# Patient Record
Sex: MALE | Race: WHITE | NOT HISPANIC OR LATINO | Employment: OTHER | URBAN - METROPOLITAN AREA
[De-identification: names, ages, dates, MRNs, and addresses within clinical notes are randomized per-mention and may not be internally consistent; named-entity substitution may affect disease eponyms.]

---

## 2017-02-09 ENCOUNTER — TELEPHONE (OUTPATIENT)
Dept: DERMATOLOGY | Facility: CLINIC | Age: 47
End: 2017-02-09

## 2017-02-09 NOTE — TELEPHONE ENCOUNTER
RN called pt and pt states that he has never had a skin check before. RN notified pt that skin check is okay on 3/10/17. Appt notes updated...Cathryn Ross RN

## 2017-02-09 NOTE — TELEPHONE ENCOUNTER
[facility] Call Center    Phone Message    Name of Caller: SAUMYA VAIL    Phone Number: Home number on file [phone] (home)    Best time to return call: ANY. Has appt on 3.10.17 with Dr Woods.  Wants to know if she can do a skin check at that appt.    May a detailed message be left on voicemail: yes    Relation to patient: Self    Reason for Call: Other: Call and advise patient. Thanks.     Action Taken: Message routed to:  Adult Clinics: Dermatology p [phone]

## 2017-03-10 ENCOUNTER — OFFICE VISIT (OUTPATIENT)
Dept: DERMATOLOGY | Facility: CLINIC | Age: 47
End: 2017-03-10
Payer: COMMERCIAL

## 2017-03-10 DIAGNOSIS — B07.9 VIRAL WARTS, UNSPECIFIED TYPE: Primary | ICD-10-CM

## 2017-03-10 DIAGNOSIS — D22.9 MULTIPLE BENIGN NEVI: ICD-10-CM

## 2017-03-10 DIAGNOSIS — Z92.25 HISTORY OF IMMUNOSUPPRESSION: ICD-10-CM

## 2017-03-10 PROCEDURE — 17110 DESTRUCTION B9 LES UP TO 14: CPT | Performed by: DERMATOLOGY

## 2017-03-10 NOTE — LETTER
3/10/2017       RE: Jorge Amaral  [address] [facility] [address]     Dear Colleague,    Thank you for referring your patient, Jorge Amaral, to the [facility]. Please see a copy of my visit note below.    [facility] Dermatology Note    Dermatology Problem List:  1. Bowenoid papulosis, left abdomen  -s/p biopsy 4/18/15  -s/p Efudex x 6 weeks initiated 5/6/15, marked resolved 8/2015  2. Verruca plantaris  -s/p cantherone, trichloracetic acid, cryotherapy and PDL with Dr. Chris Stoddard, s/p candida 3X  -s/p cryotherapy  3. History of cold sore  4. Hyperpigmented patches, scalp, post inflammatory hyperpigmentation 2/2 to shaving,   -s/p fluocinolone   5. Tinea versicolor, back and chest  -s/p ketoconazole 2% cream initiated 10/23/2015  6. History of immunosuppression, Humaria, Crohn's     Encounter Date: Mar 10, 2017    CC:  Chief Complaint   Patient presents with     RECHECK     History of Present Illness:  This 46 year old male presents as a follow up for verruca plantaris and total skin exam for history of immunosuppression. Reports no lesions on the genitals and declines exam there today. Reports warts on hand and foot. No other new skin concerns.     Past Medical History:   Past Medical History   Diagnosis Date     Crohn's disease (H)      Hyperlipidemia LDL goal < 160      not on meds   Fracture, metacarpal  Delayed union of fracture  ACL tear, recurrent    Past Surgical History   Procedure Laterality Date     Appendectomy       Laparoscopy procedure unlisted       C stomach surgery procedure unlisted       Open reduction internal fixation hand       right IV MCP hand 7/11     Remove hardware lower extremity  4/6/2012     Procedure:REMOVE HARDWARE LOWER EXTREMITY; REMOVAL OF RIGHT TIBIAL SCREW; Surgeon:JOSE LUIS CARMICHAEL; Location:[facility]     Ent surgery  6-28-13     Left Tonsil biopsy     Social History:  The  patient works as a  but is retiring. The patient uses 1-2  alcoholic beverages per week. The patient admits to use of tanning beds.    Family History:  There is no family history of skin cancer.  There is no family history of psoriasis, eczema or allergies.  There is a family history of asthma.    Medications:  Current Outpatient Prescriptions   Medication Sig Dispense Refill     valACYclovir (VALTREX) 1000 mg tablet two tablets by mouth twice daily for 1 day at onset of symptoms of a cold sore. 30 tablet 0     mercaptopurine (PURINETHOL) 50 MG tablet Take 50 mg by mouth daily. 1 and half tabs daily       adalimumab (HUMIRA) 40 MG/0.8ML injection Inject 40 mg Subcutaneous once.       PREDNISONE PO Take 10 mg by mouth daily Reported on 3/10/2017       No Known Allergies    Review of Systems:  -Skin: As above in HPI. No additional skin concerns.    Physical exam:  There were no vitals taken for this visit.  -This is a well developed, well-nourished male in no acute distress, in a pleasant mood.    SKIN: Focused examination of the bilateral toes was performed.  -There are verrucous papules with thrombosed capillaries interrupting dermatoglyphics on the right distal 2nd toe and left periungual toe and index finger tip.  -No other lesions of concern on areas examined.     Impression/Plan:  1. Verrucous papule, right distal 2nd toe and left periungual toe and index finger s/p cryotherapy with improvement  Cryotherapy procedure note: After verbal consent and discussion of risks and benefits including but no limited to dyspigmentation/scar, blister, and pain, 3 was treated with 1-2mm freeze border for 2 cycles with liquid nitrogen. Post cryotherapy instructions were provided.   2. History of immunosuppression- reviewed increased risks with the skin and need for regular skin exams.   Follow up in 3 weeks for wart, earlier for new or changing lesions.       Staff Involved:  Staff/January hillman  also present.     Sasha Woods MD    Department of Dermatology  [facility]: Phone: [phone], Fax:[phone]  [facility]: Phone: [phone], Fax: [phone]

## 2017-03-10 NOTE — PATIENT INSTRUCTIONS
Cryotherapy    What is it?    Use of a very cold liquid, such as liquid nitrogen, to freeze and destroy abnormal skin cells that need to be removed    What should I expect?    Tenderness and redness    A small blister that might grow and fill with dark purple blood. There may be crusting.    More than one treatment may be needed if the lesions do not go away.    How do I care for the treated area?    Gently wash the area with your hands when bathing.    Use a thin layer of Vaseline to help with healing. You may use a Band-Aid.     The area should heal within 7-10 days and may leave behind a pink or lighter color.     Do not use an antibiotic or Neosporin ointment.     You may take acetaminophen (Tylenol) for pain.     Call your Doctor if you have:    Severe pain    Signs of infection (warmth, redness, cloudy yellow drainage, and or a bad smell)    Questions or concerns    Who should I call with questions?       [facility]: [phone]       [facility]: [phone]       For urgent needs outside of business hours call the [facility] at [phone]        and ask for the dermatology resident on call

## 2017-03-10 NOTE — NURSING NOTE
Dermatology Rooming Note    Jorge Amaral's goals for this visit include:   Chief Complaint   Patient presents with     RECHECK       Is a scribe okay for this visit:YES,     Are records needed for this visit(If yes, obtain release of information): Not applicable, .     Vitals: There were no vitals taken for this visit.    Referring Provider:  No referring provider defined for this encounter.

## 2017-03-10 NOTE — MR AVS SNAPSHOT
After Visit Summary   3/10/2017    Jorge Amaral    MRN: [id number]           Patient Information     Date Of Birth          1970        Visit Information        Provider Department      3/10/2017 11:15 AM Sasha Woods MD [facility]        Today's Diagnoses     Viral warts, unspecified type    -  1    Multiple benign nevi        History of immunosuppression          Care Instructions    Cryotherapy    What is it?    Use of a very cold liquid, such as liquid nitrogen, to freeze and destroy abnormal skin cells that need to be removed    What should I expect?    Tenderness and redness    A small blister that might grow and fill with dark purple blood. There may be crusting.    More than one treatment may be needed if the lesions do not go away.    How do I care for the treated area?    Gently wash the area with your hands when bathing.    Use a thin layer of Vaseline to help with healing. You may use a Band-Aid.     The area should heal within 7-10 days and may leave behind a pink or lighter color.     Do not use an antibiotic or Neosporin ointment.     You may take acetaminophen (Tylenol) for pain.     Call your Doctor if you have:    Severe pain    Signs of infection (warmth, redness, cloudy yellow drainage, and or a bad smell)    Questions or concerns    Who should I call with questions?       [facility]: [phone]       [facility]: [phone]       For urgent needs outside of business hours call the [facility] at [phone]        and ask for the dermatology resident on call          Follow-ups after your visit        Your next 10 appointments already scheduled     Apr 04, 2017  9:15 AM CDT   [facility] Visit with Thad Lott MD   [facility]    [address] [place] [id number] [phone]            Mar 06, 2018  9:30 AM CST    [facility] Visit with Sasha Woods MD   [facility]    [address] [address] [place] [address] [phone]              Who to contact     If you have questions or need follow up information about today's clinic visit or your schedule please contact [facility] directly at [phone].  Normal or non-critical lab and imaging results will be communicated to you by [employer], letter or phone within 4 business days after the clinic has received the results. If you do not hear from us within 7 days, please contact the clinic through [employer] or phone. If you have a critical or abnormal lab result, we will notify you by phone as soon as possible.  Submit refill requests through [employer] or call your pharmacy and they will forward the refill request to us. Please allow 3 business days for your refill to be completed.          Additional Information About Your Visit        [employer] Information     [employer] gives you secure access to your electronic health record. If you see a primary care provider, you can also send messages to your care team and make appointments. If you have questions, please call your primary care clinic.  If you do not have a primary care provider, please call [phone] and they will assist you.      [employer] is an electronic gateway that provides easy, online access to your medical records. With [employer], you can request a clinic appointment, read your test results, renew a prescription or communicate with your care team.     To access your existing account, please contact your [facility] or call [phone] for assistance.        Care EveryWhere ID     This is your Care EveryWhere ID. This could be used by other organizations to access your [facility] medical records  [id number]         Blood Pressure from Last 3 Encounters:   12/09/16 107/73   08/10/16 123/65   03/08/16 117/74    Weight from Last 3  Encounters:   12/09/16 100.2 kg (221 lb)   08/10/16 97.5 kg (215 lb)   03/08/16 98.9 kg (218 lb)              Today, you had the following     No orders found for display       Primary Care Provider Office Phone # Fax #    Vince Swain PA-C [phone]       [facility] [address] [facility] [address]        Thank you!     Thank you for choosing [facility]  for your care. Our goal is always to provide you with excellent care. Hearing back from our patients is one way we can continue to improve our services. Please take a few minutes to complete the written survey that you may receive in the mail after your visit with us. Thank you!             Your Updated Medication List - Protect others around you: Learn how to safely use, store and throw away your medicines at www.disposemymeds.org.          This list is accurate as of: 3/10/17 12:07 PM.  Always use your most recent med list.                   Brand Name Dispense Instructions for use    HUMIRA 40 MG/0.8ML injection   Generic drug:  adalimumab      Inject 40 mg Subcutaneous once.       PREDNISONE PO      Take 10 mg by mouth daily Reported on 3/10/2017       PURINETHOL 50 MG tablet CHEMO   Generic drug:  mercaptopurine      Take 50 mg by mouth daily. 1 and half tabs daily       valACYclovir 1000 mg tablet    VALTREX    30 tablet    two tablets by mouth twice daily for 1 day at onset of symptoms of a cold sore.

## 2017-03-10 NOTE — NURSING NOTE
[facility] Dermatology Note      Dermatology Problem List:  1. History of immunosuppression, Cronh's  2. Bowenoid papulosis, left abdomen  -s/p biopsy 4/18/15  -s/p Efudex x 6 weeks initiated 5/6/15, marked resolved 8/2015  3. Verruca plantaris  -Previous Tx: cantherone, trichloracetic acid, cryotherapy and PDL with Dr. Chris Stoddard, s/p candida 3X, cryotherapy  4. Tinea versicolor, back and chest  -Previous Tx:  ketoconazole 2% cream initiated 10/23/2015    Encounter Date: Mar 10, 2017    CC:  Chief Complaint   Patient presents with     RECHECK         History of Present Illness:  Mr. Jorge Amaral is a 46 year old male who presents as a follow-up for warts. The patient was last seen 7/14/2016 when 1 wart was treated with cryotherapy and 1 treated with cantherone. Today, the patient reports new warts on the left 5th finger and right 2nd toe. The patient reports no other lesions of concern.      Past Medical History:   Patient Active Problem List   Diagnosis     Crohns disease (H)     CARDIOVASCULAR SCREENING; LDL GOAL LESS THAN 160     Fracture, metacarpal     Delayed union of fracture     Family history of diabetes mellitus     ACL (anterior cruciate ligament) tear--recurrent     Cold sore     Common wart     Past Medical History   Diagnosis Date     Crohn's disease (H)      Hyperlipidemia LDL goal < 160      not on meds     Past Surgical History   Procedure Laterality Date     Appendectomy       Laparoscopy procedure unlisted       C stomach surgery procedure unlisted       Open reduction internal fixation hand       right IV MCP hand 7/11     Remove hardware lower extremity  4/6/2012     Procedure:REMOVE HARDWARE LOWER EXTREMITY; REMOVAL OF RIGHT TIBIAL SCREW; Surgeon:JOSE LUIS CARMICHAEL; Location:[facility]     Ent surgery  6-28-13     Left Tonsil biopsy     Social History:  The patient works as a  but is retiring. The patient uses 1-2 alcoholic beverages per week. The  patient admits to use of tanning beds.     Family History:  There is no family history of skin cancer.  There is no family history of psoriasis, eczema or allergies.  There is a family history of asthma.    Medications:  Current Outpatient Prescriptions   Medication Sig Dispense Refill     valACYclovir (VALTREX) 1000 mg tablet two tablets by mouth twice daily for 1 day at onset of symptoms of a cold sore. 30 tablet 0     mercaptopurine (PURINETHOL) 50 MG tablet Take 50 mg by mouth daily. 1 and half tabs daily       adalimumab (HUMIRA) 40 MG/0.8ML injection Inject 40 mg Subcutaneous once.       PREDNISONE PO Take 10 mg by mouth daily Reported on 3/10/2017       No Known Allergies    Review of Systems:  -Skin: As above in HPI. No additional skin concerns.    Physical exam:  Vitals: There were no vitals taken for this visit.  GEN: This is a well developed, well-nourished male in no acute distress, in a pleasant mood.    SKIN: Total skin excluding the undergarment areas was performed. The exam included the head/face, neck, both arms, chest, back, abdomen, both legs, digits and/or nails.   -There are verrucous papules with thrombosed capillaries interrupting dermatoglyphics on the right 2nd toe, left 3rd toe and left 2nd finger.  -Multiple regular brown pigmented macules and papules are identified on the trunk.   -No other lesions of concern on areas examined.     Impression/Plan:  1. History of immunosuppression  2. Verrucous plantaris, right 2nd toe, left 3rd toe    Cryotherapy procedure note: After verbal consent and discussion of risks and benefits including but no limited to dyspigmentation/scar, blister, and pain, 2 were treated with 1-2mm freeze border for 2 cycles with liquid nitrogen. Post cryotherapy instructions were provided.  3. Verrucous vulgaris, left 2nd finger    Cryotherapy procedure note: After verbal consent and discussion of risks and benefits including but no limited to dyspigmentation/scar,  blister, and pain, 1 was  treated with 1-2mm freeze border for 2 cycles with liquid nitrogen. Post cryotherapy instructions were provided.   4. Multiple clinically benign nevi on the trunk    Discussed benign nature, no further intervention required at this time.     Follow up in 3 weeks for warts, earlier for new or changing lesions.     Staff Involved:  Scribe/Staff    Scribe Disclosure:   I, Kaykay Naranjo, am serving as a scribe to document services personally performed by Dr. Sasha Woods, based on data collection and the provider's statements to me.

## 2017-03-11 NOTE — PROGRESS NOTES
[facility] Dermatology Note    Dermatology Problem List:  1. Bowenoid papulosis, left abdomen  -s/p biopsy 4/18/15  -s/p Efudex x 6 weeks initiated 5/6/15, marked resolved 8/2015  2. Verruca plantaris  -s/p cantherone, trichloracetic acid, cryotherapy and PDL with Dr. Chris Stoddard, s/p candida 3X  -s/p cryotherapy  3. History of cold sore  4. Hyperpigmented patches, scalp, post inflammatory hyperpigmentation 2/2 to shaving,   -s/p fluocinolone   5. Tinea versicolor, back and chest  -s/p ketoconazole 2% cream initiated 10/23/2015  6. History of immunosuppression, Humaria, Crohn's     Encounter Date: Mar 10, 2017    CC:  Chief Complaint   Patient presents with     RECHECK     History of Present Illness:  This 46 year old male presents as a follow up for verruca plantaris and total skin exam for history of immunosuppression. Reports no lesions on the genitals and declines exam there today. Reports warts on hand and foot. No other new skin concerns.     Past Medical History:   Past Medical History   Diagnosis Date     Crohn's disease (H)      Hyperlipidemia LDL goal < 160      not on meds   Fracture, metacarpal  Delayed union of fracture  ACL tear, recurrent    Past Surgical History   Procedure Laterality Date     Appendectomy       Laparoscopy procedure unlisted       C stomach surgery procedure unlisted       Open reduction internal fixation hand       right IV MCP hand 7/11     Remove hardware lower extremity  4/6/2012     Procedure:REMOVE HARDWARE LOWER EXTREMITY; REMOVAL OF RIGHT TIBIAL SCREW; Surgeon:JOSE LUIS CARMICHAEL; Location:[facility]     Ent surgery  6-28-13     Left Tonsil biopsy     Social History:  The patient works as a  but is retiring. The patient uses 1-2  alcoholic beverages per week. The patient admits to use of tanning beds.    Family History:  There is no family history of skin cancer.  There is no family history of psoriasis, eczema or allergies.  There is  a family history of asthma.    Medications:  Current Outpatient Prescriptions   Medication Sig Dispense Refill     valACYclovir (VALTREX) 1000 mg tablet two tablets by mouth twice daily for 1 day at onset of symptoms of a cold sore. 30 tablet 0     mercaptopurine (PURINETHOL) 50 MG tablet Take 50 mg by mouth daily. 1 and half tabs daily       adalimumab (HUMIRA) 40 MG/0.8ML injection Inject 40 mg Subcutaneous once.       PREDNISONE PO Take 10 mg by mouth daily Reported on 3/10/2017       No Known Allergies    Review of Systems:  -Skin: As above in HPI. No additional skin concerns.    Physical exam:  There were no vitals taken for this visit.  -This is a well developed, well-nourished male in no acute distress, in a pleasant mood.    SKIN: Focused examination of the bilateral toes was performed.  -There are verrucous papules with thrombosed capillaries interrupting dermatoglyphics on the right distal 2nd toe and left periungual toe and index finger tip.  -No other lesions of concern on areas examined.     Impression/Plan:  1. Verrucous papule, right distal 2nd toe and left periungual toe and index finger s/p cryotherapy with improvement  Cryotherapy procedure note: After verbal consent and discussion of risks and benefits including but no limited to dyspigmentation/scar, blister, and pain, 3 was treated with 1-2mm freeze border for 2 cycles with liquid nitrogen. Post cryotherapy instructions were provided.   2. History of immunosuppression- reviewed increased risks with the skin and need for regular skin exams.   Follow up in 3 weeks for wart, earlier for new or changing lesions.       Staff Involved:  Staff/January hillman also present.     Sasha Woods MD    Department of Dermatology  [facility]: Phone: [phone], Fax:[phone]  [facility]: Phone: [phone], Fax:  [phone]

## 2017-04-28 ENCOUNTER — RADIANT APPOINTMENT (OUTPATIENT)
Dept: GENERAL RADIOLOGY | Facility: CLINIC | Age: 47
End: 2017-04-28
Attending: FAMILY MEDICINE
Payer: COMMERCIAL

## 2017-04-28 ENCOUNTER — OFFICE VISIT (OUTPATIENT)
Dept: FAMILY MEDICINE | Facility: CLINIC | Age: 47
End: 2017-04-28
Payer: COMMERCIAL

## 2017-04-28 VITALS
BODY MASS INDEX: 27.34 KG/M2 | TEMPERATURE: 97 F | WEIGHT: 213 LBS | DIASTOLIC BLOOD PRESSURE: 70 MMHG | SYSTOLIC BLOOD PRESSURE: 105 MMHG | OXYGEN SATURATION: 96 % | HEART RATE: 65 BPM | HEIGHT: 74 IN

## 2017-04-28 DIAGNOSIS — M79.672 PAIN OF LEFT HEEL: Primary | ICD-10-CM

## 2017-04-28 DIAGNOSIS — M79.672 PAIN OF LEFT HEEL: ICD-10-CM

## 2017-04-28 PROCEDURE — 73650 X-RAY EXAM OF HEEL: CPT | Mod: LT

## 2017-04-28 PROCEDURE — 99213 OFFICE O/P EST LOW 20 MIN: CPT | Performed by: FAMILY MEDICINE

## 2017-04-28 NOTE — MR AVS SNAPSHOT
After Visit Summary   4/28/2017    Jorge Amaral    MRN: [id number]           Patient Information     Date Of Birth          1970        Visit Information        Provider Department      4/28/2017 10:50 AM Candido Dozier MD [facility]        Today's Diagnoses     Pain of left heel    -  1      Care Instructions    1. Your xray is fine.    2. Do achilles stretch on a step, achilles stretch against the wall, roll tennis ball under the foot.    3. Wear well padded shoes. Otherwise you can try a heel cup (gel type) which you can get from any pharmacy.    4. If the pain is not getting better see our foot specialist [phone].          Follow-ups after your visit        Your next 10 appointments already scheduled     May 09, 2017 10:15 AM CDT   Return Visit with Thad Lott MD   [facility]    [address] [place] [id number] [phone]            Mar 06, 2018  9:30 AM CST   Return Visit with Sasha Woods MD   [facility] ([facility])    [address] [place] [id number] [phone]              Who to contact     If you have questions or need follow up information about today's clinic visit or your schedule please contact [facility] directly at [phone].  Normal or non-critical lab and imaging results will be communicated to you by MyChart, letter or phone within 4 business days after the clinic has received the results. If you do not hear from us within 7 days, please contact the clinic through MyChart or phone. If you have a critical or abnormal lab result, we will notify you by phone as soon as possible.  Submit refill requests through Mind Palette or call your pharmacy and they will forward the refill request to us. Please allow 3 business days for your refill to be completed.          Additional Information About Your Visit        [facility]  "Information     Roque gives you secure access to your electronic health record. If you see a primary care provider, you can also send messages to your care team and make appointments. If you have questions, please call your primary care clinic.  If you do not have a primary care provider, please call [phone] and they will assist you.        Care EveryWhere ID     This is your Care EveryWhere ID. This could be used by other organizations to access your [place] medical records  [id number]        Your Vitals Were     Pulse Temperature Height Pulse Oximetry BMI (Body Mass Index)       65 97  F (36.1  C) (Oral) 6' 2\" (1.88 m) 96% 27.35 kg/m2        Blood Pressure from Last 3 Encounters:   04/28/17 105/70   12/09/16 107/73   08/10/16 123/65    Weight from Last 3 Encounters:   04/28/17 213 lb (96.6 kg)   12/09/16 221 lb (100.2 kg)   08/10/16 215 lb (97.5 kg)               Primary Care Provider Office Phone # Fax #    Vince Swain PA-C [phone]       [facility] [address] [facility] [address]        Thank you!     Thank you for choosing [facility]  for your care. Our goal is always to provide you with excellent care. Hearing back from our patients is one way we can continue to improve our services. Please take a few minutes to complete the written survey that you may receive in the mail after your visit with us. Thank you!             Your Updated Medication List - Protect others around you: Learn how to safely use, store and throw away your medicines at www.disposemymeds.org.          This list is accurate as of: 4/28/17 12:19 PM.  Always use your most recent med list.                   Brand Name Dispense Instructions for use    HUMIRA 40 MG/0.8ML injection   Generic drug:  adalimumab      Inject 40 mg Subcutaneous once.       PREDNISONE PO      Take 10 mg by mouth daily Reported on 3/10/2017       PURINETHOL 50 MG tablet CHEMO   Generic drug:  " mercaptopurine      Take 50 mg by mouth daily. 1 and half tabs daily       valACYclovir 1000 mg tablet    VALTREX    30 tablet    two tablets by mouth twice daily for 1 day at onset of symptoms of a cold sore.

## 2017-04-28 NOTE — PATIENT INSTRUCTIONS
1. Your xray is fine.    2. Do achilles stretch on a step, achilles stretch against the wall, roll tennis ball under the foot.    3. Wear well padded shoes. Otherwise you can try a heel cup (gel type) which you can get from any pharmacy.    4. If the pain is not getting better see our foot specialist [phone].

## 2017-04-28 NOTE — PROGRESS NOTES
"  HPI:    Jorge Amaral is an 46 year old male who presents for evaluation of left heel pain.    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 Also he was in [employer] tournament and may have kicked using his left heel.  Location: bottom of feet over the calcaneous on the plantar aspect  Severity: mild - he says he just wants to make sure there is nothing serious  Aggravated by: walking after prolonged inactivity  Alleviated by: activity  Other symptoms: none      XR CALCANEUS LT G/E 2 VW 4/28/2017 11:55 AM     HISTORY: Pain.     COMPARISON: None.         IMPRESSION: No evidence of acute fracture. Tiny calcaneal bone spur.     YARED GABRIEL MD    ROS:    No joint swelling. No numbness.     Exam:    /70 (Cuff Size: Adult Large)  Pulse 65  Temp 97  F (36.1  C) (Oral)  Ht 6' 2\" (1.88 m)  Wt 213 lb (96.6 kg)  SpO2 96%  BMI 27.35 kg/m2    Gen: Healthy appearing male in no acute distress  CV: Left DP pulses normal. The tips of the toes are warm with good capillary refills.  MS: there is typical tenderness over the left heel on the plantar aspects. There is also tenderness when squeezing the calcaneous. Otherwise both feet normal by inspection and palpation.      Assessment and Plan - Decision Making    1. Pain of left heel  See below.  - XR Calcaneus Left G/E 2 Views; Future      Written instructions given as follows:    Patient Instructions   1. Your xray is fine.    2. Do achilles stretch on a step, achilles stretch against the wall, roll tennis ball under the foot.    3. Wear well padded shoes. Otherwise you can try a heel cup (gel type) which you can get from any pharmacy.    4. If the pain is not getting better see our foot specialist [phone].        "

## 2017-05-09 ENCOUNTER — OFFICE VISIT (OUTPATIENT)
Dept: DERMATOLOGY | Facility: CLINIC | Age: 47
End: 2017-05-09
Payer: COMMERCIAL

## 2017-05-09 DIAGNOSIS — D84.9 IMMUNOSUPPRESSION (H): ICD-10-CM

## 2017-05-09 DIAGNOSIS — B07.8 PERIUNGUAL WART: ICD-10-CM

## 2017-05-09 DIAGNOSIS — L82.1 SEBORRHEIC KERATOSIS: ICD-10-CM

## 2017-05-09 DIAGNOSIS — B07.8 COMMON WART: Primary | ICD-10-CM

## 2017-05-09 PROCEDURE — 17110 DESTRUCTION B9 LES UP TO 14: CPT | Performed by: DERMATOLOGY

## 2017-05-09 PROCEDURE — 99212 OFFICE O/P EST SF 10 MIN: CPT | Mod: 25 | Performed by: DERMATOLOGY

## 2017-05-09 RX ORDER — FLUOROURACIL 50 MG/G
CREAM TOPICAL
Qty: 40 G | Refills: 0 | Status: SHIPPED | OUTPATIENT
Start: 2017-05-09 | End: 2018-05-16

## 2017-05-09 NOTE — PROGRESS NOTES
[facility] Dermatology Note      Dermatology Problem List:  1. History of immunosuppression, Cronh's  2. Bowenoid papulosis, left abdomen  -s/p biopsy 4/18/15  -s/p Efudex x 6 weeks initiated 5/6/15, marked resolved 8/2015  3. Verruca plantaris  -Previous Tx: cantherone, trichloracetic acid, cryotherapy and PDL with Dr. Chris Stoddard, s/p candida 3X, cryotherapy  4. Tinea versicolor, back and chest  -Previous Tx:  ketoconazole 2% cream initiated 10/23/2015    Encounter Date: May 9, 2017    CC:  Chief Complaint   Patient presents with     RECHECK         History of Present Illness:  Mr. Jorge Amaral is a 46 year old male with history of immunosuppression presents as a follow-up for warts. The patient was last seen 3/10/2017 by Dr. Woods when 3 warts were treated with cryotherapy. Today, the patient reports a new lesion on the right forearm that used to be raised but has since scratched off. The patient reports no other lesions of concern.     Past Medical History:   Patient Active Problem List   Diagnosis     Crohns disease (H)     CARDIOVASCULAR SCREENING; LDL GOAL LESS THAN 160     Fracture, metacarpal     Delayed union of fracture     Family history of diabetes mellitus     ACL (anterior cruciate ligament) tear--recurrent     Cold sore     Common wart     Past Medical History:   Diagnosis Date     Crohn's disease (H)      Hyperlipidemia LDL goal < 160     not on meds     Past Surgical History:   Procedure Laterality Date     APPENDECTOMY       C STOMACH SURGERY PROCEDURE UNLISTED       ENT SURGERY  6-28-13    Left Tonsil biopsy     LAPAROSCOPY PROCEDURE UNLISTED       OPEN REDUCTION INTERNAL FIXATION HAND      right IV MCP hand 7/11     REMOVE HARDWARE LOWER EXTREMITY  4/6/2012    Procedure:REMOVE HARDWARE LOWER EXTREMITY; REMOVAL OF RIGHT TIBIAL SCREW; Surgeon:JOSE LUIS CARMICHAEL; Location:[facility]     Social History:  The patient works as a  but is retiring. The  patient uses 1-2  alcoholic beverages per week. The patient admits to use of tanning beds.    Family History:  There is no family history of skin cancer.  There is no family history of psoriasis, eczema or allergies.  There is a family history of asthma.    Medications:  Current Outpatient Prescriptions   Medication Sig Dispense Refill     PREDNISONE PO Take 10 mg by mouth daily Reported on 3/10/2017       valACYclovir (VALTREX) 1000 mg tablet two tablets by mouth twice daily for 1 day at onset of symptoms of a cold sore. 30 tablet 0     mercaptopurine (PURINETHOL) 50 MG tablet Take 50 mg by mouth daily. 1 and half tabs daily       adalimumab (HUMIRA) 40 MG/0.8ML injection Inject 40 mg Subcutaneous once.       No Known Allergies    Review of Systems:  -Skin: As above in HPI. No additional skin concerns.    Physical exam:  There were no vitals taken for this visit.  -This is a well developed, well-nourished male in no acute distress, in a pleasant mood.    SKIN: Focused examination of the hands and feet was performed.  -2mm papilloma on the left pinky.  -There is verrucous papules with thrombosed capillaries interrupting dermatoglyphics on the right 2nd toe hyponychium.   -There is a waxy stuck on tan to brown papule on the right forearm.  -No other lesions of concern on areas examined.     Impression/Plan:  1. Verrucous vulgaris, right 2nd toe hyponychium, left 5th finger with Immunosuppression which makes treatment of warts difficult AND most immunologically based treatments such as imiquimod to not work.  Cryotherapy procedure note: After verbal consent and discussion of risks and benefits including but no limited to dyspigmentation/scar, blister, and pain, 2 were treated with 1-2mm freeze border for 2 cycles with liquid nitrogen. Post cryotherapy instructions were provided.   Start Efudex twice daily for 2-3 weeks or until the onset of irritation.   2. Seborrheic keratosis, right forearm    No further  intervention required at this time.     Follow up in 4-8 weeks, earlier for new or changing lesions.     Staff Involved:  Staff/Scribe    Scribe Disclosure:   I, Kaykay Naranjo, am serving as a scribe to document services personally performed by Dr. Thad Lott, based on data collection and the provider's statements to me.      Provider Disclosure:   I have reviewed the documentation recorded by the scribe and have edited it as needed. I have personally performed the services documented here and the documentation accurately represents those services and the decisions made by me.     Thad Lott MD, MS    Department of Dermatology  [facility]: Phone: [phone], Fax:[phone]  [facility]: Phone: [phone], Fax: [phone]

## 2017-05-09 NOTE — PATIENT INSTRUCTIONS
Efudex Treatment    Today, you are being prescribed Fluorouracil (Efudex) a topical cream used for the treatment of Actinic Keratosis (AK's).  The medication is working to eliminate the unhealthy cells. Even though this treatment may be unattractive and somewhat uncomfortable.    Your treatment will last twice daily for 2-3 weeks or until the onset of irritation on the right 2nd toe and left pinky finger.  You may experience some mild discomfort while being treated.    You will want to stop any other creams such as glycolic acid products, retin A, Tazorac, etc. to the area. You may use bland makeup/cover-up as long as it doesn't sting or cause you discomfort.    Apply the cream at night as your physician recommends. Use a cotton-tipped applicator, or use gloves if applying it with your fingertips. If applied with unprotected fingertips, it is important to wash your hands well after you apply this medicine.     Keep this medication away from pets.    We recommend avoiding excessive sun exposure to the treated area    You may use moisturizing creams over bothersome areas such as Vanicream or Cetaphil cream if the reaction becomes too bothersome. Please, call the clinic if this occurs.   Potential Side Effects    Your treated areas may be unsightly during therapy.  This will improve slowly following the discontinuation of therapy.     During the first week of application, mild inflammation may occur.     During the following weeks, redness, and swelling may occur with some crusting and burning.     Lesions resolve as the skin exfoliates.     Over 1 to 2 weeks, new skin grows into the treatment area.    Keep this medication away from pets  Specific side effects that usually do not require medical attention (report to your doctor or health care professional if they continue or are bothersome) include: Red or dark-colored skin     Mild erosion (loss of upper layer of skin)     Mild eye irritation including burning,  itching, sensitivity, stinging, or watering     Increased sensitivity of the skin to sun and ultraviolet light     Pain and burning of the affected area     Dryness, scaling or swelling of the affected area     Skin rash, itching of the affected area     Tenderness   Who should I call with questions?     [facility]: [phone]     [facility]: [phone]     For urgent needs outside of business hours call the [facility] at [phone]  and ask for the dermatology resident on call        Cryotherapy    What is it?    Use of a very cold liquid, such as liquid nitrogen, to freeze and destroy abnormal skin cells that need to be removed    What should I expect?    Tenderness and redness    A small blister that might grow and fill with dark purple blood. There may be crusting.    More than one treatment may be needed if the lesions do not go away.    How do I care for the treated area?    Gently wash the area with your hands when bathing.    Use a thin layer of Vaseline to help with healing. You may use a Band-Aid.     The area should heal within 7-10 days and may leave behind a pink or lighter color.     Do not use an antibiotic or Neosporin ointment.     You may take acetaminophen (Tylenol) for pain.     Call your Doctor if you have:    Severe pain    Signs of infection (warmth, redness, cloudy yellow drainage, and or a bad smell)    Questions or concerns    Who should I call with questions?       [facility]: [phone]       [facility]: [phone]       For urgent needs outside of business hours call the [facility] at [phone]        and ask for the dermatology resident on call

## 2017-05-09 NOTE — MR AVS SNAPSHOT
After Visit Summary   5/9/2017    Jorge Amaral    MRN: [id number]           Patient Information     Date Of Birth          1970        Visit Information        Provider Department      5/9/2017 10:15 AM Thad Lott MD [facility]        Today's Diagnoses     Common wart    -  1    Periungual wart        Seborrheic keratosis        Immunosuppression (H)          Care Instructions    Efudex Treatment    Today, you are being prescribed Fluorouracil (Efudex) a topical cream used for the treatment of Actinic Keratosis (AK's).  The medication is working to eliminate the unhealthy cells. Even though this treatment may be unattractive and somewhat uncomfortable.    Your treatment will last twice daily for 2-3 weeks or until the onset of irritation on the right 2nd toe and left pinky finger.  You may experience some mild discomfort while being treated.    You will want to stop any other creams such as glycolic acid products, retin A, Tazorac, etc. to the area. You may use bland makeup/cover-up as long as it doesn't sting or cause you discomfort.    Apply the cream at night as your physician recommends. Use a cotton-tipped applicator, or use gloves if applying it with your fingertips. If applied with unprotected fingertips, it is important to wash your hands well after you apply this medicine.     Keep this medication away from pets.    We recommend avoiding excessive sun exposure to the treated area    You may use moisturizing creams over bothersome areas such as Vanicream or Cetaphil cream if the reaction becomes too bothersome. Please, call the clinic if this occurs.   Potential Side Effects    Your treated areas may be unsightly during therapy.  This will improve slowly following the discontinuation of therapy.     During the first week of application, mild inflammation may occur.     During the following weeks, redness, and swelling may occur with some crusting and burning.      Lesions resolve as the skin exfoliates.     Over 1 to 2 weeks, new skin grows into the treatment area.    Keep this medication away from pets  Specific side effects that usually do not require medical attention (report to your doctor or health care professional if they continue or are bothersome) include: Red or dark-colored skin     Mild erosion (loss of upper layer of skin)     Mild eye irritation including burning, itching, sensitivity, stinging, or watering     Increased sensitivity of the skin to sun and ultraviolet light     Pain and burning of the affected area     Dryness, scaling or swelling of the affected area     Skin rash, itching of the affected area     Tenderness   Who should I call with questions?     [facility]: [phone]     [facility]: [phone]     For urgent needs outside of business hours call the [facility] at [phone]  and ask for the dermatology resident on call        Cryotherapy    What is it?    Use of a very cold liquid, such as liquid nitrogen, to freeze and destroy abnormal skin cells that need to be removed    What should I expect?    Tenderness and redness    A small blister that might grow and fill with dark purple blood. There may be crusting.    More than one treatment may be needed if the lesions do not go away.    How do I care for the treated area?    Gently wash the area with your hands when bathing.    Use a thin layer of Vaseline to help with healing. You may use a Band-Aid.     The area should heal within 7-10 days and may leave behind a pink or lighter color.     Do not use an antibiotic or Neosporin ointment.     You may take acetaminophen (Tylenol) for pain.     Call your Doctor if you have:    Severe pain    Signs of infection (warmth, redness, cloudy yellow drainage, and or a bad smell)    Questions or concerns    Who should I call with questions?       [facility]  [place]: [phone]       [facility]: [phone]       For urgent needs outside of business hours call the [facility] at [phone]        and ask for the dermatology resident on call          Follow-ups after your visit        Your next 10 appointments already scheduled     Mar 06, 2018  9:30 AM CST   Return Visit with Sasha Woods MD   [facility]    [address] [place] [id number] [phone]              Who to contact     If you have questions or need follow up information about today's clinic visit or your schedule please contact [facility] directly at [phone].  Normal or non-critical lab and imaging results will be communicated to you by Eyegroovehart, letter or phone within 4 business days after the clinic has received the results. If you do not hear from us within 7 days, please contact the clinic through Eyegroovehart or phone. If you have a critical or abnormal lab result, we will notify you by phone as soon as possible.  Submit refill requests through Lanica or call your pharmacy and they will forward the refill request to us. Please allow 3 business days for your refill to be completed.          Additional Information About Your Visit        Lanica Information     Lanica gives you secure access to your electronic health record. If you see a primary care provider, you can also send messages to your care team and make appointments. If you have questions, please call your primary care clinic.  If you do not have a primary care provider, please call [phone] and they will assist you.      Lanica is an electronic gateway that provides easy, online access to your medical records. With Lanica, you can request a clinic appointment, read your test results, renew a prescription or communicate with your care team.     To access your existing account, please contact your [facility]  [facility] or call [phone] for assistance.        Care EveryWhere ID     This is your Care EveryWhere ID. This could be used by other organizations to access your [place] medical records  [id number]         Blood Pressure from Last 3 Encounters:   04/28/17 105/70   12/09/16 107/73   08/10/16 123/65    Weight from Last 3 Encounters:   04/28/17 213 lb (96.6 kg)   12/09/16 221 lb (100.2 kg)   08/10/16 215 lb (97.5 kg)              We Performed the Following     DESTRUCT BENIGN LESION, UP TO 14          Today's Medication Changes          These changes are accurate as of: 5/9/17 10:54 AM.  If you have any questions, ask your nurse or doctor.               Start taking these medicines.        Dose/Directions    fluorouracil 5 % cream   Commonly known as:  EFUDEX   Used for:  Common wart, Periungual wart, Immunosuppression (H)        Apply to the wart at night and cover. Do for 2-3 weeks then stop to see if wart is still there after healing.   Quantity:  40 g   Refills:  0         Stop taking these medicines if you haven't already. Please contact your care team if you have questions.     PREDNISONE PO                Where to get your medicines      These medications were sent to [facility]/pharmacy #[id number] - [facility] [place] - [address] [facility] [address]     Phone:  [phone]     fluorouracil 5 % cream                Primary Care Provider Office Phone # Fax #    Vince Swain PA-C [phone]       [facility] [address] [facility] [address]        Thank you!     Thank you for choosing [facility]  for your care. Our goal is always to provide you with excellent care. Hearing back from our patients is one way we can continue to improve our services. Please take a few minutes to complete the written survey that you may receive in the mail after your visit with us. Thank you!              Your Updated Medication List - Protect others around you: Learn how to safely use, store and throw away your medicines at www.disposemymeds.org.          This list is accurate as of: 5/9/17 10:54 AM.  Always use your most recent med list.                   Brand Name Dispense Instructions for use    fluorouracil 5 % cream    EFUDEX    40 g    Apply to the wart at night and cover. Do for 2-3 weeks then stop to see if wart is still there after healing.       HUMIRA 40 MG/0.8ML injection   Generic drug:  adalimumab      Inject 40 mg Subcutaneous once.       PURINETHOL 50 MG tablet CHEMO   Generic drug:  mercaptopurine      Take 50 mg by mouth daily. 1 and half tabs daily       valACYclovir 1000 mg tablet    VALTREX    30 tablet    two tablets by mouth twice daily for 1 day at onset of symptoms of a cold sore.

## 2017-05-09 NOTE — NURSING NOTE
Dermatology Rooming Note    Jorge Amaral's goals for this visit include:   Chief Complaint   Patient presents with     RECHECK       Is a scribe okay for this visit:YES,     Are records needed for this visit(If yes, obtain release of information): No,      Vitals: There were no vitals taken for this visit.    Referring Provider:  ESTABLISHED PATIENT  No address on file

## 2017-08-10 NOTE — PROGRESS NOTES
SUBJECTIVE:   CC: Jorge Amaral is an 47 year old male who presents for preventative health visit.     Healthy Habits:    Do you get at least three servings of calcium containing foods daily (dairy, green leafy vegetables, etc.)? yes    Amount of exercise or daily activities, outside of work: 4-5 days a week    Problems taking medications regularly No    Medication side effects: No    Have you had an eye exam in the past two years? yes    Do you see a dentist twice per year? yes    Do you have sleep apnea, excessive snoring or daytime drowsiness?no      No concerns  History of crohns and see's GI DrKatalina   History of cold sore. About 1 a years. Stable.     Today's PHQ-2 Score:   PHQ-2 ( 1999 Pfizer) 8/11/2017 8/10/2016   Q1: Little interest or pleasure in doing things 0 0   Q2: Feeling down, depressed or hopeless 0 0   PHQ-2 Score 0 0       Abuse: Current or Past(Physical, Sexual or Emotional)- No  Do you feel safe in your environment - Yes    Social History   Substance Use Topics     Smoking status: Current Some Day Smoker     Types: Cigars     Smokeless tobacco: Never Used      Comment: very rarely      Alcohol use Yes      Comment: RARE     The patient does not drink >3 drinks per day nor >7 drinks per week.    Last PSA: No results found for: PSA    Reviewed orders with patient. Reviewed health maintenance and updated orders accordingly - Yes  Labs reviewed in EPIC  BP Readings from Last 3 Encounters:   08/11/17 115/73   04/28/17 105/70   12/09/16 107/73    Wt Readings from Last 3 Encounters:   08/11/17 216 lb (98 kg)   04/28/17 213 lb (96.6 kg)   12/09/16 221 lb (100.2 kg)                  Patient Active Problem List   Diagnosis     Crohns disease (H)     CARDIOVASCULAR SCREENING; LDL GOAL LESS THAN 160     Fracture, metacarpal     Delayed union of fracture     Family history of diabetes mellitus     ACL (anterior cruciate ligament) tear--recurrent     Cold sore     Common wart     Crohn's disease with  complication, unspecified gastrointestinal tract location (H)     Herpes labialis     FH: prostate cancer     Past Surgical History:   Procedure Laterality Date     APPENDECTOMY       C STOMACH SURGERY PROCEDURE UNLISTED       ENT SURGERY  6-28-13    Left Tonsil biopsy     LAPAROSCOPY PROCEDURE UNLISTED       OPEN REDUCTION INTERNAL FIXATION HAND      right IV MCP hand 7/11     REMOVE HARDWARE LOWER EXTREMITY  4/6/2012    Procedure:REMOVE HARDWARE LOWER EXTREMITY; REMOVAL OF RIGHT TIBIAL SCREW; Surgeon:JOSE LUIS CARMICHAEL; Location[facility]       Social History   Substance Use Topics     Smoking status: Current Some Day Smoker     Types: Cigars     Smokeless tobacco: Never Used      Comment: very rarely      Alcohol use Yes      Comment: RARE     Family History   Problem Relation Age of Onset     Hypertension Mother      Respiratory Mother      SMOKER -EMPYSEMA     Hypertension Father      DIABETES Father      DIABETES Brother          Current Outpatient Prescriptions   Medication Sig Dispense Refill     valACYclovir (VALTREX) 1000 mg tablet two tablets by mouth twice daily for 1 day at onset of symptoms of a cold sore. 30 tablet 0     fluorouracil (EFUDEX) 5 % cream Apply to the wart at night and cover. Do for 2-3 weeks then stop to see if wart is still there after healing. 40 g 0     mercaptopurine (PURINETHOL) 50 MG tablet Take 50 mg by mouth daily. 1 and half tabs daily       adalimumab (HUMIRA) 40 MG/0.8ML injection Inject 40 mg Subcutaneous once.       [DISCONTINUED] valACYclovir (VALTREX) 1000 mg tablet two tablets by mouth twice daily for 1 day at onset of symptoms of a cold sore. 30 tablet 0     No Known Allergies        Reviewed and updated as needed this visit by clinical staffTobacco  Allergies  Meds  Problems  Med Hx  Surg Hx  Fam Hx  Soc Hx          Reviewed and updated as needed this visit by Provider  Allergies  Meds  Problems          Past Medical History:   Diagnosis Date     Crohn's disease  "(H)      Hyperlipidemia LDL goal < 160     not on meds      Past Surgical History:   Procedure Laterality Date     APPENDECTOMY       C STOMACH SURGERY PROCEDURE UNLISTED       ENT SURGERY  6-28-13    Left Tonsil biopsy     LAPAROSCOPY PROCEDURE UNLISTED       OPEN REDUCTION INTERNAL FIXATION HAND      right IV MCP hand 7/11     REMOVE HARDWARE LOWER EXTREMITY  4/6/2012    Procedure:REMOVE HARDWARE LOWER EXTREMITY; REMOVAL OF RIGHT TIBIAL SCREW; Surgeon:JOSE LUIS CARMICHAEL; Location:[facility]         ROS:  C: NEGATIVE for fever, chills, change in weight  I: NEGATIVE for worrisome rashes, moles or lesions  E: NEGATIVE for vision changes or irritation  ENT: NEGATIVE for ear, mouth and throat problems  R: NEGATIVE for significant cough or SOB  CV: NEGATIVE for chest pain, palpitations or peripheral edema  GI: NEGATIVE for nausea, abdominal pain, heartburn, or change in bowel habits   male: negative for dysuria, hematuria, decreased urinary stream, erectile dysfunction, urethral discharge  M: NEGATIVE for significant arthralgias or myalgia  N: NEGATIVE for weakness, dizziness or paresthesias  P: NEGATIVE for changes in mood or affect    OBJECTIVE:   /73  Pulse 65  Ht 6' 2\" (1.88 m)  Wt 216 lb (98 kg)  SpO2 96%  BMI 27.73 kg/m2  EXAM:  GENERAL: healthy, alert and no distress  EYES: Eyes grossly normal to inspection, PERRL and conjunctivae and sclerae normal  HENT: ear canals and TM's normal, nose and mouth without ulcers or lesions  NECK: no adenopathy, no asymmetry, masses, or scars and thyroid normal to palpation  RESP: lungs clear to auscultation - no rales, rhonchi or wheezes  CV: regular rate and rhythm, normal S1 S2, no S3 or S4, no murmur, click or rub, no peripheral edema and peripheral pulses strong  ABDOMEN: soft, nontender, no hepatosplenomegaly, no masses and bowel sounds normal   (male): normal male genitalia without lesions or urethral discharge, no hernia  MS: no gross musculoskeletal defects " "noted, no edema  SKIN: no suspicious lesions or rashes  NEURO: Normal strength and tone, mentation intact and speech normal  PSYCH: mentation appears normal, affect normal/bright    ASSESSMENT/PLAN:       ICD-10-CM    1. Routine general medical examination at a health care facility Z00.00 Lipid panel reflex to direct LDL     Basic metabolic panel   2. Crohn's disease with complication, unspecified gastrointestinal tract location (H) K50.919    3. Herpes labialis B00.1 valACYclovir (VALTREX) 1000 mg tablet   4. FH: prostate cancer Z80.42 PSA, screen   Work on Healthy diet and exercise. Getting heart rate elevated for 30 mins most days of week.  Labs pending.   Med refill  Recheck yearly.       COUNSELING:  Reviewed preventive health counseling, as reflected in patient instructions       Regular exercise       Healthy diet/nutrition    BP Screening:   Last 3 BP Readings:    BP Readings from Last 3 Encounters:   08/11/17 115/73   04/28/17 105/70   12/09/16 107/73       The following was recommended to the patient:       reports that he has been smoking Cigars.  He has never used smokeless tobacco.      Estimated body mass index is 27.73 kg/(m^2) as calculated from the following:    Height as of this encounter: 6' 2\" (1.88 m).    Weight as of this encounter: 216 lb (98 kg).         Counseling Resources:  ATP IV Guidelines  Pooled Cohorts Equation Calculator  FRAX Risk Assessment  ICSI Preventive Guidelines  Dietary Guidelines for Americans, 2010  USDA's MyPlate  ASA Prophylaxis  Lung CA Screening    Vince Swain PA-C  [facility]  "

## 2017-08-11 ENCOUNTER — OFFICE VISIT (OUTPATIENT)
Dept: FAMILY MEDICINE | Facility: CLINIC | Age: 47
End: 2017-08-11
Payer: COMMERCIAL

## 2017-08-11 VITALS
OXYGEN SATURATION: 96 % | DIASTOLIC BLOOD PRESSURE: 73 MMHG | HEIGHT: 74 IN | BODY MASS INDEX: 27.72 KG/M2 | WEIGHT: 216 LBS | SYSTOLIC BLOOD PRESSURE: 115 MMHG | HEART RATE: 65 BPM

## 2017-08-11 DIAGNOSIS — B00.1 HERPES LABIALIS: ICD-10-CM

## 2017-08-11 DIAGNOSIS — K50.919 CROHN'S DISEASE WITH COMPLICATION, UNSPECIFIED GASTROINTESTINAL TRACT LOCATION (H): ICD-10-CM

## 2017-08-11 DIAGNOSIS — Z00.00 ROUTINE GENERAL MEDICAL EXAMINATION AT A HEALTH CARE FACILITY: Primary | ICD-10-CM

## 2017-08-11 DIAGNOSIS — Z80.42 FH: PROSTATE CANCER: ICD-10-CM

## 2017-08-11 LAB
ANION GAP SERPL CALCULATED.3IONS-SCNC: 3 MMOL/L (ref 3–14)
BUN SERPL-MCNC: 11 MG/DL (ref 7–30)
CALCIUM SERPL-MCNC: 8.8 MG/DL (ref 8.5–10.1)
CHLORIDE SERPL-SCNC: 104 MMOL/L (ref 94–109)
CHOLEST SERPL-MCNC: 213 MG/DL
CO2 SERPL-SCNC: 34 MMOL/L (ref 20–32)
CREAT SERPL-MCNC: 1.07 MG/DL (ref 0.66–1.25)
GFR SERPL CREATININE-BSD FRML MDRD: 73 ML/MIN/1.7M2
GLUCOSE SERPL-MCNC: 94 MG/DL (ref 70–99)
HDLC SERPL-MCNC: 47 MG/DL
LDLC SERPL CALC-MCNC: 136 MG/DL
NONHDLC SERPL-MCNC: 166 MG/DL
POTASSIUM SERPL-SCNC: 4.4 MMOL/L (ref 3.4–5.3)
PSA SERPL-ACNC: 0.51 UG/L (ref 0–4)
SODIUM SERPL-SCNC: 141 MMOL/L (ref 133–144)
TRIGL SERPL-MCNC: 152 MG/DL

## 2017-08-11 PROCEDURE — G0103 PSA SCREENING: HCPCS | Performed by: PHYSICIAN ASSISTANT

## 2017-08-11 PROCEDURE — 36415 COLL VENOUS BLD VENIPUNCTURE: CPT | Performed by: PHYSICIAN ASSISTANT

## 2017-08-11 PROCEDURE — 80048 BASIC METABOLIC PNL TOTAL CA: CPT | Performed by: PHYSICIAN ASSISTANT

## 2017-08-11 PROCEDURE — 80061 LIPID PANEL: CPT | Performed by: PHYSICIAN ASSISTANT

## 2017-08-11 PROCEDURE — 99396 PREV VISIT EST AGE 40-64: CPT | Performed by: PHYSICIAN ASSISTANT

## 2017-08-11 RX ORDER — VALACYCLOVIR HYDROCHLORIDE 1 G/1
TABLET, FILM COATED ORAL
Qty: 30 TABLET | Refills: 0 | Status: SHIPPED | OUTPATIENT
Start: 2017-08-11 | End: 2019-12-04

## 2017-08-11 NOTE — MR AVS SNAPSHOT
After Visit Summary   8/11/2017    Jorge Amaral    MRN: [id number]           Patient Information     Date Of Birth          1970        Visit Information        Provider Department      8/11/2017 7:00 AM Vince Swain PA-C [facility]        Today's Diagnoses     Routine general medical examination at a health care facility    -  1    Crohn's disease with complication, unspecified gastrointestinal tract location (H)        Herpes labialis        FH: prostate cancer          Care Instructions      Preventive Health Recommendations  Male Ages 40 to 49    Yearly exam:             See your health care provider every year in order to  o   Review health changes.   o   Discuss preventive care.    o   Review your medicines if your doctor has prescribed any.    You should be tested each year for STDs (sexually transmitted diseases) if you re at risk.     Have a cholesterol test every 5 years.     Have a colonoscopy (test for colon cancer) if someone in your family has had colon cancer or polyps before age 50.     After age 45, have a diabetes test (fasting glucose). If you are at risk for diabetes, you should have this test every 3 years.      Talk with your health care provider about whether or not a prostate cancer screening test (PSA) is right for you.    Shots: Get a flu shot each year. Get a tetanus shot every 10 years.     Nutrition:    Eat at least 5 servings of fruits and vegetables daily.     Eat whole-grain bread, whole-wheat pasta and brown rice instead of white grains and rice.     Talk to your provider about Calcium and Vitamin D.     Lifestyle    Exercise for at least 150 minutes a week (30 minutes a day, 5 days a week). This will help you control your weight and prevent disease.     Limit alcohol to one drink per day.     No smoking.     Wear sunscreen to prevent skin cancer.     See your dentist every six months for an exam and cleaning.              Follow-ups  "after your visit        Your next 10 appointments already scheduled     Mar 06, 2018  9:30 AM CST   Return Visit with Sasha Woods MD   [facility]    [address] [address] [place] [address] [phone]              Who to contact     If you have questions or need follow up information about today's clinic visit or your schedule please contact [facility] directly at [phone].  Normal or non-critical lab and imaging results will be communicated to you by MyChart, letter or phone within 4 business days after the clinic has received the results. If you do not hear from us within 7 days, please contact the clinic through Twinedhart or phone. If you have a critical or abnormal lab result, we will notify you by phone as soon as possible.  Submit refill requests through [employer] or call your pharmacy and they will forward the refill request to us. Please allow 3 business days for your refill to be completed.          Additional Information About Your Visit        Twinedhart Information     [employer] gives you secure access to your electronic health record. If you see a primary care provider, you can also send messages to your care team and make appointments. If you have questions, please call your primary care clinic.  If you do not have a primary care provider, please call [phone] and they will assist you.        Care EveryWhere ID     This is your Care EveryWhere ID. This could be used by other organizations to access your [place] medical records  [id number]        Your Vitals Were     Pulse Height Pulse Oximetry BMI (Body Mass Index)          65 6' 2\" (1.88 m) 96% 27.73 kg/m2         Blood Pressure from Last 3 Encounters:   08/11/17 115/73   04/28/17 105/70   12/09/16 107/73    Weight from Last 3 Encounters:   08/11/17 216 lb (98 kg)   04/28/17 213 lb (96.6 kg)   12/09/16 221 lb (100.2 kg)              We Performed the Following     Basic " metabolic panel     Lipid panel reflex to direct LDL     PSA, screen          Where to get your medicines      These medications were sent to [facility]/pharmacy #[id number] - [place], [place] - [address] [place] [address] [place] [address] [place] [address]     Phone:  [phone]     valACYclovir 1000 mg tablet          Primary Care Provider Office Phone # Fax #    Vince Swain PA-C [phone] [address] [facility] [address]        Equal Access to Services     [facility]: Hadii aad ku hadasho Soomaali, waaxda luqadaha, qaybta kaalmada adeegyada, waxay brentin hayaan adeselvin cruz . So [facility] [phone].    ATENCIÓN: Si habla español, tiene a shannon disposición servicios gratuitos de asistencia lingüística. [facility] [phone].    We comply with applicable federal civil rights laws and [place] laws. We do not discriminate on the basis of race, color, national origin, age, disability sex, sexual orientation or gender identity.            Thank you!     Thank you for choosing [facility]  for your care. Our goal is always to provide you with excellent care. Hearing back from our patients is one way we can continue to improve our services. Please take a few minutes to complete the written survey that you may receive in the mail after your visit with us. Thank you!             Your Updated Medication List - Protect others around you: Learn how to safely use, store and throw away your medicines at www.disposemymeds.org.          This list is accurate as of: 8/11/17  7:36 AM.  Always use your most recent med list.                   Brand Name Dispense Instructions for use Diagnosis    fluorouracil 5 % cream    EFUDEX    40 g    Apply to the wart at night and cover. Do for 2-3 weeks then stop to see if wart is still there after healing.    Common wart, Periungual wart, Immunosuppression (H)       HUMIRA 40 MG/0.8ML injection   Generic drug:  adalimumab       Inject 40 mg Subcutaneous once.        PURINETHOL 50 MG tablet CHEMO   Generic drug:  mercaptopurine      Take 50 mg by mouth daily. 1 and half tabs daily        valACYclovir 1000 mg tablet    VALTREX    30 tablet    two tablets by mouth twice daily for 1 day at onset of symptoms of a cold sore.    Herpes labialis

## 2017-08-11 NOTE — PROGRESS NOTES
Mr. Amaral,    All of your labs were normal for you.  Work on Healthy diet and exercise. Getting heart rate elevated for 30 mins most days of week.     Please contact the clinic if you have additional questions.  Thank you.    Sincerely,    Vince Swain PA-C

## 2017-08-11 NOTE — NURSING NOTE
"Chief Complaint   Patient presents with     Physical       Initial /73  Pulse 65  Ht 6' 2\" (1.88 m)  Wt 216 lb (98 kg)  SpO2 96%  BMI 27.73 kg/m2 Estimated body mass index is 27.73 kg/(m^2) as calculated from the following:    Height as of this encounter: 6' 2\" (1.88 m).    Weight as of this encounter: 216 lb (98 kg).  Medication Reconciliation: complete  Anna Davidson CMA    "

## 2017-10-02 ENCOUNTER — OFFICE VISIT (OUTPATIENT)
Dept: ORTHOPEDICS | Facility: CLINIC | Age: 47
End: 2017-10-02
Payer: COMMERCIAL

## 2017-10-02 ENCOUNTER — RADIANT APPOINTMENT (OUTPATIENT)
Dept: GENERAL RADIOLOGY | Facility: CLINIC | Age: 47
End: 2017-10-02
Attending: ORTHOPAEDIC SURGERY
Payer: COMMERCIAL

## 2017-10-02 VITALS — WEIGHT: 221.6 LBS | RESPIRATION RATE: 16 BRPM | HEIGHT: 74 IN | BODY MASS INDEX: 28.44 KG/M2

## 2017-10-02 DIAGNOSIS — M72.2 PLANTAR FASCIITIS: Primary | ICD-10-CM

## 2017-10-02 DIAGNOSIS — M72.2 PLANTAR FASCIITIS: ICD-10-CM

## 2017-10-02 PROCEDURE — 73630 X-RAY EXAM OF FOOT: CPT | Mod: LT

## 2017-10-02 PROCEDURE — 99242 OFF/OP CONSLTJ NEW/EST SF 20: CPT | Performed by: ORTHOPAEDIC SURGERY

## 2017-10-02 NOTE — MR AVS SNAPSHOT
After Visit Summary   10/2/2017    Jogre Amaral    MRN: [id number]           Patient Information     Date Of Birth          1970        Visit Information        Provider Department      10/2/2017 8:45 AM Zohaib Edmond MD [facility]        Today's Diagnoses     Plantar fasciitis    -  1       Follow-ups after your visit        Additional Services     [facility] PT, HAND, AND CHIROPRACTIC REFERRAL       **This order will print in the [facility] Scheduling Office**    Physical Therapy, Hand Therapy and Chiropractic Care are available through:    *[facility]  *[facility]  *[facility]    Call one number to schedule at any of the above locations: [phone].    Your provider has referred you to: Physical Therapy at [facility] or [facility]    Indication/Reason for Referral: B plantar fasciitis  Onset of Illness: chronic 3/2017  Therapy Orders: Evaluate and Treat/ # of visits @ therapist discretion  Special Programs:   Special Request: None  Modalities: as needed, ultrasound.  Additional Comments for the Therapist or Chiropractor: # of visits per therapist's discretion     Please be aware that coverage of these services is subject to the terms and limitations of your health insurance plan.  Call member services at your health plan with any benefit or coverage questions.      Please bring the following to your appointment:    *Your personal calendar for scheduling future appointments  *Comfortable clothing            ORTHOTICS REFERRAL       **This referral order prints off in the [facility]  (Orthotics & Prosthetics) Central Scheduling Office**    The [facility] Scheduling Staff will contact the patient to schedule appointments.     Central Scheduling Contact Information: [phone] ([place])    Diagnosis: Plantar fasciitis  (primary encounter diagnosis)     Orthotics: Bilateral Shoe.  Semi-rigid, with good heel  sarah.    Please be aware that coverage of these services is subject to the terms and limitations of your health insurance plan.  Call member services at your health plan with any benefit or coverage questions.      Please bring the following to your appointment:    >>   Any x-rays, CTs or MRIs which have been performed.  Contact the facility where they were done to arrange for  prior to your scheduled appointment.    >>   List of current medications   >>   This referral request   >>   Any documents/labs given to you for this referral                  Your next 10 appointments already scheduled     Mar 06, 2018  9:30 AM CST   Return Visit with Sasha Woods MD   [facility]    [address] [place] [address] [phone]              Who to contact     If you have questions or need follow up information about today's clinic visit or your schedule please contact [facility] directly at [phone].  Normal or non-critical lab and imaging results will be communicated to you by MyChart, letter or phone within 4 business days after the clinic has received the results. If you do not hear from us within 7 days, please contact the clinic through Blink Bookinghart or phone. If you have a critical or abnormal lab result, we will notify you by phone as soon as possible.  Submit refill requests through [employer] or call your pharmacy and they will forward the refill request to us. Please allow 3 business days for your refill to be completed.          Additional Information About Your Visit        Blink Bookinghart Information     [employer] gives you secure access to your electronic health record. If you see a primary care provider, you can also send messages to your care team and make appointments. If you have questions, please call your primary care clinic.  If you do not have a primary care provider, please call [phone] and they will assist you.        Care  "EveryWhere ID     This is your Care EveryWhere ID. This could be used by other organizations to access your [place] medical records  [id number]        Your Vitals Were     Respirations Height BMI (Body Mass Index)             16 1.88 m (6' 2\") 28.45 kg/m2          Blood Pressure from Last 3 Encounters:   08/11/17 115/73   04/28/17 105/70   12/09/16 107/73    Weight from Last 3 Encounters:   10/02/17 100.5 kg (221 lb 9.6 oz)   08/11/17 98 kg (216 lb)   04/28/17 96.6 kg (213 lb)              We Performed the Following     KB PT, HAND, AND CHIROPRACTIC REFERRAL     ORTHOTICS REFERRAL        Primary Care Provider Office Phone # Fax #    Vince Swain PA-C [phone] [address]  [facility] [address]        Equal Access to Services     [facility]: Hadii aad ku hadasho Soomaali, waaxda luqadaha, qaybta kaalmada adeegyada, waxay idiin hayaan daya fuentesaratavares cruz . So [facility] [phone].    ATENCIÓN: Si habla español, tiene a shannon disposición servicios gratuitos de asistencia lingüística. Marito al [phone].    We comply with applicable federal civil rights laws and [place] laws. We do not discriminate on the basis of race, color, national origin, age, disability, sex, sexual orientation, or gender identity.            Thank you!     Thank you for choosing [facility]  for your care. Our goal is always to provide you with excellent care. Hearing back from our patients is one way we can continue to improve our services. Please take a few minutes to complete the written survey that you may receive in the mail after your visit with us. Thank you!             Your Updated Medication List - Protect others around you: Learn how to safely use, store and throw away your medicines at www.disposemymeds.org.          This list is accurate as of: 10/2/17 10:37 AM.  Always use your most recent med list.                   Brand Name Dispense Instructions for use Diagnosis    " fluorouracil 5 % cream    EFUDEX    40 g    Apply to the wart at night and cover. Do for 2-3 weeks then stop to see if wart is still there after healing.    Common wart, Periungual wart, Immunosuppression (H)       HUMIRA 40 MG/0.8ML injection   Generic drug:  adalimumab      Inject 40 mg Subcutaneous once.        PURINETHOL 50 MG tablet CHEMO   Generic drug:  mercaptopurine      Take 50 mg by mouth daily. 1 and half tabs daily        valACYclovir 1000 mg tablet    VALTREX    30 tablet    two tablets by mouth twice daily for 1 day at onset of symptoms of a cold sore.    Herpes labialis

## 2017-10-02 NOTE — PROGRESS NOTES
SUBJECTIVE:  Jorge Amaral is a 47 year old male who is seen in consultation at the request of Dr. Dozier for left heel pain that started 3/11/17. 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 He may have kicked using his heel in March 2017.      The pain is worse in morning and when getting up from being seated for a while.    Present symptoms: Pain while sitting, walking. No swelling. No numbness or back problems.   Previous treatment: Rolling with racquet balls, stretching, gel heel pads in shoes. Occasional antiinflammatory.    Patients past medical, surgical, social and family histories reviewed.   Orthopedic PMH: Right IV MCP hand 7/11, Hardware removal of right tibial screw (4/6/12)   Past Medical History:   Diagnosis Date     Crohn's disease (H)      Hyperlipidemia LDL goal < 160     not on meds     Past Surgical History:   Procedure Laterality Date     APPENDECTOMY       C STOMACH SURGERY PROCEDURE UNLISTED       ENT SURGERY  6-28-13    Left Tonsil biopsy     LAPAROSCOPY PROCEDURE UNLISTED       OPEN REDUCTION INTERNAL FIXATION HAND      right IV MCP hand 7/11     REMOVE HARDWARE LOWER EXTREMITY  4/6/2012    Procedure:REMOVE HARDWARE LOWER EXTREMITY; REMOVAL OF RIGHT TIBIAL SCREW; Surgeon:JOSE LUIS CARMICHAEL; Location:[facility]     Social History     Social History     Marital status: Single     Spouse name: N/A     Number of children: N/A     Years of education: N/A     Occupational History      Self     Social History Main Topics     Smoking status: Current Some Day Smoker     Types: Cigars     Smokeless tobacco: Never Used      Comment: very rarely      Alcohol use Yes      Comment: RARE     Drug use: No     Sexual activity: Yes     Partners: Female     Other Topics Concern     Not on file     Social History Narrative     REVIEW OF SYSTEMS:   CONSTITUTIONAL:  NEGATIVE for fever, chills, change in weight  INTEGUMENTARY/SKIN:  NEGATIVE for worrisome rashes, moles or lesions  EYES:  NEGATIVE for vision changes or  "irritation  ENT/MOUTH:  NEGATIVE for ear, mouth and throat problems  RESP:  NEGATIVE for significant cough or SOB  BREAST:  NEGATIVE for masses, tenderness or discharge  CV:  NEGATIVE for chest pain, palpitations or peripheral edema  GI:  NEGATIVE for nausea, abdominal pain, heartburn, or change in bowel habits  :  Negative   MUSCULOSKELETAL:  See HPI above  NEURO:  NEGATIVE for weakness, dizziness or paresthesias  ENDOCRINE:  NEGATIVE for temperature intolerance, skin/hair changes  HEME/ALLERGY/IMMUNE:  NEGATIVE for bleeding problems  PSYCHIATRIC:  NEGATIVE for changes in mood or affect    EXAM:  Resp 16  Ht 1.88 m (6' 2\")  Wt 100.5 kg (221 lb 9.6 oz)  BMI 28.45 kg/m2  GENERAL APPEARANCE: healthy, alert and no distress   GAIT: NORMAL.   SKIN: no suspicious lesions or rashes  NEURO: Normal strength and tone, mentation intact and speech normal  PSYCH:  mentation appears normal and affect normal/bright  RESP: No increased work of breathing  LYMPH:  No lymphedema  PULSES:  Intact.    MUSCULOSKELETAL:    ANKLE/FOOT:  Inspection: Slight varus, pes cavus,    Tender: Center pad of heel, very tender over plantar fascia insertion, mild posterior in heel pad, mild calf  Non-tender: Medial and lateral borders of calcaneus  Plantar fascia is tight.  Achilles is fairly flexible.  SLR: Negative  Normal sensation    X-RAY INTERPRETATION: Obtained 4/28/17 of left heel show small plantar calcaneraneous spur, calcification of insertion of achilles tendon, calcifications seen of the dorsal talarnovicular joint.    Obtained today of the left foot: 3-views, reviewed in the office with the patient by myself today and are essentially unchanged. No stress fractures seen.      ASSESSMENT  1. Plantar fasciitis, left  2. Pes cavus    PLAN:   I discussed the findings and diagnosis with the patient. We talked about the treatment options: night stretcher boot, custom orthotics, cortisone injections, antiinflammatories, and physical therapy. " All questions were answered.   Physical therapy, custom orthotics, night stretcher boot ordered.    Return to the clinic in 6 weeks if no improvements. Cortisone injection may be administered at this time.     EMILY Edmond MD  Dept. Orthopedic Surgery  [facility]    This document serves as a record of the services and decisions personally performed and made by Dr. EMILY Edmond MD. It was created on his behalf by Brock Craig, a trained medical scribe. The creation of this record is based on the provider's personal observations and the statements of the patient. This document has been checked and approved by the attending provider.   Brock Craig October 2, 2017 10:19 AM

## 2017-10-02 NOTE — LETTER
10/2/2017         RE: Jorge Amaral  [address] [facility] [address]        Dear Colleague,    Thank you for referring your patient, Jorge Amaral, to the [facility]. Please see a copy of my visit note below.    SUBJECTIVE:  Jorge Amaral is a 47 year old male who is seen in consultation at the request of Dr. Dozier for left heel pain that started 3/11/17. 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 He may have kicked using his heel in March 2017.      The pain is worse in morning and when getting up from being seated for a while.    Present symptoms: Pain while sitting, walking. No swelling. No numbness or back problems.   Previous treatment: Rolling with racquet balls, stretching, gel heel pads in shoes. Occasional antiinflammatory.    Patients past medical, surgical, social and family histories reviewed.   Orthopedic PMH: Right IV MCP hand 7/11, Hardware removal of right tibial screw (4/6/12)   Past Medical History:   Diagnosis Date     Crohn's disease (H)      Hyperlipidemia LDL goal < 160     not on meds     Past Surgical History:   Procedure Laterality Date     APPENDECTOMY       C STOMACH SURGERY PROCEDURE UNLISTED       ENT SURGERY  6-28-13    Left Tonsil biopsy     LAPAROSCOPY PROCEDURE UNLISTED       OPEN REDUCTION INTERNAL FIXATION HAND      right IV MCP hand 7/11     REMOVE HARDWARE LOWER EXTREMITY  4/6/2012    Procedure:REMOVE HARDWARE LOWER EXTREMITY; REMOVAL OF RIGHT TIBIAL SCREW; Surgeon:JOSE LUIS CARMICHAEL; Location:[facility]     Social History     Social History     Marital status: Single     Spouse name: N/A     Number of children: N/A     Years of education: N/A     Occupational History      Self     Social History Main Topics     Smoking status: Current Some Day Smoker     Types: Cigars     Smokeless tobacco: Never Used      Comment: very rarely      Alcohol use Yes      Comment: RARE     Drug use: No     Sexual activity: Yes     Partners: Female     Other Topics Concern  "    Not on file     Social History Narrative     REVIEW OF SYSTEMS:   CONSTITUTIONAL:  NEGATIVE for fever, chills, change in weight  INTEGUMENTARY/SKIN:  NEGATIVE for worrisome rashes, moles or lesions  EYES:  NEGATIVE for vision changes or irritation  ENT/MOUTH:  NEGATIVE for ear, mouth and throat problems  RESP:  NEGATIVE for significant cough or SOB  BREAST:  NEGATIVE for masses, tenderness or discharge  CV:  NEGATIVE for chest pain, palpitations or peripheral edema  GI:  NEGATIVE for nausea, abdominal pain, heartburn, or change in bowel habits  :  Negative   MUSCULOSKELETAL:  See HPI above  NEURO:  NEGATIVE for weakness, dizziness or paresthesias  ENDOCRINE:  NEGATIVE for temperature intolerance, skin/hair changes  HEME/ALLERGY/IMMUNE:  NEGATIVE for bleeding problems  PSYCHIATRIC:  NEGATIVE for changes in mood or affect    EXAM:  Resp 16  Ht 1.88 m (6' 2\")  Wt 100.5 kg (221 lb 9.6 oz)  BMI 28.45 kg/m2  GENERAL APPEARANCE: healthy, alert and no distress   GAIT: NORMAL.   SKIN: no suspicious lesions or rashes  NEURO: Normal strength and tone, mentation intact and speech normal  PSYCH:  mentation appears normal and affect normal/bright  RESP: No increased work of breathing  LYMPH:  No lymphedema  PULSES:  Intact.    MUSCULOSKELETAL:    ANKLE/FOOT:  Inspection: Slight varus, pes cavus,    Tender: Center pad of heel, very tender over plantar fascia insertion, mild posterior in heel pad, mild calf  Non-tender: Medial and lateral borders of calcaneus  Plantar fascia is tight.  Achilles is fairly flexible.  SLR: Negative  Normal sensation    X-RAY INTERPRETATION: Obtained 4/28/17 of left heel show small plantar calcaneraneous spur, calcification of insertion of achilles tendon, calcifications seen of the dorsal talarnovicular joint.    Obtained today of the left foot: 3-views, reviewed in the office with the patient by myself today and are essentially unchanged. No stress fractures seen.      ASSESSMENT  1. Plantar " fasciitis, left  2. Pes cavus    PLAN:   I discussed the findings and diagnosis with the patient. We talked about the treatment options: night stretcher boot, custom orthotics, cortisone injections, antiinflammatories, and physical therapy. All questions were answered.   Physical therapy, custom orthotics, night stretcher boot ordered.    Return to the clinic in 6 weeks if no improvements. Cortisone injection may be administered at this time.     EMILY Edmond MD  Dept. Orthopedic Surgery  [facility]    This document serves as a record of the services and decisions personally performed and made by Dr. EMILY Edmond MD. It was created on his behalf by Brock Craig, a trained medical scribe. The creation of this record is based on the provider's personal observations and the statements of the patient. This document has been checked and approved by the attending provider.   Brock Craig October 2, 2017 10:19 AM    Again, thank you for allowing me to participate in the care of your patient.        Sincerely,        Zohaib Edmond MD

## 2017-10-02 NOTE — NURSING NOTE
"Chief Complaint   Patient presents with     Consult     Left heel       Initial Resp 16  Ht 1.88 m (6' 2\")  Wt 100.5 kg (221 lb 9.6 oz)  BMI 28.45 kg/m2 Estimated body mass index is 28.45 kg/(m^2) as calculated from the following:    Height as of this encounter: 1.88 m (6' 2\").    Weight as of this encounter: 100.5 kg (221 lb 9.6 oz).  Medication Reconciliation: complete   Nelda Orozco CMA 10/2/2017 9:01 AM      "

## 2017-10-04 ENCOUNTER — THERAPY VISIT (OUTPATIENT)
Dept: PHYSICAL THERAPY | Facility: CLINIC | Age: 47
End: 2017-10-04
Payer: COMMERCIAL

## 2017-10-04 DIAGNOSIS — M72.2 PLANTAR FASCIITIS: Primary | ICD-10-CM

## 2017-10-04 PROCEDURE — 97161 PT EVAL LOW COMPLEX 20 MIN: CPT | Mod: GP | Performed by: PHYSICAL THERAPIST

## 2017-10-04 PROCEDURE — 97035 APP MDLTY 1+ULTRASOUND EA 15: CPT | Mod: GP | Performed by: PHYSICAL THERAPIST

## 2017-10-04 PROCEDURE — 97010 HOT OR COLD PACKS THERAPY: CPT | Mod: GP | Performed by: PHYSICAL THERAPIST

## 2017-10-04 PROCEDURE — 97110 THERAPEUTIC EXERCISES: CPT | Mod: GP | Performed by: PHYSICAL THERAPIST

## 2017-10-04 NOTE — PROGRESS NOTES
[facility] Initial Evaluation      Subjective:    Patient is a 47 year old male presenting with rehab left ankle/foot hpi. The history is provided by the patient. No  was used.   Jorge Amaral is a 47 year old male with a left foot condition.  Condition occurred with:  Contact with another person.  Condition occurred: during recreation/sport.  This is a chronic condition  10/2/17 per MD order for physical therapy to address plantar fasciitis that first began in March 2017 after an impact with sparing that resulted in L heel and achilles pain.    Patient reports pain:  Longitudinal arch and other (medial calcaneous).    Pain is described as sharp and aching and is intermittent and reported as 8/10.   Pain is worse in the A.M..  Symptoms are exacerbated by activity, weight bearing, standing and walking and relieved by rest and NSAID's.  Since onset symptoms are unchanged.  Special tests:  X-ray (negative for fracture).      General health as reported by patient is fair.  Pertinent medical history includes:  Other (Crohn's disease).  Medical allergies: no.  Other surgeries include:  None reported.  Current medications:  Other (mercaptopurine).  Current occupation is .  Patient is working in normal job without restrictions.  Primary job tasks include:  Prolonged standing and repetitive tasks.    Barriers include:  None as reported by patient.    Red flags:  None as reported by patient.                        Objective:    System    Physical Exam    General     ROS    ANKLE:     PROM L PROM R AROM L AROM R MMT L MMT R   Dorsiflexion 10 15   5/5 5/5   Plantarflexion 45 45   5/5 5/5   Inversion WNL WNL   5-/5 5/5   Eversion WNL WNL   5-/5 5/5   G Toe Ext         G Toe Flex             Palpation: pain at plantar fascia insertion on medial calcaneous L foot > R, tenderness at L achilles tendon    Gait: avoids placing full weight onto L  heel      Assessment/Plan:      Patient is a 47 year old male with L foot complaints.    Patient has the following significant findings with corresponding treatment plan.                Diagnosis 1:  L heel pain    Pain -  US, self management, education and home program  Decreased ROM/flexibility - manual therapy, therapeutic exercise and home program  Decreased strength - therapeutic exercise, therapeutic activities and home program  Decreased proprioception - neuro re-education, therapeutic activities and home program  Impaired gait - gait training and home program  Decreased function - therapeutic activities and home program    Therapy Evaluation Codes:   1) History comprised of:   Personal factors that impact the plan of care:      Time since onset of symptoms.    Comorbidity factors that impact the plan of care are:      None.     Medications impacting care: None.  2) Examination of Body Systems comprised of:   Body structures and functions that impact the plan of care:      Foot.   Activity limitations that impact the plan of care are:      Running, Standing and Walking.  3) Clinical presentation characteristics are:   Stable/Uncomplicated.  4) Decision-Making    Low complexity using standardized patient assessment instrument and/or measureable assessment of functional outcome.  Cumulative Therapy Evaluation is: Low complexity.    Previous and current functional limitations:  (See Goal Flow Sheet for this information)    Short term and Long term goals: (See Goal Flow Sheet for this information)     Communication ability:  Patient appears to be able to clearly communicate and understand verbal and written communication and follow directions correctly.  Treatment Explanation - The following has been discussed with the patient:   RX ordered/plan of care  Anticipated outcomes  Possible risks and side effects  This patient would benefit from PT intervention to resume normal activities.   Rehab potential is  good.    Frequency:  1 X week, once daily  Duration:  for 6 weeks  Discharge Plan:  Achieve all LTG.  Independent in home treatment program.  Reach maximal therapeutic benefit.    Please refer to the daily flowsheet for treatment today, total treatment time and time spent performing 1:1 timed codes.

## 2017-10-13 ENCOUNTER — THERAPY VISIT (OUTPATIENT)
Dept: PHYSICAL THERAPY | Facility: CLINIC | Age: 47
End: 2017-10-13
Payer: COMMERCIAL

## 2017-10-13 DIAGNOSIS — M72.2 PLANTAR FASCIITIS: ICD-10-CM

## 2017-10-13 PROCEDURE — 97110 THERAPEUTIC EXERCISES: CPT | Mod: GP | Performed by: PHYSICAL THERAPIST

## 2017-10-13 PROCEDURE — 97035 APP MDLTY 1+ULTRASOUND EA 15: CPT | Mod: GP | Performed by: PHYSICAL THERAPIST

## 2017-10-13 NOTE — MR AVS SNAPSHOT
After Visit Summary   10/13/2017    Jorge Amaral    MRN: [id number]           Patient Information     Date Of Birth          1970        Visit Information        Provider Department      10/13/2017 9:40 AM Curry Webb PT [facility]        Today's Diagnoses     Plantar fasciitis           Follow-ups after your visit        Your next 10 appointments already scheduled     Oct 30, 2017  9:40 AM CDT   [facility] ([facility]    [address]  [place] [id number] [phone]            Mar 06, 2018  9:30 AM CST   Return Visit with Sasha Woods MD   [facility] ([facility])    [address] [place] [id number] [phone]              Who to contact     If you have questions or need follow up information about today's clinic visit or your schedule please contact [facility] directly at [phone].  Normal or non-critical lab and imaging results will be communicated to you by [employer], letter or phone within 4 business days after the clinic has received the results. If you do not hear from us within 7 days, please contact the clinic through [employer] or phone. If you have a critical or abnormal lab result, we will notify you by phone as soon as possible.  Submit refill requests through [employer] or call your pharmacy and they will forward the refill request to us. Please allow 3 business days for your refill to be completed.          Additional Information About Your Visit        [employer] Information     [employer] gives you secure access to your electronic health record. If you see a primary care provider, you can also send messages to your care team and make appointments. If you have questions, please call your primary care clinic.  If you do not have a primary care provider, please call [phone] and they will assist you.        Care EveryWhere ID      This is your Care EveryWhere ID. This could be used by other organizations to access your [facility] medical records  [id number]         Blood Pressure from Last 3 Encounters:   08/11/17 115/73   04/28/17 105/70   12/09/16 107/73    Weight from Last 3 Encounters:   10/02/17 100.5 kg (221 lb 9.6 oz)   08/11/17 98 kg (216 lb)   04/28/17 96.6 kg (213 lb)              We Performed the Following     THERAPEUTIC EXERCISES     ULTRASOUND THERAPY        Primary Care Provider Office Phone # Fax #    Vince Swain PA-C [phone] [address] [facility] [address]        Equal Access to Services     DOLLY SAAVEDRA : Hadii mary Cassidy, waaxda ludenysadaha, qaybta kaalmada adeselvinyafang, juventino forde. So [facility] [phone].    ATENCIÓN: Si habla español, tiene a shannon disposición servicios gratuitos de asistencia lingüística. Llame al [phone].    We comply with applicable federal civil rights laws and [place] laws. We do not discriminate on the basis of race, color, national origin, age, disability, sex, sexual orientation, or gender identity.            Thank you!     Thank you for choosing [facility]  for your care. Our goal is always to provide you with excellent care. Hearing back from our patients is one way we can continue to improve our services. Please take a few minutes to complete the written survey that you may receive in the mail after your visit with us. Thank you!             Your Updated Medication List - Protect others around you: Learn how to safely use, store and throw away your medicines at www.disposemymeds.org.          This list is accurate as of: 10/13/17 10:49 AM.  Always use your most recent med list.                   Brand Name Dispense Instructions for use Diagnosis    fluorouracil 5 % cream    EFUDEX    40 g    Apply to the wart at night and cover. Do for 2-3 weeks then stop to see if wart is still there after healing.     Common wart, Periungual wart, Immunosuppression (H)       HUMIRA 40 MG/0.8ML injection   Generic drug:  adalimumab      Inject 40 mg Subcutaneous once.        PURINETHOL 50 MG tablet CHEMO   Generic drug:  mercaptopurine      Take 50 mg by mouth daily. 1 and half tabs daily        valACYclovir 1000 mg tablet    VALTREX    30 tablet    two tablets by mouth twice daily for 1 day at onset of symptoms of a cold sore.    Herpes labialis

## 2017-12-11 PROBLEM — M72.2 PLANTAR FASCIITIS: Status: RESOLVED | Noted: 2017-10-04 | Resolved: 2017-12-11

## 2017-12-11 NOTE — PROGRESS NOTES
Subjective:    HPI                    Objective:    System    Physical Exam    General     ROS    Assessment/Plan:      DISCHARGE REPORT    Progress reporting period is from 10/4/17 to 10/13/17.     SUBJECTIVE  Subjective: pt reports foot is feeling somewhat better, felt better after previous session but continues to have pain with walking and performing martial arts.       Initial Pain level: 8/10   Changes in function: Yes, see goal flow sheet for change in function   Adverse reactions: None;   ,     The objective findings are from DOS 10/13/17.    OBJECTIVE  Objective: antalgic gait with decreased stance time on LLE, TTP L medial calcaneal tubercle      ASSESSMENT/PLAN  Updated problem list and treatment plan: Diagnosis 1:  Plantar fasciitis  STG/LTGs have been met or progress has been made towards goals:  None  Assessment of Progress: The patient's condition is unchanged.  The patient has not returned to therapy. Current status is unknown.  Self Management Plans:  Patient has been instructed in a home treatment program.  Jorge continues to require the following intervention to meet STG and LTG's: HEP  We will discharge this patient from PT.    Recommendations:  This patient is ready to be discharged from therapy and continue their home treatment program.    Please refer to the daily flowsheet for treatment today, total treatment time and time spent performing 1:1 timed codes.

## 2018-04-30 ENCOUNTER — OFFICE VISIT (OUTPATIENT)
Dept: FAMILY MEDICINE | Facility: CLINIC | Age: 48
End: 2018-04-30
Payer: COMMERCIAL

## 2018-04-30 VITALS
WEIGHT: 216 LBS | BODY MASS INDEX: 27.73 KG/M2 | RESPIRATION RATE: 15 BRPM | HEART RATE: 52 BPM | TEMPERATURE: 97.3 F | OXYGEN SATURATION: 96 % | DIASTOLIC BLOOD PRESSURE: 71 MMHG | SYSTOLIC BLOOD PRESSURE: 110 MMHG

## 2018-04-30 DIAGNOSIS — E86.0 DEHYDRATION: ICD-10-CM

## 2018-04-30 DIAGNOSIS — R11.10 VOMITING, INTRACTABILITY OF VOMITING NOT SPECIFIED, PRESENCE OF NAUSEA NOT SPECIFIED, UNSPECIFIED VOMITING TYPE: Primary | ICD-10-CM

## 2018-04-30 PROCEDURE — 99215 OFFICE O/P EST HI 40 MIN: CPT | Performed by: NURSE PRACTITIONER

## 2018-04-30 RX ORDER — ONDANSETRON 8 MG/1
8 TABLET, FILM COATED ORAL EVERY 8 HOURS PRN
Qty: 20 TABLET | Refills: 0 | Status: SHIPPED | OUTPATIENT
Start: 2018-04-30 | End: 2018-05-16

## 2018-04-30 NOTE — PROGRESS NOTES
SUBJECTIVE:   Jorge Amaral is a 47 year old male who presents to clinic today for the following health issues:      Patient presents with:  Vomiting: pt c/o nauesea and vomiting x 1 day  Has vomited every 15 minutes since coming on suddenly at 11 am.   No fever, abdominal pain, diarrhea.   Cannot keep fluids down    Problem list and histories reviewed & adjusted, as indicated.  Additional history: as documented    Patient Active Problem List   Diagnosis     Crohns disease (H)     CARDIOVASCULAR SCREENING; LDL GOAL LESS THAN 160     Fracture, metacarpal     Delayed union of fracture     Family history of diabetes mellitus     ACL (anterior cruciate ligament) tear--recurrent     Cold sore     Common wart     Crohn's disease with complication, unspecified gastrointestinal tract location (H)     Herpes labialis     FH: prostate cancer     Past Surgical History:   Procedure Laterality Date     APPENDECTOMY       C STOMACH SURGERY PROCEDURE UNLISTED       ENT SURGERY  6-28-13    Left Tonsil biopsy     LAPAROSCOPY PROCEDURE UNLISTED       OPEN REDUCTION INTERNAL FIXATION HAND      right IV MCP hand 7/11     REMOVE HARDWARE LOWER EXTREMITY  4/6/2012    Procedure:REMOVE HARDWARE LOWER EXTREMITY; REMOVAL OF RIGHT TIBIAL SCREW; Surgeon:JOSE LUIS CARMICHAEL; [facility]       Social History   Substance Use Topics     Smoking status: Current Some Day Smoker     Types: Cigars     Smokeless tobacco: Never Used      Comment: very rarely      Alcohol use Yes      Comment: RARE     Family History   Problem Relation Age of Onset     Hypertension Mother      Respiratory Mother      SMOKER -EMPYSEMA     Hypertension Father      DIABETES Father      DIABETES Brother            Reviewed and updated as needed this visit by clinical staff  Tobacco  Allergies  Meds  Med Hx  Surg Hx  Fam Hx  Soc Hx      Reviewed and updated as needed this visit by Provider         ROS:  Constitutional, HEENT, cardiovascular, pulmonary, GI, ,  musculoskeletal, neuro, skin, endocrine and psych systems are negative, except as otherwise noted.    OBJECTIVE:     /71  Pulse 52  Temp 97.3  F (36.3  C) (Tympanic)  Resp 15  Wt 216 lb (98 kg)  SpO2 96%  BMI 27.73 kg/m2  Body mass index is 27.73 kg/(m^2).  GENERAL: healthy, alert and no distress  EYES: Eyes grossly normal to inspection, PERRL and conjunctivae and sclerae normal  HENT: ear canals and TM's normal, nose and mouth without ulcers or lesions  NECK: no adenopathy, no asymmetry, masses, or scars and thyroid normal to palpation  RESP: lungs clear to auscultation - no rales, rhonchi or wheezes  CV: regular rate and rhythm, normal S1 S2, no S3 or S4, no murmur, click or rub, no peripheral edema and peripheral pulses strong  ABDOMEN: soft, nontender, no hepatosplenomegaly, no masses and bowel sounds normal    Diagnostic Test Results:  none     ASSESSMENT/PLAN:     1. Vomiting, intractability of vomiting not specified, presence of nausea not specified, unspecified vomiting type    - ondansetron (ZOFRAN) 8 MG tablet; Take 1 tablet (8 mg) by mouth every 8 hours as needed for nausea  Dispense: 20 tablet; Refill: 0    2. Dehydration  He is unable to keep any fluids down, mouth is dry.  Will have friend drive him to [facility] for iv fluids now.    Follow up as needed    RAMON Howard [facility]

## 2018-04-30 NOTE — MR AVS SNAPSHOT
After Visit Summary   4/30/2018    Jorge Amaral    MRN: [id number]           Patient Information     Date Of Birth          1970        Visit Information        Provider Department      4/30/2018 3:00 PM Theresa Flowers APRN CNP [facility]        Today's Diagnoses     Vomiting, intractability of vomiting not specified, presence of nausea not specified, unspecified vomiting type    -  1    Dehydration           Follow-ups after your visit        Who to contact     If you have questions or need follow up information about today's clinic visit or your schedule please contact [facility] directly at [phone].  Normal or non-critical lab and imaging results will be communicated to you by Slingjothart, letter or phone within 4 business days after the clinic has received the results. If you do not hear from us within 7 days, please contact the clinic through Slingjothart or phone. If you have a critical or abnormal lab result, we will notify you by phone as soon as possible.  Submit refill requests through SocialRadar or call your pharmacy and they will forward the refill request to us. Please allow 3 business days for your refill to be completed.          Additional Information About Your Visit        MyChart Information     SocialRadar gives you secure access to your electronic health record. If you see a primary care provider, you can also send messages to your care team and make appointments. If you have questions, please call your primary care clinic.  If you do not have a primary care provider, please call [phone] and they will assist you.        Care EveryWhere ID     This is your Care EveryWhere ID. This could be used by other organizations to access your [place] medical records  [id number]        Your Vitals Were     Pulse Temperature Respirations Pulse Oximetry BMI (Body Mass Index)       52 97.3  F (36.3  C) (Tympanic) 15 96% 27.73 kg/m2        Blood Pressure from  Last 3 Encounters:   04/30/18 110/71   08/11/17 115/73   04/28/17 105/70    Weight from Last 3 Encounters:   04/30/18 216 lb (98 kg)   10/02/17 221 lb 9.6 oz (100.5 kg)   08/11/17 216 lb (98 kg)              Today, you had the following     No orders found for display         Today's Medication Changes          These changes are accurate as of 4/30/18  3:23 PM.  If you have any questions, ask your nurse or doctor.               Start taking these medicines.        Dose/Directions    ondansetron 8 MG tablet   Commonly known as:  ZOFRAN   Used for:  Vomiting, intractability of vomiting not specified, presence of nausea not specified, unspecified vomiting type   Started by:  Theresa Flowers APRN CNP        Dose:  8 mg   Take 1 tablet (8 mg) by mouth every 8 hours as needed for nausea   Quantity:  20 tablet   Refills:  0            Where to get your medicines      These medications were sent to [facility]pharmacy #[id number] - [facility] [place] - [address] [place] [address]. [address] [facility] [address]     Phone:  [phone]     ondansetron 8 MG tablet                Primary Care Provider Office Phone # Fax #    Vince Swain PA-C [phone] [address] [facility] [address]        Equal Access to Services     DOLLY SAAVEDRA : Hadii mary parker hadasho Somagdalene, waaxda luqadaha, qaybta kaalmada adeselvinyada, juventino cruz . So [facility] [phone].    ATENCIÓN: Si habla español, tiene a shannon disposición servicios gratuitos de asistencia lingüística. Marito al [phone].    We comply with applicable federal civil rights laws and [place] laws. We do not discriminate on the basis of race, color, national origin, age, disability, sex, sexual orientation, or gender identity.            Thank you!     Thank you for choosing [facility]  for your care. Our goal is always to provide you with excellent care. Hearing back from our patients is one  way we can continue to improve our services. Please take a few minutes to complete the written survey that you may receive in the mail after your visit with us. Thank you!             Your Updated Medication List - Protect others around you: Learn how to safely use, store and throw away your medicines at www.disposemymeds.org.          This list is accurate as of 4/30/18  3:23 PM.  Always use your most recent med list.                   Brand Name Dispense Instructions for use Diagnosis    fluorouracil 5 % cream    EFUDEX    40 g    Apply to the wart at night and cover. Do for 2-3 weeks then stop to see if wart is still there after healing.    Common wart, Periungual wart, Immunosuppression (H)       HUMIRA 40 MG/0.8ML injection   Generic drug:  adalimumab      Inject 40 mg Subcutaneous once.        ondansetron 8 MG tablet    ZOFRAN    20 tablet    Take 1 tablet (8 mg) by mouth every 8 hours as needed for nausea    Vomiting, intractability of vomiting not specified, presence of nausea not specified, unspecified vomiting type       PURINETHOL 50 MG tablet CHEMO   Generic drug:  mercaptopurine      Take 50 mg by mouth daily. 1 and half tabs daily        valACYclovir 1000 mg tablet    VALTREX    30 tablet    two tablets by mouth twice daily for 1 day at onset of symptoms of a cold sore.    Herpes labialis

## 2018-05-16 ENCOUNTER — OFFICE VISIT (OUTPATIENT)
Dept: FAMILY MEDICINE | Facility: CLINIC | Age: 48
End: 2018-05-16
Payer: COMMERCIAL

## 2018-05-16 VITALS
WEIGHT: 210 LBS | RESPIRATION RATE: 16 BRPM | SYSTOLIC BLOOD PRESSURE: 107 MMHG | OXYGEN SATURATION: 98 % | HEIGHT: 74 IN | BODY MASS INDEX: 26.95 KG/M2 | HEART RATE: 72 BPM | DIASTOLIC BLOOD PRESSURE: 67 MMHG | TEMPERATURE: 97.1 F

## 2018-05-16 DIAGNOSIS — Z01.818 PREOP GENERAL PHYSICAL EXAM: Primary | ICD-10-CM

## 2018-05-16 PROCEDURE — 99214 OFFICE O/P EST MOD 30 MIN: CPT | Performed by: INTERNAL MEDICINE

## 2018-05-16 ASSESSMENT — PAIN SCALES - GENERAL: PAINLEVEL: NO PAIN (0)

## 2018-05-16 NOTE — NURSING NOTE
"Chief Complaint   Patient presents with     Pre-Op Exam       Initial /67  Pulse 72  Temp 97.1  F (36.2  C) (Oral)  Resp 16  Ht 6' 2.02\" (1.88 m)  Wt 210 lb (95.3 kg)  SpO2 98%  BMI 26.95 kg/m2 Estimated body mass index is 26.95 kg/(m^2) as calculated from the following:    Height as of this encounter: 6' 2.02\" (1.88 m).    Weight as of this encounter: 210 lb (95.3 kg).  Medication Reconciliation: complete  Josh Bourgeios MA    "

## 2018-05-16 NOTE — MR AVS SNAPSHOT
After Visit Summary   5/16/2018    Jorge Amaral    MRN: [id number]           Patient Information     Date Of Birth          1970        Visit Information        Provider Department      5/16/2018 9:40 AM Aminata Wei MD [facility]        Today's Diagnoses     Preop general physical exam    -  1      Care Instructions      Before Your Surgery      Call your surgeon if there is any change in your health. This includes signs of a cold or flu (such as a sore throat, runny nose, cough, rash or fever).    Do not smoke, drink alcohol or take over the counter medicine (unless your surgeon or primary care doctor tells you to) for the 24 hours before and after surgery.    If you take prescribed drugs: Follow your doctor s orders about which medicines to take and which to stop until after surgery.    Eating and drinking prior to surgery: follow the instructions from your surgeon    Take a shower or bath the night before surgery. Use the soap your surgeon gave you to gently clean your skin. If you do not have soap from your surgeon, use your regular soap. Do not shave or scrub the surgery site.  Wear clean pajamas and have clean sheets on your bed.           Follow-ups after your visit        Your next 10 appointments already scheduled     Nov 15, 2018  9:30 AM CST   [facility]   [facility]    [address] [place] [id number] [phone]              Who to contact     If you have questions or need follow up information about today's clinic visit or your schedule please contact [facility] directly at [phone].  Normal or non-critical lab and imaging results will be communicated to you by MyChart, letter or phone within 4 business days after the clinic has received the results. If you do not hear from us within 7 days, please contact the clinic through MyChart or phone. If you have  "a critical or abnormal lab result, we will notify you by phone as soon as possible.  Submit refill requests through [employer] or call your pharmacy and they will forward the refill request to us. Please allow 3 business days for your refill to be completed.          Additional Information About Your Visit        MyChart Information     [employer] gives you secure access to your electronic health record. If you see a primary care provider, you can also send messages to your care team and make appointments. If you have questions, please call your primary care clinic.  If you do not have a primary care provider, please call [phone] and they will assist you.        Care EveryWhere ID     This is your Care EveryWhere ID. This could be used by other organizations to access your [place] medical records  [id number]        Your Vitals Were     Pulse Temperature Respirations Height Pulse Oximetry BMI (Body Mass Index)    72 97.1  F (36.2  C) (Oral) 16 6' 2.02\" (1.88 m) 98% 26.95 kg/m2       Blood Pressure from Last 3 Encounters:   05/16/18 107/67   04/30/18 110/71   08/11/17 115/73    Weight from Last 3 Encounters:   05/16/18 210 lb (95.3 kg)   04/30/18 216 lb (98 kg)   10/02/17 221 lb 9.6 oz (100.5 kg)              Today, you had the following     No orders found for display       Primary Care Provider Office Phone # Fax #    Vince Swain PA-C [phone] [address] [facility] [address]        Equal Access to Services     [facility] QUENTIN AH: Hadii aad ku hadasho Soomaali, waaxda luqadaha, qaybta kaalmada adeegyada, juventino forde. So [facility] [phone].    ATENCIÓN: Si habla español, tiene a shannon disposición servicios gratuitos de asistencia lingüística. Llame al [phone].    We comply with applicable federal civil rights laws and [place] laws. We do not discriminate on the basis of race, color, national origin, age, disability, sex, sexual orientation, or gender " identity.            Thank you!     Thank you for choosing [facility]  for your care. Our goal is always to provide you with excellent care. Hearing back from our patients is one way we can continue to improve our services. Please take a few minutes to complete the written survey that you may receive in the mail after your visit with us. Thank you!             Your Updated Medication List - Protect others around you: Learn how to safely use, store and throw away your medicines at www.disposemymeds.org.          This list is accurate as of 5/16/18 10:00 AM.  Always use your most recent med list.                   Brand Name Dispense Instructions for use Diagnosis    HUMIRA 40 MG/0.8ML injection   Generic drug:  adalimumab      Inject 40 mg Subcutaneous once.        PURINETHOL 50 MG tablet CHEMO   Generic drug:  mercaptopurine      Take 50 mg by mouth daily. 1 and half tabs daily        valACYclovir 1000 mg tablet    VALTREX    30 tablet    two tablets by mouth twice daily for 1 day at onset of symptoms of a cold sore.    Herpes labialis

## 2018-05-16 NOTE — PROGRESS NOTES
[facility]  [address] [facility] [address] [phone]  Dept: [phone]    PRE-OP EVALUATION:  Today's date: 2018    Jorge Amaral (: 1970) presents for pre-operative evaluation assessment as requested by Dr. Dr Estes.  He requires evaluation and anesthesia risk assessment prior to undergoing surgery/procedure for treatment of Crohns .    Fax number for surgical facility: [facility]  Primary Physician: Vince Swain  Type of Anesthesia Anticipated: to be determined    Patient has a Health Care Directive or Living Will:  NO    Preop Questions 2018   Who is doing your surgery? Dr Estes - ColoRectal Surgery   What are you having done? Dilation & Colonoscopy   Date of Surgery/Procedure: 18   Facility or Hospital where procedure/surgery will be performed: colorectal specialty clinic   1.  Do you have a history of Heart attack, stroke, stent, coronary bypass surgery, or other heart surgery? No   2.  Do you ever have any pain or discomfort in your chest? No   3.  Do you have a history of  Heart Failure? No   4.   Are you troubled by shortness of breath when:  walking on a level surface, or up a slight hill, or at night? No   5.  Do you currently have a cold, bronchitis or other respiratory infection? No   6.  Do you have a cough, shortness of breath, or wheezing? No   7.  Do you sometimes get pains in the calves of your legs when you walk? YES - only if vigorously exercising, but not feel it's a problem   8. Do you or anyone in your family have previous history of blood clots? No   9.  Do you or does anyone in your family have a serious bleeding problem such as prolonged bleeding following surgeries or cuts? No   10. Have you ever had problems with anemia or been told to take iron pills? YES - he's been on iron in past   11. Have you had any abnormal blood loss such as black, tarry or bloody stools? No   12. Have you ever had a blood transfusion?  YES - long time ago, for chrons   13. Have you or any of your relatives ever had problems with anesthesia? UNKNOWN - none known   14. Do you have sleep apnea, excessive snoring or daytime drowsiness? YES - some daytime tiredness   15. Do you have any prosthetic heart valves? No   16. Do you have prosthetic joints? No         HPI:     HPI related to upcoming procedure: has history of Chron's.  Has seen his specialist and has strictures present.  Tried to do a colonoscopy earlier this year and had complications due to the strictures.  Now planning to dilate the strictures and redo the colonoscopy.      See problem list for active medical problems.  Problems all longstanding and stable, except as noted/documented.  See ROS for pertinent symptoms related to these conditions.                                                                                                                                                          .    MEDICAL HISTORY:     Patient Active Problem List    Diagnosis Date Noted     Crohn's disease with complication, unspecified gastrointestinal tract location (H) 08/11/2017     Priority: Medium     Herpes labialis 08/11/2017     Priority: Medium     FH: prostate cancer 08/11/2017     Priority: Medium     Common wart 10/14/2014     Priority: Medium     Cold sore 08/14/2014     Priority: Medium     ACL (anterior cruciate ligament) tear--recurrent 01/03/2013     Priority: Medium     Family history of diabetes mellitus 12/16/2011     Priority: Medium     Delayed union of fracture 12/01/2011     Priority: Medium     Fracture, metacarpal 08/03/2011     Priority: Medium     CARDIOVASCULAR SCREENING; LDL GOAL LESS THAN 160 10/31/2010     Priority: Medium     Crohns disease (H) 10/29/2010     Priority: Medium      Past Medical History:   Diagnosis Date     Crohn's disease (H)      Hyperlipidemia LDL goal < 160     not on meds     Past Surgical History:   Procedure Laterality Date     APPENDECTOMY       C  STOMACH SURGERY PROCEDURE UNLISTED       ENT SURGERY  6-28-13    Left Tonsil biopsy     LAPAROSCOPY PROCEDURE UNLISTED       OPEN REDUCTION INTERNAL FIXATION HAND      right IV MCP hand 7/11     REMOVE HARDWARE LOWER EXTREMITY  4/6/2012    Procedure:REMOVE HARDWARE LOWER EXTREMITY; REMOVAL OF RIGHT TIBIAL SCREW; Surgeon:JOSE LUIS CARMICHAEL; Location:[facility]     Current Outpatient Prescriptions   Medication Sig Dispense Refill     adalimumab (HUMIRA) 40 MG/0.8ML injection Inject 40 mg Subcutaneous once.       mercaptopurine (PURINETHOL) 50 MG tablet Take 50 mg by mouth daily. 1 and half tabs daily       valACYclovir (VALTREX) 1000 mg tablet two tablets by mouth twice daily for 1 day at onset of symptoms of a cold sore. 30 tablet 0     OTC products: None, except as noted above    No Known Allergies   Latex Allergy: NO    Social History   Substance Use Topics     Smoking status: Current Some Day Smoker     Types: Cigars     Smokeless tobacco: Never Used      Comment: very rarely      Alcohol use Yes      Comment: RARE     History   Drug Use No       REVIEW OF SYSTEMS:   CONSTITUTIONAL: NEGATIVE for fever, chills, change in weight  INTEGUMENTARY/SKIN: NEGATIVE for worrisome rashes, moles or lesions  EYES: NEGATIVE for vision changes or irritation  ENT/MOUTH: NEGATIVE for ear, mouth and throat problems  RESP: NEGATIVE for significant cough or SOB  BREAST: NEGATIVE for masses, tenderness or discharge  CV: NEGATIVE for chest pain, palpitations or peripheral edema  GI: NEGATIVE for nausea, abdominal pain, heartburn, or change in bowel habits  : NEGATIVE for frequency, dysuria, or hematuria  MUSCULOSKELETAL: NEGATIVE for significant arthralgias or myalgia  NEURO: NEGATIVE for weakness, dizziness or paresthesias  ENDOCRINE: NEGATIVE for temperature intolerance, skin/hair changes  HEME: NEGATIVE for bleeding problems  PSYCHIATRIC: NEGATIVE for changes in mood or affect    EXAM:   /67  Pulse 72  Temp 97.1  F (36.2  C)  "(Oral)  Resp 16  Ht 6' 2.02\" (1.88 m)  Wt 210 lb (95.3 kg)  SpO2 98%  BMI 26.95 kg/m2    GENERAL APPEARANCE: healthy, alert and no distress     EYES: EOMI,  PERRL     HENT: ear canals and TM's normal and nose and mouth without ulcers or lesions     NECK: no adenopathy, no asymmetry, masses, or scars and thyroid normal to palpation     RESP: lungs clear to auscultation - no rales, rhonchi or wheezes     CV: regular rates and rhythm, normal S1 S2, no S3 or S4 and no murmur, click or rub     ABDOMEN:  soft, nontender, no HSM or masses and bowel sounds normal     MS: extremities normal- no gross deformities noted, no evidence of inflammation in joints, FROM in all extremities.     SKIN: no suspicious lesions or rashes     NEURO: Normal strength and tone, sensory exam grossly normal, mentation intact and speech normal     PSYCH: mentation appears normal. and affect normal/bright.CN 2-12 grossly intact.  Strength 5/5 and symmetric in bilateral upper and lower extremities.  DTRs 2+ and symmetric in bilateral upper and lower extremities.  Sensation to light touch grossly intact in bilateral upper and lower extremities.     LYMPHATICS: No cervical adenopathy    DIAGNOSTICS:   Labs from 4/30/18 done at [facility]: Na 139, K 4.3, gluc 99, bun 15, Cr 0.93, WBC 6.1, Hgb 14.7, plt 216    Recent Labs   Lab Test  08/11/17   0738  04/04/12   1123  12/16/11   1228   HGB   --   15.2  14.9   NA  141   --    --    POTASSIUM  4.4   --    --    CR  1.07   --    --         IMPRESSION:   Reason for surgery/procedure: colon stricture, Chron's  Diagnosis/reason for consult: minimize risk of procedure    The proposed surgical procedure is considered INTERMEDIATE risk.    REVISED CARDIAC RISK INDEX  The patient has the following serious cardiovascular risks for perioperative complications such as (MI, PE, VFib and 3  AV Block):  No serious cardiac risks  INTERPRETATION: 0 risks: Class I (very low risk - 0.4% complication rate)    The patient " has the following additional risks for perioperative complications:  H/O Chron's increases risk of complication for GI procedures      ICD-10-CM    1. Preop general physical exam Z01.818        RECOMMENDATIONS:         --Patient is to take all scheduled medications on the day of surgery   -- Pt to avoid asa and nsaids prior to surgery.  -- pt to contact his doctor is becomes ill prior to surgery.    APPROVAL GIVEN to proceed with proposed procedure, without further diagnostic evaluation       Signed Electronically by: Aminata Wei MD    Copy of this evaluation report is provided to requesting physician.    [facility] Preop Guidelines    Revised Cardiac Risk Index

## 2018-05-17 ENCOUNTER — TELEPHONE (OUTPATIENT)
Dept: FAMILY MEDICINE | Facility: CLINIC | Age: 48
End: 2018-05-17

## 2018-05-18 ENCOUNTER — TRANSFERRED RECORDS (OUTPATIENT)
Dept: HEALTH INFORMATION MANAGEMENT | Facility: CLINIC | Age: 48
End: 2018-05-18
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
## 2018-06-21 ENCOUNTER — OFFICE VISIT (OUTPATIENT)
Dept: FAMILY MEDICINE | Facility: CLINIC | Age: 48
End: 2018-06-21
Payer: COMMERCIAL

## 2018-06-21 ENCOUNTER — RADIANT APPOINTMENT (OUTPATIENT)
Dept: GENERAL RADIOLOGY | Facility: CLINIC | Age: 48
End: 2018-06-21
Attending: PHYSICIAN ASSISTANT
Payer: COMMERCIAL

## 2018-06-21 VITALS
RESPIRATION RATE: 14 BRPM | OXYGEN SATURATION: 98 % | WEIGHT: 214 LBS | SYSTOLIC BLOOD PRESSURE: 109 MMHG | BODY MASS INDEX: 27.46 KG/M2 | HEART RATE: 78 BPM | HEIGHT: 74 IN | TEMPERATURE: 97.7 F | DIASTOLIC BLOOD PRESSURE: 68 MMHG

## 2018-06-21 DIAGNOSIS — R68.84 JAW PAIN: Primary | ICD-10-CM

## 2018-06-21 DIAGNOSIS — J06.9 VIRAL UPPER RESPIRATORY TRACT INFECTION: ICD-10-CM

## 2018-06-21 DIAGNOSIS — R68.84 JAW PAIN: ICD-10-CM

## 2018-06-21 PROCEDURE — 70110 X-RAY EXAM OF JAW 4/> VIEWS: CPT | Mod: FY

## 2018-06-21 PROCEDURE — 99214 OFFICE O/P EST MOD 30 MIN: CPT | Performed by: PHYSICIAN ASSISTANT

## 2018-06-21 RX ORDER — DIPHENOXYLATE HCL/ATROPINE 2.5-.025MG
2 TABLET ORAL
COMMUNITY

## 2018-06-21 ASSESSMENT — PAIN SCALES - GENERAL: PAINLEVEL: NO PAIN (1)

## 2018-06-21 NOTE — NURSING NOTE
"Chief Complaint   Patient presents with     Jaw Pain     Health Maintenance     PHQ2, HIV       Initial /68  Pulse 78  Temp 97.7  F (36.5  C) (Oral)  Resp 14  Ht 6' 2.25\" (1.886 m)  Wt 214 lb (97.1 kg)  SpO2 98%  BMI 27.29 kg/m2 Estimated body mass index is 27.29 kg/(m^2) as calculated from the following:    Height as of this encounter: 6' 2.25\" (1.886 m).    Weight as of this encounter: 214 lb (97.1 kg).  Medication Reconciliation: complete  Anna Davidson CMA    "

## 2018-06-21 NOTE — MR AVS SNAPSHOT
After Visit Summary   6/21/2018    Jorge Amaral    MRN: [id number]           Patient Information     Date Of Birth          1970        Visit Information        Provider Department      6/21/2018 8:20 AM Vince Swain PA-C [facility]        Today's Diagnoses     Jaw pain    -  1    Viral upper respiratory tract infection           Follow-ups after your visit        Your next 10 appointments already scheduled     Nov 15, 2018  9:30 AM CST   Return Visit with Sasha Woods MD   [facility] ([facility])    [id number] [place] [address] [phone]              Who to contact     If you have questions or need follow up information about today's clinic visit or your schedule please contact [facility] directly at [phone].  Normal or non-critical lab and imaging results will be communicated to you by MyChart, letter or phone within 4 business days after the clinic has received the results. If you do not hear from us within 7 days, please contact the clinic through MyChart or phone. If you have a critical or abnormal lab result, we will notify you by phone as soon as possible.  Submit refill requests through Sensegon or call your pharmacy and they will forward the refill request to us. Please allow 3 business days for your refill to be completed.          Additional Information About Your Visit        MyChart Information     Sensegon gives you secure access to your electronic health record. If you see a primary care provider, you can also send messages to your care team and make appointments. If you have questions, please call your primary care clinic.  If you do not have a primary care provider, please call [phone] and they will assist you.        Care EveryWhere ID     This is your Care EveryWhere ID. This could be used by other organizations to access your [place] medical records  [id number]       "  Your Vitals Were     Pulse Temperature Respirations Height Pulse Oximetry BMI (Body Mass Index)    78 97.7  F (36.5  C) (Oral) 14 6' 2.25\" (1.886 m) 98% 27.29 kg/m2       Blood Pressure from Last 3 Encounters:   06/21/18 109/68   05/16/18 107/67   04/30/18 110/71    Weight from Last 3 Encounters:   06/21/18 214 lb (97.1 kg)   05/16/18 210 lb (95.3 kg)   04/30/18 216 lb (98 kg)               Primary Care Provider Office Phone # Fax #    Vince Teo Swain PA-C [phone] [address] [facility] [address]        Equal Access to Services     DOLLY SAAVEDRA : Aleida moore Somagdalene, waaxda luqadaha, qaybta kaalmada adeegyafang, juventino cruz . So [facility] [phone].    ATENCIÓN: Si habla español, tiene a shannon disposición servicios gratuitos de asistencia lingüística. Llame al [phone].    We comply with applicable federal civil rights laws and [place] laws. We do not discriminate on the basis of race, color, national origin, age, disability, sex, sexual orientation, or gender identity.            Thank you!     Thank you for choosing [facility]  for your care. Our goal is always to provide you with excellent care. Hearing back from our patients is one way we can continue to improve our services. Please take a few minutes to complete the written survey that you may receive in the mail after your visit with us. Thank you!             Your Updated Medication List - Protect others around you: Learn how to safely use, store and throw away your medicines at www.disposemymeds.org.          This list is accurate as of 6/21/18  9:21 AM.  Always use your most recent med list.                   Brand Name Dispense Instructions for use Diagnosis    diphenoxylate-atropine 2.5-0.025 MG per tablet    LOMOTIL     Take 2 tablets by mouth        HUMIRA 40 MG/0.8ML injection   Generic drug:  adalimumab      Inject 40 mg Subcutaneous once.        PURINETHOL 50 MG " tablet CHEMO   Generic drug:  mercaptopurine      Take 50 mg by mouth daily. 1 and half tabs daily        valACYclovir 1000 mg tablet    VALTREX    30 tablet    two tablets by mouth twice daily for 1 day at onset of symptoms of a cold sore.    Herpes labialis

## 2018-07-02 ENCOUNTER — RADIANT APPOINTMENT (OUTPATIENT)
Dept: CT IMAGING | Facility: CLINIC | Age: 48
End: 2018-07-02
Attending: PHYSICIAN ASSISTANT
Payer: COMMERCIAL

## 2018-07-02 DIAGNOSIS — R68.84 JAW PAIN: ICD-10-CM

## 2018-07-02 PROCEDURE — 70486 CT MAXILLOFACIAL W/O DYE: CPT | Mod: TC

## 2018-08-04 ENCOUNTER — RADIANT APPOINTMENT (OUTPATIENT)
Dept: GENERAL RADIOLOGY | Facility: CLINIC | Age: 48
End: 2018-08-04
Attending: PEDIATRICS
Payer: COMMERCIAL

## 2018-08-04 ENCOUNTER — OFFICE VISIT (OUTPATIENT)
Dept: ORTHOPEDICS | Facility: CLINIC | Age: 48
End: 2018-08-04
Payer: COMMERCIAL

## 2018-08-04 VITALS
WEIGHT: 215 LBS | DIASTOLIC BLOOD PRESSURE: 69 MMHG | BODY MASS INDEX: 27.59 KG/M2 | SYSTOLIC BLOOD PRESSURE: 104 MMHG | HEIGHT: 74 IN

## 2018-08-04 DIAGNOSIS — M75.101 ROTATOR CUFF SYNDROME OF RIGHT SHOULDER: Primary | ICD-10-CM

## 2018-08-04 DIAGNOSIS — M25.811 SHOULDER IMPINGEMENT, RIGHT: ICD-10-CM

## 2018-08-04 DIAGNOSIS — M25.511 RIGHT SHOULDER PAIN: ICD-10-CM

## 2018-08-04 DIAGNOSIS — M19.019 ACROMIOCLAVICULAR JOINT ARTHRITIS: ICD-10-CM

## 2018-08-04 PROCEDURE — 73030 X-RAY EXAM OF SHOULDER: CPT | Mod: RT

## 2018-08-04 PROCEDURE — 99203 OFFICE O/P NEW LOW 30 MIN: CPT | Performed by: PEDIATRICS

## 2018-08-04 NOTE — LETTER
8/4/2018         RE: Jorge Amaral  [address] [facility] [address]        Dear Colleague,    Thank you for referring your patient, Jorge Amaral, to the [facility] [place]. Please see a copy of my visit note below.    Sports Medicine Clinic Visit    PCP: Vince Swain    Jorge Amaral is a 47 year old male who is seen  as a self referral presenting with right shoulder pain    Injury: He reports he has had shoulder pain on and off for the last 25+ years. Initially he reports he hurt his shoulder weight lifting. He reports his pain is aggravated by sleeping on his right side or performing pushups and bench press. He reports the pain is usually a dull ache but can be sharp at times with exercise. He reports he is right handed.    Location of Pain: right shoulder  Duration of Pain: 25+ years   Rating of Pain at worst: 9/10  Rating of Pain Currently: 2/10  Symptoms are better with: Ice, Heat, Tylenol, Ibuprofen and Rest  Symptoms are worse with: overhead motions: flexion, pushing   Additional Features:   Positive: instability and weakness   Negative: swelling, bruising, popping, grinding, catching, locking, paresthesias and numbness  Other evaluation and/or treatments so far consists of: Ice, Heat, Tylenol, Ibuprofen and Rest  Prior History of related problems: nothing     Social History: teach martial arts    Review of Systems  Skin: no bruising, no swelling  Musculoskeletal: as above  Neurologic: no numbness, paresthesias  Remainder of review of systems is negative including constitutional, CV, pulmonary, GI, except as noted in HPI or medical history.    Patient's current problem list, past medical and surgical history, and family history were reviewed.    Patient Active Problem List   Diagnosis     Crohns disease (H)     CARDIOVASCULAR SCREENING; LDL GOAL LESS THAN 160     Fracture, metacarpal     Delayed union of fracture     Family history of diabetes mellitus  "    ACL (anterior cruciate ligament) tear--recurrent     Cold sore     Common wart     Crohn's disease with complication, unspecified gastrointestinal tract location (H)     Herpes labialis     FH: prostate cancer     Past Medical History:   Diagnosis Date     Crohn's disease (H)      Hyperlipidemia LDL goal < 160     not on meds     Past Surgical History:   Procedure Laterality Date     APPENDECTOMY       C STOMACH SURGERY PROCEDURE UNLISTED       ENT SURGERY  6-28-13    Left Tonsil biopsy     LAPAROSCOPY PROCEDURE UNLISTED       OPEN REDUCTION INTERNAL FIXATION HAND      right IV MCP hand 7/11     REMOVE HARDWARE LOWER EXTREMITY  4/6/2012    Procedure:REMOVE HARDWARE LOWER EXTREMITY; REMOVAL OF RIGHT TIBIAL SCREW; Surgeon:JOSE LUIS CARMICHAEL; Location[facility]     Family History   Problem Relation Age of Onset     Hypertension Mother      Respiratory Mother      SMOKER -EMPYSEMA     Hypertension Father      Diabetes Father      Diabetes Brother          Objective  /69 (BP Location: Left arm, Patient Position: Chair, Cuff Size: Adult Regular)  Ht 6' 2.25\" (1.886 m)  Wt 215 lb (97.5 kg)  BMI 27.42 kg/m2    GENERAL APPEARANCE: healthy, alert and no distress   GAIT: NORMAL  SKIN: no suspicious lesions or rashes  HEENT: Sclera clear, anicteric  CV: good peripheral pulses  RESP: Breathing not labored  NEURO: Normal strength and tone, mentation intact and speech normal  PSYCH:  mentation appears normal and affect normal/bright    Bilateral Shoulder exam  Inspection and Posture:       normal    Skin:        no visible deformities    Tender:        none    Non Tender:       remainder of shoulder bilateral    ROM:        Slightly limited ROM on right in flexion and extension when compared to left       asymmetric scapular motion    Painful motions:       end range flexion and elevation right    Strength:        abduction 5/5 bilateral       flexion 5/5 bilateral       internal rotation 5/5 bilateral       external " rotation 5/5 bilateral       lift-off 5/5 bilateral    Impingement testing:       neg (-) Neer right       positive (+) Allen right       positive (+) [place] right    Sensation:        normal sensation over shoulder and upper extremity     Radiology  I ordered, visualized and reviewed these images with the patient  3 XR views of right shoulder reviewed: no acute bony abnormality, AC joint degenerative change  - will follow official read    Assessment:  1. Rotator cuff syndrome of right shoulder    2. Shoulder impingement, right    3. Acromioclavicular joint arthritis      Low suspicion for rotator cuff tear given current history and exam.  AC joint arthritis likely contributing to impingement.  Recommended rest from irritating activities coupled with physical therapy.  Would consider further imaging or treatment pending clinical course.    Plan:  - Today's Plan of Care:  Rehab: Physical Therapy: [facility] - [phone]    -We also discussed other future treatment options:  MRI of the shoulder or Steroid injection of shoulder    Follow Up: 6 - 8 weeks    Concerning signs and symptoms were reviewed.  The patient expressed understanding of this management plan and all questions were answered at this time.    Aysha Mon MD CAQ  Primary Care Sports Medicine  [facility]    Again, thank you for allowing me to participate in the care of your patient.        Sincerely,        Aysha Mon MD

## 2018-08-04 NOTE — MR AVS SNAPSHOT
After Visit Summary   8/4/2018    Jorge Amaral    MRN: [id number]           Patient Information     Date Of Birth          1970        Visit Information        Provider Department      8/4/2018 8:00 AM Aysha Mon MD [facility]        Today's Diagnoses     Rotator cuff syndrome of right shoulder    -  1    Shoulder impingement, right        Acromioclavicular joint arthritis          Care Instructions        Plan:  - Today's Plan of Care:  Rehab: Physical Therapy: [facility] - [phone]    -We also discussed other future treatment options:  MRI of the shoulder or Steroid injection of shoulder    Follow Up: 6 - 8 weeks    If you have any further questions for your physician or physician s care team you can call [phone] and use option 3 to leave a voice message. Calls received during business hours will be returned same day.              Follow-ups after your visit        Additional Services     [facility] PT, HAND, AND CHIROPRACTIC REFERRAL       **This order will print in the [facility] Scheduling Office**    Physical Therapy, Hand Therapy and Chiropractic Care are available through:    *[facility]  *[facility]  *[facility]    Call one number to schedule at any of the above locations: [phone].    Your provider has referred you to: Physical Therapy at [facility] or [facility]    Indication/Reason for Referral: Shoulder Pain  Onset of Illness: chronic  Therapy Orders: Evaluate and Treat  Special Programs: None  Special Request: None    Bety Albert      Additional Comments for the Therapist or Chiropractor:     Please be aware that coverage of these services is subject to the terms and limitations of your health insurance plan.  Call member services at your health plan with any benefit or coverage questions.      Please bring the following to your appointment:    *Your personal calendar for  "scheduling future appointments  *Comfortable clothing                  Your next 10 appointments already scheduled     Nov 15, 2018  9:30 AM CST   [facility] Visit with S[facility] Woods MD   [facility]    [address] [place] [address] [phone]              Who to contact     If you have questions or need follow up information about today's clinic visit or your schedule please contact [facility] directly at [phone].  Normal or non-critical lab and imaging results will be communicated to you by GTIhart, letter or phone within 4 business days after the clinic has received the results. If you do not hear from us within 7 days, please contact the clinic through Wisht or phone. If you have a critical or abnormal lab result, we will notify you by phone as soon as possible.  Submit refill requests through Fastr or call your pharmacy and they will forward the refill request to us. Please allow 3 business days for your refill to be completed.          Additional Information About Your Visit        GTIhart Information     Fastr gives you secure access to your electronic health record. If you see a primary care provider, you can also send messages to your care team and make appointments. If you have questions, please call your primary care clinic.  If you do not have a primary care provider, please call [phone] and they will assist you.        Care EveryWhere ID     This is your Care EveryWhere ID. This could be used by other organizations to access your [place] medical records  [id number]        Your Vitals Were     Height BMI (Body Mass Index)                6' 2.25\" (1.886 m) 27.42 kg/m2           Blood Pressure from Last 3 Encounters:   08/04/18 104/69   06/21/18 109/68   05/16/18 107/67    Weight from Last 3 Encounters:   08/04/18 215 lb (97.5 kg)   06/21/18 214 lb (97.1 kg)   05/16/18 210 lb (95.3 kg)         "      We Performed the Following     [facility] PT, HAND, AND CHIROPRACTIC REFERRAL        Primary Care Provider Office Phone # Fax #    Vince Swain PA-C [phone] [address]  [facility] [address]        Equal Access to Services     [facility] QUENTIN : Hadii aad ku hadasho Soomaali, waaxda luqadaha, qaybta kaalmada adeegyada, waxay brentin haycorinnen adeselvin gudino labrodiemariely . So [facility] [phone].    ATENCIÓN: Si habla español, tiene a shannon disposición servicios gratuitos de asistencia lingüística. Llame al [phone].    We comply with applicable federal civil rights laws and [place] laws. We do not discriminate on the basis of race, color, national origin, age, disability, sex, sexual orientation, or gender identity.            Thank you!     Thank you for choosing [facility] [place]  for your care. Our goal is always to provide you with excellent care. Hearing back from our patients is one way we can continue to improve our services. Please take a few minutes to complete the written survey that you may receive in the mail after your visit with us. Thank you!             Your Updated Medication List - Protect others around you: Learn how to safely use, store and throw away your medicines at www.disposemymeds.org.          This list is accurate as of 8/4/18  8:45 AM.  Always use your most recent med list.                   Brand Name Dispense Instructions for use Diagnosis    diphenoxylate-atropine 2.5-0.025 MG per tablet    LOMOTIL     Take 2 tablets by mouth        HUMIRA 40 MG/0.8ML injection   Generic drug:  adalimumab      Inject 40 mg Subcutaneous once.        PURINETHOL 50 MG tablet CHEMO   Generic drug:  mercaptopurine      Take 50 mg by mouth daily. 1 and half tabs daily        valACYclovir 1000 mg tablet    VALTREX    30 tablet    two tablets by mouth twice daily for 1 day at onset of symptoms of a cold sore.    Herpes labialis

## 2018-08-04 NOTE — PROGRESS NOTES
Sports Medicine Clinic Visit    PCP: Vince Swain    Jorge Amaral is a 47 year old male who is seen  as a self referral presenting with right shoulder pain    Injury: He reports he has had shoulder pain on and off for the last 25+ years. Initially he reports he hurt his shoulder weight lifting. He reports his pain is aggravated by sleeping on his right side or performing pushups and bench press. He reports the pain is usually a dull ache but can be sharp at times with exercise. He reports he is right handed.    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    Patient's current problem list, past medical and surgical history, and family history were reviewed.    Patient Active Problem List   Diagnosis     Crohns disease (H)     CARDIOVASCULAR SCREENING; LDL GOAL LESS THAN 160     Fracture, metacarpal     Delayed union of fracture     Family history of diabetes mellitus     ACL (anterior cruciate ligament) tear--recurrent     Cold sore     Common wart     Crohn's disease with complication, unspecified gastrointestinal tract location (H)     Herpes labialis     FH: prostate cancer     Past Medical History:   Diagnosis Date      "Crohn's disease (H)      Hyperlipidemia LDL goal < 160     not on meds     Past Surgical History:   Procedure Laterality Date     APPENDECTOMY       C STOMACH SURGERY PROCEDURE UNLISTED       ENT SURGERY  6-28-13    Left Tonsil biopsy     LAPAROSCOPY PROCEDURE UNLISTED       OPEN REDUCTION INTERNAL FIXATION HAND      right IV MCP hand 7/11     REMOVE HARDWARE LOWER EXTREMITY  4/6/2012    Procedure:REMOVE HARDWARE LOWER EXTREMITY; REMOVAL OF RIGHT TIBIAL SCREW; Surgeon:JOSE LUIS CARMICHAEL; Location[facility]     Family History   Problem Relation Age of Onset     Hypertension Mother      Respiratory Mother      SMOKER -EMPYSEMA     Hypertension Father      Diabetes Father      Diabetes Brother          Objective  /69 (BP Location: Left arm, Patient Position: Chair, Cuff Size: Adult Regular)  Ht 6' 2.25\" (1.886 m)  Wt 215 lb (97.5 kg)  BMI 27.42 kg/m2    GENERAL APPEARANCE: healthy, alert and no distress   GAIT: NORMAL  SKIN: no suspicious lesions or rashes  HEENT: Sclera clear, anicteric  CV: good peripheral pulses  RESP: Breathing not labored  NEURO: Normal strength and tone, mentation intact and speech normal  PSYCH:  mentation appears normal and affect normal/bright    Bilateral Shoulder exam  Inspection and Posture:       normal    Skin:        no visible deformities    Tender:        none    Non Tender:       remainder of shoulder bilateral    ROM:        Slightly limited ROM on right in flexion and extension when compared to left       asymmetric scapular motion    Painful motions:       end range flexion and elevation right    Strength:        abduction 5/5 bilateral       flexion 5/5 bilateral       internal rotation 5/5 bilateral       external rotation 5/5 bilateral       lift-off 5/5 bilateral    Impingement testing:       neg (-) Neer right       positive (+) Allen right       positive (+) O'rola right    Sensation:        normal sensation over shoulder and upper extremity     Radiology  I ordered, " visualized and reviewed these images with the patient  3 XR views of right shoulder reviewed: no acute bony abnormality, AC joint degenerative change  - will follow official read    Assessment:  1. Rotator cuff syndrome of right shoulder    2. Shoulder impingement, right    3. Acromioclavicular joint arthritis      Low suspicion for rotator cuff tear given current history and exam.  AC joint arthritis likely contributing to impingement.  Recommended rest from irritating activities coupled with physical therapy.  Would consider further imaging or treatment pending clinical course.    Plan:  - Today's Plan of Care:  Rehab: Physical Therapy: [facility] - [phone]    -We also discussed other future treatment options:  MRI of the shoulder or Steroid injection of shoulder    Follow Up: 6 - 8 weeks    Concerning signs and symptoms were reviewed.  The patient expressed understanding of this management plan and all questions were answered at this time.    Aysha Mon MD CAQ  Primary Care Sports Medicine  [facility]

## 2018-08-04 NOTE — PATIENT INSTRUCTIONS
Plan:  - Today's Plan of Care:  Rehab: Physical Therapy: [facility] - [phone]    -We also discussed other future treatment options:  MRI of the shoulder or Steroid injection of shoulder    Follow Up: 6 - 8 weeks    If you have any further questions for your physician or physician s care team you can call [phone] and use option 3 to leave a voice message. Calls received during business hours will be returned same day.

## 2018-08-27 ENCOUNTER — THERAPY VISIT (OUTPATIENT)
Dept: PHYSICAL THERAPY | Facility: CLINIC | Age: 48
End: 2018-08-27
Payer: COMMERCIAL

## 2018-08-27 DIAGNOSIS — M25.511 RIGHT SHOULDER PAIN: Primary | ICD-10-CM

## 2018-08-27 PROCEDURE — 97110 THERAPEUTIC EXERCISES: CPT | Mod: GP | Performed by: PHYSICAL THERAPIST

## 2018-08-27 PROCEDURE — 97161 PT EVAL LOW COMPLEX 20 MIN: CPT | Mod: GP | Performed by: PHYSICAL THERAPIST

## 2018-08-27 NOTE — PROGRESS NOTES
[facility] Initial Evaluation  Subjective:  Patient is a 48 year old male presenting with rehab right shoulder hpi.   Jorge Amaral is a 48 year old male with a right shoulder condition.  Condition occurred with:  Unknown cause.  Condition occurred: during recreation/sport.  This is a new and recurrent condition  Pt hurt R shoulder 6-25-18 while doing [employer] do, has long history of R shoulder pain..    Patient reports pain:  Anterior and lateral.  Radiates to:  Upper arm.  Pain is described as aching and is constant and reported as 6/10.  Associated symptoms:  Painful arc, loss of motion/stiffness and loss of strength. Pain is the same all the time.  Symptoms are exacerbated by certain positions, lifting, using arm overhead and lying on extremity and relieved by ice.  Since onset symptoms are unchanged.        General health as reported by patient is fair.  Past medical history: chrohns disease.  Medical allergies: no.  Other surgeries include:  Orthopedic surgery (R ACLR).  Current medications:  Anti-inflammatory.  Current occupation is .  Patient is working in normal job without restrictions.  Primary job tasks include:  Prolonged standing.    Barriers include:  None as reported by the patient.    Red flags:  None as reported by the patient.                        Objective:          SHOULDER:    Cervical Screen: normal and painfree    Shoulder:   PROM L PROM R AROM L AROM R MMT L MMT R   Flex   170 170(painful [id number]) 5/5 4/5+   Abd   180 180painful arc 45-100degs 5/5 4/5+   Full Can     5/5 pain   Empty Can     5/5 nt   IR   T8 T8 5/5 5/5   ER   77 64 5/5 4/5+   Ext/IR           Scapulothoraic Rhythm: increased upward rotation with R upper trap compensatory strategy    Palpation: mild TTP R anterolateral shoulder    Special tests:   L R   Impingement     Neer's neg +   Hawkin's-Can neg +   Coracoid Impingement     Internal impingement     Labral      Anterior Slide     Vevay's     Crank     Instability     Apprehension (anterior)     Relocation (anterior)     Anterior Load & Shift     Posterior Load & Shift     Posterior instability (with 90 degrees flex)     Multi-Directional Instability      Sulcus     Biceps      Speed's neg neg   Rotator Cuff Tear     Drop Arm     Belly Press     Lift off        GH Mobility  L  R   Posterior glide limited limited   Inferior glide limited limited   Anterior glide wnl wnl     Pt with new recurrent R shoulder pain limited his ability to lift/reach overhead and 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 He would benefit from PT to address posture, shoulder ROM, and strength and assist with return to high level sport competition.          System    Physical Exam    General     ROS    Assessment/Plan:    Patient is a 48 year old male with right side shoulder complaints.    Patient has the following significant findings with corresponding treatment plan.                Diagnosis 1:  R shoulder pain  Pain -  hot/cold therapy, US, electric stimulation, manual therapy, education and home program  Decreased ROM/flexibility - manual therapy, therapeutic exercise, therapeutic activity and home program  Decreased joint mobility - manual therapy, therapeutic exercise, therapeutic activity and home program  Decreased strength - therapeutic exercise, therapeutic activities and home program  Impaired posture - neuro re-education, therapeutic activities and home program    Therapy Evaluation Codes:   1) History comprised of:   Personal factors that impact the plan of care:      None.    Comorbidity factors that impact the plan of care are:      None.     Medications impacting care: None.  2) Examination of Body Systems comprised of:   Body structures and functions that impact the plan of care:      Shoulder.   Activity limitations that impact the plan of care are:      Lifting.  3) Clinical presentation  characteristics are:   Stable/Uncomplicated.  4) Decision-Making    Low complexity using standardized patient assessment instrument and/or measureable assessment of functional outcome.  Cumulative Therapy Evaluation is: Low complexity.    Previous and current functional limitations:  (See Goal Flow Sheet for this information)    Short term and Long term goals: (See Goal Flow Sheet for this information)     Communication ability:  Patient appears to be able to clearly communicate and understand verbal and written communication and follow directions correctly.  Treatment Explanation - The following has been discussed with the patient:   RX ordered/plan of care  Anticipated outcomes  Possible risks and side effects  This patient would benefit from PT intervention to resume normal activities.   Rehab potential is good.    Frequency:  1 X week, once daily  Duration:  for 12 weeks  Discharge Plan:  Achieve all LTG.  Independent in home treatment program.  Reach maximal therapeutic benefit.    Please refer to the daily flowsheet for treatment today, total treatment time and time spent performing 1:1 timed codes.

## 2018-08-27 NOTE — MR AVS SNAPSHOT
After Visit Summary   8/27/2018    Jorge Amaral    MRN: [id number]           Patient Information     Date Of Birth          1970        Visit Information        Provider Department      8/27/2018 8:20 AM Curry Webb, [facility]        Today's Diagnoses     Right shoulder pain    -  1       Follow-ups after your visit        Your next 10 appointments already scheduled     Sep 04, 2018  2:10 PM CDT   KB Extremity with Curry Webb PT   [facility] ([facility] [place]    [address]  [place] [address]   [phone]            Sep 11, 2018  9:40 AM CDT   KB Extremity with Curry Webb PT   [facility] ([facility] [place]    [address]  [place] [address]   [phone]            Nov 15, 2018  9:30 AM CST   Return Visit with Sasha Woods MD   [facility]    [address] [place] [address] [phone]              Who to contact     If you have questions or need follow up information about today's clinic visit or your schedule please contact [facility] directly at [phone].  Normal or non-critical lab and imaging results will be communicated to you by PetMDhart, letter or phone within 4 business days after the clinic has received the results. If you do not hear from us within 7 days, please contact the clinic through PetMDhart or phone. If you have a critical or abnormal lab result, we will notify you by phone as soon as possible.  Submit refill requests through [employer] or call your pharmacy and they will forward the refill request to us. Please allow 3 business days for your refill to be completed.          Additional Information About Your Visit        PetMDharKOJI Drinks Information     [employer] gives you secure access to your electronic health record. If you see a primary care provider, you can also send messages to your care team and make appointments. If  you have questions, please call your primary care clinic.  If you do not have a primary care provider, please call [phone] and they will assist you.        Care EveryWhere ID     This is your Care EveryWhere ID. This could be used by other organizations to access your [facility] medical records  [id number]         Blood Pressure from Last 3 Encounters:   08/04/18 104/69   06/21/18 109/68   05/16/18 107/67    Weight from Last 3 Encounters:   08/04/18 97.5 kg (215 lb)   06/21/18 97.1 kg (214 lb)   05/16/18 95.3 kg (210 lb)              We Performed the Following     HC PT EVAL, LOW COMPLEXITY     KB CERT REPORT     THERAPEUTIC EXERCISES        Primary Care Provider Office Phone # Fax #    Vince Swain PA-C [phone] [address]  [facility] [address]        Equal Access to Services     FLAVIA SAAVEDRA : Hadii aad ku hadasho Soomaali, waaxda luqadaha, qaybta kaalmada adeegyada, waxay idiin hayaan daya cruz . So [facility] [phone].    ATENCIÓN: Si habla español, tiene a shannon disposición servicios gratuitos de asistencia lingüística. Marito al [phone].    We comply with applicable federal civil rights laws and [place] laws. We do not discriminate on the basis of race, color, national origin, age, disability, sex, sexual orientation, or gender identity.            Thank you!     Thank you for choosing [facility]  for your care. Our goal is always to provide you with excellent care. Hearing back from our patients is one way we can continue to improve our services. Please take a few minutes to complete the written survey that you may receive in the mail after your visit with us. Thank you!             Your Updated Medication List - Protect others around you: Learn how to safely use, store and throw away your medicines at www.disposemymeds.org.          This list is accurate as of 8/27/18  9:23 AM.  Always use your most recent med list.                    Brand Name Dispense Instructions for use Diagnosis    diphenoxylate-atropine 2.5-0.025 MG per tablet    LOMOTIL     Take 2 tablets by mouth        HUMIRA 40 MG/0.8ML injection   Generic drug:  adalimumab      Inject 40 mg Subcutaneous once.        PURINETHOL 50 MG tablet CHEMO   Generic drug:  mercaptopurine      Take 50 mg by mouth daily. 1 and half tabs daily        valACYclovir 1000 mg tablet    VALTREX    30 tablet    two tablets by mouth twice daily for 1 day at onset of symptoms of a cold sore.    Herpes labialis

## 2018-09-04 ENCOUNTER — THERAPY VISIT (OUTPATIENT)
Dept: PHYSICAL THERAPY | Facility: CLINIC | Age: 48
End: 2018-09-04
Payer: COMMERCIAL

## 2018-09-04 DIAGNOSIS — M25.511 RIGHT SHOULDER PAIN: ICD-10-CM

## 2018-09-04 PROCEDURE — 97110 THERAPEUTIC EXERCISES: CPT | Mod: GP | Performed by: PHYSICAL THERAPIST

## 2018-09-04 PROCEDURE — 97010 HOT OR COLD PACKS THERAPY: CPT | Mod: GP | Performed by: PHYSICAL THERAPIST

## 2018-09-11 ENCOUNTER — THERAPY VISIT (OUTPATIENT)
Dept: PHYSICAL THERAPY | Facility: CLINIC | Age: 48
End: 2018-09-11
Payer: COMMERCIAL

## 2018-09-11 DIAGNOSIS — M25.511 RIGHT SHOULDER PAIN: ICD-10-CM

## 2018-09-11 PROCEDURE — 97010 HOT OR COLD PACKS THERAPY: CPT | Mod: GP | Performed by: PHYSICAL THERAPIST

## 2018-09-11 PROCEDURE — 97110 THERAPEUTIC EXERCISES: CPT | Mod: GP | Performed by: PHYSICAL THERAPIST

## 2018-09-11 PROCEDURE — 97112 NEUROMUSCULAR REEDUCATION: CPT | Mod: GP | Performed by: PHYSICAL THERAPIST

## 2018-09-11 NOTE — MR AVS SNAPSHOT
After Visit Summary   9/11/2018    Jorge Amaral    MRN: [id number]           Patient Information     Date Of Birth          1970        Visit Information        Provider Department      9/11/2018 9:40 AM Curry Webb PT [facility]        Today's Diagnoses     Right shoulder pain           Follow-ups after your visit        Your next 10 appointments already scheduled     Nov 15, 2018  9:30 AM CST   Return Visit with Sasha Woods MD   [facility]    [address] [place] [address] [phone]              Who to contact     If you have questions or need follow up information about today's clinic visit or your schedule please contact [facility] directly at [phone].  Normal or non-critical lab and imaging results will be communicated to you by MyChart, letter or phone within 4 business days after the clinic has received the results. If you do not hear from us within 7 days, please contact the clinic through MyChart or phone. If you have a critical or abnormal lab result, we will notify you by phone as soon as possible.  Submit refill requests through [employer] or call your pharmacy and they will forward the refill request to us. Please allow 3 business days for your refill to be completed.          Additional Information About Your Visit        MyChart Information     [employer] gives you secure access to your electronic health record. If you see a primary care provider, you can also send messages to your care team and make appointments. If you have questions, please call your primary care clinic.  If you do not have a primary care provider, please call [phone] and they will assist you.        Care EveryWhere ID     This is your Care EveryWhere ID. This could be used by other organizations to access your [facility] medical records  [id number]         Blood Pressure from Last 3 Encounters:    08/04/18 104/69   06/21/18 109/68   05/16/18 107/67    Weight from Last 3 Encounters:   08/04/18 97.5 kg (215 lb)   06/21/18 97.1 kg (214 lb)   05/16/18 95.3 kg (210 lb)              We Performed the Following     HOT OR COLD PACKS THERAPY     NEUROMUSCULAR RE-EDUCATION     THERAPEUTIC EXERCISES        Primary Care Provider Office Phone # Fax #    Vince Swain PA-C [phone] [address] [facility] [address]        Equal Access to Services     [facility]: Hadii aad ku hadasho Soomaali, waaxda luqadaha, qaybta kaalmada adeegyada, juventino cruz . So [facility] [phone].    ATENCIÓN: Si habla español, tiene a shannon disposición servicios gratuitos de asistencia lingüística. [facility] [phone].    We comply with applicable federal civil rights laws and [place] laws. We do not discriminate on the basis of race, color, national origin, age, disability, sex, sexual orientation, or gender identity.            Thank you!     Thank you for choosing [facility]  for your care. Our goal is always to provide you with excellent care. Hearing back from our patients is one way we can continue to improve our services. Please take a few minutes to complete the written survey that you may receive in the mail after your visit with us. Thank you!             Your Updated Medication List - Protect others around you: Learn how to safely use, store and throw away your medicines at www.disposemymeds.org.          This list is accurate as of 9/11/18 10:20 AM.  Always use your most recent med list.                   Brand Name Dispense Instructions for use Diagnosis    diphenoxylate-atropine 2.5-0.025 MG per tablet    LOMOTIL     Take 2 tablets by mouth        HUMIRA 40 MG/0.8ML injection   Generic drug:  adalimumab      Inject 40 mg Subcutaneous once.        PURINETHOL 50 MG tablet CHEMO   Generic drug:  mercaptopurine      Take 50 mg by mouth daily. 1 and half  tabs daily        valACYclovir 1000 mg tablet    VALTREX    30 tablet    two tablets by mouth twice daily for 1 day at onset of symptoms of a cold sore.    Herpes labialis

## 2018-09-28 ENCOUNTER — MYC MEDICAL ADVICE (OUTPATIENT)
Dept: FAMILY MEDICINE | Facility: CLINIC | Age: 48
End: 2018-09-28

## 2018-09-28 DIAGNOSIS — Z00.00 ROUTINE HISTORY AND PHYSICAL EXAMINATION OF ADULT: Primary | ICD-10-CM

## 2018-10-02 NOTE — TELEPHONE ENCOUNTER
Please review lab orders sign and close encounter. Brenda CARTER    Pvl 10/9/18    Physical 10/11/18

## 2018-10-09 DIAGNOSIS — Z80.42 FH: PROSTATE CANCER: ICD-10-CM

## 2018-10-09 DIAGNOSIS — Z00.00 ROUTINE HISTORY AND PHYSICAL EXAMINATION OF ADULT: ICD-10-CM

## 2018-10-09 LAB
ANION GAP SERPL CALCULATED.3IONS-SCNC: 5 MMOL/L (ref 3–14)
BUN SERPL-MCNC: 15 MG/DL (ref 7–30)
CALCIUM SERPL-MCNC: 8.9 MG/DL (ref 8.5–10.1)
CHLORIDE SERPL-SCNC: 105 MMOL/L (ref 94–109)
CHOLEST SERPL-MCNC: 201 MG/DL
CO2 SERPL-SCNC: 33 MMOL/L (ref 20–32)
CREAT SERPL-MCNC: 1.05 MG/DL (ref 0.66–1.25)
GFR SERPL CREATININE-BSD FRML MDRD: 75 ML/MIN/1.7M2
GLUCOSE SERPL-MCNC: 93 MG/DL (ref 70–99)
HDLC SERPL-MCNC: 46 MG/DL
LDLC SERPL CALC-MCNC: 129 MG/DL
NONHDLC SERPL-MCNC: 155 MG/DL
POTASSIUM SERPL-SCNC: 4.2 MMOL/L (ref 3.4–5.3)
SODIUM SERPL-SCNC: 143 MMOL/L (ref 133–144)
TRIGL SERPL-MCNC: 129 MG/DL

## 2018-10-09 PROCEDURE — 36415 COLL VENOUS BLD VENIPUNCTURE: CPT | Performed by: PHYSICIAN ASSISTANT

## 2018-10-09 PROCEDURE — 80061 LIPID PANEL: CPT | Performed by: PHYSICIAN ASSISTANT

## 2018-10-09 PROCEDURE — 80048 BASIC METABOLIC PNL TOTAL CA: CPT | Performed by: PHYSICIAN ASSISTANT

## 2018-10-09 PROCEDURE — G0103 PSA SCREENING: HCPCS | Performed by: PHYSICIAN ASSISTANT

## 2018-10-11 ENCOUNTER — OFFICE VISIT (OUTPATIENT)
Dept: FAMILY MEDICINE | Facility: CLINIC | Age: 48
End: 2018-10-11
Payer: COMMERCIAL

## 2018-10-11 VITALS
BODY MASS INDEX: 28.11 KG/M2 | SYSTOLIC BLOOD PRESSURE: 118 MMHG | OXYGEN SATURATION: 98 % | HEIGHT: 74 IN | TEMPERATURE: 97 F | DIASTOLIC BLOOD PRESSURE: 73 MMHG | WEIGHT: 219 LBS | HEART RATE: 68 BPM

## 2018-10-11 DIAGNOSIS — Z80.42 FH: PROSTATE CANCER: ICD-10-CM

## 2018-10-11 DIAGNOSIS — Z23 NEED FOR PROPHYLACTIC VACCINATION AND INOCULATION AGAINST INFLUENZA: ICD-10-CM

## 2018-10-11 DIAGNOSIS — Z00.00 PREVENTATIVE HEALTH CARE: Primary | ICD-10-CM

## 2018-10-11 DIAGNOSIS — K50.919 CROHN'S DISEASE WITH COMPLICATION, UNSPECIFIED GASTROINTESTINAL TRACT LOCATION (H): ICD-10-CM

## 2018-10-11 LAB — PSA SERPL-ACNC: 0.5 UG/L (ref 0–4)

## 2018-10-11 PROCEDURE — 90471 IMMUNIZATION ADMIN: CPT | Performed by: PHYSICIAN ASSISTANT

## 2018-10-11 PROCEDURE — 90686 IIV4 VACC NO PRSV 0.5 ML IM: CPT | Performed by: PHYSICIAN ASSISTANT

## 2018-10-11 PROCEDURE — 99396 PREV VISIT EST AGE 40-64: CPT | Mod: 25 | Performed by: PHYSICIAN ASSISTANT

## 2018-10-11 ASSESSMENT — ENCOUNTER SYMPTOMS
ARTHRALGIAS: 0
COUGH: 0
NERVOUS/ANXIOUS: 0
HEADACHES: 1
SHORTNESS OF BREATH: 0
DYSURIA: 0
FREQUENCY: 0
NAUSEA: 0
HEMATOCHEZIA: 0
DIARRHEA: 0
FEVER: 0
MYALGIAS: 0
ABDOMINAL PAIN: 0
SORE THROAT: 0
DIZZINESS: 0
PALPITATIONS: 0
PARESTHESIAS: 0
JOINT SWELLING: 0
EYE PAIN: 0
WEAKNESS: 0
CHILLS: 0
HEARTBURN: 0
CONSTIPATION: 0
HEMATURIA: 0

## 2018-10-11 NOTE — MR AVS SNAPSHOT
After Visit Summary   10/11/2018    Jorge Amaral    MRN: [id number]           Patient Information     Date Of Birth          1970        Visit Information        Provider Department      10/11/2018 9:00 AM Vince Swain PA-C [facility]        Today's Diagnoses     Crohn's disease with complication, unspecified gastrointestinal tract location (H)    -  1    FH: prostate cancer           Follow-ups after your visit        Your next 10 appointments already scheduled     Nov 15, 2018  9:30 AM CST   Return Visit with Sasha Woods MD   [facility]    [address] [place] [address] [phone]              Who to contact     If you have questions or need follow up information about today's clinic visit or your schedule please contact [facility] directly at [phone].  Normal or non-critical lab and imaging results will be communicated to you by MyChart, letter or phone within 4 business days after the clinic has received the results. If you do not hear from us within 7 days, please contact the clinic through [employer] or phone. If you have a critical or abnormal lab result, we will notify you by phone as soon as possible.  Submit refill requests through [employer] or call your pharmacy and they will forward the refill request to us. Please allow 3 business days for your refill to be completed.          Additional Information About Your Visit        MyChart Information     [employer] gives you secure access to your electronic health record. If you see a primary care provider, you can also send messages to your care team and make appointments. If you have questions, please call your primary care clinic.  If you do not have a primary care provider, please call [phone] and they will assist you.        Care EveryWhere ID     This is your Care EveryWhere ID. This could be used by other organizations to  "access your [place] medical records  [id number]        Your Vitals Were     Pulse Temperature Height Pulse Oximetry BMI (Body Mass Index)       68 97  F (36.1  C) (Oral) 6' 2.25\" (1.886 m) 98% 27.93 kg/m2        Blood Pressure from Last 3 Encounters:   10/11/18 118/73   08/04/18 104/69   06/21/18 109/68    Weight from Last 3 Encounters:   10/11/18 219 lb (99.3 kg)   08/04/18 215 lb (97.5 kg)   06/21/18 214 lb (97.1 kg)              We Performed the Following     PSA, screen        Primary Care Provider Office Phone # Fax #    Vince Swain PA-C [phone] [address] [facility] [address]        Equal Access to Services     [facility] : Hadii mary parker hadasho Soomaali, waaxda luqadaha, qaybta kaalmada adeselvinyafang, juventino cruz . So [facility] [phone].    ATENCIÓN: Si habla español, tiene a shannon disposición servicios gratuitos de asistencia lingüística. Marito al [phone].    We comply with applicable federal civil rights laws and [place] laws. We do not discriminate on the basis of race, color, national origin, age, disability, sex, sexual orientation, or gender identity.            Thank you!     Thank you for choosing [facility]  for your care. Our goal is always to provide you with excellent care. Hearing back from our patients is one way we can continue to improve our services. Please take a few minutes to complete the written survey that you may receive in the mail after your visit with us. Thank you!             Your Updated Medication List - Protect others around you: Learn how to safely use, store and throw away your medicines at www.disposemymeds.org.          This list is accurate as of 10/11/18  9:35 AM.  Always use your most recent med list.                   Brand Name Dispense Instructions for use Diagnosis    diphenoxylate-atropine 2.5-0.025 MG per tablet    LOMOTIL     Take 2 tablets by mouth        HUMIRA 40 MG/0.8ML " injection   Generic drug:  adalimumab      Inject 40 mg Subcutaneous once.        PURINETHOL 50 MG tablet CHEMO   Generic drug:  mercaptopurine      Take 50 mg by mouth daily. 1 and half tabs daily        valACYclovir 1000 mg tablet    VALTREX    30 tablet    two tablets by mouth twice daily for 1 day at onset of symptoms of a cold sore.    Herpes labialis

## 2018-10-11 NOTE — PROGRESS NOTES
SUBJECTIVE:   CC: Jorge Amaral is an 48 year old male who presents for preventative health visit.     Physical   Annual:     Getting at least 3 servings of Calcium per day:  NO    Bi-annual eye exam:  Yes    Dental care twice a year:  Yes    Sleep apnea or symptoms of sleep apnea:  Daytime drowsiness    Diet:  Regular (no restrictions)    Frequency of exercise:  2-3 days/week    Duration of exercise:  30-45 minutes    Taking medications regularly:  Yes    Medication side effects:  None    Additional concerns today:  YES    History of crohns' see's GI every 6 months. Last colonoscopy 5/18. See careeverywhere.       Today's PHQ-2 Score:   PHQ-2 ( 1999 Pfizer) 10/11/2018   Q1: Little interest or pleasure in doing things 0   Q2: Feeling down, depressed or hopeless 0   PHQ-2 Score 0   Q1: Little interest or pleasure in doing things Not at all   Q2: Feeling down, depressed or hopeless Not at all   PHQ-2 Score 0       Social History   Substance Use Topics     Smoking status: Current Some Day Smoker     Types: Cigars     Smokeless tobacco: Never Used      Comment: very rarely      Alcohol use Yes      Comment: RARE     Alcohol Use 10/11/2018   If you drink alcohol do you typically have greater than 3 drinks per day OR greater than 7 drinks per week? No       Last PSA:   PSA   Date Value Ref Range Status   10/09/2018 0.50 0 - 4 ug/L Final     Comment:     Assay Method:  Chemiluminescence using Siemens Vista analyzer       Reviewed orders with patient. Reviewed health maintenance and updated orders accordingly - Yes  Labs reviewed in EPIC  BP Readings from Last 3 Encounters:   10/11/18 118/73   08/04/18 104/69   06/21/18 109/68    Wt Readings from Last 3 Encounters:   10/11/18 219 lb (99.3 kg)   08/04/18 215 lb (97.5 kg)   06/21/18 214 lb (97.1 kg)                  Patient Active Problem List   Diagnosis     CARDIOVASCULAR SCREENING; LDL GOAL LESS THAN 160     Fracture, metacarpal     Delayed union of fracture     Family  history of diabetes mellitus     ACL (anterior cruciate ligament) tear--recurrent     Cold sore     Common wart     Crohn's disease with complication, unspecified gastrointestinal tract location (H)     Herpes labialis     FH: prostate cancer     Right shoulder pain     Past Surgical History:   Procedure Laterality Date     APPENDECTOMY       C STOMACH SURGERY PROCEDURE UNLISTED       ENT SURGERY  6-28-13    Left Tonsil biopsy     LAPAROSCOPY PROCEDURE UNLISTED       OPEN REDUCTION INTERNAL FIXATION HAND      right IV MCP hand 7/11     REMOVE HARDWARE LOWER EXTREMITY  4/6/2012    Procedure:REMOVE HARDWARE LOWER EXTREMITY; REMOVAL OF RIGHT TIBIAL SCREW; Surgeon:JOSE LUIS CARMICHAEL; Location:[facility]       Social History   Substance Use Topics     Smoking status: Current Some Day Smoker     Types: Cigars     Smokeless tobacco: Never Used      Comment: very rarely      Alcohol use Yes      Comment: RARE     Family History   Problem Relation Age of Onset     Hypertension Mother      Respiratory Mother      SMOKER -EMPYSEMA     Hypertension Father      Diabetes Father      Diabetes Brother          Current Outpatient Prescriptions   Medication Sig Dispense Refill     adalimumab (HUMIRA) 40 MG/0.8ML injection Inject 40 mg Subcutaneous once.       diphenoxylate-atropine (LOMOTIL) 2.5-0.025 MG per tablet Take 2 tablets by mouth       mercaptopurine (PURINETHOL) 50 MG tablet Take 50 mg by mouth daily. 1 and half tabs daily       valACYclovir (VALTREX) 1000 mg tablet two tablets by mouth twice daily for 1 day at onset of symptoms of a cold sore. 30 tablet 0     No Known Allergies    Reviewed and updated as needed this visit by clinical staff  Tobacco  Allergies  Meds  Med Hx  Surg Hx  Fam Hx  Soc Hx        Reviewed and updated as needed this visit by Provider          Past Medical History:   Diagnosis Date     Crohn's disease (H)      Hyperlipidemia LDL goal < 160     not on meds      Past Surgical History:   Procedure  Laterality Date     APPENDECTOMY       C STOMACH SURGERY PROCEDURE UNLISTED       ENT SURGERY  6-28-13    Left Tonsil biopsy     LAPAROSCOPY PROCEDURE UNLISTED       OPEN REDUCTION INTERNAL FIXATION HAND      right IV MCP hand 7/11     REMOVE HARDWARE LOWER EXTREMITY  4/6/2012    Procedure:REMOVE HARDWARE LOWER EXTREMITY; REMOVAL OF RIGHT TIBIAL SCREW; Surgeon:JOSE LUIS CARMICHAEL; Location:[facility]       Review of Systems   Constitutional: Negative for chills and fever.   HENT: Negative for congestion, ear pain, hearing loss and sore throat.    Eyes: Negative for pain and visual disturbance.   Respiratory: Negative for cough and shortness of breath.    Cardiovascular: Negative for chest pain, palpitations and peripheral edema.   Gastrointestinal: Negative for abdominal pain, constipation, diarrhea, heartburn, hematochezia and nausea.   Genitourinary: Negative for discharge, dysuria, frequency, genital sores, hematuria, impotence and urgency.   Musculoskeletal: Negative for arthralgias, joint swelling and myalgias.   Skin: Negative for rash.   Neurological: Positive for headaches. Negative for dizziness, weakness and paresthesias.   Psychiatric/Behavioral: Negative for mood changes. The patient is not nervous/anxious.      CONSTITUTIONAL: NEGATIVE for fever, chills, change in weight  INTEGUMENTARY/SKIN: NEGATIVE for worrisome rashes, moles or lesions  EYES: NEGATIVE for vision changes or irritation  ENT: NEGATIVE for ear, mouth and throat problems  RESP: NEGATIVE for significant cough or SOB  CV: NEGATIVE for chest pain, palpitations or peripheral edema  GI: NEGATIVE for nausea, abdominal pain, heartburn, or change in bowel habits   male: negative for dysuria, hematuria, decreased urinary stream, erectile dysfunction, urethral discharge  MUSCULOSKELETAL: NEGATIVE for significant arthralgias or myalgia  NEURO: NEGATIVE for weakness, dizziness or paresthesias  PSYCHIATRIC: NEGATIVE for changes in mood or  "affect    OBJECTIVE:   /73  Pulse 68  Temp 97  F (36.1  C) (Oral)  Ht 6' 2.25\" (1.886 m)  Wt 219 lb (99.3 kg)  SpO2 98%  BMI 27.93 kg/m2    Physical Exam  GENERAL: healthy, alert and no distress  EYES: Eyes grossly normal to inspection, PERRL and conjunctivae and sclerae normal  HENT: ear canals and TM's normal, nose and mouth without ulcers or lesions  NECK: no adenopathy, no asymmetry, masses, or scars and thyroid normal to palpation  RESP: lungs clear to auscultation - no rales, rhonchi or wheezes  CV: regular rate and rhythm, normal S1 S2, no S3 or S4, no murmur, click or rub, no peripheral edema and peripheral pulses strong  ABDOMEN: soft, nontender, no hepatosplenomegaly, no masses and bowel sounds normal   (male): normal male genitalia without lesions or urethral discharge, no hernia  MS: no gross musculoskeletal defects noted, no edema  SKIN: no suspicious lesions or rashes  NEURO: Normal strength and tone, mentation intact and speech normal  PSYCH: mentation appears normal, affect normal/bright    Diagnostic Test Results:  Results for orders placed or performed in visit on 10/09/18   Basic metabolic panel  (Ca, Cl, CO2, Creat, Gluc, K, Na, BUN)   Result Value Ref Range    Sodium 143 133 - 144 mmol/L    Potassium 4.2 3.4 - 5.3 mmol/L    Chloride 105 94 - 109 mmol/L    Carbon Dioxide 33 (H) 20 - 32 mmol/L    Anion Gap 5 3 - 14 mmol/L    Glucose 93 70 - 99 mg/dL    Urea Nitrogen 15 7 - 30 mg/dL    Creatinine 1.05 0.66 - 1.25 mg/dL    GFR Estimate 75 >60 mL/min/1.7m2    GFR Estimate If Black >90 >60 mL/min/1.7m2    Calcium 8.9 8.5 - 10.1 mg/dL   Lipid panel reflex to direct LDL Fasting   Result Value Ref Range    Cholesterol 201 (H) <200 mg/dL    Triglycerides 129 <150 mg/dL    HDL Cholesterol 46 >39 mg/dL    LDL Cholesterol Calculated 129 (H) <100 mg/dL    Non HDL Cholesterol 155 (H) <130 mg/dL   PSA, screen   Result Value Ref Range    PSA 0.50 0 - 4 ug/L       ASSESSMENT/PLAN:       ICD-10-CM  " "  1. Preventative health care Z00.00    2. Crohn's disease with complication, unspecified gastrointestinal tract location (H) K50.913    3. FH: prostate cancer Z80.42 PSA, screen     CANCELED: PSA, screen   4. Need for prophylactic vaccination and inoculation against influenza Z23 FLU VACCINE, SPLIT VIRUS, IM (QUADRIVALENT) [93180]- >3 YRS     Vaccine Administration, Initial [92344]     1. Work on Healthy diet and exercise. Getting heart rate elevated for 30 mins most days of week.  2. con't follow up  With GI  Recheck yearly.     COUNSELING:   Reviewed preventive health counseling, as reflected in patient instructions       Regular exercise       Healthy diet/nutrition    BP Readings from Last 1 Encounters:   10/11/18 118/73     Estimated body mass index is 27.93 kg/(m^2) as calculated from the following:    Height as of this encounter: 6' 2.25\" (1.886 m).    Weight as of this encounter: 219 lb (99.3 kg).           reports that he has been smoking Cigars.  He has never used smokeless tobacco.      Counseling Resources:  ATP IV Guidelines  Pooled Cohorts Equation Calculator  FRAX Risk Assessment  ICSI Preventive Guidelines  Dietary Guidelines for Americans, 2010  [employer] MyPlate  ASA Prophylaxis  Lung CA Screening    Vince Swain PA-C  [facility] [place]  Answers for HPI/ROS submitted by the patient on 10/11/2018   PHQ-2 Score: 0    The 10-year ASCVD risk score (Toi CARROLL Jr, et al., 2013) is: 7.3%    Values used to calculate the score:      Age: 48 years      Sex: Male      Is Non- : No      Diabetic: No      Tobacco smoker: Yes      Systolic Blood Pressure: 118 mmHg      Is BP treated: No      HDL Cholesterol: 46 mg/dL      Total Cholesterol: 201 mg/dL   "

## 2018-10-11 NOTE — PROGRESS NOTES
Injectable Influenza Immunization Documentation    1.  Is the person to be vaccinated sick today?   No    2. Does the person to be vaccinated have an allergy to a component   of the vaccine?   No  Egg Allergy Algorithm Link    3. Has the person to be vaccinated ever had a serious reaction   to influenza vaccine in the past?   No    4. Has the person to be vaccinated ever had Guillain-Barré syndrome?   No    Prior to injection verified patient identity using patient's name and date of birth.  Due to injection administration, patient instructed to remain in clinic for 15 minutes  afterwards, and to report any adverse reaction to me immediately.        Form completed by Nisha Bryan CMA

## 2018-10-11 NOTE — NURSING NOTE
"Chief Complaint   Patient presents with     Physical     Health Maintenance       Initial /73  Pulse 68  Temp 97  F (36.1  C) (Oral)  Ht 6' 2.25\" (1.886 m)  Wt 219 lb (99.3 kg)  SpO2 98%  BMI 27.93 kg/m2 Estimated body mass index is 27.93 kg/(m^2) as calculated from the following:    Height as of this encounter: 6' 2.25\" (1.886 m).    Weight as of this encounter: 219 lb (99.3 kg).  Medication Reconciliation: complete    Nisha Bryan CMA      "

## 2018-10-18 ENCOUNTER — OFFICE VISIT (OUTPATIENT)
Dept: ORTHOPEDICS | Facility: CLINIC | Age: 48
End: 2018-10-18
Payer: COMMERCIAL

## 2018-10-18 ENCOUNTER — RADIANT APPOINTMENT (OUTPATIENT)
Dept: GENERAL RADIOLOGY | Facility: CLINIC | Age: 48
End: 2018-10-18
Attending: PHYSICIAN ASSISTANT
Payer: COMMERCIAL

## 2018-10-18 ENCOUNTER — OFFICE VISIT (OUTPATIENT)
Dept: FAMILY MEDICINE | Facility: CLINIC | Age: 48
End: 2018-10-18
Payer: COMMERCIAL

## 2018-10-18 VITALS
RESPIRATION RATE: 16 BRPM | TEMPERATURE: 97.4 F | SYSTOLIC BLOOD PRESSURE: 123 MMHG | HEART RATE: 77 BPM | DIASTOLIC BLOOD PRESSURE: 78 MMHG | OXYGEN SATURATION: 97 % | BODY MASS INDEX: 28.06 KG/M2 | WEIGHT: 220 LBS

## 2018-10-18 VITALS
HEART RATE: 81 BPM | OXYGEN SATURATION: 96 % | WEIGHT: 220 LBS | DIASTOLIC BLOOD PRESSURE: 72 MMHG | HEIGHT: 74 IN | BODY MASS INDEX: 28.23 KG/M2 | SYSTOLIC BLOOD PRESSURE: 112 MMHG

## 2018-10-18 DIAGNOSIS — M25.531 RIGHT WRIST PAIN: ICD-10-CM

## 2018-10-18 DIAGNOSIS — M25.831 MASS OF JOINT OF RIGHT WRIST: Primary | ICD-10-CM

## 2018-10-18 DIAGNOSIS — M25.531 RIGHT WRIST PAIN: Primary | ICD-10-CM

## 2018-10-18 PROCEDURE — 99243 OFF/OP CNSLTJ NEW/EST LOW 30: CPT | Performed by: ORTHOPAEDIC SURGERY

## 2018-10-18 PROCEDURE — 99213 OFFICE O/P EST LOW 20 MIN: CPT | Performed by: PHYSICIAN ASSISTANT

## 2018-10-18 PROCEDURE — 73110 X-RAY EXAM OF WRIST: CPT | Mod: RT

## 2018-10-18 NOTE — MR AVS SNAPSHOT
After Visit Summary   10/18/2018    Jorge Amaral    MRN: [id number]           Patient Information     Date Of Birth          1970        Visit Information        Provider Department      10/18/2018 2:15 PM Zohaib Edmond MD [facility]        Today's Diagnoses     Mass of joint of right wrist    -  1       Follow-ups after your visit        Your next 10 appointments already scheduled     Nov 15, 2018  9:30 AM CST   Return Visit with Sasha Woods MD   [facility] ([facility])    [id number] [place] [id number] [phone]              Who to contact     If you have questions or need follow up information about today's clinic visit or your schedule please contact [facility] directly at [phone].  Normal or non-critical lab and imaging results will be communicated to you by MyChart, letter or phone within 4 business days after the clinic has received the results. If you do not hear from us within 7 days, please contact the clinic through MyChart or phone. If you have a critical or abnormal lab result, we will notify you by phone as soon as possible.  Submit refill requests through BULX or call your pharmacy and they will forward the refill request to us. Please allow 3 business days for your refill to be completed.          Additional Information About Your Visit        MyChart Information     [employer] gives you secure access to your electronic health record. If you see a primary care provider, you can also send messages to your care team and make appointments. If you have questions, please call your primary care clinic.  If you do not have a primary care provider, please call [phone] and they will assist you.        Care EveryWhere ID     This is your Care EveryWhere ID. This could be used by other organizations to access your [facility] medical records  [id number]        Your Vitals Were     Pulse  "Height Pulse Oximetry BMI (Body Mass Index)          81 1.886 m (6' 2.25\") 96% 28.06 kg/m2         Blood Pressure from Last 3 Encounters:   10/18/18 112/72   10/18/18 123/78   10/11/18 118/73    Weight from Last 3 Encounters:   10/18/18 99.8 kg (220 lb)   10/18/18 99.8 kg (220 lb)   10/11/18 99.3 kg (219 lb)              We Performed the Following     Sonia-Operative Worksheet        Primary Care Provider Office Phone # Fax #    Vince Swain PA-C [phone] [address] [facility] [address]        Equal Access to Services     DOLLY SAAVEDRA : Hadii aad ku hadasho Somagdalene, waaxda luqadaha, qaybta kaalmada adeegyada, juventino cruz . So [facility] [phone].    ATENCIÓN: Si habla español, tiene a shannon disposición servicios gratuitos de asistencia lingüística. [facility] [phone].    We comply with applicable federal civil rights laws and [place] laws. We do not discriminate on the basis of race, color, national origin, age, disability, sex, sexual orientation, or gender identity.            Thank you!     Thank you for choosing [facility]  for your care. Our goal is always to provide you with excellent care. Hearing back from our patients is one way we can continue to improve our services. Please take a few minutes to complete the written survey that you may receive in the mail after your visit with us. Thank you!             Your Updated Medication List - Protect others around you: Learn how to safely use, store and throw away your medicines at www.disposemymeds.org.          This list is accurate as of 10/18/18 11:59 PM.  Always use your most recent med list.                   Brand Name Dispense Instructions for use Diagnosis    diphenoxylate-atropine 2.5-0.025 MG per tablet    LOMOTIL     Take 2 tablets by mouth        HUMIRA 40 MG/0.8ML injection   Generic drug:  adalimumab      Inject 40 mg Subcutaneous once.        PURINETHOL 50 MG tablet " CHEMO   Generic drug:  mercaptopurine      Take 50 mg by mouth daily. 1 and half tabs daily        valACYclovir 1000 mg tablet    VALTREX    30 tablet    two tablets by mouth twice daily for 1 day at onset of symptoms of a cold sore.    Herpes labialis

## 2018-10-18 NOTE — LETTER
10/18/2018         RE: Jorge Amaral  [address] [facility] [address]        Dear Colleague,    Thank you for referring your patient, Jorge Amaral, to the [facility]. Please see a copy of my visit note below.    SUBJECTIVE:   Jorge Amaral is a 48 year old male who is seen in consultation at the request of Vince Swain PA-C for evaluation of a right wrist mass that has been present for many years. The patient has noticed increases in the mass size, without definite fluctuation. It has been getting more painful over the last few years.     Orthopedic PMH: History of right hand fracture in the past.     Review of Systems:  Constitutional:  NEGATIVE for fever, chills, change in weight  Integumentary/Skin:  NEGATIVE for worrisome rashes, moles or lesions  Eyes:  NEGATIVE for vision changes or irritation  ENT/Mouth:  NEGATIVE for ear, mouth and throat problems  Resp:  NEGATIVE for significant cough or SOB  Breast:  NEGATIVE for masses, tenderness or discharge  CV:  NEGATIVE for chest pain, palpitations or peripheral edema  GI:  NEGATIVE for nausea, abdominal pain, heartburn, or change in bowel habits  :  Negative   Musculoskeletal:  See HPI above  Neuro:  NEGATIVE for weakness, dizziness or paresthesias  Endocrine:  NEGATIVE for temperature intolerance, skin/hair changes  Heme/allergy/immune:  NEGATIVE for bleeding problems  Psychiatric:  NEGATIVE for changes in mood or affect    Past Medical History:   Past Medical History:   Diagnosis Date     Crohn's disease (H)      Hyperlipidemia LDL goal < 160     not on meds     Past Surgical History:   Past Surgical History:   Procedure Laterality Date     APPENDECTOMY       C STOMACH SURGERY PROCEDURE UNLISTED       ENT SURGERY  6-28-13    Left Tonsil biopsy     LAPAROSCOPY PROCEDURE UNLISTED       OPEN REDUCTION INTERNAL FIXATION HAND      right IV MCP hand 7/11     REMOVE HARDWARE LOWER EXTREMITY  4/6/2012    Procedure:REMOVE HARDWARE  "LOWER EXTREMITY; REMOVAL OF RIGHT TIBIAL SCREW; Surgeon:JOSE LUIS ACRMICHAEL; Location:[facility]     Family History:   Family History   Problem Relation Age of Onset     Hypertension Mother      Respiratory Mother      SMOKER -EMPYSEMA     Hypertension Father      Diabetes Father      Diabetes Brother      Social History:   Social History   Substance Use Topics     Smoking status: Current Some Day Smoker     Types: Cigars     Smokeless tobacco: Never Used      Comment: very rarely      Alcohol use Yes      Comment: RARE     OBJECTIVE:  Physical Exam:  /72 (BP Location: Right arm, Patient Position: Sitting, Cuff Size: Adult Large)  Pulse 81  Ht 1.886 m (6' 2.25\")  Wt 99.8 kg (220 lb)  SpO2 96%  BMI 28.06 kg/m2  General Appearance: healthy, alert and no distress   Skin: no suspicious lesions or rashes  Neuro: Normal strength and tone, mentation intact and speech normal  Vascular: good pulses, and cappillary refill   Lymph: no lymphadenopathy   Psych:  mentation appears normal and affect normal/bright  Resp: no increased work of breathing     Right Wrist Exam:  Inspection: There is a mass located just radial to the 4th dorsal compartment  Size: 1 cm in diameter. Most notable in volar flexion, but still can be palpated in neutral  Palpation: firm and more prominent than the normal prominence on the left hand.   ROM: dorsiflexion: full with pain, volar flexion: full  Supination and pronation: full  Radial and ulnar deviation: pain free     X-rays:  Obtained today, 10/18/18, of the RIGHT WRIST: 3-views, reviewed in the office with the patient by myself today and show slight hypertrophy at the capitate-metacarpal joint but no other abnormalities.      ASSESSMENT:   Probable ganglion cyst, right wrist    PLAN:   We talked about the options: bracing, NSAIDs, steroid injection, and surgical intervention. The patient understands the nature, risks, and recovery times of the procedure. We discussed the rate of recurrence " for each treatment options. The patient would like to proceed with mass excision.   The procedure was discussed in detail with the patient, including alternatives to the proposed procedure, and expected recovery.  Risks were discussed, including, but not limited to infection, neurovascular injury, failure to relieve pain, cyst recurrence, and anesthetic complications.        Return to clinic: postop follow up    EMILY Edmond MD  Dept. Orthopedic Surgery  [facility]     This document serves as a record of the services and decisions personally performed and made by Dr. EMILY Edmond MD. It was created on his behalf by Weston Trivedi, a trained medical scribe. The creation of this record is based on the provider's personal observations and the statements of the patient. This document has been checked and approved by the attending provider.   Weston Trivedi October 18, 2018 3:03 PM     Again, thank you for allowing me to participate in the care of your patient.        Sincerely,        Zohaib Edmond MD

## 2018-10-18 NOTE — PROGRESS NOTES
SUBJECTIVE:   Jorge Amaral is a 48 year old male who presents to clinic today for the following health issues:      Cyst on RT wrist. Has been there for at least a year. Is now starting to get bigger and is bothersome.      Problem list and histories reviewed & adjusted, as indicated.  Additional history: as documented    Patient Active Problem List   Diagnosis     CARDIOVASCULAR SCREENING; LDL GOAL LESS THAN 160     Fracture, metacarpal     Delayed union of fracture     Family history of diabetes mellitus     ACL (anterior cruciate ligament) tear--recurrent     Cold sore     Common wart     Crohn's disease with complication, unspecified gastrointestinal tract location (H)     Herpes labialis     FH: prostate cancer     Right shoulder pain     Past Surgical History:   Procedure Laterality Date     APPENDECTOMY       C STOMACH SURGERY PROCEDURE UNLISTED       ENT SURGERY  6-28-13    Left Tonsil biopsy     LAPAROSCOPY PROCEDURE UNLISTED       OPEN REDUCTION INTERNAL FIXATION HAND      right IV MCP hand 7/11     REMOVE HARDWARE LOWER EXTREMITY  4/6/2012    Procedure:REMOVE HARDWARE LOWER EXTREMITY; REMOVAL OF RIGHT TIBIAL SCREW; Surgeon:JOSE LUIS CARMICHAEL; Location[facility]       Social History   Substance Use Topics     Smoking status: Current Some Day Smoker     Types: Cigars     Smokeless tobacco: Never Used      Comment: very rarely      Alcohol use Yes      Comment: RARE     Family History   Problem Relation Age of Onset     Hypertension Mother      Respiratory Mother      SMOKER -EMPYSEMA     Hypertension Father      Diabetes Father      Diabetes Brother          Current Outpatient Prescriptions   Medication Sig Dispense Refill     adalimumab (HUMIRA) 40 MG/0.8ML injection Inject 40 mg Subcutaneous once.       diphenoxylate-atropine (LOMOTIL) 2.5-0.025 MG per tablet Take 2 tablets by mouth       mercaptopurine (PURINETHOL) 50 MG tablet Take 50 mg by mouth daily. 1 and half tabs daily       valACYclovir  (VALTREX) 1000 mg tablet two tablets by mouth twice daily for 1 day at onset of symptoms of a cold sore. 30 tablet 0     No Known Allergies    Reviewed and updated as needed this visit by clinical staff       Reviewed and updated as needed this visit by Provider           OBJECTIVE:     /78  Pulse 77  Temp 97.4  F (36.3  C) (Oral)  Resp 16  Wt 220 lb (99.8 kg)  SpO2 97%  BMI 28.06 kg/m2  Body mass index is 28.06 kg/(m^2).  GENERAL: healthy, alert and no distress  Right Wrist Exam  Inspection: firm cystic lesion dorsum or wrist.  Range of Motion: full range of motion   Strength: no deficits  Neurovascularly Intact Distally.       Diagnostic Test Results:  Xray - right wrist: neg pending radiology     ASSESSMENT/PLAN:         ICD-10-CM    1. Right wrist pain M25.531 ORTHOPEDICS ADULT REFERRAL     XR Wrist Right G/E 3 Views   suspect ganglion cyst.   Follow up  With ortho for eval and tx.   warning signs discussed.      Vince Swain PA-C  [facility] [place]

## 2018-10-18 NOTE — MR AVS SNAPSHOT
After Visit Summary   10/18/2018    Jorge Amaral    MRN: [id number]           Patient Information     Date Of Birth          1970        Visit Information        Provider Department      10/18/2018 11:20 AM Vince Swain PA-C [facility]        Today's Diagnoses     Right wrist pain    -  1       Follow-ups after your visit        Additional Services     ORTHOPEDICS ADULT REFERRAL       Your provider has referred you to: [facility]: [facility] [phone]   [email or web address]    Please be aware that coverage of these services is subject to the terms and limitations of your health insurance plan.  Call member services at your health plan with any benefit or coverage questions.      Please bring the following to your appointment:    >>   Any x-rays, CTs or MRIs which have been performed.  Contact the facility where they were done to arrange for  prior to your scheduled appointment.    >>   List of current medications   >>   This referral request   >>   Any documents/labs given to you for this referral                  Your next 10 appointments already scheduled     Nov 15, 2018  9:30 AM CST   Return Visit with Sasha Woods MD   [facility] ([facility])    [address] [place] [id number] [phone]              Who to contact     If you have questions or need follow up information about today's clinic visit or your schedule please contact [facility] directly at [phone].  Normal or non-critical lab and imaging results will be communicated to you by MyChart, letter or phone within 4 business days after the clinic has received the results. If you do not hear from us within 7 days, please contact the clinic through MyChart or phone. If you have a critical or abnormal lab result, we will notify you by phone as soon as possible.  Submit refill requests through  [employer] or call your pharmacy and they will forward the refill request to us. Please allow 3 business days for your refill to be completed.          Additional Information About Your Visit        MyChart Information     Gogo gives you secure access to your electronic health record. If you see a primary care provider, you can also send messages to your care team and make appointments. If you have questions, please call your primary care clinic.  If you do not have a primary care provider, please call [phone] and they will assist you.        Care EveryWhere ID     This is your Care EveryWhere ID. This could be used by other organizations to access your [facility] medical records  [id number]        Your Vitals Were     Pulse Temperature Respirations Pulse Oximetry BMI (Body Mass Index)       77 97.4  F (36.3  C) (Oral) 16 97% 28.06 kg/m2        Blood Pressure from Last 3 Encounters:   10/18/18 123/78   10/11/18 118/73   08/04/18 104/69    Weight from Last 3 Encounters:   10/18/18 220 lb (99.8 kg)   10/11/18 219 lb (99.3 kg)   08/04/18 215 lb (97.5 kg)              We Performed the Following     ORTHOPEDICS ADULT REFERRAL        Primary Care Provider Office Phone # Fax #    Vince Swain PA-C [phone] [address] [address]        Equal Access to Services     DOLLY SAAVEDRA : Hadii aad ku hadasho Soomaali, waaxda luqadaha, qaybta kaalmada adereyda, juventino cruz . So [facility] [phone].    ATENCIÓN: Si habla español, tiene a shannon disposición servicios gratuitos de asistencia lingüística. Llcampbell al [phone].    We comply with applicable federal civil rights laws and [place] laws. We do not discriminate on the basis of race, color, national origin, age, disability, sex, sexual orientation, or gender identity.            Thank you!     Thank you for choosing [facility]  for your care. Our goal is always to provide you with  excellent care. Hearing back from our patients is one way we can continue to improve our services. Please take a few minutes to complete the written survey that you may receive in the mail after your visit with us. Thank you!             Your Updated Medication List - Protect others around you: Learn how to safely use, store and throw away your medicines at www.disposemymeds.org.          This list is accurate as of 10/18/18 11:52 AM.  Always use your most recent med list.                   Brand Name Dispense Instructions for use Diagnosis    diphenoxylate-atropine 2.5-0.025 MG per tablet    LOMOTIL     Take 2 tablets by mouth        HUMIRA 40 MG/0.8ML injection   Generic drug:  adalimumab      Inject 40 mg Subcutaneous once.        PURINETHOL 50 MG tablet CHEMO   Generic drug:  mercaptopurine      Take 50 mg by mouth daily. 1 and half tabs daily        valACYclovir 1000 mg tablet    VALTREX    30 tablet    two tablets by mouth twice daily for 1 day at onset of symptoms of a cold sore.    Herpes labialis

## 2018-10-18 NOTE — NURSING NOTE
"Chief Complaint   Patient presents with     Derm Problem     cyst on RT wrist       Initial There were no vitals taken for this visit. Estimated body mass index is 27.93 kg/(m^2) as calculated from the following:    Height as of 10/11/18: 6' 2.25\" (1.886 m).    Weight as of 10/11/18: 219 lb (99.3 kg)..  BP completed using cuff size: large    "

## 2018-10-18 NOTE — PROGRESS NOTES
SUBJECTIVE:   Jorge Amaral is a 48 year old male who is seen in consultation at the request of Vince Swain PA-C for evaluation of a right wrist mass that has been present for many years. The patient has noticed increases in the mass size, without definite fluctuation. It has been getting more painful over the last few years.     Orthopedic PMH: History of right hand fracture in the past.     Review of Systems:  Constitutional:  NEGATIVE for fever, chills, change in weight  Integumentary/Skin:  NEGATIVE for worrisome rashes, moles or lesions  Eyes:  NEGATIVE for vision changes or irritation  ENT/Mouth:  NEGATIVE for ear, mouth and throat problems  Resp:  NEGATIVE for significant cough or SOB  Breast:  NEGATIVE for masses, tenderness or discharge  CV:  NEGATIVE for chest pain, palpitations or peripheral edema  GI:  NEGATIVE for nausea, abdominal pain, heartburn, or change in bowel habits  :  Negative   Musculoskeletal:  See HPI above  Neuro:  NEGATIVE for weakness, dizziness or paresthesias  Endocrine:  NEGATIVE for temperature intolerance, skin/hair changes  Heme/allergy/immune:  NEGATIVE for bleeding problems  Psychiatric:  NEGATIVE for changes in mood or affect    Past Medical History:   Past Medical History:   Diagnosis Date     Crohn's disease (H)      Hyperlipidemia LDL goal < 160     not on meds     Past Surgical History:   Past Surgical History:   Procedure Laterality Date     APPENDECTOMY       C STOMACH SURGERY PROCEDURE UNLISTED       ENT SURGERY  6-28-13    Left Tonsil biopsy     LAPAROSCOPY PROCEDURE UNLISTED       OPEN REDUCTION INTERNAL FIXATION HAND      right IV MCP hand 7/11     REMOVE HARDWARE LOWER EXTREMITY  4/6/2012    Procedure:REMOVE HARDWARE LOWER EXTREMITY; REMOVAL OF RIGHT TIBIAL SCREW; Surgeon:JOSE LUIS CARMICHAEL; Location:[facility]     Family History:   Family History   Problem Relation Age of Onset     Hypertension Mother      Respiratory Mother      SMOKER -EMPYSEMA     Hypertension  "Father      Diabetes Father      Diabetes Brother      Social History:   Social History   Substance Use Topics     Smoking status: Current Some Day Smoker     Types: Cigars     Smokeless tobacco: Never Used      Comment: very rarely      Alcohol use Yes      Comment: RARE     OBJECTIVE:  Physical Exam:  /72 (BP Location: Right arm, Patient Position: Sitting, Cuff Size: Adult Large)  Pulse 81  Ht 1.886 m (6' 2.25\")  Wt 99.8 kg (220 lb)  SpO2 96%  BMI 28.06 kg/m2  General Appearance: healthy, alert and no distress   Skin: no suspicious lesions or rashes  Neuro: Normal strength and tone, mentation intact and speech normal  Vascular: good pulses, and cappillary refill   Lymph: no lymphadenopathy   Psych:  mentation appears normal and affect normal/bright  Resp: no increased work of breathing     Right Wrist Exam:  Inspection: There is a mass located just radial to the 4th dorsal compartment  Size: 1 cm in diameter. Most notable in volar flexion, but still can be palpated in neutral  Palpation: firm and more prominent than the normal prominence on the left hand.   ROM: dorsiflexion: full with pain, volar flexion: full  Supination and pronation: full  Radial and ulnar deviation: pain free     X-rays:  Obtained today, 10/18/18, of the RIGHT WRIST: 3-views, reviewed in the office with the patient by myself today and show slight hypertrophy at the capitate-metacarpal joint but no other abnormalities.      ASSESSMENT:   Probable ganglion cyst, right wrist    PLAN:   We talked about the options: bracing, NSAIDs, steroid injection, and surgical intervention. The patient understands the nature, risks, and recovery times of the procedure. We discussed the rate of recurrence for each treatment options. The patient would like to proceed with mass excision.   The procedure was discussed in detail with the patient, including alternatives to the proposed procedure, and expected recovery.  Risks were discussed, including, " but not limited to infection, neurovascular injury, failure to relieve pain, cyst recurrence, and anesthetic complications.        Return to clinic: postop follow up    EMILY Edmond MD  Dept. Orthopedic Surgery  [facility]     This document serves as a record of the services and decisions personally performed and made by Dr. EMILY Edmond MD. It was created on his behalf by Weston Trivedi, a trained medical scribe. The creation of this record is based on the provider's personal observations and the statements of the patient. This document has been checked and approved by the attending provider.   Weston Trivedi October 18, 2018 3:03 PM

## 2018-10-19 ENCOUNTER — TELEPHONE (OUTPATIENT)
Dept: ORTHOPEDICS | Facility: CLINIC | Age: 48
End: 2018-10-19

## 2018-10-23 NOTE — TELEPHONE ENCOUNTER
Patient called back wondering when he would be contacted. Told him about the notes, will keep an eye on his phone for the call. Call up to surgery but was busy and he told me to leave a message. Please advise.

## 2018-10-24 NOTE — TELEPHONE ENCOUNTER
Type of surgery: mass excision wrist right  CPT 85219  Mass of joint of right wrist [M25.831]  - Primary   Location of surgery: [facility]  Date and time of surgery: 11-7-18   TBD  Surgeon: Dr Edmond  Pre-Op Appt Date: 10-31-18  Post-Op Appt Date: 11-21-18   Packet sent out: Yes  Pre-cert/Authorization completed:  No pre cert needed  Date: 10/24/2018

## 2018-10-30 NOTE — PROGRESS NOTES
[facility]  [address] [facility] [id number] [phone]  Dept: [phone]    PRE-OP EVALUATION:  Today's date: 10/31/2018    Jorge Amaral (: 1970) presents for pre-operative evaluation assessment as requested by Dr. Edmond.  He requires evaluation and anesthesia risk assessment prior to undergoing surgery/procedure for treatment of right Wrist pain  .    Fax number for surgical facility: [facility]  Primary Physician: Vince Swain  Type of Anesthesia Anticipated: to be determined    Patient has a Health Care Directive or Living Will:  NO    Preop Questions 10/31/2018   Who is doing your surgery? Dr Edmond   What are you having done? Wrist Surgery   Date of Surgery/Procedure:    Facility or Hospital where procedure/surgery will be performed: -[facility]   1.  Do you have a history of Heart attack, stroke, stent, coronary bypass surgery, or other heart surgery? -no   2.  Do you ever have any pain or discomfort in your chest? -no   3.  Do you have a history of  Heart Failure? -no   4.   Are you troubled by shortness of breath when:  walking on a level surface, or up a slight hill, or at night? -no   5.  Do you currently have a cold, bronchitis or other respiratory infection? -no   6.  Do you have a cough, shortness of breath, or wheezing? -no   7.  Do you sometimes get pains in the calves of your legs when you walk? -no   8. Do you or anyone in your family have previous history of blood clots? -no   9.  Do you or does anyone in your family have a serious bleeding problem such as prolonged bleeding following surgeries or cuts? -no   10. Have you ever had problems with anemia or been told to take iron pills? -no   11. Have you had any abnormal blood loss such as black, tarry or bloody stools? -yes- history of crohns   12. Have you ever had a blood transfusion? -yes   13. Have you or any of your relatives ever had problems with  anesthesia? -no   14. Do you have sleep apnea, excessive snoring or daytime drowsiness? -no   15. Do you have any prosthetic heart valves? -no   16. Do you have prosthetic joints? -no         HPI:     HPI related to upcoming procedure: history of right wrist pain with probably cyst.      See problem list for active medical problems.  Problems all longstanding and stable, except as noted/documented.  See ROS for pertinent symptoms related to these conditions.                                                                                                                                                          .    MEDICAL HISTORY:     Patient Active Problem List    Diagnosis Date Noted     Right wrist pain 10/31/2018     Priority: Medium     Right shoulder pain 08/27/2018     Priority: Medium     Crohn's disease with complication, unspecified gastrointestinal tract location (H) 08/11/2017     Priority: Medium     Herpes labialis 08/11/2017     Priority: Medium     FH: prostate cancer 08/11/2017     Priority: Medium     Common wart 10/14/2014     Priority: Medium     Cold sore 08/14/2014     Priority: Medium     ACL (anterior cruciate ligament) tear--recurrent 01/03/2013     Priority: Medium     Family history of diabetes mellitus 12/16/2011     Priority: Medium     Delayed union of fracture 12/01/2011     Priority: Medium     Fracture, metacarpal 08/03/2011     Priority: Medium     CARDIOVASCULAR SCREENING; LDL GOAL LESS THAN 160 10/31/2010     Priority: Medium      Past Medical History:   Diagnosis Date     Crohn's disease (H)      Hyperlipidemia LDL goal < 160     not on meds     Past Surgical History:   Procedure Laterality Date     APPENDECTOMY       C STOMACH SURGERY PROCEDURE UNLISTED       ENT SURGERY  6-28-13    Left Tonsil biopsy     LAPAROSCOPY PROCEDURE UNLISTED       OPEN REDUCTION INTERNAL FIXATION HAND      right IV MCP hand 7/11     REMOVE HARDWARE LOWER EXTREMITY  4/6/2012    Procedure:REMOVE HARDWARE  "LOWER EXTREMITY; REMOVAL OF RIGHT TIBIAL SCREW; Surgeon:JOSE LUIS CARMICHAEL; Location:[facility]     Current Outpatient Prescriptions   Medication Sig Dispense Refill     adalimumab (HUMIRA) 40 MG/0.8ML injection Inject 40 mg Subcutaneous once.       diphenoxylate-atropine (LOMOTIL) 2.5-0.025 MG per tablet Take 2 tablets by mouth       mercaptopurine (PURINETHOL) 50 MG tablet Take 50 mg by mouth daily. 1 and half tabs daily       valACYclovir (VALTREX) 1000 mg tablet two tablets by mouth twice daily for 1 day at onset of symptoms of a cold sore. 30 tablet 0     OTC products: None, except as noted above    No Known Allergies   Latex Allergy: NO    Social History   Substance Use Topics     Smoking status: Current Some Day Smoker     Types: Cigars     Smokeless tobacco: Never Used      Comment: very rarely      Alcohol use Yes      Comment: RARE     History   Drug Use No       REVIEW OF SYSTEMS:   CONSTITUTIONAL: NEGATIVE for fever, chills, change in weight  INTEGUMENTARY/SKIN: NEGATIVE for worrisome rashes, moles or lesions  EYES: NEGATIVE for vision changes or irritation  ENT/MOUTH: NEGATIVE for ear, mouth and throat problems  RESP: NEGATIVE for significant cough or SOB  CV: NEGATIVE for chest pain, palpitations or peripheral edema  GI: NEGATIVE for nausea, abdominal pain, heartburn, or change in bowel habits  : NEGATIVE for frequency, dysuria, or hematuria  NEURO: NEGATIVE for weakness, dizziness or paresthesias  ENDOCRINE: NEGATIVE for temperature intolerance, skin/hair changes  HEME: NEGATIVE for bleeding problems  PSYCHIATRIC: NEGATIVE for changes in mood or affect    EXAM:   /73  Pulse 68  Temp 96.8  F (36  C) (Oral)  Resp 16  Ht 6' 2.25\" (1.886 m)  Wt 217 lb (98.4 kg)  SpO2 98%  BMI 27.67 kg/m2    GENERAL APPEARANCE: healthy, alert and no distress     EYES: EOMI,  PERRL     HENT: ear canals and TM's normal and nose and mouth without ulcers or lesions     NECK: no adenopathy, no asymmetry, masses, or " scars and thyroid normal to palpation     RESP: lungs clear to auscultation - no rales, rhonchi or wheezes     CV: regular rates and rhythm, normal S1 S2, no S3 or S4 and no murmur, click or rub     ABDOMEN:  soft, nontender, no HSM or masses and bowel sounds normal     MS: extremities normal- no gross deformities noted, no evidence of inflammation in joints, FROM in all extremities.     SKIN: no suspicious lesions or rashes     NEURO: Normal strength and tone, sensory exam grossly normal, mentation intact and speech normal     PSYCH: mentation appears normal. and affect normal/bright     LYMPHATICS: No cervical adenopathy    DIAGNOSTICS:   EKG: appears normal, NSR, normal axis, normal intervals, no acute ST/T changes c/w ischemia, no LVH by voltage criteria, there are no prior tracings available    Recent Labs   Lab Test  10/09/18   0903  08/11/17   0738  04/04/12   1123  12/16/11   1228   HGB   --    --   15.2  14.9   NA  143  141   --    --    POTASSIUM  4.2  4.4   --    --    CR  1.05  1.07   --    --         IMPRESSION:   Reason for surgery/procedure: right Wrist pain  .      The proposed surgical procedure is considered LOW risk.    REVISED CARDIAC RISK INDEX  The patient has the following serious cardiovascular risks for perioperative complications such as (MI, PE, VFib and 3  AV Block):  No serious cardiac risks  INTERPRETATION: 0 risks: Class I (very low risk - 0.4% complication rate)    The patient has the following additional risks for perioperative complications:  No identified additional risks      ICD-10-CM    1. Preop general physical exam Z01.818 EKG 12-lead complete w/read - Clinics   2. Right wrist pain M25.531        RECOMMENDATIONS:     APPROVAL GIVEN to proceed with proposed procedure, without further diagnostic evaluation       Signed Electronically by: Vince Swain PA-C    Copy of this evaluation report is provided to requesting physician.    [facility] Preop Guidelines    Revised  Cardiac Risk Index  I agree with the above plan  Antonio Lorenzana MD

## 2018-10-31 ENCOUNTER — OFFICE VISIT (OUTPATIENT)
Dept: FAMILY MEDICINE | Facility: CLINIC | Age: 48
End: 2018-10-31
Payer: COMMERCIAL

## 2018-10-31 VITALS
BODY MASS INDEX: 27.85 KG/M2 | OXYGEN SATURATION: 98 % | SYSTOLIC BLOOD PRESSURE: 118 MMHG | WEIGHT: 217 LBS | RESPIRATION RATE: 16 BRPM | DIASTOLIC BLOOD PRESSURE: 73 MMHG | HEART RATE: 68 BPM | TEMPERATURE: 96.8 F | HEIGHT: 74 IN

## 2018-10-31 DIAGNOSIS — Z01.818 PREOP GENERAL PHYSICAL EXAM: Primary | ICD-10-CM

## 2018-10-31 DIAGNOSIS — M25.531 RIGHT WRIST PAIN: ICD-10-CM

## 2018-10-31 LAB — HGB BLD-MCNC: 14.9 G/DL (ref 13.3–17.7)

## 2018-10-31 PROCEDURE — 99214 OFFICE O/P EST MOD 30 MIN: CPT | Performed by: PHYSICIAN ASSISTANT

## 2018-10-31 PROCEDURE — 85018 HEMOGLOBIN: CPT | Performed by: PHYSICIAN ASSISTANT

## 2018-10-31 PROCEDURE — 36415 COLL VENOUS BLD VENIPUNCTURE: CPT | Performed by: PHYSICIAN ASSISTANT

## 2018-10-31 PROCEDURE — 93000 ELECTROCARDIOGRAM COMPLETE: CPT | Performed by: PHYSICIAN ASSISTANT

## 2018-10-31 NOTE — NURSING NOTE
"Chief Complaint   Patient presents with     Pre-Op Exam       Initial /73  Pulse 68  Temp 96.8  F (36  C) (Oral)  Resp 16  Ht 6' 2.25\" (1.886 m)  Wt 217 lb (98.4 kg)  SpO2 98%  BMI 27.67 kg/m2 Estimated body mass index is 27.67 kg/(m^2) as calculated from the following:    Height as of this encounter: 6' 2.25\" (1.886 m).    Weight as of this encounter: 217 lb (98.4 kg).    Prema Scott CMA      "

## 2018-10-31 NOTE — MR AVS SNAPSHOT
After Visit Summary   10/31/2018    Jorge Amaral    MRN: [id number]           Patient Information     Date Of Birth          1970        Visit Information        Provider Department      10/31/2018 9:00 AM Vince Swain PA-C [facility] [place]        Today's Diagnoses     Preop general physical exam    -  1    Right wrist pain          Care Instructions      Before Your Surgery      Call your surgeon if there is any change in your health. This includes signs of a cold or flu (such as a sore throat, runny nose, cough, rash or fever).    Do not smoke, drink alcohol or take over the counter medicine (unless your surgeon or primary care doctor tells you to) for the 24 hours before and after surgery.    If you take prescribed drugs: Follow your doctor s orders about which medicines to take and which to stop until after surgery.    Eating and drinking prior to surgery: follow the instructions from your surgeon    Take a shower or bath the night before surgery. Use the soap your surgeon gave you to gently clean your skin. If you do not have soap from your surgeon, use your regular soap. Do not shave or scrub the surgery site.  Wear clean pajamas and have clean sheets on your bed.           Follow-ups after your visit        Your next 10 appointments already scheduled     Nov 07, 2018   Procedure with Zohaib Edmond MD   [facility] (--)    [address] [address] [place], [place] [address]  [facility] [address]   [phone]            Nov 15, 2018  9:30 AM CST   Return Visit with Sasha Woods MD   [facility] ([facility])    [address] [place] [address]   [phone]            Nov 21, 2018  1:30 PM CST   Return Visit with Zohaib Edmond MD   [facility] ([facility] [place])    [address] [place] [address]  [place] [address] [phone]              Who to contact     If you have questions or need follow up  "information about today's clinic visit or your schedule please contact [facility] directly at [phone].  Normal or non-critical lab and imaging results will be communicated to you by MyChart, letter or phone within 4 business days after the clinic has received the results. If you do not hear from us within 7 days, please contact the clinic through [employer] or phone. If you have a critical or abnormal lab result, we will notify you by phone as soon as possible.  Submit refill requests through [employer] or call your pharmacy and they will forward the refill request to us. Please allow 3 business days for your refill to be completed.          Additional Information About Your Visit        [employer] Information     [employer] gives you secure access to your electronic health record. If you see a primary care provider, you can also send messages to your care team and make appointments. If you have questions, please call your primary care clinic.  If you do not have a primary care provider, please call [phone] and they will assist you.        Care EveryWhere ID     This is your Care EveryWhere ID. This could be used by other organizations to access your [facility] medical records  [id number]        Your Vitals Were     Pulse Temperature Respirations Height Pulse Oximetry BMI (Body Mass Index)    68 96.8  F (36  C) (Oral) 16 6' 2.25\" (1.886 m) 98% 27.67 kg/m2       Blood Pressure from Last 3 Encounters:   10/31/18 118/73   10/18/18 112/72   10/18/18 123/78    Weight from Last 3 Encounters:   10/31/18 217 lb (98.4 kg)   10/18/18 220 lb (99.8 kg)   10/18/18 220 lb (99.8 kg)              We Performed the Following     EKG 12-lead complete w/read - Clinics     Hemoglobin        Primary Care Provider Office Phone # Fax #    Vince Swain PA-C [phone] [address]  [facility] [address]        Equal Access to Services     DOLLY SAAVEDRA AH: Hadii byron Taylor " bonita finleyanita rjjuventino zhao ah. So [facility] [phone].    ATENCIÓN: Si giuliana gutierrez, tiene a shannon disposición servicios gratuitos de asistencia lingüística. Marito al [phone].    We comply with applicable federal civil rights laws and [place] laws. We do not discriminate on the basis of race, color, national origin, age, disability, sex, sexual orientation, or gender identity.            Thank you!     [facility] you for [facility]  [facility]. Our goal is always to provide you with excellent care. Hearing back from our patients is one way we can continue to improve our services. Please take a few minutes to complete the written survey that you may receive in the mail after your visit with us. Thank you!             Your Updated Medication List - Protect others around you: Learn how to safely use, store and throw away your medicines at www.disposemymeds.org.          This list is accurate as of 10/31/18  9:45 AM.  Always use your most recent med list.                   Brand Name Dispense Instructions for use Diagnosis    diphenoxylate-atropine 2.5-0.025 MG per tablet    LOMOTIL     Take 2 tablets by mouth        HUMIRA 40 MG/0.8ML injection   Generic drug:  adalimumab      Inject 40 mg Subcutaneous once.        PURINETHOL 50 MG tablet CHEMO   Generic drug:  mercaptopurine      Take 50 mg by mouth daily. 1 and half tabs daily        valACYclovir 1000 mg tablet    VALTREX    30 tablet    two tablets by mouth twice daily for 1 day at onset of symptoms of a cold sore.    Herpes labialis

## 2018-11-04 ENCOUNTER — ANESTHESIA EVENT (OUTPATIENT)
Dept: SURGERY | Facility: AMBULATORY SURGERY CENTER | Age: 48
End: 2018-11-04

## 2018-11-07 ENCOUNTER — ANESTHESIA (OUTPATIENT)
Dept: SURGERY | Facility: AMBULATORY SURGERY CENTER | Age: 48
End: 2018-11-07
Payer: COMMERCIAL

## 2018-11-07 ENCOUNTER — HOSPITAL ENCOUNTER (OUTPATIENT)
Facility: AMBULATORY SURGERY CENTER | Age: 48
Discharge: HOME | End: 2018-11-07
Attending: ORTHOPAEDIC SURGERY | Admitting: ORTHOPAEDIC SURGERY
Payer: COMMERCIAL

## 2018-11-07 VITALS
RESPIRATION RATE: 16 BRPM | OXYGEN SATURATION: 94 % | SYSTOLIC BLOOD PRESSURE: 117 MMHG | TEMPERATURE: 97.9 F | DIASTOLIC BLOOD PRESSURE: 73 MMHG

## 2018-11-07 DIAGNOSIS — R22.31 MASS OF RIGHT WRIST: Primary | ICD-10-CM

## 2018-11-07 PROCEDURE — 25130 REMOVAL OF WRIST LESION: CPT

## 2018-11-07 PROCEDURE — 25111 REMOVE WRIST TENDON LESION: CPT | Mod: RT

## 2018-11-07 PROCEDURE — 88311 DECALCIFY TISSUE: CPT | Performed by: ORTHOPAEDIC SURGERY

## 2018-11-07 PROCEDURE — G8907 PT DOC NO EVENTS ON DISCHARG: HCPCS

## 2018-11-07 PROCEDURE — 88305 TISSUE EXAM BY PATHOLOGIST: CPT | Performed by: ORTHOPAEDIC SURGERY

## 2018-11-07 PROCEDURE — G8916 PT W IV AB GIVEN ON TIME: HCPCS

## 2018-11-07 PROCEDURE — 25075 EXC FOREARM LES SC < 3 CM: CPT | Mod: RT

## 2018-11-07 PROCEDURE — 25075 EXC FOREARM LES SC < 3 CM: CPT | Mod: RT | Performed by: ORTHOPAEDIC SURGERY

## 2018-11-07 RX ORDER — SODIUM CHLORIDE, SODIUM LACTATE, POTASSIUM CHLORIDE, CALCIUM CHLORIDE 600; 310; 30; 20 MG/100ML; MG/100ML; MG/100ML; MG/100ML
INJECTION, SOLUTION INTRAVENOUS CONTINUOUS
Status: DISCONTINUED | OUTPATIENT
Start: 2018-11-07 | End: 2018-11-08 | Stop reason: HOSPADM

## 2018-11-07 RX ORDER — CEFAZOLIN SODIUM 1 G/3ML
1 INJECTION, POWDER, FOR SOLUTION INTRAMUSCULAR; INTRAVENOUS SEE ADMIN INSTRUCTIONS
Status: DISCONTINUED | OUTPATIENT
Start: 2018-11-07 | End: 2018-11-08 | Stop reason: HOSPADM

## 2018-11-07 RX ORDER — ONDANSETRON 2 MG/ML
4 INJECTION INTRAMUSCULAR; INTRAVENOUS EVERY 30 MIN PRN
Status: DISCONTINUED | OUTPATIENT
Start: 2018-11-07 | End: 2018-11-08 | Stop reason: HOSPADM

## 2018-11-07 RX ORDER — LIDOCAINE 40 MG/G
CREAM TOPICAL
Status: DISCONTINUED | OUTPATIENT
Start: 2018-11-07 | End: 2018-11-08 | Stop reason: HOSPADM

## 2018-11-07 RX ORDER — CELECOXIB 200 MG/1
400 CAPSULE ORAL ONCE
Status: DISCONTINUED | OUTPATIENT
Start: 2018-11-07 | End: 2018-11-08 | Stop reason: HOSPADM

## 2018-11-07 RX ORDER — HYDROCODONE BITARTRATE AND ACETAMINOPHEN 5; 325 MG/1; MG/1
1 TABLET ORAL
Status: DISCONTINUED | OUTPATIENT
Start: 2018-11-07 | End: 2018-11-08 | Stop reason: HOSPADM

## 2018-11-07 RX ORDER — PROPOFOL 10 MG/ML
INJECTION, EMULSION INTRAVENOUS CONTINUOUS PRN
Status: DISCONTINUED | OUTPATIENT
Start: 2018-11-07 | End: 2018-11-07

## 2018-11-07 RX ORDER — NALOXONE HYDROCHLORIDE 0.4 MG/ML
.1-.4 INJECTION, SOLUTION INTRAMUSCULAR; INTRAVENOUS; SUBCUTANEOUS
Status: DISCONTINUED | OUTPATIENT
Start: 2018-11-07 | End: 2018-11-08 | Stop reason: HOSPADM

## 2018-11-07 RX ORDER — OXYCODONE HYDROCHLORIDE 5 MG/1
5 TABLET ORAL
Status: COMPLETED | OUTPATIENT
Start: 2018-11-07 | End: 2018-11-07

## 2018-11-07 RX ORDER — PROPOFOL 10 MG/ML
INJECTION, EMULSION INTRAVENOUS PRN
Status: DISCONTINUED | OUTPATIENT
Start: 2018-11-07 | End: 2018-11-07

## 2018-11-07 RX ORDER — HYDROMORPHONE HYDROCHLORIDE 1 MG/ML
.3-.5 INJECTION, SOLUTION INTRAMUSCULAR; INTRAVENOUS; SUBCUTANEOUS EVERY 10 MIN PRN
Status: DISCONTINUED | OUTPATIENT
Start: 2018-11-07 | End: 2018-11-08 | Stop reason: HOSPADM

## 2018-11-07 RX ORDER — FENTANYL CITRATE 50 UG/ML
25-50 INJECTION, SOLUTION INTRAMUSCULAR; INTRAVENOUS EVERY 5 MIN PRN
Status: DISCONTINUED | OUTPATIENT
Start: 2018-11-07 | End: 2018-11-08 | Stop reason: HOSPADM

## 2018-11-07 RX ORDER — CELECOXIB 200 MG/1
200 CAPSULE ORAL
Status: COMPLETED | OUTPATIENT
Start: 2018-11-07 | End: 2018-11-07

## 2018-11-07 RX ORDER — CEFAZOLIN SODIUM 2 G/100ML
2 INJECTION, SOLUTION INTRAVENOUS
Status: COMPLETED | OUTPATIENT
Start: 2018-11-07 | End: 2018-11-07

## 2018-11-07 RX ORDER — FENTANYL CITRATE 50 UG/ML
INJECTION, SOLUTION INTRAMUSCULAR; INTRAVENOUS PRN
Status: DISCONTINUED | OUTPATIENT
Start: 2018-11-07 | End: 2018-11-07

## 2018-11-07 RX ORDER — GABAPENTIN 300 MG/1
300 CAPSULE ORAL ONCE
Status: COMPLETED | OUTPATIENT
Start: 2018-11-07 | End: 2018-11-07

## 2018-11-07 RX ORDER — ONDANSETRON 4 MG/1
4 TABLET, ORALLY DISINTEGRATING ORAL EVERY 30 MIN PRN
Status: DISCONTINUED | OUTPATIENT
Start: 2018-11-07 | End: 2018-11-08 | Stop reason: HOSPADM

## 2018-11-07 RX ORDER — ACETAMINOPHEN 325 MG/1
975 TABLET ORAL ONCE
Status: COMPLETED | OUTPATIENT
Start: 2018-11-07 | End: 2018-11-07

## 2018-11-07 RX ORDER — HYDROCODONE BITARTRATE AND ACETAMINOPHEN 5; 325 MG/1; MG/1
1-2 TABLET ORAL EVERY 4 HOURS PRN
Qty: 20 TABLET | Refills: 0 | Status: ON HOLD | OUTPATIENT
Start: 2018-11-07 | End: 2019-01-14

## 2018-11-07 RX ORDER — LIDOCAINE HYDROCHLORIDE 20 MG/ML
INJECTION, SOLUTION INFILTRATION; PERINEURAL PRN
Status: DISCONTINUED | OUTPATIENT
Start: 2018-11-07 | End: 2018-11-07

## 2018-11-07 RX ADMIN — LIDOCAINE HYDROCHLORIDE 40 MG: 20 INJECTION, SOLUTION INFILTRATION; PERINEURAL at 12:46

## 2018-11-07 RX ADMIN — CEFAZOLIN SODIUM 2 G: 2 INJECTION, SOLUTION INTRAVENOUS at 12:40

## 2018-11-07 RX ADMIN — FENTANYL CITRATE 50 MCG: 50 INJECTION, SOLUTION INTRAMUSCULAR; INTRAVENOUS at 12:46

## 2018-11-07 RX ADMIN — PROPOFOL 80 MG: 10 INJECTION, EMULSION INTRAVENOUS at 12:46

## 2018-11-07 RX ADMIN — SODIUM CHLORIDE, SODIUM LACTATE, POTASSIUM CHLORIDE, CALCIUM CHLORIDE: 600; 310; 30; 20 INJECTION, SOLUTION INTRAVENOUS at 12:40

## 2018-11-07 RX ADMIN — CELECOXIB 200 MG: 200 CAPSULE ORAL at 11:45

## 2018-11-07 RX ADMIN — ACETAMINOPHEN 975 MG: 325 TABLET ORAL at 11:45

## 2018-11-07 RX ADMIN — OXYCODONE HYDROCHLORIDE 5 MG: 5 TABLET ORAL at 14:05

## 2018-11-07 RX ADMIN — GABAPENTIN 300 MG: 300 CAPSULE ORAL at 11:45

## 2018-11-07 RX ADMIN — PROPOFOL 100 MCG/KG/MIN: 10 INJECTION, EMULSION INTRAVENOUS at 12:46

## 2018-11-07 NOTE — IP AVS SNAPSHOT
MRN:[id number]                      After Visit Summary   11/7/2018    Jorge Amaral    MRN: [id number]           Thank you!     Thank you for choosing [facility] for your care. Our goal is always to provide you with excellent care. Hearing back from our patients is one way we can continue to improve our services. Please take a few minutes to complete the written survey that you may receive in the mail after you visit with us. Thank you!        Patient Information     Date Of Birth          1970        About your hospital stay     You were admitted on:  November 7, 2018 You last received care in the:  [facility]    You were discharged on:  November 7, 2018       Who to Call     For medical emergencies, please call 911.  For non-urgent questions about your medical care, please call your primary care provider or clinic, [phone]  For questions related to your surgery, please call your surgery clinic        Attending Provider     Provider Specialty    Zohaib Edmond MD Orthopedics       Primary Care Provider Office Phone # Fax #    Vince Swain PA-C [phone]      After Care Instructions     Discharge Instructions       Review outpatient procedure discharge instructions with patient as directed by Provider    Elevate and ice to affected hand x 48 hours, then, as needed.    Move fingers, no lifting with affected hand.    Keep splint on, keep it clean and dry    Return to clinic 10-14 days, call for appointment with Dr. Edmond, [phone]            Ice to affected area       Ice pack to surgical site every 15 minutes per hour for 24 hours                  Your next 10 appointments already scheduled     Nov 15, 2018  9:30 AM CST   [facility]    [address] [place] [address] [phone]            Nov 21, 2018  1:30 PM CST   Return Visit  with Zohaib Edmond MD   [facility] ([facility])    [address] [facility]  [place] [address] [phone]              Further instructions from your care team       [facility]  Same-Day Surgery   Adult Discharge Orders & Instructions   For 24 hours after surgery  1. Get plenty of rest.  A responsible adult must stay with you for at least 24 hours after you leave the hospital.   2. Do not drive or use heavy equipment.  If you have weakness or tingling, don't drive or use heavy equipment until this feeling goes away.  3. Do not drink alcohol.  4. Avoid strenuous or risky activities.  Ask for help when climbing stairs.   5. You may feel lightheaded.  IF so, sit for a few minutes before standing.  Have someone help you get up.   6. If you have nausea (feel sick to your stomach): Drink only clear liquids such as apple juice, ginger ale, broth or 7-Up.  Rest may also help.  Be sure to drink enough fluids.  Move to a regular diet as you feel able.  7. You may have a slight fever. Call the doctor if your fever is over 100 F (37.7 C) (taken under the tongue) or lasts longer than 24 hours.  8. You may have a dry mouth, a sore throat, muscle aches or trouble sleeping.  These should go away after 24 hours.  9. Do not make important or legal decisions.   Call your doctor for any of the followin.  Signs of infection (fever, growing tenderness at the surgery site, a large amount of drainage or bleeding, severe pain, foul-smelling drainage, redness, swelling).    2. It has been over 8 to 10 hours since surgery and you are still not able to urinate (pass water).    3.  Headache for over 24 hours.         To contact Dr Edmond call:  [phone]    Tylenol was given at 11:45 AM    Pending Results     No orders found from 2018 to 2018.            Admission Information     Date & Time Provider Department Dept. Phone    2018 Zohaib Edmond MD  [facility] [phone]      Your Vitals Were     Blood Pressure Temperature Respirations Pulse Oximetry          112/64 98  F (36.7  C) (Temporal) 12 91%        MyChart Information     [employer] gives you secure access to your electronic health record. If you see a primary care provider, you can also send messages to your care team and make appointments. If you have questions, please call your primary care clinic.  If you do not have a primary care provider, please call [phone] and they will assist you.        Care EveryWhere ID     This is your Care EveryWhere ID. This could be used by other organizations to access your [place] medical records  [id number]        Equal Access to Services     DOLLY SAAVEDRA : Aleida Cassidy, byron finley, bonita moore, juventino forde. So [facility] [phone].    ATENCIÓN: Si habla español, tiene a shannon disposición servicios gratuitos de asistencia lingüística. Llame al [phone].    We comply with applicable federal civil rights laws and [place] laws. We do not discriminate on the basis of race, color, national origin, age, disability, sex, sexual orientation, or gender identity.               Review of your medicines      START taking        Dose / Directions    HYDROcodone-acetaminophen 5-325 MG per tablet   Commonly known as:  NORCO   Used for:  Mass of right wrist        Dose:  1-2 tablet   Take 1-2 tablets by mouth every 4 hours as needed for moderate to severe pain   Quantity:  20 tablet   Refills:  0         CONTINUE these medicines which have NOT CHANGED        Dose / Directions    diphenoxylate-atropine 2.5-0.025 MG per tablet   Commonly known as:  LOMOTIL        Dose:  2 tablet   Take 2 tablets by mouth   Refills:  0       HUMIRA 40 MG/0.8ML injection   Generic drug:  adalimumab        Dose:  40 mg   Inject 40 mg Subcutaneous once.   Refills:  0       PURINETHOL 50 MG tablet CHEMO   Generic  drug:  mercaptopurine        Dose:  50 mg   Take 50 mg by mouth daily. 1 and half tabs daily   Refills:  0       valACYclovir 1000 mg tablet   Commonly known as:  VALTREX   Used for:  Herpes labialis        two tablets by mouth twice daily for 1 day at onset of symptoms of a cold sore.   Quantity:  30 tablet   Refills:  0            Where to get your medicines      Some of these will need a paper prescription and others can be bought over the counter. Ask your nurse if you have questions.     Bring a paper prescription for each of these medications     HYDROcodone-acetaminophen 5-325 MG per tablet                Protect others around you: Learn how to safely use, store and throw away your medicines at www.disposemymeds.org.        Information about OPIOIDS     PRESCRIPTION OPIOIDS: WHAT YOU NEED TO KNOW   We gave you an opioid (narcotic) pain medicine. It is important to manage your pain, but opioids are not always the best choice. You should first try all the other options your care team gave you. Take this medicine for as short a time (and as few doses) as possible.    Some activities can increase your pain, such as bandage changes or therapy sessions. It may help to take your pain medicine 30 to 60 minutes before these activities. Reduce your stress by getting enough sleep, working on hobbies you enjoy and practicing relaxation or meditation. Talk to your care team about ways to manage your pain beyond prescription opioids.    These medicines have risks:    DO NOT drive when on new or higher doses of pain medicine. These medicines can affect your alertness and reaction times, and you could be arrested for driving under the influence (DUI). If you need to use opioids long-term, talk to your care team about driving.    DO NOT operate heavy machinery    DO NOT do any other dangerous activities while taking these medicines.    DO NOT drink any alcohol while taking these medicines.     If the opioid prescribed  includes acetaminophen, DO NOT take with any other medicines that contain acetaminophen. Read all labels carefully. Look for the word  acetaminophen  or  Tylenol.  Ask your pharmacist if you have questions or are unsure.    You can get addicted to pain medicines, especially if you have a history of addiction (chemical, alcohol or substance dependence). Talk to your care team about ways to reduce this risk.    All opioids tend to cause constipation. Drink plenty of water and eat foods that have a lot of fiber, such as fruits, vegetables, prune juice, apple juice and high-fiber cereal. Take a laxative (Miralax, milk of magnesia, Colace, Senna) if you don t move your bowels at least every other day. Other side effects include upset stomach, sleepiness, dizziness, throwing up, tolerance (needing more of the medicine to have the same effect), physical dependence and slowed breathing.    Store your pills in a secure place, locked if possible. We will not replace any lost or stolen medicine. If you don t finish your medicine, please throw away (dispose) as directed by your pharmacist. The [place] Pollution Control Agency has more information about safe disposal: https://www.pca.[facility].[place].us/living-green/managing-unwanted-medications             Medication List: This is a list of all your medications and when to take them. Check marks below indicate your daily home schedule. Keep this list as a reference.      Medications           Morning Afternoon Evening Bedtime As Needed    diphenoxylate-atropine 2.5-0.025 MG per tablet   Commonly known as:  LOMOTIL   Take 2 tablets by mouth                                HUMIRA 40 MG/0.8ML injection   Inject 40 mg Subcutaneous once.   Generic drug:  adalimumab                                HYDROcodone-acetaminophen 5-325 MG per tablet   Commonly known as:  NORCO   Take 1-2 tablets by mouth every 4 hours as needed for moderate to severe pain                                PURINETHOL  50 MG tablet CHEMO   Take 50 mg by mouth daily. 1 and half tabs daily   Generic drug:  mercaptopurine                                valACYclovir 1000 mg tablet   Commonly known as:  VALTREX   two tablets by mouth twice daily for 1 day at onset of symptoms of a cold sore.

## 2018-11-07 NOTE — ANESTHESIA POSTPROCEDURE EVALUATION
Anesthesia POST Procedure Evaluation    Patient: Jorge Amaral   MRN:     [id number] Gender:   male   Age:    48 year old :      1970        Preoperative Diagnosis: Right painful dorsal wrist   Procedure(s):  EXCISION OF RIGHT DORSAL WRIST MASS   Postop Comments: No value filed.       Anesthesia Type:  MAC    Reportable Event: NO     PAIN: Uncomplicated   Sign Out status: Comfortable, Well controlled pain     PONV: No PONV   Sign Out status:  No Nausea or Vomiting     Neuro/Psych: Uneventful perioperative course   Sign Out Status: Preoperative baseline; Age appropriate mentation     Airway/Resp.: Uneventful perioperative course   Sign Out Status: Non labored breathing, age appropriate RR; Resp. Status within EXPECTED Parameters     CV: Uneventful perioperative course   Sign Out status: Appropriate BP and perfusion indices; Appropriate HR/Rhythm     Disposition:   Sign Out in:  Phase II  Disposition:  Home  Recovery Course: Uneventful  Follow-Up: Not required           Last Anesthesia Record Vitals:  CRNA VITALS  2018 1251 - 2018 1332      2018             Pulse: 74    SpO2: 94 %    Resp Rate (observed): (!)  1          Last PACU/Preop Vitals:  Vitals:    18 1327   BP: 112/64   Resp: 12   Temp: 98  F (36.7  C)   SpO2: 91%         Electronically Signed By: Antonio Holcomb MD, 2018, 1:32 PM

## 2018-11-07 NOTE — ANESTHESIA PREPROCEDURE EVALUATION
Anesthesia Pre-Procedure Evaluation    Patient: Jorge Amaral   MRN:     [id number] Gender:   male   Age:    48 year old :      1970        Preoperative Diagnosis: Right painful dorsal wrist   Procedure(s):  EXCISE MASS WRIST, RIGHT     Past Medical History:   Diagnosis Date     Crohn's disease (H)      Hyperlipidemia LDL goal < 160     not on meds      Past Surgical History:   Procedure Laterality Date     APPENDECTOMY       C STOMACH SURGERY PROCEDURE UNLISTED       ENT SURGERY  13    Left Tonsil biopsy     LAPAROSCOPY PROCEDURE UNLISTED       OPEN REDUCTION INTERNAL FIXATION HAND      right IV MCP hand      REMOVE HARDWARE LOWER EXTREMITY  2012    Procedure:REMOVE HARDWARE LOWER EXTREMITY; REMOVAL OF RIGHT TIBIAL SCREW; Surgeon:JOSE LUIS CARMICHAEL; [facility]          Anesthesia Evaluation     . Pt has had prior anesthetic. Type: General    No history of anesthetic complications          ROS/MED HX    ENT/Pulmonary:  - neg pulmonary ROS     Neurologic:  - neg neurologic ROS     Cardiovascular:  - neg cardiovascular ROS       METS/Exercise Tolerance:     Hematologic:  - neg hematologic  ROS       Musculoskeletal:  - neg musculoskeletal ROS       GI/Hepatic:     (+) Other GI/Hepatic Crohn's disease      Renal/Genitourinary:  - ROS Renal section negative       Endo:  - neg endo ROS       Psychiatric:  - neg psychiatric ROS       Infectious Disease:  - neg infectious disease ROS       Malignancy:      - no malignancy   Other:    - neg other ROS                     PHYSICAL EXAM:   Mental Status/Neuro: A/A/O   Airway: Facies: Feasible  Mallampati: I  Mouth/Opening: Full  TM distance: > 6 cm  Neck ROM: Full   Respiratory: Auscultation: CTAB     Resp. Rate: Normal     Resp. Effort: Normal      CV: Rhythm: Regular  Rate: Age appropriate  Heart: Normal Sounds   Comments:      Dental: Normal                  Lab Results   Component Value Date    HGB 14.9 10/31/2018     10/09/2018     "POTASSIUM 4.2 10/09/2018    CHLORIDE 105 10/09/2018    CO2 33 (H) 10/09/2018    BUN 15 10/09/2018    CR 1.05 10/09/2018    GLC 93 10/09/2018    JOSELITO 8.9 10/09/2018       Preop Vitals  BP Readings from Last 3 Encounters:   10/31/18 118/73   10/18/18 112/72   10/18/18 123/78    Pulse Readings from Last 3 Encounters:   10/31/18 68   10/18/18 81   10/18/18 77      Resp Readings from Last 3 Encounters:   10/31/18 16   10/18/18 16   06/21/18 14    SpO2 Readings from Last 3 Encounters:   10/31/18 98%   10/18/18 96%   10/18/18 97%      Temp Readings from Last 1 Encounters:   10/31/18 96.8  F (36  C) (Oral)    Ht Readings from Last 1 Encounters:   10/31/18 1.886 m (6' 2.25\")      Wt Readings from Last 1 Encounters:   10/31/18 98.4 kg (217 lb)    Estimated body mass index is 27.67 kg/(m^2) as calculated from the following:    Height as of 10/31/18: 1.886 m (6' 2.25\").    Weight as of 10/31/18: 98.4 kg (217 lb).     LDA:            Assessment:   ASA SCORE: 3    NPO Status: > 6 hours since completed Solid Foods   Documentation: H&P complete; Preop Testing complete; Consents complete   Proceeding: Proceed without further delay  Tobacco Use:  NO Active use of Tobacco/UNKNOWN Tobacco use status     Plan:   Anes. Type:  MAC   Pre-Induction: Midazolam IV; Acetaminophen PO; Gabapentin PO   Induction:  IV (Standard)   Airway: Native Airway   Access/Monitoring: PIV   Maintenance: Propofol; IV   Emergence: Procedure Site   Logistics: Same Day Surgery     Postop Pain/Sedation Strategy:  Standard-Options: Opioids PRN     PONV Management:  Adult Risk Factors:, Non-Smoker, Postop Opioids  Prevention: Propofol Infusion     CONSENT: Direct conversation   Plan and risks discussed with: Patient; Spouse   Blood Products: Consent Deferred (Minimal Blood Loss)                         Antonio Holcomb MD  "

## 2018-11-07 NOTE — OR NURSING
Surgeon late- pt opting to wait in the waiting room for a while before we prep him for surgery, hence the longer wait time.

## 2018-11-07 NOTE — BRIEF OP NOTE
BRIEF OPERATIVE NOTE:    PRE-OP DIAGNOSIS:  Right wrist mass     POST-OP DIAGNOSIS:  same    PROCEDURE: Procedure(s):  EXCISION OF RIGHT DORSAL WRIST MASS    SURGEON:  Mayito    ASSISTANT(S):  KERI Javier  Circulator: Coni Chaney RN  Relief Scrub: Joselyn Phelps  Scrub Person: Curry Gayle; Lili Wilburn    ANESTHESIA:  Combined MAC with Local   Anesthesiologist: Antonio Holcomb MD  CRNA: Radha Carlson APRN CRNA    ESTIMATED BLOOD LOSS:  2 cc    Drains: none    SPECIMEN:    ID Type Source Tests Collected by Time Destination   A : Right dorsal wrist mass and prominence Tissue Wrist, Right SURGICAL PATHOLOGY EXAM Zohaib Edmond MD 11/7/2018  1:03 PM        COMPLICATIONS:  none    FINDINGS:  Thin bursa overlying bony prominence of the lunate, with small bone fragments/calcifications in the lunato-triquetrum articulation    PLAN:  Routine postoperative protocol      EMILY Edmond MD  Dept. Orthopedic Surgery  [facility]      Dictation #: [id number]

## 2018-11-07 NOTE — IP AVS SNAPSHOT
[facility]    [address] [place] [address]    Phone:  [phone]                                       After Visit Summary   11/7/2018    Jorge Amaral    MRN: [id number]           After Visit Summary Signature Page     I have received my discharge instructions, and my questions have been answered. I have discussed any challenges I see with this plan with the nurse or doctor.    ..........................................................................................................................................  Patient/Patient Representative Signature      ..........................................................................................................................................  Patient Representative Print Name and Relationship to Patient    ..................................................               ................................................  Date                                   Time    ..........................................................................................................................................  Reviewed by Signature/Title    ...................................................              ..............................................  Date                                               Time          22EPIC Rev 08/18

## 2018-11-07 NOTE — OP NOTE
OPERATIVE NOTE    Date of Procedure: November 7, 2018    PRE-OP DIAGNOSIS: bucket-handle medial meniscus tear left knee     POST-OP DIAGNOSIS:  bucket-handle medial meniscus tear left knee    PROCEDURE: Arthroscopic medial meniscal repair left knee.    SURGEON: Mayito    ASSISTANT(S):  KERI Javier    ANESTHESIA:  General    EBL: 3cc    Complications: none   Specimen: none     INDICATIONS:  Patient is a 48 year old male with left knee symptoms including pain and locking of the left knee.   Clinical evaluation, including MRI was consistent with a displaced bucket-handle medial meniscus tear.  Has failed conservative treatment.  Informed consent was obtained for the procedure, The procedure had been discussed in detail with the patient, including alternatives to the proposed procedure, and expected recovery.  Risks were discussed, including, but not limited to infection, neurovascular injury, DVT/PE, failure to relieve pain, and anesthetic complications.      Surgical site was marked.    PROCEDURE IN DETAIL  The patient was taken to the operating room and placed on the  operating table in the supine position. General anesthesia was successfully achieved. Pause for site confirmation was done.  Then, a tourniquet was placed around the proximal thigh, the leg placed in the leg bansal, and the limb was prepped and draped in the usual sterile fashion. The anticipated portal sites were injected with 1/4% Marcaine with epinephrine, and was also injected intra-articularly.  Standard superomedial inflow portal was established and inflow started.  Standard inferolateral scope portal was established, and the scope was introduced. Medial work portal was localized using a spinal needle.  The portal was made and probe introduced.  The tourniquet was not inflated during the case.     The following findings were noted at arthroscopy :  Medial compartment: Gr 0-1  articular cartilage changes.   A bucket-handle medial meniscus tear  in the peripheral white-white zone, and a little more central at the posterior aspect. There was also a horizontal tear in the posterior medial meniscus remnant as well, but there was some bleeding through this tear. // Lateral Compartment: Gr 0  articular cartilage changes.   Normal lateral meniscus.  // Patellofemoral joint: Gr 0  articular cartilage changes  on the patella, Gr 0  articular cartilage changes on the trochlea.  ACL intact.  Some anterior synovitis noted.    I felt that the tear of this size should be repaired at his age, even though it was not in the optimal healing zone.  Using a combination of the motorized shaver, and the meniscal rasp, I debrided the edges of the tear.  I also shaved the surrounding synovium, and made vascular channels extending into the capsule.  Then the [employer] Fast Fix all inside devices were used, utilizing vertical repairs  on the inferior and superior surfaces, as well as horizontal repair on the superior surface.  The meniscus was probed and found to be stable.  The horizontal component was still present as well, and although the fragments were not unstable, the flaps were quite significant.  I was able to use the knee scorpion to pass a 2-0 Ethibond suture through the horizontal tear, and was able to tie it down, despite the suture that we had available being too short for arthroscopic knot tying.  This suture stabilized the horizontal component well, but not perfectly.  I had no instrument to get far posterior portion of the tear, but I felt that overall the tear had been made stable.  The Gutters were checked for any loose bodies.      At this point, the excess fluid was suctioned from the knee, instruments were removed from the knee.  3- Nylon sutures placed in the portal sites.  PRP, 5cc was injected.  Sterile, compressive dressings were applied.  Estimated blood loss was negligible.  Patient was awakened, and transferred to the recovery room in stable  kassi.    EMILY Edmond MD  Dept. Orthopedic Surgery  [facility]

## 2018-11-07 NOTE — ANESTHESIA CARE TRANSFER NOTE
Patient: Jorge Amaral    Procedure(s):  EXCISION OF RIGHT DORSAL WRIST MASS    Diagnosis: Right painful dorsal wrist  Diagnosis Additional Information: No value filed.    Anesthesia Type:   MAC     Note:  Airway :Room Air  Patient transferred to:Phase II  Comments: To Phase II. Report to RN.  VSS Resp status stable.Handoff Report: Identifed the Patient, Identified the Reponsible Provider, Reviewed the pertinent medical history, Discussed the surgical course, Reviewed Intra-OP anesthesia mangement and issues during anesthesia, Set expectations for post-procedure period and Allowed opportunity for questions and acknowledgement of understanding      Vitals: (Last set prior to Anesthesia Care Transfer)    CRNA VITALS  11/7/2018 1251 - 11/7/2018 1327      11/7/2018             Pulse: 74    SpO2: 94 %    Resp Rate (observed): (!)  1                Electronically Signed By: RAMON Grant CRNA  November 7, 2018  1:27 PM

## 2018-11-08 NOTE — OP NOTE
Procedure Date: 11/07/2018      PREOPERATIVE DIAGNOSIS:  Dorsal wrist mass, right wrist.      POSTOPERATIVE DIAGNOSIS:  Dorsal wrist mass, right wrist.      PROCEDURE:  Mass excision, right wrist.      SURGEON:  Zohaib Edmond MD      FIRST ASSISTANT:  TINY Severino      ANESTHESIA:  MAC.      INDICATIONS:  The patient is a 48-year-old male who has had a painful mass on the dorsum of his wrist.  It does not really change in size and is quite firm.  It was mainly evident with the wrist in volar flexion but could also be palpated in neutral flexion.  It was felt to possibly be a ganglion cyst or a bony prominence.  Informed consent was obtained for the procedure.      DETAILS OF PROCEDURE:  The patient was taken to the operating room and placed on the operating table in the supine position.  IV anesthesia given by Anesthesia.  Tourniquet placed around the proximal forearm.  The limb was prepped and draped in the usual sterile fashion.  Pause for site confirmation was performed.      Local anesthetic using 0.25% Marcaine and 1% lidocaine were injected in the anticipated incision site.  The limb was exsanguinated, tourniquet inflated to 250 mmHg and then a  longitudinal incision was made over the palpable prominence.  The incision was carefully taken down through the skin and subcutaneous tissue in the interval just radial to the fourth dorsal compartment.  I incised through the fascia and identified the prominence which was quite broad-based.  I excised around the prominence but what resulted was removing just a hall of capsule and bursa without a definite soft tissue mass.  Re-palpating while we had direct vision of the underlying lunate and capitate, the prominent was a bony prominence mainly.  I used a rongeur to trim down the prominent bone on the non-articulating aspect of the lunate.  I noted that adjacent to the lunate at the lunotriquetral joint that there were some calcifications or bony fragments  within this joint.  These were removed with a rongeur.  I smoothed down the dorsal bony prominences until we could not feel the prominence any longer.  We did not remove much bone for this.  The wound was irrigated and the tourniquet deflated.  No major bleeders were encountered.  The wound was closed with interrupted 3-0 Monocryl suture in the subcutaneous and subcuticular layers.  Sterile dressings were applied followed by a volar splint.  He was awakened, transferred to the hospital cart and to the recovery area in stable condition.         OTILIA CHAMPAGNE MD             D: 2018   T: 2018   MT: NTS      Name:     SAUMYA VAIL   MRN:      [id number]        Account:        [id number]   :      1970           Procedure Date: 2018      Document: [id number]

## 2018-11-13 LAB — COPATH REPORT: NORMAL

## 2018-11-15 ENCOUNTER — OFFICE VISIT (OUTPATIENT)
Dept: DERMATOLOGY | Facility: CLINIC | Age: 48
End: 2018-11-15
Payer: COMMERCIAL

## 2018-11-15 DIAGNOSIS — B07.0 VERRUCA PLANTARIS: Primary | ICD-10-CM

## 2018-11-15 DIAGNOSIS — L71.9 ROSACEA: ICD-10-CM

## 2018-11-15 PROCEDURE — 17110 DESTRUCTION B9 LES UP TO 14: CPT | Performed by: DERMATOLOGY

## 2018-11-15 NOTE — MR AVS SNAPSHOT
After Visit Summary   11/15/2018    Jorge Amaral    MRN: [id number]           Patient Information     Date Of Birth          1970        Visit Information        Provider Department      11/15/2018 9:30 AM Sasha Woods MD [facility]        Today's Diagnoses     Verruca plantaris    -  1      Care Instructions    Cryotherapy    What is it?    Use of a very cold liquid, such as liquid nitrogen, to freeze and destroy abnormal skin cells that need to be removed    What should I expect?    Tenderness and redness    A small blister that might grow and fill with dark purple blood. There may be crusting.    More than one treatment may be needed if the lesions do not go away.    How do I care for the treated area?    Gently wash the area with your hands when bathing.    Use a thin layer of Vaseline to help with healing. You may use a Band-Aid.     The area should heal within 7-10 days and may leave behind a pink or lighter color.     Do not use an antibiotic or Neosporin ointment.     You may take acetaminophen (Tylenol) for pain.     Call your Doctor if you have:    Severe pain    Signs of infection (warmth, redness, cloudy yellow drainage, and or a bad smell)    Questions or concerns    Who should I call with questions?       [facility]: [phone]       [facility]: [phone]       For urgent needs outside of business hours call the [facility] at [phone]        and ask for the dermatology resident on call            Follow-ups after your visit        Your next 10 appointments already scheduled     Nov 21, 2018  1:30 PM CST   Return Visit with Zohaib Edmond MD   [facility]    [address] [address]   [phone]            Dec 20, 2018 10:00 AM CST   Return Visit with Sasha Woods MD   [facility] ([facility]  [facility]) [address] [address] [place] [id number] [phone]              Who to contact     If you have questions or need follow up information about today's clinic visit or your schedule please contact [facility] directly at [phone].  Normal or non-critical lab and imaging results will be communicated to you by MyChart, letter or phone within 4 business days after the clinic has received the results. If you do not hear from us within 7 days, please contact the clinic through [employer] or phone. If you have a critical or abnormal lab result, we will notify you by phone as soon as possible.  Submit refill requests through [employer] or call your pharmacy and they will forward the refill request to us. Please allow 3 business days for your refill to be completed.          Additional Information About Your Visit        MyChart Information     [employer] gives you secure access to your electronic health record. If you see a primary care provider, you can also send messages to your care team and make appointments. If you have questions, please call your primary care clinic.  If you do not have a primary care provider, please call [phone] and they will assist you.      [employer] is an electronic gateway that provides easy, online access to your medical records. With [employer], you can request a clinic appointment, read your test results, renew a prescription or communicate with your care team.     To access your existing account, please contact your [facility] or call [phone] for assistance.        Care EveryWhere ID     This is your Care EveryWhere ID. This could be used by other organizations to access your [place] medical records  [id number]         Blood Pressure from Last 3 Encounters:   11/07/18 117/73   10/31/18 118/73   10/18/18 112/72    Weight from Last 3 Encounters:   10/31/18 98.4 kg (217 lb)   10/18/18 99.8 kg (220 lb)   10/18/18 99.8  kg (220 lb)              Today, you had the following     No orders found for display       Primary Care Provider Office Phone # Fax #    Vince Swain PA-C [phone] [address]  [facility] [address]        Equal Access to Services     DOLLY SAAVEDRA : Hadii aad ku hadasho Soomaali, waaxda luqadaha, qaybta kaalmada adeegyada, waxay brentin haycorinnen adeselvin gudino la'corinnemariely . So [facility] [phone].    ATENCIÓN: Si habla español, tiene a shannon disposición servicios gratuitos de asistencia lingüística. Llame al [phone].    We comply with applicable federal civil rights laws and [place] laws. We do not discriminate on the basis of race, color, national origin, age, disability, sex, sexual orientation, or gender identity.            Thank you!     Thank you for choosing [facility]  for your care. Our goal is always to provide you with excellent care. Hearing back from our patients is one way we can continue to improve our services. Please take a few minutes to complete the written survey that you may receive in the mail after your visit with us. Thank you!             Your Updated Medication List - Protect others around you: Learn how to safely use, store and throw away your medicines at www.disposemymeds.org.          This list is accurate as of 11/15/18 10:36 AM.  Always use your most recent med list.                   Brand Name Dispense Instructions for use Diagnosis    diphenoxylate-atropine 2.5-0.025 MG per tablet    LOMOTIL     Take 2 tablets by mouth        HUMIRA 40 MG/0.8ML injection   Generic drug:  adalimumab      Inject 40 mg Subcutaneous once.        HYDROcodone-acetaminophen 5-325 MG per tablet    NORCO    20 tablet    Take 1-2 tablets by mouth every 4 hours as needed for moderate to severe pain    Mass of right wrist       PURINETHOL 50 MG tablet CHEMO   Generic drug:  mercaptopurine      Take 50 mg by mouth daily. 1 and half tabs daily        valACYclovir 1000  mg tablet    VALTREX    30 tablet    two tablets by mouth twice daily for 1 day at onset of symptoms of a cold sore.    Herpes labialis

## 2018-11-15 NOTE — PROGRESS NOTES
[facility] Dermatology Note      Dermatology Problem List:  1. History of immunosuppression, Chron's  2. Bowenoid papulosis, left abdomen  -s/p biopsy 4/18/15  -s/p Efudex x 6 weeks initiated 5/6/15, marked resolved 8/2015  3. Verruca plantaris  -Previous Tx: cantherone, trichloracetic acid, cryotherapy and PDL with Dr. Chris Stoddard, s/p candida 3X, cryotherapy  4. Tinea versicolor, back and chest  -Previous Tx:  ketoconazole 2% cream initiated 10/23/2015    Encounter Date: Nov 15, 2018    CC:  Chief Complaint   Patient presents with     Wart     one wart on right foot, 2nd toe. Other then that no concerns         History of Present Illness:  Mr. Jorge Amaral is a 48 year old male who presents as a follow-up for verruca vulgaris. The patient was last seen 11/15/18 with Dr. Lott at which time a few warts frozen with cryo and was prescribed Efudex. Today, the patient reports that he tried this cream but it did not work. He only has a wart on the right second toe to address today. No other concerns.       Past Medical History:   Patient Active Problem List   Diagnosis     CARDIOVASCULAR SCREENING; LDL GOAL LESS THAN 160     Fracture, metacarpal     Delayed union of fracture     Family history of diabetes mellitus     ACL (anterior cruciate ligament) tear--recurrent     Cold sore     Common wart     Crohn's disease with complication, unspecified gastrointestinal tract location (H)     Herpes labialis     FH: prostate cancer     Right shoulder pain     Right wrist pain     Past Medical History:   Diagnosis Date     Crohn's disease (H)      Hyperlipidemia LDL goal < 160     not on meds     Past Surgical History:   Procedure Laterality Date     APPENDECTOMY       C STOMACH SURGERY PROCEDURE UNLISTED       ENT SURGERY  6-28-13    Left Tonsil biopsy     EXCISE MASS WRIST Right 11/7/2018    Procedure: EXCISION OF RIGHT DORSAL WRIST MASS;  Surgeon: Zohaib Edmond MD;  Location:  OR      LAPAROSCOPY PROCEDURE UNLISTED       OPEN REDUCTION INTERNAL FIXATION HAND      right IV MCP hand 7/11     REMOVE HARDWARE LOWER EXTREMITY  4/6/2012    Procedure:REMOVE HARDWARE LOWER EXTREMITY; REMOVAL OF RIGHT TIBIAL SCREW; Surgeon:JOSE LUIS CARMICHAEL; Location:[facility]       Social History:  Patient  reports that he has been smoking Cigars.  He has never used smokeless tobacco. He reports that he drinks alcohol. He reports that he does not use illicit drugs.    Family History:  Family History   Problem Relation Age of Onset     Hypertension Mother      Respiratory Mother      SMOKER -EMPYSEMA     Hypertension Father      Diabetes Father      Diabetes Brother        Medications:  Current Outpatient Prescriptions   Medication Sig Dispense Refill     adalimumab (HUMIRA) 40 MG/0.8ML injection Inject 40 mg Subcutaneous once.       diphenoxylate-atropine (LOMOTIL) 2.5-0.025 MG per tablet Take 2 tablets by mouth       HYDROcodone-acetaminophen (NORCO) 5-325 MG per tablet Take 1-2 tablets by mouth every 4 hours as needed for moderate to severe pain 20 tablet 0     mercaptopurine (PURINETHOL) 50 MG tablet Take 50 mg by mouth daily. 1 and half tabs daily       valACYclovir (VALTREX) 1000 mg tablet two tablets by mouth twice daily for 1 day at onset of symptoms of a cold sore. 30 tablet 0       No Known Allergies    Review of Systems:  -Constitutional: Otherwise feeling well today, in usual state of health.  -Skin: As above in HPI. No additional skin concerns.    Physical exam:  Vitals: There were no vitals taken for this visit.  GEN: This is a well developed, well-nourished male in no acute distress, in a pleasant mood.    SKIN:Focused exam of lesions on toes.   - One 2 mm verrucous papule on the right second toe  - Two 1 mm wart on the right second digit.   -No other lesions of concern on areas examined.       Impression/Plan:  1. Verruca vulgaris, right second toe, hx of pain. There are also 2 on the right second  digit  Cryotherapy procedure note: After verbal consent and discussion of risks and benefits including but no limited to dyspigmentation/scar, blister, and pain, 3was(were) treated with 1-2mm freeze border for 2 cycles with liquid nitrogen. Post cryotherapy instructions were provided.     Follow-up in 1 month for skin exam, earlier for new or changing lesions.       Staff Involved:  Scribe/Staff    Scribe Disclosure  I, Sherman Grande, am serving as a scribe to document services personally performed by Dr. Sasha Woods MD, based on data collection and the provider's statements to me.     Provider Disclosure:   The documentation recorded by the scribe accurately reflects the services I personally performed and the decisions made by me.    Sasha Woods MD    Department of Dermatology  [facility]: Phone: [phone], Fax:[phone]  [facility]: Phone: [phone], Fax: [phone]

## 2018-11-15 NOTE — LETTER
11/15/2018         RE: Jorge Amaral  [address] [facility] [address]        Dear Colleague,    Thank you for referring your patient, Jorge Amaral, to the [facility]. Please see a copy of my visit note below.    [facility] Dermatology Note      Dermatology Problem List:  1. History of immunosuppression, Chron's  2. Bowenoid papulosis, left abdomen  -s/p biopsy 4/18/15  -s/p Efudex x 6 weeks initiated 5/6/15, marked resolved 8/2015  3. Verruca plantaris  -Previous Tx: cantherone, trichloracetic acid, cryotherapy and PDL with Dr. Chris Stoddard, s/p candida 3X, cryotherapy  4. Tinea versicolor, back and chest  -Previous Tx:  ketoconazole 2% cream initiated 10/23/2015    Encounter Date: Nov 15, 2018    CC:  Chief Complaint   Patient presents with     Wart     one wart on right foot, 2nd toe. Other then that no concerns         History of Present Illness:  Mr. Jorge Amaral is a 48 year old male who presents as a follow-up for verruca vulgaris. The patient was last seen 11/15/18 with Dr. Lott at which time a few warts frozen with cryo and was prescribed Efudex. Today, the patient reports that he tried this cream but it did not work. He only has a wart on the right second toe to address today. No other concerns.       Past Medical History:   Patient Active Problem List   Diagnosis     CARDIOVASCULAR SCREENING; LDL GOAL LESS THAN 160     Fracture, metacarpal     Delayed union of fracture     Family history of diabetes mellitus     ACL (anterior cruciate ligament) tear--recurrent     Cold sore     Common wart     Crohn's disease with complication, unspecified gastrointestinal tract location (H)     Herpes labialis     FH: prostate cancer     Right shoulder pain     Right wrist pain     Past Medical History:   Diagnosis Date     Crohn's disease (H)      Hyperlipidemia LDL goal < 160     not on meds     Past Surgical History:   Procedure Laterality Date      APPENDECTOMY       C STOMACH SURGERY PROCEDURE UNLISTED       ENT SURGERY  6-28-13    Left Tonsil biopsy     EXCISE MASS WRIST Right 11/7/2018    Procedure: EXCISION OF RIGHT DORSAL WRIST MASS;  Surgeon: Zohaib Edmond MD;  Location: [facility] OR     LAPAROSCOPY PROCEDURE UNLISTED       OPEN REDUCTION INTERNAL FIXATION HAND      right IV MCP hand 7/11     REMOVE HARDWARE LOWER EXTREMITY  4/6/2012    Procedure:REMOVE HARDWARE LOWER EXTREMITY; REMOVAL OF RIGHT TIBIAL SCREW; Surgeon:JOSE LUIS CARMICHAEL; Location:[facility]       Social History:  Patient  reports that he has been smoking Cigars.  He has never used smokeless tobacco. He reports that he drinks alcohol. He reports that he does not use illicit drugs.    Family History:  Family History   Problem Relation Age of Onset     Hypertension Mother      Respiratory Mother      SMOKER -EMPYSEMA     Hypertension Father      Diabetes Father      Diabetes Brother        Medications:  Current Outpatient Prescriptions   Medication Sig Dispense Refill     adalimumab (HUMIRA) 40 MG/0.8ML injection Inject 40 mg Subcutaneous once.       diphenoxylate-atropine (LOMOTIL) 2.5-0.025 MG per tablet Take 2 tablets by mouth       HYDROcodone-acetaminophen (NORCO) 5-325 MG per tablet Take 1-2 tablets by mouth every 4 hours as needed for moderate to severe pain 20 tablet 0     mercaptopurine (PURINETHOL) 50 MG tablet Take 50 mg by mouth daily. 1 and half tabs daily       valACYclovir (VALTREX) 1000 mg tablet two tablets by mouth twice daily for 1 day at onset of symptoms of a cold sore. 30 tablet 0       No Known Allergies    Review of Systems:  -Constitutional: Otherwise feeling well today, in usual state of health.  -Skin: As above in HPI. No additional skin concerns.    Physical exam:  Vitals: There were no vitals taken for this visit.  GEN: This is a well developed, well-nourished male in no acute distress, in a pleasant mood.    SKIN:Focused exam of lesions on toes.   - One 2 mm  verrucous papule on the right second toe  - Two 1 mm wart on the right second digit.   -No other lesions of concern on areas examined.       Impression/Plan:  1. Verruca vulgaris, right second toe, hx of pain. There are also 2 on the right second digit  Cryotherapy procedure note: After verbal consent and discussion of risks and benefits including but no limited to dyspigmentation/scar, blister, and pain, 3was(were) treated with 1-2mm freeze border for 2 cycles with liquid nitrogen. Post cryotherapy instructions were provided.     Follow-up in 1 month for skin exam, earlier for new or changing lesions.       Staff Involved:  Scribe/Staff    Scribe Disclosure  I, Sherman Grande, am serving as a scribe to document services personally performed by Dr. Sasha Woods MD, based on data collection and the provider's statements to me.     Provider Disclosure:   The documentation recorded by the scribe accurately reflects the services I personally performed and the decisions made by me.    Sasha Woods MD    Department of Dermatology  [facility]: Phone: [phone], Fax:[phone]  [facility]: Phone: [phone], Fax: [phone]        Again, thank you for allowing me to participate in the care of your patient.        Sincerely,        Sasha Woods MD

## 2018-11-15 NOTE — PATIENT INSTRUCTIONS
Cryotherapy    What is it?    Use of a very cold liquid, such as liquid nitrogen, to freeze and destroy abnormal skin cells that need to be removed    What should I expect?    Tenderness and redness    A small blister that might grow and fill with dark purple blood. There may be crusting.    More than one treatment may be needed if the lesions do not go away.    How do I care for the treated area?    Gently wash the area with your hands when bathing.    Use a thin layer of Vaseline to help with healing. You may use a Band-Aid.     The area should heal within 7-10 days and may leave behind a pink or lighter color.     Do not use an antibiotic or Neosporin ointment.     You may take acetaminophen (Tylenol) for pain.     Call your Doctor if you have:    Severe pain    Signs of infection (warmth, redness, cloudy yellow drainage, and or a bad smell)    Questions or concerns    Who should I call with questions?       [facility]: [phone]       [facility]: [phone]       For urgent needs outside of business hours call the [facility] at [phone]        and ask for the dermatology resident on call

## 2018-11-15 NOTE — NURSING NOTE
Jorge Amaral's goals for this visit include:   Chief Complaint   Patient presents with     Wart     one wart on right foot, 2nd toe. Other then that no concerns       He requests these members of his care team be copied on today's visit information: PCP    PCP: Vince Swain    Referring Provider:  Vince Swain PA-C  [address] [place] [address]    There were no vitals taken for this visit.    Do you need any medication refills at today's visit? None    Davina Casanova, SCOTT

## 2018-11-20 NOTE — PROGRESS NOTES
"SUBJECTIVE:  Jorge Amaral is here for postoperative follow up for his 11/7/18:  PROCEDURE:  Mass excision, right wrist.  The mass was not a ganglion, and was prominent bone/bursa.     Pathology findings: SPECIMEN(S):   Right dorsal wrist mass and prominence     FINAL DIAGNOSIS:   Right dorsal wrist mass and prominence, excision:   - Benign fragments of synovium, bone and fibrous tissue     Review of Systems:  Constitutional/General: Negative for fever, chills, change in weight  Integumentary/Skin: Negative for worrisome rashes, moles, or lesions  Neuro: Negative for weakness, dizziness, or paresthesias   Psychiatric: negative for changes in mood or affect    OBJECTIVE:  Physical Exam:  /72 (BP Location: Left arm, Patient Position: Sitting, Cuff Size: Adult Regular)  Pulse 89  Ht 1.886 m (6' 2.25\")  Wt 98.4 kg (217 lb)  SpO2 97%  BMI 27.67 kg/m2  General Appearance: healthy, alert and no distress   Skin: no suspicious lesions or rashes  Neuro: Normal strength and tone, mentation intact and speech normal  Vascular: good pulses, and cappillary refill   Lymph: no lymphadenopathy   Psych:  mentation appears normal and affect normal/bright  Resp: no increased work of breathing     Exam:  Inspection: Wound looks excellent, no evidence of infection.  Minimal swelling  Tender around wound, + Tinel's  CMSI     ASSESSMENT:   Doing well status post mass excision    PLAN:   Scar management  Work on range of motion  Will get own wrist brace to use as needed   Hand therapy ordered    Return to clinic: as needed     EMILY Edmond MD  Dept. Orthopedic Surgery  [facility]      "

## 2018-11-21 ENCOUNTER — OFFICE VISIT (OUTPATIENT)
Dept: ORTHOPEDICS | Facility: CLINIC | Age: 48
End: 2018-11-21
Payer: COMMERCIAL

## 2018-11-21 VITALS
WEIGHT: 217 LBS | DIASTOLIC BLOOD PRESSURE: 72 MMHG | HEIGHT: 74 IN | HEART RATE: 89 BPM | SYSTOLIC BLOOD PRESSURE: 122 MMHG | BODY MASS INDEX: 27.85 KG/M2 | OXYGEN SATURATION: 97 %

## 2018-11-21 DIAGNOSIS — M25.831 MASS OF JOINT OF RIGHT WRIST: Primary | ICD-10-CM

## 2018-11-21 DIAGNOSIS — Z98.890 H/O EXCISION OF GANGLION CYST: ICD-10-CM

## 2018-11-21 PROCEDURE — 99024 POSTOP FOLLOW-UP VISIT: CPT | Performed by: ORTHOPAEDIC SURGERY

## 2018-11-21 NOTE — MR AVS SNAPSHOT
After Visit Summary   11/21/2018    Jorge Amaral    MRN: [id number]           Patient Information     Date Of Birth          1970        Visit Information        Provider Department      11/21/2018 1:30 PM Zohaib Edmond MD [facility]        Today's Diagnoses     Mass of joint of right wrist    -  1    H/O excision of ganglion cyst           Follow-ups after your visit        Additional Services     [facility] PT, HAND, AND CHIROPRACTIC REFERRAL       Physical Therapy, Hand Therapy and Chiropractic Care are available through:  *[facility]  *Hand Therapy (Occupational Therapy or Physical Therapy)  *[facility]    Decrease pain  Increase range of motion   Scar management  # of visits per therapist's discretion     Call one number to schedule at any of the above locations: [phone].    Physical therapy, Hand therapy and/or Chiropractic care has been recommended by your physician as an excellent treatment option to reduce pain and help people return to normal activities, including sports.  Therapy and/or chiropractic care services are a great complement or alternative to expensive and invasive surgery, injections, or long-term use of prescription medications. The primary goal is to identify the underlying problem and provide you the tools to manage your condition on your own.     Please be aware that coverage of these services is subject to the terms and limitations of your health insurance plan.  Call member services at your health plan with any benefit or coverage questions.      Please bring the following to your appointment:  *Your personal calendar for scheduling future appointments  *Comfortable clothing                  Your next 10 appointments already scheduled     Dec 20, 2018 10:00 AM CST   Return Visit with Sasha Woods MD   [facility] ([facility])    [address] [place] [address]  [id number] [phone]              Future tests that were ordered for you today     Open Future Orders        Priority Expected Expires Ordered    [facility] PT, HAND, AND CHIROPRACTIC REFERRAL Routine 11/21/2018 11/21/2019 11/21/2018            Who to contact     If you have questions or need follow up information about today's clinic visit or your schedule please contact [facility] directly at [phone].  Normal or non-critical lab and imaging results will be communicated to you by [employer], letter or phone within 4 business days after the clinic has received the results. If you do not hear from us within 7 days, please contact the clinic through [employer] or phone. If you have a critical or abnormal lab result, we will notify you by phone as soon as possible.  Submit refill requests through [employer] or call your pharmacy and they will forward the refill request to us. Please allow 3 business days for your refill to be completed.          Additional Information About Your Visit        [employer] Information     [employer] gives you secure access to your electronic health record. If you see a primary care provider, you can also send messages to your care team and make appointments. If you have questions, please call your primary care clinic.  If you do not have a primary care provider, please call [phone] and they will assist you.        Care EveryWhere ID     This is your Care EveryWhere ID. This could be used by other organizations to access your [facility] medical records  [id number]        Your Vitals Were     Pulse Height Pulse Oximetry BMI (Body Mass Index)          89 1.886 m (6' 2.25\") 97% 27.67 kg/m2         Blood Pressure from Last 3 Encounters:   11/21/18 122/72   11/07/18 117/73   10/31/18 118/73    Weight from Last 3 Encounters:   11/21/18 98.4 kg (217 lb)   10/31/18 98.4 kg (217 lb)   10/18/18 99.8 kg (220 lb)               Primary Care Provider Office Phone # Fax #    Vince Swain, " SHAMIKA [phone]       [address] [facility] [address]        Equal Access to Services     DOLLY SAAVEDRA : Hadii mary parker oscar Cassidy, wasandida lurochelle, qacedrickta kadawnada oscar, juventino forde. So [facility] [phone].    ATENCIÓN: Si habla español, tiene a shannon disposición servicios gratuitos de asistencia lingüística. Llame al [phone].    We comply with applicable federal civil rights laws and [place] laws. We do not discriminate on the basis of race, color, national origin, age, disability, sex, sexual orientation, or gender identity.            Thank you!     Thank you for choosing [facility]  for your care. Our goal is always to provide you with excellent care. Hearing back from our patients is one way we can continue to improve our services. Please take a few minutes to complete the written survey that you may receive in the mail after your visit with us. Thank you!             Your Updated Medication List - Protect others around you: Learn how to safely use, store and throw away your medicines at www.disposemymeds.org.          This list is accurate as of 11/21/18  2:01 PM.  Always use your most recent med list.                   Brand Name Dispense Instructions for use Diagnosis    diphenoxylate-atropine 2.5-0.025 MG per tablet    LOMOTIL     Take 2 tablets by mouth        HUMIRA 40 MG/0.8ML injection   Generic drug:  adalimumab      Inject 40 mg Subcutaneous once.        HYDROcodone-acetaminophen 5-325 MG tablet    NORCO    20 tablet    Take 1-2 tablets by mouth every 4 hours as needed for moderate to severe pain    Mass of right wrist       PURINETHOL 50 MG tablet CHEMO   Generic drug:  mercaptopurine      Take 50 mg by mouth daily. 1 and half tabs daily        valACYclovir 1000 mg tablet    VALTREX    30 tablet    two tablets by mouth twice daily for 1 day at onset of symptoms of a cold sore.    Herpes labialis

## 2018-11-21 NOTE — LETTER
"    11/21/2018         RE: Jorge Amaral  [address] [facility] [address]        Dear Colleague,    Thank you for referring your patient, Jorge Amaral, to the [facility]. Please see a copy of my visit note below.    SUBJECTIVE:  Jorge Amaral is here for postoperative follow up for his 11/7/18:  PROCEDURE:  Mass excision, right wrist.  The mass was not a ganglion, and was prominent bone/bursa.     Pathology findings: SPECIMEN(S):   Right dorsal wrist mass and prominence     FINAL DIAGNOSIS:   Right dorsal wrist mass and prominence, excision:   - Benign fragments of synovium, bone and fibrous tissue     Review of Systems:  Constitutional/General: Negative for fever, chills, change in weight  Integumentary/Skin: Negative for worrisome rashes, moles, or lesions  Neuro: Negative for weakness, dizziness, or paresthesias   Psychiatric: negative for changes in mood or affect    OBJECTIVE:  Physical Exam:  /72 (BP Location: Left arm, Patient Position: Sitting, Cuff Size: Adult Regular)  Pulse 89  Ht 1.886 m (6' 2.25\")  Wt 98.4 kg (217 lb)  SpO2 97%  BMI 27.67 kg/m2  General Appearance: healthy, alert and no distress   Skin: no suspicious lesions or rashes  Neuro: Normal strength and tone, mentation intact and speech normal  Vascular: good pulses, and cappillary refill   Lymph: no lymphadenopathy   Psych:  mentation appears normal and affect normal/bright  Resp: no increased work of breathing     Exam:  Inspection: Wound looks excellent, no evidence of infection.  Minimal swelling  Tender around wound, + Tinel's  CMSI     ASSESSMENT:   Doing well status post mass excision    PLAN:   Scar management  Work on range of motion  Will get own wrist brace to use as needed   Hand therapy ordered    Return to clinic: as needed     EMILY Edmond MD  Dept. Orthopedic Surgery  [facility]        Again, thank you for allowing me to participate in the care of your patient.  "       Sincerely,        Zohaib Edmond MD

## 2018-11-26 ENCOUNTER — THERAPY VISIT (OUTPATIENT)
Dept: OCCUPATIONAL THERAPY | Facility: CLINIC | Age: 48
End: 2018-11-26
Payer: COMMERCIAL

## 2018-11-26 DIAGNOSIS — M25.531 RIGHT WRIST PAIN: Primary | ICD-10-CM

## 2018-11-26 DIAGNOSIS — Z47.89 AFTERCARE FOLLOWING SURGERY OF THE MUSCULOSKELETAL SYSTEM: ICD-10-CM

## 2018-11-26 DIAGNOSIS — Z98.890 H/O EXCISION OF GANGLION CYST: ICD-10-CM

## 2018-11-26 PROCEDURE — 97165 OT EVAL LOW COMPLEX 30 MIN: CPT | Mod: GO | Performed by: OCCUPATIONAL THERAPIST

## 2018-11-26 PROCEDURE — 97140 MANUAL THERAPY 1/> REGIONS: CPT | Mod: GO | Performed by: OCCUPATIONAL THERAPIST

## 2018-11-26 PROCEDURE — 97110 THERAPEUTIC EXERCISES: CPT | Mod: GO | Performed by: OCCUPATIONAL THERAPIST

## 2018-11-26 NOTE — PROGRESS NOTES
Hand Therapy Initial Evaluation    Current Date:  11/26/2018    Subjective:  Jorge Amaral is a 48 year old right hand dominant male.    Diagnosis:   Right wrist pain  DOS:  11/7/18  Procedure:  Dorsal wrist mass excision  Post:  2w 5d    Patient reports symptoms of pain, stiffness/loss of motion, weakness/loss of strength and edema of the right wrist which occurred due to gradual onset over the past 5 year with unknown etiology. Since onset symptoms are gradually became worse over the past year.  Special tests:  x-ray.  Previous treatment: None.  General health as reported by patient is fair to good.  Pertinent medical history includes:Crohns disease  Medical allergies:none.  Surgical history: orthopedic: ACL repair, other: bowel resection.  Medication history: Anti-inflammatory.    Occupational Profile Information:  Current occupation is    Currently working in normal job without restrictions  Job Tasks: Computer Work, Lifting, Carrying  Prior functional level:  no limitations  Barriers include:transportation  Mobility: No difficulty  Transportation: drives  Leisure activities/hobbies: Martial Arts     Functional Outcome Measure:  Upper Extremity Functional Index  SCORE:   Column Totals: 68/80  (A lower score indicates greater disability.)    Objective:  ROM:  Wrist  11/26/18   AROM(PROM) Left Right   Extension 65 50   Flexion 75 60   RD 15 10   UD 40 35   Supination WNL WNL   Pronation WNL WNL     Strength:   (Measured in pounds)    11/26/18   Trials Left Right   1  2  3 84  70  68 75+  55+  55+   Average: 74 62     Lat Pinch  11/26/18   Trials Left Right   1  2  3 26  27  27 25-  26-  24-   Average: 27 25     3 Pt Pinch  11/26/18   Trials Left Right   1  2  3 24  23  21 23+  23+  22+   Average: 23 23     Edema: Mild dorsal wrist/scar area    Scar:  Mild sensitivity and moderately adherent    Sensation:  WNL throughout all nerve distributions; per patient report    Pain  Report:  VAS(0-10) 11/26/18   At Rest: 0/10   With Use: 2-3/10   Location:  Dorsal wrist  Pain Quality:  Dull, aching  Frequency: intermittent    Pain is worst:  daytime  Exacerbated by:  Weight bearing, overuse   Relieved by:  rest  Progression:  Gradually improving    Assessment:  Patient presents with symptoms consistent with diagnosis of wrist pain due to dorsal mass, with surgical  intervention.     Patient's limitations or Problem List includes:  Pain, Decreased ROM/motion, Weakness, Adherent scarring, Decreased  and Decreased pinch of the right wrist which interferes with the patient's ability to perform Self Care Tasks (dressing), Work Tasks, Recreational Activities and Household Chores as compared to previous level of function.    Rehab Potential:  Excellent - Return to full activity, no limitations    Patient will benefit from skilled Occupational Therapy to increase ROM, flexibility, overall strength and  strength and decrease pain and adherence of scarring to return to previous activity level and resume normal daily tasks and to reach their rehab potential.    Barriers to Learning:  No barrier    Communication Issues:  Patient appears to be able to clearly communicate and understand verbal and written communication and follow directions correctly.    Chart Review: Chart Review and Simple history review with patient    Identified Performance Deficits: dressing, home establishment and management, work and leisure activities    Assessment of Occupational Performance:  5 or more Performance Deficits    Clinical Decision Making (Complexity): Low complexity    Treatment Explanation:  The following has been discussed with the patient:  RX ordered/plan of care  Anticipated outcomes  Possible risks and side effects    Plan:  Frequency:  1 X week, once daily  Duration:  for 6 weeks  Treatment Plan:    Modalities:  US, Fluidotherapy and Paraffin  Therapeutic Exercise:  AROM, PROM, Tendon Gliding, Isotonics,  Isometrics and Stabilization  Neuromuscular re-education:  Kinesiotaping  Manual Techniques:  Joint mobilization and Scar mobilization  Self Care:  Self Care Tasks    Discharge Plan:  Achieve all LTG.  Independent in home treatment program.  Reach maximal therapeutic benefit.    Home Exercise Program:  Warmth for stiffness  Scar mobs circular and 3 vector  AROM wrist all planes  PROM wrist E/F   strengthening with stress ball  Wear scar pad with tubigrip sleeve sleeping    Next Visit:  See in 1 week  Assess response to HEP  Consider US for scar softening  Add EDC tendon gliding, wrist isotonics and stabilization exercises

## 2018-11-26 NOTE — MR AVS SNAPSHOT
After Visit Summary   11/26/2018    Jorge Amaral    MRN: [id number]           Patient Information     Date Of Birth          1970        Visit Information        Provider Department      11/26/2018 9:30 AM Aminata Burrows [facility] [place]        Today's Diagnoses     Right wrist pain    -  1    H/O excision of ganglion cyst        Aftercare following surgery of the musculoskeletal system           Follow-ups after your visit        Your next 10 appointments already scheduled     Dec 03, 2018  9:30 AM CST   KB Hand with Aminata Burrows   [facility] [place] ([facility] [place] [facility])    [address]  [place] [id number] [phone]            Dec 20, 2018 10:00 AM CST   Return Visit with Sasha Woods MD   [facility] ([facility])    [address] [place] [id number] [phone]              Who to contact     If you have questions or need follow up information about today's clinic visit or your schedule please contact [facility] [place] directly at [phone].  Normal or non-critical lab and imaging results will be communicated to you by MyChart, letter or phone within 4 business days after the clinic has received the results. If you do not hear from us within 7 days, please contact the clinic through MyChart or phone. If you have a critical or abnormal lab result, we will notify you by phone as soon as possible.  Submit refill requests through [employer] or call your pharmacy and they will forward the refill request to us. Please allow 3 business days for your refill to be completed.          Additional Information About Your Visit        MyChart Information     [employer] gives you secure access to your electronic health record. If you see a primary care provider, you can also send messages to your care team and make  appointments. If you have questions, please call your primary care clinic.  If you do not have a primary care provider, please call [phone] and they will assist you.        Care EveryWhere ID     This is your Care EveryWhere ID. This could be used by other organizations to access your [place] medical records  [id number]         Blood Pressure from Last 3 Encounters:   11/21/18 122/72   11/07/18 117/73   10/31/18 118/73    Weight from Last 3 Encounters:   11/21/18 98.4 kg (217 lb)   10/31/18 98.4 kg (217 lb)   10/18/18 99.8 kg (220 lb)              We Performed the Following     HC OT EVAL, LOW COMPLEXITY     KB INITIAL EVAL REPORT     KB PT, HAND, AND CHIROPRACTIC REFERRAL     MANUAL THER TECH,1+REGIONS,EA 15 MIN     THERAPEUTIC EXERCISES        Primary Care Provider Office Phone # Fax #    Vince Teo Swain PA-C [phone] [address] [address]        Equal Access to Services     DOLLY SAAVEDRA : Hadii aad ku hadasho Soomaali, waaxda luqadaha, qaybta kaalmada adeegyada, waxay yoselin haycorinnen daya cruz . So [facility] [phone].    ATENCIÓN: Si habla español, tiene a shannon disposición servicios gratuitos de asistencia lingüística. [facility] [phone].    We comply with applicable federal civil rights laws and [place] laws. We do not discriminate on the basis of race, color, national origin, age, disability, sex, sexual orientation, or gender identity.            Thank you!     Thank you for choosing [facility]  for your care. Our goal is always to provide you with excellent care. Hearing back from our patients is one way we can continue to improve our services. Please take a few minutes to complete the written survey that you may receive in the mail after your visit with us. Thank you!             Your Updated Medication List - Protect others around you: Learn how to safely use, store and throw away your medicines at  www.disposemymeds.org.          This list is accurate as of 11/26/18 10:25 AM.  Always use your most recent med list.                   Brand Name Dispense Instructions for use Diagnosis    diphenoxylate-atropine 2.5-0.025 MG tablet    LOMOTIL     Take 2 tablets by mouth        HUMIRA 40 MG/0.8ML injection   Generic drug:  adalimumab      Inject 40 mg Subcutaneous once.        HYDROcodone-acetaminophen 5-325 MG tablet    NORCO    20 tablet    Take 1-2 tablets by mouth every 4 hours as needed for moderate to severe pain    Mass of right wrist       PURINETHOL 50 MG tablet CHEMO   Generic drug:  mercaptopurine      Take 50 mg by mouth daily. 1 and half tabs daily        valACYclovir 1000 mg tablet    VALTREX    30 tablet    two tablets by mouth twice daily for 1 day at onset of symptoms of a cold sore.    Herpes labialis

## 2018-12-03 ENCOUNTER — THERAPY VISIT (OUTPATIENT)
Dept: OCCUPATIONAL THERAPY | Facility: CLINIC | Age: 48
End: 2018-12-03
Payer: COMMERCIAL

## 2018-12-03 DIAGNOSIS — Z47.89 AFTERCARE FOLLOWING SURGERY OF THE MUSCULOSKELETAL SYSTEM: ICD-10-CM

## 2018-12-03 DIAGNOSIS — M25.531 RIGHT WRIST PAIN: ICD-10-CM

## 2018-12-03 PROCEDURE — 97140 MANUAL THERAPY 1/> REGIONS: CPT | Mod: GO | Performed by: OCCUPATIONAL THERAPIST

## 2018-12-03 PROCEDURE — 97110 THERAPEUTIC EXERCISES: CPT | Mod: GO | Performed by: OCCUPATIONAL THERAPIST

## 2018-12-03 NOTE — MR AVS SNAPSHOT
After Visit Summary   12/3/2018    Jorge Amaral    MRN: [id number]           Patient Information     Date Of Birth          1970        Visit Information        Provider Department      12/3/2018 9:30 AM Aminata Burrows [facility] [place]        Today's Diagnoses     Aftercare following surgery of the musculoskeletal system        Right wrist pain           Follow-ups after your visit        Your next 10 appointments already scheduled     Dec 10, 2018 10:30 AM CST   KB Hand with Aminata Burrows   [facility] [place] ([facility] [place] [facility])    [address] [address]  [place] [address]   [phone]            Dec 17, 2018  9:30 AM CST   KB Hand with Aminata Burrows   [facility] [place] ([facility] [place] [facility])    [address] [address]  [place] [address]   [phone]            Dec 20, 2018 10:00 AM CST   [facility] with [facility]    [address] [place] [id number] [phone]              Who to contact     If you have questions or need follow up information about today's clinic visit or your schedule please contact [facility] [place] directly at [phone].  Normal or non-critical lab and imaging results will be communicated to you by MyChart, letter or phone within 4 business days after the clinic has received the results. If you do not hear from us within 7 days, please contact the clinic through MyChart or phone. If you have a critical or abnormal lab result, we will notify you by phone as soon as possible.  Submit refill requests through [employer] or call your pharmacy and they will forward the refill request to us. Please allow 3 business days for your refill to be completed.          Additional Information About Your Visit        [facility]  Information     [employer] gives you secure access to your electronic health record. If you see a primary care provider, you can also send messages to your care team and make appointments. If you have questions, please call your primary care clinic.  If you do not have a primary care provider, please call [phone] and they will assist you.        Care EveryWhere ID     This is your Care EveryWhere ID. This could be used by other organizations to access your [place] medical records  [id number]         Blood Pressure from Last 3 Encounters:   11/21/18 122/72   11/07/18 117/73   10/31/18 118/73    Weight from Last 3 Encounters:   11/21/18 98.4 kg (217 lb)   10/31/18 98.4 kg (217 lb)   10/18/18 99.8 kg (220 lb)              We Performed the Following     MANUAL THER TECH,1+REGIONS,EA 15 MIN     THERAPEUTIC EXERCISES        Primary Care Provider Office Phone # Fax #    Vince Swain PA-C [phone] [address]  [facility] [address]        Equal Access to Services     [facility]: Hadii aad ku hadasho Soomaali, waaxda luqadaha, qaybta kaalmada adeegyada, waxay yoselin hayotis cruz . So [facility] [phone].    ATENCIÓN: Si habla español, tiene a shannon disposición servicios gratuitos de asistencia lingüística. [facility] [phone].    We comply with applicable federal civil rights laws and [place] laws. We do not discriminate on the basis of race, color, national origin, age, disability, sex, sexual orientation, or gender identity.            Thank you!     Thank you for choosing [place] [facility]  for your care. Our goal is always to provide you with excellent care. Hearing back from our patients is one way we can continue to improve our services. Please take a few minutes to complete the written survey that you may receive in the mail after your visit with us. Thank you!             Your Updated Medication List - Protect others around  you: Learn how to safely use, store and throw away your medicines at www.disposemymeds.org.          This list is accurate as of 12/3/18 10:01 AM.  Always use your most recent med list.                   Brand Name Dispense Instructions for use Diagnosis    diphenoxylate-atropine 2.5-0.025 MG tablet    LOMOTIL     Take 2 tablets by mouth        HUMIRA 40 MG/0.8ML injection   Generic drug:  adalimumab      Inject 40 mg Subcutaneous once.        HYDROcodone-acetaminophen 5-325 MG tablet    NORCO    20 tablet    Take 1-2 tablets by mouth every 4 hours as needed for moderate to severe pain    Mass of right wrist       PURINETHOL 50 MG tablet CHEMO   Generic drug:  mercaptopurine      Take 50 mg by mouth daily. 1 and half tabs daily        valACYclovir 1000 mg tablet    VALTREX    30 tablet    two tablets by mouth twice daily for 1 day at onset of symptoms of a cold sore.    Herpes labialis

## 2018-12-03 NOTE — PROGRESS NOTES
SOAP note objective information for 12/3/2018:    ROM:  Wrist  11/26/18 12/3/18   AROM(PROM) Left Right Right   Extension 65 50 55   Flexion 75 60 70   RD 15 10 15   UD 40 35 40     Home Exercise Program:  Warmth for stiffness  Scar mobs circular and 3 vector  Tendon gliding for EDC  AROM wrist all planes  PROM wrist E/F   strengthening with stress ball  Wrist E/F isotonics   Wear scar pad with tubigrip sleeve sleeping    Next Visit:  See in 1 week  Continue US for scar softening  Assess response to EDC tendon gliding and wrist isotonics   Add wrist stabilization exercises     Please refer to the daily flowsheet for treatment today, total treatment time and time spent performing 1:1 timed codes.

## 2018-12-10 ENCOUNTER — THERAPY VISIT (OUTPATIENT)
Dept: OCCUPATIONAL THERAPY | Facility: CLINIC | Age: 48
End: 2018-12-10
Payer: COMMERCIAL

## 2018-12-10 DIAGNOSIS — Z47.89 AFTERCARE FOLLOWING SURGERY OF THE MUSCULOSKELETAL SYSTEM: ICD-10-CM

## 2018-12-10 DIAGNOSIS — M25.531 RIGHT WRIST PAIN: ICD-10-CM

## 2018-12-10 PROCEDURE — 97110 THERAPEUTIC EXERCISES: CPT | Mod: GO | Performed by: OCCUPATIONAL THERAPIST

## 2018-12-10 PROCEDURE — 97140 MANUAL THERAPY 1/> REGIONS: CPT | Mod: GO | Performed by: OCCUPATIONAL THERAPIST

## 2018-12-10 NOTE — PROGRESS NOTES
SOAP note objective information for 12/10/2018:    ROM:  Wrist  11/26/18 12/3/18 12/10/18   AROM(PROM) Left Right Right Right   Extension 65 50 55 60   Flexion 75 60 70 75   RD 15 10 15    UD 40 35 40      Home Exercise Program:  Warmth for stiffness  Scar mobs circular and 3 vector  Tendon gliding for EDC  AROM wrist all planes  PROM wrist E/F   strengthening with stress ball  Wrist E/F isotonics   Wear scar pad with tubigrip sleeve sleeping    Next Visit:  See in 1 week  Continue US for scar softening 1-2 more visits  Add wrist stabilization exercises     Please refer to the daily flowsheet for treatment today, total treatment time and time spent performing 1:1 timed codes.

## 2018-12-17 ENCOUNTER — THERAPY VISIT (OUTPATIENT)
Dept: OCCUPATIONAL THERAPY | Facility: CLINIC | Age: 48
End: 2018-12-17
Payer: COMMERCIAL

## 2018-12-17 DIAGNOSIS — M25.531 RIGHT WRIST PAIN: Primary | ICD-10-CM

## 2018-12-17 DIAGNOSIS — Z47.89 AFTERCARE FOLLOWING SURGERY OF THE MUSCULOSKELETAL SYSTEM: ICD-10-CM

## 2018-12-17 PROCEDURE — 97140 MANUAL THERAPY 1/> REGIONS: CPT | Mod: GO | Performed by: OCCUPATIONAL THERAPIST

## 2018-12-17 PROCEDURE — 97110 THERAPEUTIC EXERCISES: CPT | Mod: GO | Performed by: OCCUPATIONAL THERAPIST

## 2018-12-17 NOTE — PROGRESS NOTES
Hand Therapy Progress/Discharge Note    Current Date:  12/17/2018    Reporting period is 11/26/2018 to 12/17/2018    Diagnosis:   Right wrist pain  DOS:  11/7/18  Procedure:  Dorsal wrist mass excision    Subjective:   Subjective changes noted by patient:  Wrist is feeling pretty good, I can do most everything now.  Functional changes noted by patient:  Improvement in Household Chores  Patient has noted adverse reaction to:  None    Functional Outcome Measure:  Upper Extremity Functional Index  SCORE:   Column Totals: 79/80  (A lower score indicates greater disability.)    Objective:  ROM:  Wrist  11/26/18 12/17/18   AROM(PROM) Left Right Right   Extension 65 50 60   Flexion 75 60 75   RD 15 10 15   UD 40 35 40     Strength:   (Measured in pounds)    11/26/18 12/17/18   Trials Left Right Right   1  2  3 84  70  68 75+  55+  55+ 95-  82-  78-   Average: 74 62 85     Lat Pinch  11/26/18 12/17/18   Trials Left Right Right   1  2  3 26  27  27 25-  26-  24- 25-  25-  25-   Average: 27 25 25     3 Pt Pinch  11/26/18 12/17/18   Trials Left Right Right   1  2  3 24  23  21 23+  23+  22+ 24-  23-  23-   Average: 23 23 23     Edema: Mild dorsal wrist/scar area    Scar:  Nosensitivity and mildly adherent    Sensation:  WNL throughout all nerve distributions; per patient report    Pain Report:  VAS(0-10) 11/26/18 12/17/18   At Rest: 0/10    With Use: 2-3/10 0/10   Location:  Dorsal wrist  Pain Quality:  Dull, aching  Frequency: intermittent    Pain is worst:  daytime  Exacerbated by:  Weight bearing, overuse   Relieved by:  rest  Progression:  Gradually improving    Please refer to the daily flowsheet for treatment provided today.     Assessment:  Response to therapy has been improvement to:  ROM of Wrist:  All Planes  Flexibility:  Scar is more mobile   Strength:     Pain:  intensity of pain is decreased    Overall Assessment:  Patient's symptoms are resolving and patient is independent in home exercise  program  STG/LTG:  STGoals have been reviewed and progress or achievement has occurred;  see goal sheet for details and updates.  I have re-evaluated this patient and find that the nature, scope, duration and intensity of the therapy is appropriate for the medical condition of the patient.    Plan:  Frequency/Duration:  Discharge from Hand Therapy; continue home program.    Recommendations for Continued Therapy  Home Exercise Program:  Warmth for stiffness  Scar mobs circular and 3 vector  Tendon gliding for EDC  AROM wrist all planes  PROM wrist E/F   strengthening with stress ball  Wrist E/F isotonics   Wear scar pad with tubigrip sleeve sleeping

## 2018-12-26 ENCOUNTER — OFFICE VISIT (OUTPATIENT)
Dept: FAMILY MEDICINE | Facility: CLINIC | Age: 48
End: 2018-12-26
Payer: COMMERCIAL

## 2018-12-26 VITALS
DIASTOLIC BLOOD PRESSURE: 77 MMHG | TEMPERATURE: 98.3 F | WEIGHT: 223 LBS | HEART RATE: 88 BPM | OXYGEN SATURATION: 96 % | BODY MASS INDEX: 28.44 KG/M2 | SYSTOLIC BLOOD PRESSURE: 117 MMHG

## 2018-12-26 DIAGNOSIS — Z01.818 PREOP GENERAL PHYSICAL EXAM: Primary | ICD-10-CM

## 2018-12-26 DIAGNOSIS — M23.92 INTERNAL DERANGEMENT OF KNEE, LEFT: ICD-10-CM

## 2018-12-26 PROCEDURE — 99214 OFFICE O/P EST MOD 30 MIN: CPT | Performed by: FAMILY MEDICINE

## 2018-12-26 NOTE — H&P (VIEW-ONLY)
[facility]  [address] [address] [phone]  Dept: [phone]    PRE-OP EVALUATION:  Today's date: 2018    Jorge Amaral (: 1970) presents for pre-operative evaluation and anesthesia risk assessment prior to undergoing surgery/procedure for treatment of left meniscus .    Fax number for surgical facility: ???  Primary Physician: Vince Swain  Type of Anesthesia Anticipated: to be determined    Patient has a Health Care Directive or Living Will:  NO    Preop Questions 2018   Who is doing your surgery? Dr Joleen Jama   What are you having done? Left Meniscus Tear Repair   Date of Surgery/Procedure: January 3 2019   Facility or Hospital where procedure/surgery will be performed: [facility]   1.  Do you have a history of Heart attack, stroke, stent, coronary bypass surgery, or other heart surgery? No   2.  Do you ever have any pain or discomfort in your chest? No   3.  Do you have a history of  Heart Failure? No   4.   Are you troubled by shortness of breath when:  walking on a level surface, or up a slight hill, or at night? No   5.  Do you currently have a cold, bronchitis or other respiratory infection? No   6.  Do you have a cough, shortness of breath, or wheezing? No   7.  Do you sometimes get pains in the calves of your legs when you walk? No   8. Do you or anyone in your family have previous history of blood clots? No   9.  Do you or does anyone in your family have a serious bleeding problem such as prolonged bleeding following surgeries or cuts? No   10. Have you ever had problems with anemia or been told to take iron pills? YES -    11. Have you had any abnormal blood loss such as black, tarry or bloody stools? No   12. Have you ever had a blood transfusion? YES -    13. Have you or any of your relatives ever had problems with anesthesia? No   14. Do you have sleep apnea, excessive snoring or daytime drowsiness? No    15. Do you have any prosthetic heart valves? No   16. Do you have prosthetic joints? No         HPI:     Left knee meniscal tear - he has had left knee pain.  Evaluation and treatment:    He is scheduled for surgery with [facility] on 1/3/19.   No contraindications to surgery - may proceed without further evaluation.        See problem list for active medical problems.  Problems all longstanding and stable, except as noted/documented.  See ROS for pertinent symptoms related to these conditions.                                                                                                                                                          .    MEDICAL HISTORY:     Patient Active Problem List    Diagnosis Date Noted     Right shoulder pain 08/27/2018     Priority: Medium     Crohn's disease with complication, unspecified gastrointestinal tract location (H) 08/11/2017     Priority: Medium     Herpes labialis 08/11/2017     Priority: Medium     FH: prostate cancer 08/11/2017     Priority: Medium     Common wart 10/14/2014     Priority: Medium     Cold sore 08/14/2014     Priority: Medium     ACL (anterior cruciate ligament) tear--recurrent 01/03/2013     Priority: Medium     Family history of diabetes mellitus 12/16/2011     Priority: Medium     Delayed union of fracture 12/01/2011     Priority: Medium     Fracture, metacarpal 08/03/2011     Priority: Medium     CARDIOVASCULAR SCREENING; LDL GOAL LESS THAN 160 10/31/2010     Priority: Medium      Past Medical History:   Diagnosis Date     Crohn's disease (H)      Hyperlipidemia LDL goal < 160     not on meds     Past Surgical History:   Procedure Laterality Date     APPENDECTOMY       C STOMACH SURGERY PROCEDURE UNLISTED       ENT SURGERY  6-28-13    Left Tonsil biopsy     EXCISE MASS WRIST Right 11/7/2018    Procedure: EXCISION OF RIGHT DORSAL WRIST MASS;  Surgeon: Zohaib Edmond MD;  Location: [facility] OR     LAPAROSCOPY PROCEDURE UNLISTED       OPEN  REDUCTION INTERNAL FIXATION HAND      right IV MCP hand 7/11     REMOVE HARDWARE LOWER EXTREMITY  4/6/2012    Procedure:REMOVE HARDWARE LOWER EXTREMITY; REMOVAL OF RIGHT TIBIAL SCREW; Surgeon:JOSE LUIS CARMICHAEL; Location:[facility]     Current Outpatient Medications   Medication Sig Dispense Refill     adalimumab (HUMIRA) 40 MG/0.8ML injection Inject 40 mg Subcutaneous once.       diphenoxylate-atropine (LOMOTIL) 2.5-0.025 MG per tablet Take 2 tablets by mouth       HYDROcodone-acetaminophen (NORCO) 5-325 MG per tablet Take 1-2 tablets by mouth every 4 hours as needed for moderate to severe pain 20 tablet 0     mercaptopurine (PURINETHOL) 50 MG tablet Take 50 mg by mouth daily. 1 and half tabs daily       valACYclovir (VALTREX) 1000 mg tablet two tablets by mouth twice daily for 1 day at onset of symptoms of a cold sore. 30 tablet 0     OTC products: None, except as noted above    No Known Allergies   Latex Allergy: NO    Social History     Tobacco Use     Smoking status: Current Some Day Smoker     Types: Cigars     Smokeless tobacco: Never Used     Tobacco comment: very rarely    Substance Use Topics     Alcohol use: Yes     Comment: RARE     History   Drug Use No       REVIEW OF SYSTEMS:   CONSTITUTIONAL: NEGATIVE for fever, chills, change in weight  INTEGUMENTARY/SKIN: NEGATIVE for worrisome rashes, moles or lesions  EYES: NEGATIVE for vision changes or irritation  ENT/MOUTH: NEGATIVE for ear, mouth and throat problems  RESP: NEGATIVE for significant cough or SOB  BREAST: NEGATIVE for masses, tenderness or discharge  CV: NEGATIVE for chest pain, palpitations or peripheral edema  GI: NEGATIVE for nausea, abdominal pain, heartburn, or change in bowel habits  : NEGATIVE for frequency, dysuria, or hematuria  MUSCULOSKELETAL: per HPI  NEURO: NEGATIVE for weakness, dizziness or paresthesias  ENDOCRINE: NEGATIVE for temperature intolerance, skin/hair changes  HEME: NEGATIVE for bleeding problems  PSYCHIATRIC: NEGATIVE for  changes in mood or affect    EXAM:   /77 (Cuff Size: Adult Large)   Pulse 88   Temp 98.3  F (36.8  C) (Oral)   Wt 101.2 kg (223 lb)   SpO2 96%   BMI 28.44 kg/m      GENERAL APPEARANCE: healthy, alert and no distress     EYES: EOMI,  PERRL     HENT: ear canals and TM's normal and nose and mouth without ulcers or lesions     NECK: no adenopathy, no asymmetry, masses, or scars and thyroid normal to palpation     RESP: lungs clear to auscultation - no rales, rhonchi or wheezes     CV: regular rates and rhythm, normal S1 S2, no S3 or S4 and no murmur, click or rub     ABDOMEN:  soft, nontender, no HSM or masses and bowel sounds normal     NEURO: Normal strength and tone, sensory exam grossly normal, mentation intact and speech normal     PSYCH: mentation appears normal. and affect normal/bright     LYMPHATICS: No cervical adenopathy    DIAGNOSTICS:     EKG: 10/31/18 - normal sinus with early repolarization.    Recent Labs   Lab Test 10/31/18  0946 10/09/18  0903 08/11/17  0738 04/04/12  1123   HGB 14.9  --   --  15.2   NA  --  143 141  --    POTASSIUM  --  4.2 4.4  --    CR  --  1.05 1.07  --         IMPRESSION:   Reason for surgery/procedure: knee meniscal tear.    The proposed surgical procedure is considered INTERMEDIATE risk.    REVISED CARDIAC RISK INDEX  The patient has the following serious cardiovascular risks for perioperative complications such as (MI, PE, VFib and 3  AV Block):  No serious cardiac risks  INTERPRETATION: 0 risks: Class I (very low risk - 0.4% complication rate)    The patient has the following additional risks for perioperative complications:  No identified additional risks      RECOMMENDATIONS:     Assessment and Plan - Decision Making    1. Preop general physical exam    No contraindications to surgery - may proceed without further evaluation.    2. Internal derangement of knee, left    No contraindications to surgery - may proceed without further evaluation.              Written  instructions given as follows:    Patient Instructions   1. Do not take Fish Oil, NSAID's like Aspirin, Ibuprofen, Naproxen etc, one week prior to your surgery.     2. Do not take the following medications on the day of surgery:     supplements    3. Continue your other medications but on the day of surgery use only a sip of water to take these.          Signed Electronically by: NELY SHARP MD    Copy of this evaluation report is provided to requesting physician.

## 2018-12-26 NOTE — PROGRESS NOTES
[facility]  [address] [facility] [address] [phone]  Dept: [phone]    PRE-OP EVALUATION:  Today's date: 2018    Jorge Amaral (: 1970) presents for pre-operative evaluation and anesthesia risk assessment prior to undergoing surgery/procedure for treatment of left meniscus .    Fax number for surgical facility: ???  Primary Physician: Vince Swain  Type of Anesthesia Anticipated: to be determined    Patient has a Health Care Directive or Living Will:  NO    Preop Questions 2018   Who is doing your surgery? Dr Joleen Jama   What are you having done? Left Meniscus Tear Repair   Date of Surgery/Procedure: January 3 2019   Facility or Hospital where procedure/surgery will be performed: [facility]   1.  Do you have a history of Heart attack, stroke, stent, coronary bypass surgery, or other heart surgery? No   2.  Do you ever have any pain or discomfort in your chest? No   3.  Do you have a history of  Heart Failure? No   4.   Are you troubled by shortness of breath when:  walking on a level surface, or up a slight hill, or at night? No   5.  Do you currently have a cold, bronchitis or other respiratory infection? No   6.  Do you have a cough, shortness of breath, or wheezing? No   7.  Do you sometimes get pains in the calves of your legs when you walk? No   8. Do you or anyone in your family have previous history of blood clots? No   9.  Do you or does anyone in your family have a serious bleeding problem such as prolonged bleeding following surgeries or cuts? No   10. Have you ever had problems with anemia or been told to take iron pills? YES -    11. Have you had any abnormal blood loss such as black, tarry or bloody stools? No   12. Have you ever had a blood transfusion? YES -    13. Have you or any of your relatives ever had problems with anesthesia? No   14. Do you have sleep apnea, excessive snoring or daytime drowsiness? No    15. Do you have any prosthetic heart valves? No   16. Do you have prosthetic joints? No         HPI:     Left knee meniscal tear - he has had left knee pain.  Evaluation and treatment:    He is scheduled for surgery with [facility] on 1/3/19.   No contraindications to surgery - may proceed without further evaluation.        See problem list for active medical problems.  Problems all longstanding and stable, except as noted/documented.  See ROS for pertinent symptoms related to these conditions.                                                                                                                                                          .    MEDICAL HISTORY:     Patient Active Problem List    Diagnosis Date Noted     Right shoulder pain 08/27/2018     Priority: Medium     Crohn's disease with complication, unspecified gastrointestinal tract location (H) 08/11/2017     Priority: Medium     Herpes labialis 08/11/2017     Priority: Medium     FH: prostate cancer 08/11/2017     Priority: Medium     Common wart 10/14/2014     Priority: Medium     Cold sore 08/14/2014     Priority: Medium     ACL (anterior cruciate ligament) tear--recurrent 01/03/2013     Priority: Medium     Family history of diabetes mellitus 12/16/2011     Priority: Medium     Delayed union of fracture 12/01/2011     Priority: Medium     Fracture, metacarpal 08/03/2011     Priority: Medium     CARDIOVASCULAR SCREENING; LDL GOAL LESS THAN 160 10/31/2010     Priority: Medium      Past Medical History:   Diagnosis Date     Crohn's disease (H)      Hyperlipidemia LDL goal < 160     not on meds     Past Surgical History:   Procedure Laterality Date     APPENDECTOMY       C STOMACH SURGERY PROCEDURE UNLISTED       ENT SURGERY  6-28-13    Left Tonsil biopsy     EXCISE MASS WRIST Right 11/7/2018    Procedure: EXCISION OF RIGHT DORSAL WRIST MASS;  Surgeon: Zohaib Edmond MD;  Location: [facility] OR     LAPAROSCOPY PROCEDURE UNLISTED       OPEN  REDUCTION INTERNAL FIXATION HAND      right IV MCP hand 7/11     REMOVE HARDWARE LOWER EXTREMITY  4/6/2012    Procedure:REMOVE HARDWARE LOWER EXTREMITY; REMOVAL OF RIGHT TIBIAL SCREW; Surgeon:JOSE LUIS CARMICHAEL; Location:[facility]     Current Outpatient Medications   Medication Sig Dispense Refill     adalimumab (HUMIRA) 40 MG/0.8ML injection Inject 40 mg Subcutaneous once.       diphenoxylate-atropine (LOMOTIL) 2.5-0.025 MG per tablet Take 2 tablets by mouth       HYDROcodone-acetaminophen (NORCO) 5-325 MG per tablet Take 1-2 tablets by mouth every 4 hours as needed for moderate to severe pain 20 tablet 0     mercaptopurine (PURINETHOL) 50 MG tablet Take 50 mg by mouth daily. 1 and half tabs daily       valACYclovir (VALTREX) 1000 mg tablet two tablets by mouth twice daily for 1 day at onset of symptoms of a cold sore. 30 tablet 0     OTC products: None, except as noted above    No Known Allergies   Latex Allergy: NO    Social History     Tobacco Use     Smoking status: Current Some Day Smoker     Types: Cigars     Smokeless tobacco: Never Used     Tobacco comment: very rarely    Substance Use Topics     Alcohol use: Yes     Comment: RARE     History   Drug Use No       REVIEW OF SYSTEMS:   CONSTITUTIONAL: NEGATIVE for fever, chills, change in weight  INTEGUMENTARY/SKIN: NEGATIVE for worrisome rashes, moles or lesions  EYES: NEGATIVE for vision changes or irritation  ENT/MOUTH: NEGATIVE for ear, mouth and throat problems  RESP: NEGATIVE for significant cough or SOB  BREAST: NEGATIVE for masses, tenderness or discharge  CV: NEGATIVE for chest pain, palpitations or peripheral edema  GI: NEGATIVE for nausea, abdominal pain, heartburn, or change in bowel habits  : NEGATIVE for frequency, dysuria, or hematuria  MUSCULOSKELETAL: per HPI  NEURO: NEGATIVE for weakness, dizziness or paresthesias  ENDOCRINE: NEGATIVE for temperature intolerance, skin/hair changes  HEME: NEGATIVE for bleeding problems  PSYCHIATRIC: NEGATIVE for  changes in mood or affect    EXAM:   /77 (Cuff Size: Adult Large)   Pulse 88   Temp 98.3  F (36.8  C) (Oral)   Wt 101.2 kg (223 lb)   SpO2 96%   BMI 28.44 kg/m      GENERAL APPEARANCE: healthy, alert and no distress     EYES: EOMI,  PERRL     HENT: ear canals and TM's normal and nose and mouth without ulcers or lesions     NECK: no adenopathy, no asymmetry, masses, or scars and thyroid normal to palpation     RESP: lungs clear to auscultation - no rales, rhonchi or wheezes     CV: regular rates and rhythm, normal S1 S2, no S3 or S4 and no murmur, click or rub     ABDOMEN:  soft, nontender, no HSM or masses and bowel sounds normal     NEURO: Normal strength and tone, sensory exam grossly normal, mentation intact and speech normal     PSYCH: mentation appears normal. and affect normal/bright     LYMPHATICS: No cervical adenopathy    DIAGNOSTICS:     EKG: 10/31/18 - normal sinus with early repolarization.    Recent Labs   Lab Test 10/31/18  0946 10/09/18  0903 08/11/17  0738 04/04/12  1123   HGB 14.9  --   --  15.2   NA  --  143 141  --    POTASSIUM  --  4.2 4.4  --    CR  --  1.05 1.07  --         IMPRESSION:   Reason for surgery/procedure: knee meniscal tear.    The proposed surgical procedure is considered INTERMEDIATE risk.    REVISED CARDIAC RISK INDEX  The patient has the following serious cardiovascular risks for perioperative complications such as (MI, PE, VFib and 3  AV Block):  No serious cardiac risks  INTERPRETATION: 0 risks: Class I (very low risk - 0.4% complication rate)    The patient has the following additional risks for perioperative complications:  No identified additional risks      RECOMMENDATIONS:     Assessment and Plan - Decision Making    1. Preop general physical exam    No contraindications to surgery - may proceed without further evaluation.    2. Internal derangement of knee, left    No contraindications to surgery - may proceed without further evaluation.              Written  instructions given as follows:    Patient Instructions   1. Do not take Fish Oil, NSAID's like Aspirin, Ibuprofen, Naproxen etc, one week prior to your surgery.     2. Do not take the following medications on the day of surgery:     supplements    3. Continue your other medications but on the day of surgery use only a sip of water to take these.          Signed Electronically by: NELY SHARP MD    Copy of this evaluation report is provided to requesting physician.

## 2018-12-26 NOTE — PATIENT INSTRUCTIONS
1. Do not take Fish Oil, NSAID's like Aspirin, Ibuprofen, Naproxen etc, one week prior to your surgery.     2. Do not take the following medications on the day of surgery:     supplements    3. Continue your other medications but on the day of surgery use only a sip of water to take these.

## 2018-12-27 NOTE — PROGRESS NOTES
Faxed pre op and EKG to [facility] @ [phone].Shaista Rivero MA/EMMA

## 2019-01-08 PROBLEM — M25.511 RIGHT SHOULDER PAIN: Status: RESOLVED | Noted: 2018-08-27 | Resolved: 2019-01-08

## 2019-01-08 NOTE — PROGRESS NOTES
Subjective:  HPI                    Objective:  System    Physical Exam    General     ROS    Assessment/Plan:    DISCHARGE REPORT    Progress reporting period is from 8/27/18 to 9/11/18.     SUBJECTIVE  Subjective: pt reports R shoulder has improved but still  has intermittent R shoulder pain. has done some sparring in the ring this past week.   Current Pain level: 2/10   Initial Pain level: 6/10   Changes in function: Yes, see goal flow sheet for change in function   Adverse reactions: None;   ,     The objective findings are from DOS 9/11/18.    OBJECTIVE  Objective: AROM R shoulder flx: 172degs abd:180degs(some pinch at end-range) ER:90degs      ASSESSMENT/PLAN  Updated problem list and treatment plan: Diagnosis 1:  R shoulder pain  STG/LTGs have been met or progress has been made towards goals:  Yes, progressing towards painfree return to sport.  Assessment of Progress: The patient's condition is improving.  Self Management Plans:  Patient has been instructed in a home treatment program.  Jorge continues to require the following intervention to meet STG and LTG's: PT intervention is no longer required to meet STG/LTG.  We will discharge this patient from PT.    Recommendations:  Pt was seen three times clinically for R shoulder pain with some good improvement in ROM and pain. Patient did not return to clinic and plan to discharge PT services.    Please refer to the daily flowsheet for treatment today, total treatment time and time spent performing 1:1 timed codes.

## 2019-01-10 ENCOUNTER — ANESTHESIA EVENT (OUTPATIENT)
Dept: SURGERY | Facility: CLINIC | Age: 49
End: 2019-01-10
Payer: COMMERCIAL

## 2019-01-14 ENCOUNTER — ANESTHESIA (OUTPATIENT)
Dept: SURGERY | Facility: CLINIC | Age: 49
End: 2019-01-14
Payer: COMMERCIAL

## 2019-01-14 ENCOUNTER — HOSPITAL ENCOUNTER (OUTPATIENT)
Facility: CLINIC | Age: 49
Discharge: HOME | End: 2019-01-14
Attending: ORTHOPAEDIC SURGERY | Admitting: ORTHOPAEDIC SURGERY
Payer: COMMERCIAL

## 2019-01-14 VITALS
DIASTOLIC BLOOD PRESSURE: 80 MMHG | OXYGEN SATURATION: 96 % | TEMPERATURE: 96.7 F | SYSTOLIC BLOOD PRESSURE: 130 MMHG | BODY MASS INDEX: 29.18 KG/M2 | RESPIRATION RATE: 14 BRPM | HEART RATE: 60 BPM | WEIGHT: 227.4 LBS | HEIGHT: 74 IN

## 2019-01-14 DIAGNOSIS — Z98.890 S/P LEFT KNEE ARTHROSCOPY: Primary | ICD-10-CM

## 2019-01-14 PROCEDURE — 36000056 ZZH SURGERY LEVEL 3 1ST 30 MIN: Performed by: ORTHOPAEDIC SURGERY

## 2019-01-14 PROCEDURE — 40000170 ZZH STATISTIC PRE-PROCEDURE ASSESSMENT II: Performed by: ORTHOPAEDIC SURGERY

## 2019-01-14 PROCEDURE — 71000013 ZZH RECOVERY PHASE 1 LEVEL 1 EA ADDTL HR: Performed by: ORTHOPAEDIC SURGERY

## 2019-01-14 PROCEDURE — 25000132 ZZH RX MED GY IP 250 OP 250 PS 637: Performed by: PHYSICIAN ASSISTANT

## 2019-01-14 PROCEDURE — 37000008 ZZH ANESTHESIA TECHNICAL FEE, 1ST 30 MIN: Performed by: ORTHOPAEDIC SURGERY

## 2019-01-14 PROCEDURE — 27210794 ZZH OR GENERAL SUPPLY STERILE: Performed by: ORTHOPAEDIC SURGERY

## 2019-01-14 PROCEDURE — 71000027 ZZH RECOVERY PHASE 2 EACH 15 MINS: Performed by: ORTHOPAEDIC SURGERY

## 2019-01-14 PROCEDURE — 25000125 ZZHC RX 250: Performed by: NURSE ANESTHETIST, CERTIFIED REGISTERED

## 2019-01-14 PROCEDURE — 25000128 H RX IP 250 OP 636: Performed by: PHYSICIAN ASSISTANT

## 2019-01-14 PROCEDURE — 71000012 ZZH RECOVERY PHASE 1 LEVEL 1 FIRST HR: Performed by: ORTHOPAEDIC SURGERY

## 2019-01-14 PROCEDURE — 25000128 H RX IP 250 OP 636: Performed by: ORTHOPAEDIC SURGERY

## 2019-01-14 PROCEDURE — 37000009 ZZH ANESTHESIA TECHNICAL FEE, EACH ADDTL 15 MIN: Performed by: ORTHOPAEDIC SURGERY

## 2019-01-14 PROCEDURE — 36000058 ZZH SURGERY LEVEL 3 EA 15 ADDTL MIN: Performed by: ORTHOPAEDIC SURGERY

## 2019-01-14 PROCEDURE — 25000566 ZZH SEVOFLURANE, EA 15 MIN: Performed by: ORTHOPAEDIC SURGERY

## 2019-01-14 PROCEDURE — 25000128 H RX IP 250 OP 636: Performed by: NURSE ANESTHETIST, CERTIFIED REGISTERED

## 2019-01-14 RX ORDER — PROPOFOL 10 MG/ML
INJECTION, EMULSION INTRAVENOUS PRN
Status: DISCONTINUED | OUTPATIENT
Start: 2019-01-14 | End: 2019-01-14

## 2019-01-14 RX ORDER — HYDROCODONE BITARTRATE AND ACETAMINOPHEN 5; 325 MG/1; MG/1
2 TABLET ORAL EVERY 4 HOURS PRN
Status: COMPLETED | OUTPATIENT
Start: 2019-01-14 | End: 2019-01-14

## 2019-01-14 RX ORDER — NALOXONE HYDROCHLORIDE 0.4 MG/ML
.1-.4 INJECTION, SOLUTION INTRAMUSCULAR; INTRAVENOUS; SUBCUTANEOUS
Status: DISCONTINUED | OUTPATIENT
Start: 2019-01-14 | End: 2019-01-14 | Stop reason: HOSPADM

## 2019-01-14 RX ORDER — HYDROXYZINE HYDROCHLORIDE 25 MG/1
25 TABLET, FILM COATED ORAL
Status: DISCONTINUED | OUTPATIENT
Start: 2019-01-14 | End: 2019-01-14 | Stop reason: HOSPADM

## 2019-01-14 RX ORDER — ONDANSETRON 2 MG/ML
4 INJECTION INTRAMUSCULAR; INTRAVENOUS EVERY 30 MIN PRN
Status: DISCONTINUED | OUTPATIENT
Start: 2019-01-14 | End: 2019-01-14 | Stop reason: HOSPADM

## 2019-01-14 RX ORDER — FENTANYL CITRATE 50 UG/ML
25-50 INJECTION, SOLUTION INTRAMUSCULAR; INTRAVENOUS
Status: DISCONTINUED | OUTPATIENT
Start: 2019-01-14 | End: 2019-01-14 | Stop reason: HOSPADM

## 2019-01-14 RX ORDER — HYDROMORPHONE HYDROCHLORIDE 1 MG/ML
.3-.5 INJECTION, SOLUTION INTRAMUSCULAR; INTRAVENOUS; SUBCUTANEOUS EVERY 10 MIN PRN
Status: DISCONTINUED | OUTPATIENT
Start: 2019-01-14 | End: 2019-01-14 | Stop reason: HOSPADM

## 2019-01-14 RX ORDER — ONDANSETRON 4 MG/1
4 TABLET, ORALLY DISINTEGRATING ORAL EVERY 30 MIN PRN
Status: DISCONTINUED | OUTPATIENT
Start: 2019-01-14 | End: 2019-01-14 | Stop reason: HOSPADM

## 2019-01-14 RX ORDER — CEFAZOLIN SODIUM 1 G/3ML
1 INJECTION, POWDER, FOR SOLUTION INTRAMUSCULAR; INTRAVENOUS SEE ADMIN INSTRUCTIONS
Status: DISCONTINUED | OUTPATIENT
Start: 2019-01-14 | End: 2019-01-14 | Stop reason: HOSPADM

## 2019-01-14 RX ORDER — FENTANYL CITRATE 50 UG/ML
INJECTION, SOLUTION INTRAMUSCULAR; INTRAVENOUS PRN
Status: DISCONTINUED | OUTPATIENT
Start: 2019-01-14 | End: 2019-01-14

## 2019-01-14 RX ORDER — KETOROLAC TROMETHAMINE 30 MG/ML
INJECTION, SOLUTION INTRAMUSCULAR; INTRAVENOUS PRN
Status: DISCONTINUED | OUTPATIENT
Start: 2019-01-14 | End: 2019-01-14

## 2019-01-14 RX ORDER — CEFAZOLIN SODIUM 2 G/100ML
2 INJECTION, SOLUTION INTRAVENOUS
Status: COMPLETED | OUTPATIENT
Start: 2019-01-14 | End: 2019-01-14

## 2019-01-14 RX ORDER — ALBUTEROL SULFATE 0.83 MG/ML
2.5 SOLUTION RESPIRATORY (INHALATION) EVERY 4 HOURS PRN
Status: DISCONTINUED | OUTPATIENT
Start: 2019-01-14 | End: 2019-01-14 | Stop reason: HOSPADM

## 2019-01-14 RX ORDER — ACETAMINOPHEN 650 MG/1
650 SUPPOSITORY RECTAL EVERY 4 HOURS PRN
Status: DISCONTINUED | OUTPATIENT
Start: 2019-01-14 | End: 2019-01-14 | Stop reason: HOSPADM

## 2019-01-14 RX ORDER — SODIUM CHLORIDE, SODIUM LACTATE, POTASSIUM CHLORIDE, CALCIUM CHLORIDE 600; 310; 30; 20 MG/100ML; MG/100ML; MG/100ML; MG/100ML
INJECTION, SOLUTION INTRAVENOUS CONTINUOUS PRN
Status: DISCONTINUED | OUTPATIENT
Start: 2019-01-14 | End: 2019-01-14

## 2019-01-14 RX ORDER — SODIUM CHLORIDE, SODIUM LACTATE, POTASSIUM CHLORIDE, CALCIUM CHLORIDE 600; 310; 30; 20 MG/100ML; MG/100ML; MG/100ML; MG/100ML
INJECTION, SOLUTION INTRAVENOUS CONTINUOUS
Status: DISCONTINUED | OUTPATIENT
Start: 2019-01-14 | End: 2019-01-14 | Stop reason: HOSPADM

## 2019-01-14 RX ORDER — BUPIVACAINE HYDROCHLORIDE 5 MG/ML
INJECTION, SOLUTION PERINEURAL PRN
Status: DISCONTINUED | OUTPATIENT
Start: 2019-01-14 | End: 2019-01-14 | Stop reason: HOSPADM

## 2019-01-14 RX ORDER — LIDOCAINE HYDROCHLORIDE 20 MG/ML
INJECTION, SOLUTION INFILTRATION; PERINEURAL PRN
Status: DISCONTINUED | OUTPATIENT
Start: 2019-01-14 | End: 2019-01-14

## 2019-01-14 RX ORDER — ONDANSETRON 2 MG/ML
INJECTION INTRAMUSCULAR; INTRAVENOUS PRN
Status: DISCONTINUED | OUTPATIENT
Start: 2019-01-14 | End: 2019-01-14

## 2019-01-14 RX ORDER — HYDROCODONE BITARTRATE AND ACETAMINOPHEN 5; 325 MG/1; MG/1
1-2 TABLET ORAL EVERY 4 HOURS PRN
Qty: 25 TABLET | Refills: 0 | Status: SHIPPED | OUTPATIENT
Start: 2019-01-14 | End: 2019-01-17

## 2019-01-14 RX ORDER — ACETAMINOPHEN 325 MG/1
650 TABLET ORAL
Status: DISCONTINUED | OUTPATIENT
Start: 2019-01-14 | End: 2019-01-14 | Stop reason: HOSPADM

## 2019-01-14 RX ORDER — DEXAMETHASONE SODIUM PHOSPHATE 4 MG/ML
INJECTION, SOLUTION INTRA-ARTICULAR; INTRALESIONAL; INTRAMUSCULAR; INTRAVENOUS; SOFT TISSUE PRN
Status: DISCONTINUED | OUTPATIENT
Start: 2019-01-14 | End: 2019-01-14

## 2019-01-14 RX ORDER — HYDROMORPHONE HYDROCHLORIDE 1 MG/ML
.3-.5 INJECTION, SOLUTION INTRAMUSCULAR; INTRAVENOUS; SUBCUTANEOUS EVERY 5 MIN PRN
Status: DISCONTINUED | OUTPATIENT
Start: 2019-01-14 | End: 2019-01-14 | Stop reason: HOSPADM

## 2019-01-14 RX ORDER — MEPERIDINE HYDROCHLORIDE 25 MG/ML
12.5 INJECTION INTRAMUSCULAR; INTRAVENOUS; SUBCUTANEOUS
Status: DISCONTINUED | OUTPATIENT
Start: 2019-01-14 | End: 2019-01-14 | Stop reason: HOSPADM

## 2019-01-14 RX ORDER — HYDROXYZINE HYDROCHLORIDE 25 MG/1
25 TABLET, FILM COATED ORAL 3 TIMES DAILY PRN
Qty: 25 TABLET | Refills: 0 | Status: SHIPPED | OUTPATIENT
Start: 2019-01-14 | End: 2019-01-24

## 2019-01-14 RX ADMIN — LIDOCAINE HYDROCHLORIDE 60 MG: 20 INJECTION, SOLUTION INFILTRATION; PERINEURAL at 08:02

## 2019-01-14 RX ADMIN — FENTANYL CITRATE 50 MCG: 50 INJECTION, SOLUTION INTRAMUSCULAR; INTRAVENOUS at 08:15

## 2019-01-14 RX ADMIN — FENTANYL CITRATE 50 MCG: 50 INJECTION, SOLUTION INTRAMUSCULAR; INTRAVENOUS at 08:02

## 2019-01-14 RX ADMIN — DEXAMETHASONE SODIUM PHOSPHATE 4 MG: 4 INJECTION, SOLUTION INTRA-ARTICULAR; INTRALESIONAL; INTRAMUSCULAR; INTRAVENOUS; SOFT TISSUE at 08:09

## 2019-01-14 RX ADMIN — PROPOFOL 250 MG: 10 INJECTION, EMULSION INTRAVENOUS at 08:02

## 2019-01-14 RX ADMIN — MIDAZOLAM 2 MG: 1 INJECTION INTRAMUSCULAR; INTRAVENOUS at 08:00

## 2019-01-14 RX ADMIN — KETOROLAC TROMETHAMINE 30 MG: 30 INJECTION, SOLUTION INTRAMUSCULAR at 08:23

## 2019-01-14 RX ADMIN — HYDROCODONE BITARTRATE AND ACETAMINOPHEN 1 TABLET: 5; 325 TABLET ORAL at 09:25

## 2019-01-14 RX ADMIN — ONDANSETRON 4 MG: 2 INJECTION INTRAMUSCULAR; INTRAVENOUS at 08:24

## 2019-01-14 RX ADMIN — CEFAZOLIN SODIUM 2 G: 2 INJECTION, SOLUTION INTRAVENOUS at 08:06

## 2019-01-14 RX ADMIN — SODIUM CHLORIDE, POTASSIUM CHLORIDE, SODIUM LACTATE AND CALCIUM CHLORIDE: 600; 310; 30; 20 INJECTION, SOLUTION INTRAVENOUS at 07:59

## 2019-01-14 ASSESSMENT — LIFESTYLE VARIABLES: TOBACCO_USE: 1

## 2019-01-14 ASSESSMENT — ENCOUNTER SYMPTOMS: ORTHOPNEA: 0

## 2019-01-14 ASSESSMENT — MIFFLIN-ST. JEOR: SCORE: 1961.23

## 2019-01-14 NOTE — ANESTHESIA POSTPROCEDURE EVALUATION
Patient: Jorge Amaral    Procedure(s):  LEFT KNEE ARTHROSCOPY WITH PARTIAL MEDIAL MENISCECTOMY    Diagnosis:MEDIAL MENISCUS TEAR  Diagnosis Additional Information: No value filed.    Anesthesia Type:  General, LMA    Note:  Anesthesia Post Evaluation    Patient location during evaluation: PACU  Patient participation: Able to fully participate in evaluation  Level of consciousness: awake  Pain management: adequate  Airway patency: patent  Cardiovascular status: acceptable  Respiratory status: acceptable  Hydration status: acceptable  PONV: controlled     Anesthetic complications: None          Last vitals:  Vitals:    01/14/19 0915 01/14/19 0930 01/14/19 1023   BP: 121/87 119/81 130/80   Pulse: 66 60    Resp: 12 12 14   Temp:  35.9  C (96.7  F)    SpO2: 98% 96% 96%         Electronically Signed By: Mona Bean MD  January 14, 2019  10:59 AM

## 2019-01-14 NOTE — ANESTHESIA CARE TRANSFER NOTE
Patient: Jorge Amaral    Procedure(s):  LEFT KNEE ARTHROSCOPY WITH PARTIAL MEDIAL MENISCECTOMY    Diagnosis: MEDIAL MENISCUS TEAR  Diagnosis Additional Information: No value filed.    Anesthesia Type:   General, LMA     Note:  Airway :Face Mask and Oral Airway  Patient transferred to:PACU  Comments: At end of procedure, spontaneous respirations, adequate tidal volumes, LMA removed atraumatically, oropharynx suctioned, airway patent after LMA removal. Oxygen via facemask at 6 liters per minute to PACU. Oxygen tubing connected to wall O2 in PACU, SpO2, NiBP, and EKG monitors and alarms on and functioning, Jimmy Hugger warmer connected to patient gown, report on patient's clinical status given to PACU RN, RN questions answered.Handoff Report: Identifed the Patient, Identified the Reponsible Provider, Reviewed the pertinent medical history, Discussed the surgical course, Reviewed Intra-OP anesthesia mangement and issues during anesthesia, Set expectations for post-procedure period and Allowed opportunity for questions and acknowledgement of understanding      Vitals: (Last set prior to Anesthesia Care Transfer)    CRNA VITALS  1/14/2019 0806 - 1/14/2019 0843      1/14/2019             Pulse:  76    SpO2:  97 %    Resp Rate (observed):  2  (Abnormal)         118/79-71-9-99%-96.9F        Electronically Signed By: Sneha Can  January 14, 2019  8:43 AM

## 2019-01-14 NOTE — ANESTHESIA PREPROCEDURE EVALUATION
Procedure: Procedure(s):  LEFT KNEE ARTHROSCOPY WITH PARTIAL MEDIAL MENISCECTOMY  Preop diagnosis: MEDIAL MENISCUS TEAR    No Known Allergies  Past Medical History:   Diagnosis Date     Crohn's disease (H)      Hyperlipidemia LDL goal < 160     not on meds     Past Surgical History:   Procedure Laterality Date     APPENDECTOMY       C STOMACH SURGERY PROCEDURE UNLISTED       ENT SURGERY  6-28-13    Left Tonsil biopsy     EXCISE MASS WRIST Right 11/7/2018    Procedure: EXCISION OF RIGHT DORSAL WRIST MASS;  Surgeon: Zohaib Edmond MD;  Location:  OR     LAPAROSCOPY PROCEDURE UNLISTED       OPEN REDUCTION INTERNAL FIXATION HAND      right IV MCP hand 7/11     REMOVE HARDWARE LOWER EXTREMITY  4/6/2012    Procedure:REMOVE HARDWARE LOWER EXTREMITY; REMOVAL OF RIGHT TIBIAL SCREW; Surgeon:JOSE LUIS CARMICHAEL; Location:[facility]     Social History     Tobacco Use     Smoking status: Former Smoker     Types: Cigars     Smokeless tobacco: Never Used     Tobacco comment: very rarely    Substance Use Topics     Alcohol use: Yes     Comment: RARE     Prior to Admission medications    Medication Sig Start Date End Date Taking? Authorizing Provider   adalimumab (HUMIRA) 40 MG/0.8ML injection Inject 40 mg Subcutaneous once.    Reported, Patient   diphenoxylate-atropine (LOMOTIL) 2.5-0.025 MG per tablet Take 2 tablets by mouth    Reported, Patient   HYDROcodone-acetaminophen (NORCO) 5-325 MG per tablet Take 1-2 tablets by mouth every 4 hours as needed for moderate to severe pain 11/7/18   Zohaib Edmond MD   mercaptopurine (PURINETHOL) 50 MG tablet Take 50 mg by mouth daily. 1 and half tabs daily    Reported, Patient   valACYclovir (VALTREX) 1000 mg tablet two tablets by mouth twice daily for 1 day at onset of symptoms of a cold sore. 8/11/17   Vince Swain PA-C     No current Epic-ordered facility-administered medications on file.      Current Outpatient Medications Ordered in Epic   Medication     adalimumab  (HUMIRA) 40 MG/0.8ML injection     diphenoxylate-atropine (LOMOTIL) 2.5-0.025 MG per tablet     HYDROcodone-acetaminophen (NORCO) 5-325 MG per tablet     mercaptopurine (PURINETHOL) 50 MG tablet     valACYclovir (VALTREX) 1000 mg tablet       Wt Readings from Last 1 Encounters:   18 101.2 kg (223 lb)     Temp Readings from Last 1 Encounters:   18 36.8  C (98.3  F) (Oral)     BP Readings from Last 6 Encounters:   18 117/77   18 122/72   18 117/73   10/31/18 118/73   10/18/18 112/72   10/18/18 123/78     Pulse Readings from Last 4 Encounters:   18 88   18 89   10/31/18 68   10/18/18 81     Resp Readings from Last 1 Encounters:   18 16   @LASTSAO2(1)@  Recent Labs   Lab Test 10/09/18  0903 17  0738    141   POTASSIUM 4.2 4.4   CHLORIDE 105 104   CO2 33* 34*   ANIONGAP 5 3   GLC 93 94   BUN 15 11   CR 1.05 1.07   JOSELITO 8.9 8.8     No results for input(s): AST, ALT, ALKPHOS, BILITOTAL, LIPASE in the last 57691 hours.  Recent Labs   Lab Test 10/31/18  0946 12  1123   HGB 14.9 15.2     No results for input(s): ABO, RH in the last 82474 hours.  No results for input(s): INR, PTT in the last 51105 hours.   No results for input(s): TROPI in the last 06162 hours.  No results for input(s): PH, PCO2, PO2, HCO3 in the last 83546 hours.  No results for input(s): HCG in the last 99453 hours.  No results found for this or any previous visit (from the past 744 hour(s)).    RECENT LABS:   ECG:   ECHO:   Anesthesia Pre-Procedure Evaluation    Patient: Jorge Amaral   MRN: [id number] : 1970          Preoperative Diagnosis: MEDIAL MENISCUS TEAR    Procedure(s):  LEFT KNEE ARTHROSCOPY WITH PARTIAL MEDIAL MENISCECTOMY    Past Medical History:   Diagnosis Date     Crohn's disease (H)      Hyperlipidemia LDL goal < 160     not on meds     Past Surgical History:   Procedure Laterality Date     APPENDECTOMY       C STOMACH SURGERY PROCEDURE UNLISTED       ENT SURGERY   6-28-13    Left Tonsil biopsy     EXCISE MASS WRIST Right 11/7/2018    Procedure: EXCISION OF RIGHT DORSAL WRIST MASS;  Surgeon: Zohaib Edmond MD;  Location: [facility] OR     LAPAROSCOPY PROCEDURE UNLISTED       OPEN REDUCTION INTERNAL FIXATION HAND      right IV MCP hand 7/11     REMOVE HARDWARE LOWER EXTREMITY  4/6/2012    Procedure:REMOVE HARDWARE LOWER EXTREMITY; REMOVAL OF RIGHT TIBIAL SCREW; Surgeon:JOSE LUIS CARMICHAEL; Location:[facility]       Anesthesia Evaluation     . Pt has had prior anesthetic.     No history of anesthetic complications          ROS/MED HX    ENT/Pulmonary: Comment: TMJ syndrome    (+)tobacco use, Current use , . .   (-) asthma and sleep apnea   Neurologic: Comment: Right wrist and shoulder pain      Cardiovascular: Comment: SR, ST changes     (+) Dyslipidemia, ----. : . . . :. .      (-) NOWAK, orthopnea/PND and syncope   METS/Exercise Tolerance:  >4 METS   Hematologic:         Musculoskeletal:         GI/Hepatic: Comment: On Humira    (+) Inflammatory bowel disease,      (-) GERD   Renal/Genitourinary:         Endo:         Psychiatric:         Infectious Disease:         Malignancy:         Other:                          Physical Exam  Normal systems: dental    Airway   Mallampati: II  TM distance: >3 FB  Neck ROM: full    Dental     Cardiovascular   Rhythm and rate: regular      Pulmonary    breath sounds clear to auscultation            Lab Results   Component Value Date    HGB 14.9 10/31/2018     10/09/2018    POTASSIUM 4.2 10/09/2018    CHLORIDE 105 10/09/2018    CO2 33 (H) 10/09/2018    BUN 15 10/09/2018    CR 1.05 10/09/2018    GLC 93 10/09/2018    JOSELITO 8.9 10/09/2018       Preop Vitals  BP Readings from Last 3 Encounters:   12/26/18 117/77   11/21/18 122/72   11/07/18 117/73    Pulse Readings from Last 3 Encounters:   12/26/18 88   11/21/18 89   10/31/18 68      Resp Readings from Last 3 Encounters:   11/07/18 16   10/31/18 16   10/18/18 16    SpO2 Readings from Last 3  "Encounters:   12/26/18 96%   11/21/18 97%   11/07/18 94%      Temp Readings from Last 1 Encounters:   12/26/18 36.8  C (98.3  F) (Oral)    Ht Readings from Last 1 Encounters:   11/21/18 1.886 m (6' 2.25\")      Wt Readings from Last 1 Encounters:   12/26/18 101.2 kg (223 lb)    Estimated body mass index is 28.44 kg/m  as calculated from the following:    Height as of 11/21/18: 1.886 m (6' 2.25\").    Weight as of 12/26/18: 101.2 kg (223 lb).     No Known Allergies  Social History     Tobacco Use     Smoking status: Former Smoker     Types: Cigars     Smokeless tobacco: Never Used     Tobacco comment: very rarely    Substance Use Topics     Alcohol use: Yes     Comment: social     Prior to Admission medications    Medication Sig Start Date End Date Taking? Authorizing Provider   adalimumab (HUMIRA) 40 MG/0.8ML injection Inject 40 mg Subcutaneous once.   Yes Reported, Patient   diphenoxylate-atropine (LOMOTIL) 2.5-0.025 MG per tablet Take 2 tablets by mouth   Yes Reported, Patient   mercaptopurine (PURINETHOL) 50 MG tablet Take 50 mg by mouth daily. 1 and half tabs daily   Yes Reported, Patient   valACYclovir (VALTREX) 1000 mg tablet two tablets by mouth twice daily for 1 day at onset of symptoms of a cold sore. 8/11/17   Vince Swain PA-C     Current Facility-Administered Medications Ordered in Epic   Medication Dose Route Frequency Last Rate Last Dose     ceFAZolin (ANCEF) 1 g vial to attach to  ml bag for ADULT or 50 ml bag for PEDS  1 g Intravenous See Admin Instructions         fentaNYL (PF) (SUBLIMAZE) injection    PRN   50 mcg at 01/14/19 0802     lidocaine 2% injection (MDV)    PRN   60 mg at 01/14/19 0802     midazolam (VERSED) injection    PRN   2 mg at 01/14/19 0800     propofol (DIPRIVAN) injection 10 mg/mL vial    PRN   250 mg at 01/14/19 0802     No current [facility]-ordered outpatient medications on file.       Recent Labs   Lab Test 10/09/18  0903 08/11/17  0738    141   POTASSIUM 4.2 " 4.4   CHLORIDE 105 104   CO2 33* 34*   ANIONGAP 5 3   GLC 93 94   BUN 15 11   CR 1.05 1.07   JOSELITO 8.9 8.8     Recent Labs   Lab Test 10/31/18  0946 04/04/12  1123   HGB 14.9 15.2     No results for input(s): ABO, RH in the last 63297 hours.  No results for input(s): TROPI in the last 95132 hours.  No results for input(s): PH, PCO2, PO2, HCO3 in the last 43463 hours.  No results for input(s): HCG in the last 27882 hours.  No results found for this or any previous visit (from the past 744 hour(s)).    RECENT LABS:     Anesthesia Plan      History & Physical Review  History and physical reviewed and following examination; no interval change.    ASA Status:  2 .    NPO Status:  > 8 hours    Plan for General and LMA with Intravenous induction. Maintenance will be Balanced.    PONV prophylaxis:  Ondansetron (or other 5HT-3) and Dexamethasone or Solumedrol       Postoperative Care  Postoperative pain management:  IV analgesics.      Consents  Anesthetic plan, risks, benefits and alternatives discussed with:  Patient..                 Marcel Sood MD

## 2019-01-14 NOTE — OP NOTE
Procedure Date: 01/14/2019      PREOPERATIVE DIAGNOSIS:  Left knee medial meniscus tear.      POSTOPERATIVE DIAGNOSIS:  Left knee medial meniscus tear.      PROCEDURES:     1.  Right knee arthroscopy.   2.  Partial medial meniscectomy.      INDICATIONS:  Jorge is a 48-year-old gentleman who had the acute onset of medial-sided knee pain with findings on history, examination and imaging consistent with medial meniscus tearing.  Discussion was had after a period of nonoperative measures regarding further nonoperative versus operative risks, benefits, alternatives and recovery were reviewed.  Questions answered.  He elected to proceed.      DESCRIPTION OF PROCEDURE:  After informed consent was obtained and operative site marked, patient was brought to the operating room suite.  General anesthetic via an LMA was administered.  His left lower extremity was prepped and draped in the usual sterile fashion.  A timeout taken to confirm operative site, antibiotic administration and procedure.  Leg was exsanguinated and the tourniquet insufflated to 250 mmHg.  Standard inferior lateral viewing portal was established.  Diagnostic arthroscopy revealed preservation of the retropatellar surface.  The femoral trochlea had centrally grade 3 change throughout.  The lateral and medial gutters were free of loose body, injection or synovitis.  Medial compartment was then entered and under direct visualization, a medial working portal was established.  Probe examination revealed a short complex tear of the partial radial component of the posterior horn.  There was diffuse grade 1 change of the weightbearing surface of the medial femoral condyle and the tibial plateau was benign.  No other abnormalities were noted of the medial compartment.  At this time, biters and motorized kaylee were used to trim the meniscus to a stable rim.  Final images were obtained.  Intercondylar notch was explored and was found to be pristine.  The lateral  compartment was also pristine to visual and probe examination.  Returned back to the patellofemoral compartment where probe examination revealed no unstable cartilage flaps of the grade 3 chondral defect centrally.  Fluid and instruments were removed from the knee.  Scope portals were closed with nylon suture and local anesthetic delivered to the scope portals.  Sterile dressing was applied.  Tourniquet let down.  He was extubated and taken to the PACU in stable condition.      ESTIMATED BLOOD LOSS:  Minimal.      COMPLICATIONS:  None apparent.      POSTOPERATIVE PLAN:  He may bear weight as tolerated, has unrestricted range of motion. Ice, elevation, intermittent use of oral narcotics for pain and daily baby aspirin for 10-14 days.  Postoperative followup in 10-14 days.         JOSE LUIS CARMICHAEL MD             D: 2019   T: 2019   MT: CORIE      Name:     SAUMYA VAIL   MRN:      [id number]        Account:        [id number]   :      1970           Procedure Date: 2019      Document: [id number]

## 2019-01-14 NOTE — DISCHARGE INSTRUCTIONS
Same Day Surgery Discharge Instructions for  Sedation and General Anesthesia       It's not unusual to feel dizzy, light-headed or faint for up to 24 hours after surgery or while taking pain medication.  If you have these symptoms: sit for a few minutes before standing and have someone assist you when you get up to walk or use the bathroom.      You should rest and relax for the next 24 hours. We recommend you make arrangements to have an adult stay with you for at least 24 hours after your discharge.  Avoid hazardous and strenuous activity.      DO NOT DRIVE any vehicle or operate mechanical equipment for 24 hours following the end of your surgery.  Even though you may feel normal, your reactions may be affected by the medication you have received.      Do not drink alcoholic beverages for 24 hours following surgery.       Slowly progress to your regular diet as you feel able. It's not unusual to feel nauseated and/or vomit after receiving anesthesia.  If you develop these symptoms, drink clear liquids (apple juice, ginger ale, broth, 7-up, etc. ) until you feel better.  If your nausea and vomiting persists for 24 hours, please notify your surgeon.        All narcotic pain medications, along with inactivity and anesthesia, can cause constipation. Drinking plenty of liquids and increasing fiber intake will help.      For any questions of a medical nature, call your surgeon.      Do not make important decisions for 24 hours.      If you had general anesthesia, you may have a sore throat for a couple of days related to the breathing tube used during surgery.  You may use Cepacol lozenges to help with this discomfort.  If it worsens or if you develop a fever, contact your surgeon.       If you feel your pain is not well managed with the pain medications prescribed by your surgeon, please contact your surgeon's office to let them know so they can address your concerns.                  Today you received Toradol, an  antiinflammatory medication similar to Ibuprofen.  You should not take other antiinflammatory medication, such as Ibuprofen, Motrin, Advil, Aleve, Naprosyn, etc until 2:25 PM.        **If you have questions or concerns about your procedure,   call Dr. Jama at [phone]**

## 2019-02-21 ENCOUNTER — OFFICE VISIT (OUTPATIENT)
Dept: DERMATOLOGY | Facility: CLINIC | Age: 49
End: 2019-02-21
Payer: COMMERCIAL

## 2019-02-21 DIAGNOSIS — D22.9 MULTIPLE BENIGN NEVI: ICD-10-CM

## 2019-02-21 DIAGNOSIS — B07.0 VERRUCA PLANTARIS: ICD-10-CM

## 2019-02-21 DIAGNOSIS — D84.9 IMMUNOSUPPRESSION (H): Primary | ICD-10-CM

## 2019-02-21 DIAGNOSIS — D18.01 CHERRY ANGIOMA: ICD-10-CM

## 2019-02-21 PROCEDURE — 99213 OFFICE O/P EST LOW 20 MIN: CPT | Mod: 25 | Performed by: DERMATOLOGY

## 2019-02-21 PROCEDURE — 17110 DESTRUCTION B9 LES UP TO 14: CPT | Performed by: DERMATOLOGY

## 2019-02-21 ASSESSMENT — PAIN SCALES - GENERAL: PAINLEVEL: NO PAIN (0)

## 2019-02-21 NOTE — PATIENT INSTRUCTIONS
Cryotherapy    What is it?    Use of a very cold liquid, such as liquid nitrogen, to freeze and destroy abnormal skin cells that need to be removed    What should I expect?    Tenderness and redness    A small blister that might grow and fill with dark purple blood. There may be crusting.    More than one treatment may be needed if the lesions do not go away.    How do I care for the treated area?    Gently wash the area with your hands when bathing.    Use a thin layer of Vaseline to help with healing. You may use a Band-Aid.     The area should heal within 7-10 days and may leave behind a pink or lighter color.     Do not use an antibiotic or Neosporin ointment.     You may take acetaminophen (Tylenol) for pain.     Call your Doctor if you have:    Severe pain    Signs of infection (warmth, redness, cloudy yellow drainage, and or a bad smell)    Questions or concerns    Who should I call with questions?       [facility]: [phone]       [facility]: [phone]       For urgent needs outside of business hours call the [facility] at [phone]        and ask for the dermatology resident on call

## 2019-02-21 NOTE — LETTER
2/21/2019         RE: Jorge Amaral  [address] [place] [facility] [address]        Dear Colleague,    Thank you for referring your patient, Jorge Amaral, to the [facility]. Please see a copy of my visit note below.    [facility] Dermatology Note      Dermatology Problem List:  1. History of immunosuppression, Chron's  2. Bowenoid papulosis, left abdomen  -s/p biopsy 4/18/15  -s/p Efudex x 6 weeks initiated 5/6/15, marked resolved 8/2015  3. Verruca plantaris  -Previous Tx: cantherone, trichloracetic acid, cryotherapy and PDL with Dr. Chris Stoddard, s/p candida 3X, cryotherapy  4. Tinea versicolor, back and chest  -Previous Tx:  ketoconazole 2% cream initiated 10/23/2015    Encounter Date: Feb 21, 2019    CC:  Chief Complaint   Patient presents with     RECHECK     Jorge is returning for a full body skin check         History of Present Illness:  Mr. Jorge Amaral is a 48 year old male who presents for follow up for a history of immunosuppression in the setting of Chron's disease (on Humira). Last seen 11/15/18 when he had a wart treated with cryotherapy. Today, the patient reports that he only has one area of concern: a nevus on the back. He is unsure if we have looked at it in the past. The wart that we had previously treated is still present.  Otherwise, nothing bleeding, crusting, or changing. Feels well today.     Past Medical History:   Patient Active Problem List   Diagnosis     CARDIOVASCULAR SCREENING; LDL GOAL LESS THAN 160     Fracture, metacarpal     Delayed union of fracture     Family history of diabetes mellitus     ACL (anterior cruciate ligament) tear--recurrent     Cold sore     Common wart     Crohn's disease with complication, unspecified gastrointestinal tract location (H)     Herpes labialis     FH: prostate cancer     Past Medical History:   Diagnosis Date     Crohn's disease (H)      Hyperlipidemia LDL goal < 160     not on meds     Past  Surgical History:   Procedure Laterality Date     APPENDECTOMY       ARTHROSCOPY KNEE WITH MEDIAL MENISCECTOMY Left 1/14/2019    Procedure: LEFT KNEE ARTHROSCOPY WITH PARTIAL MEDIAL MENISCECTOMY;  Surgeon: Jose Luis Jama MD;  Location:  OR     C STOMACH SURGERY PROCEDURE UNLISTED      terminal ileum removed     ENT SURGERY  6-28-13    Left Tonsil biopsy     EXCISE MASS WRIST Right 11/7/2018    Procedure: EXCISION OF RIGHT DORSAL WRIST MASS;  Surgeon: Zohaib Edmond MD;  Location: [facility] OR     LAPAROSCOPY PROCEDURE UNLISTED       OPEN REDUCTION INTERNAL FIXATION HAND      right IV MCP hand 7/11     REMOVE HARDWARE LOWER EXTREMITY  4/6/2012    Procedure:REMOVE HARDWARE LOWER EXTREMITY; REMOVAL OF RIGHT TIBIAL SCREW; Surgeon:JOSE LUIS JAMA; Location:[facility]       Social History:  Patient  reports that he has quit smoking. His smoking use included cigars. he has never used smokeless tobacco. He reports that he drinks alcohol. He reports that he does not use drugs.   In takewondo    Family History:  Family History   Problem Relation Age of Onset     Hypertension Mother      Respiratory Mother         SMOKER -EMPYSEMA     Hypertension Father      Diabetes Father      Diabetes Brother        Medications:  Current Outpatient Medications   Medication Sig Dispense Refill     adalimumab (HUMIRA) 40 MG/0.8ML injection Inject 40 mg Subcutaneous once.       diphenoxylate-atropine (LOMOTIL) 2.5-0.025 MG per tablet Take 2 tablets by mouth       mercaptopurine (PURINETHOL) 50 MG tablet Take 50 mg by mouth daily. 1 and half tabs daily       valACYclovir (VALTREX) 1000 mg tablet two tablets by mouth twice daily for 1 day at onset of symptoms of a cold sore. 30 tablet 0       No Known Allergies    Review of Systems:  -Constitutional: Otherwise feeling well today, in usual state of health.  -Skin: As above in HPI. No additional skin concerns.    Physical exam:  Vitals: There were no vitals taken for this visit.  GEN: This  is a well developed, well-nourished male in no acute distress, in a pleasant mood.    SKIN: Full skin, which includes the head/face, both arms, chest, back, abdomen,both legs, genitalia and/or groin buttocks, digits and/or nails, was examined.   - Multiple regular brown pigmented macules and papules are identified on the back.   - There is a dome shaped bright red papule on the left chest.   - There is no erythema, telangectasias, nodularity, or pigmentation on the left abdomen.  - Verrucous papule on the right second toe.   -No other lesions of concern on areas examined.       Impression/Plan:  1. Hx of immunosupression  Discussed with the patient that he is at an increased risk of skin cancer.     2. Verruca plantaris, right second toe, painful  Cryotherapy procedure note: After verbal consent and discussion of risks and benefits including but no limited to dyspigmentation/scar, blister, and pain, 1was(were) treated with 1-2mm freeze border for 2 cycles with liquid nitrogen. Post cryotherapy instructions were provided.     3. Multiple clinically benign nevi, cherry angioma    No further intervention needed.     4. Hx of Bowenoid papulosis, left abdomen - no evidence of recurrence.     Recommend sunscreens SPF #30 or greater, protective clothing and avoidance of tanning beds.    Follow-up 1 month.     Staff Involved:  Scribe/Staff    Scribe Disclosure  I, Sherman Grande, am serving as a scribe to document services personally performed by Dr. Sasha Woods MD, based on data collection and the provider's statements to me.     Provider Disclosure:   The documentation recorded by the scribe accurately reflects the services I personally performed and the decisions made by me.    Sasha Woods MD    Department of Dermatology  [facility]: Phone: [phone], Fax:[phone]  [facility] [place]: Phone:  [phone], Fax: [phone]          Again, thank you for allowing me to participate in the care of your patient.        Sincerely,        Sasha Woods MD

## 2019-02-21 NOTE — NURSING NOTE
Jorge Amaral's goals for this visit include:   Chief Complaint   Patient presents with     OSFIA     Jorge is returning for a full body skin check       He requests these members of his care team be copied on today's visit information:     PCP: Vince Swain    Referring Provider:  No referring provider defined for this encounter.    There were no vitals taken for this visit.    Do you need any medication refills at today's visit? No  Cassandra Stone LPN

## 2019-02-21 NOTE — PROGRESS NOTES
[facility] Dermatology Note      Dermatology Problem List:  1. History of immunosuppression, Chron's  2. Bowenoid papulosis, left abdomen  -s/p biopsy 4/18/15  -s/p Efudex x 6 weeks initiated 5/6/15, marked resolved 8/2015  3. Verruca plantaris  -Previous Tx: cantherone, trichloracetic acid, cryotherapy and PDL with Dr. Chris Stoddard, s/p candida 3X, cryotherapy  4. Tinea versicolor, back and chest  -Previous Tx:  ketoconazole 2% cream initiated 10/23/2015    Encounter Date: Feb 21, 2019    CC:  Chief Complaint   Patient presents with     RECHECK     Jorge is returning for a full body skin check         History of Present Illness:  Mr. Jorge Amaral is a 48 year old male who presents for follow up for a history of immunosuppression in the setting of Chron's disease (on Humira). Last seen 11/15/18 when he had a wart treated with cryotherapy. Today, the patient reports that he only has one area of concern: a nevus on the back. He is unsure if we have looked at it in the past. The wart that we had previously treated is still present.  Otherwise, nothing bleeding, crusting, or changing. Feels well today.     Past Medical History:   Patient Active Problem List   Diagnosis     CARDIOVASCULAR SCREENING; LDL GOAL LESS THAN 160     Fracture, metacarpal     Delayed union of fracture     Family history of diabetes mellitus     ACL (anterior cruciate ligament) tear--recurrent     Cold sore     Common wart     Crohn's disease with complication, unspecified gastrointestinal tract location (H)     Herpes labialis     FH: prostate cancer     Past Medical History:   Diagnosis Date     Crohn's disease (H)      Hyperlipidemia LDL goal < 160     not on meds     Past Surgical History:   Procedure Laterality Date     APPENDECTOMY       ARTHROSCOPY KNEE WITH MEDIAL MENISCECTOMY Left 1/14/2019    Procedure: LEFT KNEE ARTHROSCOPY WITH PARTIAL MEDIAL MENISCECTOMY;  Surgeon: Joleen Jama MD;  Location:  OR      C STOMACH SURGERY PROCEDURE UNLISTED      terminal ileum removed     ENT SURGERY  6-28-13    Left Tonsil biopsy     EXCISE MASS WRIST Right 11/7/2018    Procedure: EXCISION OF RIGHT DORSAL WRIST MASS;  Surgeon: Zohaib Edmond MD;  Location:  OR     LAPAROSCOPY PROCEDURE UNLISTED       OPEN REDUCTION INTERNAL FIXATION HAND      right IV MCP hand 7/11     REMOVE HARDWARE LOWER EXTREMITY  4/6/2012    Procedure:REMOVE HARDWARE LOWER EXTREMITY; REMOVAL OF RIGHT TIBIAL SCREW; Surgeon:JOSE LUIS CARMCIHAEL; Location:[facility]       Social History:  Patient  reports that he has quit smoking. His smoking use included cigars. he has never used smokeless tobacco. He reports that he drinks alcohol. He reports that he does not use drugs.   In takewondo    Family History:  Family History   Problem Relation Age of Onset     Hypertension Mother      Respiratory Mother         SMOKER -EMPYSEMA     Hypertension Father      Diabetes Father      Diabetes Brother        Medications:  Current Outpatient Medications   Medication Sig Dispense Refill     adalimumab (HUMIRA) 40 MG/0.8ML injection Inject 40 mg Subcutaneous once.       diphenoxylate-atropine (LOMOTIL) 2.5-0.025 MG per tablet Take 2 tablets by mouth       mercaptopurine (PURINETHOL) 50 MG tablet Take 50 mg by mouth daily. 1 and half tabs daily       valACYclovir (VALTREX) 1000 mg tablet two tablets by mouth twice daily for 1 day at onset of symptoms of a cold sore. 30 tablet 0       No Known Allergies    Review of Systems:  -Constitutional: Otherwise feeling well today, in usual state of health.  -Skin: As above in HPI. No additional skin concerns.    Physical exam:  Vitals: There were no vitals taken for this visit.  GEN: This is a well developed, well-nourished male in no acute distress, in a pleasant mood.    SKIN: Full skin, which includes the head/face, both arms, chest, back, abdomen,both legs, genitalia and/or groin buttocks, digits and/or nails, was examined.   -  Multiple regular brown pigmented macules and papules are identified on the back.   - There is a dome shaped bright red papule on the left chest.   - There is no erythema, telangectasias, nodularity, or pigmentation on the left abdomen.  - Verrucous papule on the right second toe.   -No other lesions of concern on areas examined.       Impression/Plan:  1. Hx of immunosupression  Discussed with the patient that he is at an increased risk of skin cancer.     2. Verruca plantaris, right second toe, painful  Cryotherapy procedure note: After verbal consent and discussion of risks and benefits including but no limited to dyspigmentation/scar, blister, and pain, 1was(were) treated with 1-2mm freeze border for 2 cycles with liquid nitrogen. Post cryotherapy instructions were provided.     3. Multiple clinically benign nevi, cherry angioma    No further intervention needed.     4. Hx of Bowenoid papulosis, left abdomen - no evidence of recurrence.     Recommend sunscreens SPF #30 or greater, protective clothing and avoidance of tanning beds.    Follow-up 1 month.     Staff Involved:  Scribe/Staff    Scribe Disclosure  I, Sherman Grande, am serving as a scribe to document services personally performed by Dr. Sasha Woods MD, based on data collection and the provider's statements to me.     Provider Disclosure:   The documentation recorded by the scribe accurately reflects the services I personally performed and the decisions made by me.    Sasha Woods MD    Department of Dermatology  [facility]: Phone: [phone], Fax:[phone]  [facility]: Phone: [phone], Fax: [phone]

## 2019-07-02 ENCOUNTER — ANCILLARY PROCEDURE (OUTPATIENT)
Dept: GENERAL RADIOLOGY | Facility: CLINIC | Age: 49
End: 2019-07-02
Attending: PHYSICIAN ASSISTANT
Payer: COMMERCIAL

## 2019-07-02 ENCOUNTER — OFFICE VISIT (OUTPATIENT)
Dept: URGENT CARE | Facility: URGENT CARE | Age: 49
End: 2019-07-02
Payer: COMMERCIAL

## 2019-07-02 VITALS
OXYGEN SATURATION: 96 % | SYSTOLIC BLOOD PRESSURE: 122 MMHG | WEIGHT: 230 LBS | BODY MASS INDEX: 29.52 KG/M2 | TEMPERATURE: 98.2 F | HEART RATE: 85 BPM | DIASTOLIC BLOOD PRESSURE: 78 MMHG | HEIGHT: 74 IN

## 2019-07-02 DIAGNOSIS — S99.922A FOOT INJURY, LEFT, INITIAL ENCOUNTER: Primary | ICD-10-CM

## 2019-07-02 DIAGNOSIS — S99.929A FOOT INJURY: ICD-10-CM

## 2019-07-02 PROCEDURE — 99213 OFFICE O/P EST LOW 20 MIN: CPT | Performed by: PHYSICIAN ASSISTANT

## 2019-07-02 PROCEDURE — 73630 X-RAY EXAM OF FOOT: CPT | Mod: LT

## 2019-07-02 PROCEDURE — 73630 X-RAY EXAM OF FOOT: CPT | Mod: LT | Performed by: RADIOLOGY

## 2019-07-02 RX ORDER — HYDROCODONE BITARTRATE AND ACETAMINOPHEN 5; 325 MG/1; MG/1
1 TABLET ORAL EVERY 6 HOURS PRN
Qty: 18 TABLET | Refills: 0 | Status: SHIPPED | OUTPATIENT
Start: 2019-07-02 | End: 2019-09-05

## 2019-07-02 ASSESSMENT — ENCOUNTER SYMPTOMS
NECK STIFFNESS: 0
JOINT SWELLING: 1
FEVER: 0
MYALGIAS: 1
WHEEZING: 0
CONSTITUTIONAL NEGATIVE: 1
SHORTNESS OF BREATH: 0
COUGH: 0
FATIGUE: 0
NECK PAIN: 0
PALPITATIONS: 0
ARTHRALGIAS: 1
CHILLS: 0
CHEST TIGHTNESS: 0
BACK PAIN: 0

## 2019-07-02 ASSESSMENT — MIFFLIN-ST. JEOR: SCORE: 1973.02

## 2019-07-03 NOTE — PROGRESS NOTES
Subjective   Jorge Amaral is a 48 year old male who presents to clinic today for the following health issues:  HPI   Musculoskeletal problem/pain    Duration: yesterday    Description  Location: L foot    Intensity:  moderate    Accompanying signs and symptoms: No radicular pain, numbness, tingling or weakness.  Reports swelling and bruising but no redness, drainage or fevers.      History  Previous similar problem: no   Previous evaluation:  none    Precipitating or alleviating factors:  Trauma or overuse: YES- sustained a L foot injury after his kick was blocked by another person in martial arts  Aggravating factors include: worse with palpation, wieght bearing and ambulation, relieved with rest    Therapies tried and outcome: rest/inactivity, heat, ice and ASA with minimal relief      Patient Active Problem List   Diagnosis     CARDIOVASCULAR SCREENING; LDL GOAL LESS THAN 160     Fracture, metacarpal     Delayed union of fracture     Family history of diabetes mellitus     ACL (anterior cruciate ligament) tear--recurrent     Cold sore     Common wart     Crohn's disease with complication, unspecified gastrointestinal tract location (H)     Herpes labialis     FH: prostate cancer     Past Surgical History:   Procedure Laterality Date     APPENDECTOMY       ARTHROSCOPY KNEE WITH MEDIAL MENISCECTOMY Left 1/14/2019    Procedure: LEFT KNEE ARTHROSCOPY WITH PARTIAL MEDIAL MENISCECTOMY;  Surgeon: Joleen Jama MD;  Location: [facility]     C STOMACH SURGERY PROCEDURE UNLISTED      terminal ileum removed     ENT SURGERY  6-28-13    Left Tonsil biopsy     EXCISE MASS WRIST Right 11/7/2018    Procedure: EXCISION OF RIGHT DORSAL WRIST MASS;  Surgeon: Zohaib Edmond MD;  Location: [facility]     LAPAROSCOPY PROCEDURE UNLISTED       OPEN REDUCTION INTERNAL FIXATION HAND      right IV MCP hand 7/11     REMOVE HARDWARE LOWER EXTREMITY  4/6/2012    Procedure:REMOVE HARDWARE LOWER EXTREMITY; REMOVAL OF RIGHT TIBIAL SCREW;  "Surgeon:JOSE LUIS CARMICHAEL; Location:[facility] [place]       Social History     Tobacco Use     Smoking status: Former Smoker     Types: Cigars     Smokeless tobacco: Never Used     Tobacco comment: very rarely    Substance Use Topics     Alcohol use: Yes     Comment: social     Family History   Problem Relation Age of Onset     Hypertension Mother      Respiratory Mother         SMOKER -EMPYSEMA     Hypertension Father      Diabetes Father      Diabetes Brother          Current Outpatient Medications   Medication Sig Dispense Refill     adalimumab (HUMIRA) 40 MG/0.8ML injection Inject 40 mg Subcutaneous once.       diphenoxylate-atropine (LOMOTIL) 2.5-0.025 MG per tablet Take 2 tablets by mouth       mercaptopurine (PURINETHOL) 50 MG tablet Take 50 mg by mouth daily. 1 and half tabs daily       valACYclovir (VALTREX) 1000 mg tablet two tablets by mouth twice daily for 1 day at onset of symptoms of a cold sore. (Patient not taking: Reported on 7/2/2019) 30 tablet 0     No Known Allergies    Reviewed and updated as needed this visit by Provider       Review of Systems   Constitutional: Negative.  Negative for chills, fatigue and fever.   Respiratory: Negative for cough, chest tightness, shortness of breath and wheezing.    Cardiovascular: Negative for chest pain, palpitations and peripheral edema.   Musculoskeletal: Positive for arthralgias, gait problem, joint swelling and myalgias. Negative for back pain, neck pain and neck stiffness.   All other systems reviewed and are negative.           Objective    /78   Pulse 85   Temp 98.2  F (36.8  C) (Oral)   Ht 1.88 m (6' 2\")   Wt 104.3 kg (230 lb)   SpO2 96%   BMI 29.53 kg/m    Body mass index is 29.53 kg/m .  Physical Exam   Constitutional: He is oriented to person, place, and time. He appears well-developed and well-nourished. No distress.   Musculoskeletal:        Right ankle: Normal. He exhibits normal range of motion, no swelling, no ecchymosis, no deformity and no " laceration. No tenderness. Achilles tendon normal.        Left ankle: He exhibits swelling. He exhibits normal range of motion, no ecchymosis, no deformity, no laceration and normal pulse. Tenderness. Lateral malleolus and medial malleolus tenderness found. Achilles tendon normal.        Right foot: Normal. There is normal range of motion, no tenderness, no bony tenderness, no swelling, normal capillary refill, no crepitus, no deformity and no laceration.        Left foot: There is tenderness, bony tenderness (over the midfoot but no crepitus) and swelling. There is normal range of motion, normal capillary refill, no crepitus, no deformity and no laceration.   Neurological: He is alert and oriented to person, place, and time. He has normal strength and normal reflexes. No sensory deficit. Gait abnormal.   Skin: Skin is warm and intact.   Distal pulses are 2+ and symmetric.  No peripheral edema.   Psychiatric: He has a normal mood and affect. His behavior is normal. Judgment and thought content normal.   Nursing note and vitals reviewed.  3V of L foot:  No acute fractures or dislocations.  No soft tissue swelling or masses.  Per my read.  Will send for overread.          Assessment & Plan   Foot injury, left, initial encounter: Xrays are negative for acute injuries. Most likely contusion vs strain/sprain.  He was placed in a long length walking boot and recommended non-weight bearing with crutches.  Recommend RICE, and will give norco #18 which he can take with ibuprofen prn pain.  Discussed risks and benefits of medication along with side effects, direction for use.  No driving or operating machinery due to sedation.  Will also send to orthopedics for further evaluation and management.  Recheck in clinic if symptoms worsen or if symptoms do not improve.   -     XR Foot Left G/E 3 Views; Future  -     ORTHO  REFERRAL  -     HYDROcodone-acetaminophen (NORCO) 5-325 MG tablet; Take 1 tablet by mouth every 6  hours as needed for pain        Nai See SHAMIKA Rees  [facility]

## 2019-07-11 ENCOUNTER — OFFICE VISIT (OUTPATIENT)
Dept: ORTHOPEDICS | Facility: CLINIC | Age: 49
End: 2019-07-11
Payer: COMMERCIAL

## 2019-07-11 ENCOUNTER — ANCILLARY PROCEDURE (OUTPATIENT)
Dept: GENERAL RADIOLOGY | Facility: CLINIC | Age: 49
End: 2019-07-11
Attending: ORTHOPAEDIC SURGERY
Payer: COMMERCIAL

## 2019-07-11 VITALS
DIASTOLIC BLOOD PRESSURE: 73 MMHG | HEART RATE: 70 BPM | WEIGHT: 230 LBS | OXYGEN SATURATION: 96 % | SYSTOLIC BLOOD PRESSURE: 119 MMHG | BODY MASS INDEX: 29.53 KG/M2

## 2019-07-11 DIAGNOSIS — S90.32XA CONTUSION OF LEFT FOOT, INITIAL ENCOUNTER: ICD-10-CM

## 2019-07-11 DIAGNOSIS — S92.902A CLOSED FRACTURE OF LEFT FOOT, INITIAL ENCOUNTER: Primary | ICD-10-CM

## 2019-07-11 PROCEDURE — 99243 OFF/OP CNSLTJ NEW/EST LOW 30: CPT | Performed by: ORTHOPAEDIC SURGERY

## 2019-07-11 PROCEDURE — 73630 X-RAY EXAM OF FOOT: CPT | Mod: LT

## 2019-07-11 RX ORDER — TRAMADOL HYDROCHLORIDE 50 MG/1
50 TABLET ORAL EVERY 6 HOURS PRN
Qty: 20 TABLET | Refills: 0 | Status: SHIPPED | OUTPATIENT
Start: 2019-07-11 | End: 2019-12-04

## 2019-07-11 ASSESSMENT — PAIN SCALES - GENERAL: PAINLEVEL: SEVERE PAIN (7)

## 2019-07-11 NOTE — PROGRESS NOTES
SUBJECTIVE:   Jorge Amaral is a 48 year old male who is seen in consultation at the request of Nai Rees PA-C for evaluation of left foot injury that occurred on 7/1/2019. His foot was fully plantar flexed when impact occurred.  Was seen the next day to urgent care. Was very swollen then with bruising of the toes. Given a walking boot. It has been approximately 10 days since the initial injury. Pain is located over the medial dorsum midfoot area. Presents today with walking boot. No other treatments.     Present symptoms: ongoing left foot pain, dorsal foot   Treatments tried to this point: walking boot.     Orthopedic PMH: history of left plantar fasciitis in 3/2017     Review of Systems:  Constitutional:  NEGATIVE for fever, chills, change in weight  Integumentary/Skin:  NEGATIVE for worrisome rashes, moles or lesions  Eyes:  NEGATIVE for vision changes or irritation  ENT/Mouth:  NEGATIVE for ear, mouth and throat problems  Resp:  NEGATIVE for significant cough or SOB  Breast:  NEGATIVE for masses, tenderness or discharge  CV:  NEGATIVE for chest pain, palpitations or peripheral edema  GI:  NEGATIVE for nausea, abdominal pain, heartburn, or change in bowel habits  :  Negative   Musculoskeletal:  See HPI above  Neuro:  NEGATIVE for weakness, dizziness or paresthesias  Endocrine:  NEGATIVE for temperature intolerance, skin/hair changes  Heme/allergy/immune:  NEGATIVE for bleeding problems  Psychiatric:  NEGATIVE for changes in mood or affect    Past Medical History:   Past Medical History:   Diagnosis Date     Crohn's disease (H)      Hyperlipidemia LDL goal < 160     not on meds     Past Surgical History:   Past Surgical History:   Procedure Laterality Date     APPENDECTOMY       ARTHROSCOPY KNEE WITH MEDIAL MENISCECTOMY Left 1/14/2019    Procedure: LEFT KNEE ARTHROSCOPY WITH PARTIAL MEDIAL MENISCECTOMY;  Surgeon: Joleen Jama MD;  Location:  OR      STOMACH SURGERY PROCEDURE UNLISTED       terminal ileum removed     ENT SURGERY  6-28-13    Left Tonsil biopsy     EXCISE MASS WRIST Right 11/7/2018    Procedure: EXCISION OF RIGHT DORSAL WRIST MASS;  Surgeon: Zohaib Edmond MD;  Location: [facility]     LAPAROSCOPY PROCEDURE UNLISTED       OPEN REDUCTION INTERNAL FIXATION HAND      right IV MCP hand 7/11     REMOVE HARDWARE LOWER EXTREMITY  4/6/2012    Procedure:REMOVE HARDWARE LOWER EXTREMITY; REMOVAL OF RIGHT TIBIAL SCREW; Surgeon:JOSE LUIS CARMICHAEL; Location:[facility]     Family History:   Family History   Problem Relation Age of Onset     Hypertension Mother      Respiratory Mother         SMOKER -EMPYSEMA     Hypertension Father      Diabetes Father      Diabetes Brother      Social History:   Social History     Tobacco Use     Smoking status: Former Smoker     Types: Cigars     Smokeless tobacco: Never Used     Tobacco comment: very rarely    Substance Use Topics     Alcohol use: Yes     Comment: social     OBJECTIVE:  Physical Exam:  /73 (BP Location: Right arm, Patient Position: Sitting, Cuff Size: Adult Large)   Pulse 70   Wt 104.3 kg (230 lb)   SpO2 96%   BMI 29.53 kg/m    General Appearance: healthy, alert and no distress   Skin: no suspicious lesions or rashes  Neuro: Normal strength and tone, mentation intact and speech normal  Vascular: Difficult to palpate dorsalis pedis pulses but capillary refill is good.   Lymph: no lymphadenopathy   Psych:  mentation appears normal and affect normal/bright  Resp: no increased work of breathing     Left Foot Exam:  Inspection: moderate swelling of the foot, mainly localized to midfoot. Also has swelling over the ankle. No ecchymosis of the arch  ROM: grossly full   Tender: first intercuneiform joint and over the first cuneiform and navicular joint.   Sensation: intact  Pain with valgus stress of the midfoot.      X-rays:  Reviewed x-rays of the left foot from 7/2/2019 were reviewed with patient today and show no fractures, but there may be some  widening of the interval between the first and second cuneiform. The second metatarsal lines up with the 2nd cuneiform and the 4th metatarsal lines up with the cuboid.     Obtained today, WEIGHT BEARING x-rays of the left FOOT: 3-views, obtained today and reviewed in the office with the patient by myself today and show no widening of the interval between the first and second cuneiforms. There is an irregularly of the medial navicular which extends into the navicular cuneiform articulation. There is some osteophytes at the talonavicular joint with a capsular calcification versus loose body.     ASSESSMENT:   1. Nondisplaced navicular fracture of the left foot. Based on the mechanism and x-rays today, a lisfranc injury is highly unlikely.    PLAN:   After reviewing initial x-ray, weightbearing x-rays were ordered. Subsequent x-rays were negative for lisfranc type injury and showed a nondisplaced navicular fracture. Will continue with non-operative treatment. I advised that he continue with the boot until pain free to walk, stating this could take 4-6 weeks. I also recommended he work on icing and elevating the left lower extremity for swelling control. Tylenol and anti-inflammatories as needed.     Return to clinic: 3-4 weeks.  New xrays of the foot, possibly stress views if indicated.    The information in this document, created by a scribe for me, accurately reflects the services I personally performed and the decisions made by me. I have reviewed and approved this document for accuracy.      EMILY Edmond MD  Dept. Orthopedic Surgery  [facility]

## 2019-07-11 NOTE — LETTER
7/11/2019         RE: Jorge Amaral  [address] [place] [facility] [address]        Dear Colleague,    Thank you for referring your patient, Jorge Amaral, to the [facility]. Please see a copy of my visit note below.    SUBJECTIVE:   Jorge Amaral is a 48 year old male who is seen in consultation at the request of Nai Rees PA-C for evaluation of left foot injury that occurred on 7/1/2019. His foot was fully plantar flexed when impact occurred.  Was seen the next day to urgent care. Was very swollen then with bruising of the toes. Given a walking boot. It has been approximately 10 days since the initial injury. Pain is located over the medial dorsum midfoot area. Presents today with walking boot. No other treatments.     Present symptoms: ongoing left foot pain, dorsal foot   Treatments tried to this point: walking boot.     Orthopedic PMH: history of left plantar fasciitis in 3/2017     Review of Systems:  Constitutional:  NEGATIVE for fever, chills, change in weight  Integumentary/Skin:  NEGATIVE for worrisome rashes, moles or lesions  Eyes:  NEGATIVE for vision changes or irritation  ENT/Mouth:  NEGATIVE for ear, mouth and throat problems  Resp:  NEGATIVE for significant cough or SOB  Breast:  NEGATIVE for masses, tenderness or discharge  CV:  NEGATIVE for chest pain, palpitations or peripheral edema  GI:  NEGATIVE for nausea, abdominal pain, heartburn, or change in bowel habits  :  Negative   Musculoskeletal:  See HPI above  Neuro:  NEGATIVE for weakness, dizziness or paresthesias  Endocrine:  NEGATIVE for temperature intolerance, skin/hair changes  Heme/allergy/immune:  NEGATIVE for bleeding problems  Psychiatric:  NEGATIVE for changes in mood or affect    Past Medical History:   Past Medical History:   Diagnosis Date     Crohn's disease (H)      Hyperlipidemia LDL goal < 160     not on meds     Past Surgical History:   Past Surgical History:   Procedure Laterality Date      APPENDECTOMY       ARTHROSCOPY KNEE WITH MEDIAL MENISCECTOMY Left 1/14/2019    Procedure: LEFT KNEE ARTHROSCOPY WITH PARTIAL MEDIAL MENISCECTOMY;  Surgeon: Jose Luis Jama MD;  Location:  OR     C STOMACH SURGERY PROCEDURE UNLISTED      terminal ileum removed     ENT SURGERY  6-28-13    Left Tonsil biopsy     EXCISE MASS WRIST Right 11/7/2018    Procedure: EXCISION OF RIGHT DORSAL WRIST MASS;  Surgeon: Zohaib Edmond MD;  Location: [facility]     LAPAROSCOPY PROCEDURE UNLISTED       OPEN REDUCTION INTERNAL FIXATION HAND      right IV MCP hand 7/11     REMOVE HARDWARE LOWER EXTREMITY  4/6/2012    Procedure:REMOVE HARDWARE LOWER EXTREMITY; REMOVAL OF RIGHT TIBIAL SCREW; Surgeon:JOSE LUIS JAMA; Location:[facility]     Family History:   Family History   Problem Relation Age of Onset     Hypertension Mother      Respiratory Mother         SMOKER -EMPYSEMA     Hypertension Father      Diabetes Father      Diabetes Brother      Social History:   Social History     Tobacco Use     Smoking status: Former Smoker     Types: Cigars     Smokeless tobacco: Never Used     Tobacco comment: very rarely    Substance Use Topics     Alcohol use: Yes     Comment: social     OBJECTIVE:  Physical Exam:  /73 (BP Location: Right arm, Patient Position: Sitting, Cuff Size: Adult Large)   Pulse 70   Wt 104.3 kg (230 lb)   SpO2 96%   BMI 29.53 kg/m     General Appearance: healthy, alert and no distress   Skin: no suspicious lesions or rashes  Neuro: Normal strength and tone, mentation intact and speech normal  Vascular: Difficult to palpate dorsalis pedis pulses but capillary refill is good.   Lymph: no lymphadenopathy   Psych:  mentation appears normal and affect normal/bright  Resp: no increased work of breathing     Left Foot Exam:  Inspection: moderate swelling of the foot, mainly localized to midfoot. Also has swelling over the ankle. No ecchymosis of the arch  ROM: grossly full   Tender: first intercuneiform joint and  over the first cuneiform and navicular joint.   Sensation: intact  Pain with valgus stress of the midfoot.      X-rays:  Reviewed x-rays of the left foot from 7/2/2019 were reviewed with patient today and show no fractures, but there may be some widening of the interval between the first and second cuneiform. The second metatarsal lines up with the 2nd cuneiform and the 4th metatarsal lines up with the cuboid.     Obtained today, WEIGHT BEARING x-rays of the left FOOT: 3-views, obtained today and reviewed in the office with the patient by myself today and show no widening of the interval between the first and second cuneiforms. There is an irregularly of the medial navicular which extends into the navicular cuneiform articulation. There is some osteophytes at the talonavicular joint with a capsular calcification versus loose body.     ASSESSMENT:   1. Nondisplaced navicular fracture of the left foot. Based on the mechanism and x-rays today, a lisfranc injury is highly unlikely.    PLAN:   After reviewing initial x-ray, weightbearing x-rays were ordered. Subsequent x-rays were negative for lisfranc type injury and showed a nondisplaced navicular fracture. Will continue with non-operative treatment. I advised that he continue with the boot until pain free to walk, stating this could take 4-6 weeks. I also recommended he work on icing and elevating the left lower extremity for swelling control. Tylenol and anti-inflammatories as needed.     Return to clinic: 3-4 weeks.  New xrays of the foot, possibly stress views if indicated.    The information in this document, created by a scribe for me, accurately reflects the services I personally performed and the decisions made by me. I have reviewed and approved this document for accuracy.      EMILY Edmond MD  Dept. Orthopedic Surgery  [facility]           Again, thank you for allowing me to participate in the care of your patient.         Sincerely,        Zohaib Edmond MD

## 2019-07-17 NOTE — DISCHARGE INSTRUCTIONS
[facility]  Same-Day Surgery   Adult Discharge Orders & Instructions   For 24 hours after surgery  1. Get plenty of rest.  A responsible adult must stay with you for at least 24 hours after you leave the hospital.   2. Do not drive or use heavy equipment.  If you have weakness or tingling, don't drive or use heavy equipment until this feeling goes away.  3. Do not drink alcohol.  4. Avoid strenuous or risky activities.  Ask for help when climbing stairs.   5. You may feel lightheaded.  IF so, sit for a few minutes before standing.  Have someone help you get up.   6. If you have nausea (feel sick to your stomach): Drink only clear liquids such as apple juice, ginger ale, broth or 7-Up.  Rest may also help.  Be sure to drink enough fluids.  Move to a regular diet as you feel able.  7. You may have a slight fever. Call the doctor if your fever is over 100 F (37.7 C) (taken under the tongue) or lasts longer than 24 hours.  8. You may have a dry mouth, a sore throat, muscle aches or trouble sleeping.  These should go away after 24 hours.  9. Do not make important or legal decisions.   Call your doctor for any of the followin.  Signs of infection (fever, growing tenderness at the surgery site, a large amount of drainage or bleeding, severe pain, foul-smelling drainage, redness, swelling).    2. It has been over 8 to 10 hours since surgery and you are still not able to urinate (pass water).    3.  Headache for over 24 hours.         To contact Dr Edmond call:  [phone]    Tylenol was given at 11:45 AM  
No Decelerations

## 2019-07-31 NOTE — PROGRESS NOTES
SUBJECTIVE:   Jorge Amaral is a 48 year old male who is seen in follow-up for evaluation of left foot injury Nondisplaced navicular fracture of the left foot. After reviewing initial x-ray, weightbearing x-rays were ordered. Subsequent x-rays were negative for lisfranc type injury and showed a nondisplaced navicular fracture. . It has been approximately 1 month since the initial injury.    Present symptoms: feels that he is improving.   Treatments tried to this point: boot with weight bearing.  Tramadol made him ill. Didn't take it.    Review of Systems:  Constitutional:  NEGATIVE for fever, chills, change in weight  Integumentary/Skin:  NEGATIVE for worrisome rashes, moles or lesions  Eyes:  NEGATIVE for vision changes or irritation  ENT/Mouth:  NEGATIVE for ear, mouth and throat problems  Resp:  NEGATIVE for significant cough or SOB  Breast:  NEGATIVE for masses, tenderness or discharge  CV:  NEGATIVE for chest pain, palpitations or peripheral edema  GI:  NEGATIVE for nausea, abdominal pain, heartburn, or change in bowel habits  :  Negative   Musculoskeletal:  See HPI above  Neuro:  NEGATIVE for weakness, dizziness or paresthesias  Endocrine:  NEGATIVE for temperature intolerance, skin/hair changes  Heme/allergy/immune:  NEGATIVE for bleeding problems  Psychiatric:  NEGATIVE for changes in mood or affect    Past Medical History:   Past Medical History:   Diagnosis Date     Crohn's disease (H)      Hyperlipidemia LDL goal < 160     not on meds     Past Surgical History:   Past Surgical History:   Procedure Laterality Date     APPENDECTOMY       ARTHROSCOPY KNEE WITH MEDIAL MENISCECTOMY Left 1/14/2019    Procedure: LEFT KNEE ARTHROSCOPY WITH PARTIAL MEDIAL MENISCECTOMY;  Surgeon: Joleen Jama MD;  Location:  OR     C STOMACH SURGERY PROCEDURE UNLISTED      terminal ileum removed     ENT SURGERY  6-28-13    Left Tonsil biopsy     EXCISE MASS WRIST Right 11/7/2018    Procedure: EXCISION OF RIGHT DORSAL  "WRIST MASS;  Surgeon: Zohaib Edmond MD;  Location: [facility] OR     LAPAROSCOPY PROCEDURE UNLISTED       OPEN REDUCTION INTERNAL FIXATION HAND      right IV MCP hand 7/11     REMOVE HARDWARE LOWER EXTREMITY  4/6/2012    Procedure:REMOVE HARDWARE LOWER EXTREMITY; REMOVAL OF RIGHT TIBIAL SCREW; Surgeon:JOSE LUIS CARMICHAEL; Location: [facility]     Family History:   Family History   Problem Relation Age of Onset     Hypertension Mother      Respiratory Mother         SMOKER -EMPYSEMA     Hypertension Father      Diabetes Father      Diabetes Brother      Social History:   Social History     Tobacco Use     Smoking status: Former Smoker     Types: Cigars     Smokeless tobacco: Never Used     Tobacco comment: very rarely    Substance Use Topics     Alcohol use: Yes     Comment: social     OBJECTIVE:  Physical Exam:  /76 (BP Location: Left arm, Patient Position: Sitting, Cuff Size: Adult Regular)   Pulse 72   Ht 1.88 m (6' 2\")   Wt 104.3 kg (230 lb)   SpO2 96%   BMI 29.53 kg/m    General Appearance: healthy, alert and no distress   Skin: no suspicious lesions or rashes  Neuro: Normal strength and tone, mentation intact and speech normal  Vascular: good pulses, and cappillary refill   Lymph: no lymphadenopathy   Psych:  mentation appears normal and affect normal/bright  Resp: no increased work of breathing     Left Foot Exam:  Inspection: some swelling. No deformity.     Tender: 2nd and 3rd TMT.  Non-tender medially today  Mild pain with forefoot eversion with pain lateral midfoot.    Sensation: intact     Xrays: today: no change.  Compared with 2017 xrays the gap around the medial cuneiform is similar.  Ossicles dorsal to navicular unchanged from 2017 as well.    ASSESSMENT:   Healing navicular fracture  Midfoot sprain.  Possible Lisfranc injury not appreciated on xrays.      PLAN:   MRI scan left foot  Follow up or my chart for results.    EMILY Edmond MD  Dept. Orthopedic Surgery  [facility]     "

## 2019-08-01 ENCOUNTER — OFFICE VISIT (OUTPATIENT)
Dept: ORTHOPEDICS | Facility: CLINIC | Age: 49
End: 2019-08-01
Payer: COMMERCIAL

## 2019-08-01 ENCOUNTER — ANCILLARY PROCEDURE (OUTPATIENT)
Dept: GENERAL RADIOLOGY | Facility: CLINIC | Age: 49
End: 2019-08-01
Attending: ORTHOPAEDIC SURGERY
Payer: COMMERCIAL

## 2019-08-01 VITALS
WEIGHT: 230 LBS | SYSTOLIC BLOOD PRESSURE: 116 MMHG | HEIGHT: 74 IN | DIASTOLIC BLOOD PRESSURE: 76 MMHG | HEART RATE: 72 BPM | OXYGEN SATURATION: 96 % | BODY MASS INDEX: 29.52 KG/M2

## 2019-08-01 DIAGNOSIS — S92.902A CLOSED FRACTURE OF LEFT FOOT, INITIAL ENCOUNTER: Primary | ICD-10-CM

## 2019-08-01 DIAGNOSIS — S92.902A CLOSED FRACTURE OF LEFT FOOT, INITIAL ENCOUNTER: ICD-10-CM

## 2019-08-01 PROCEDURE — 73630 X-RAY EXAM OF FOOT: CPT | Mod: LT

## 2019-08-01 PROCEDURE — 99213 OFFICE O/P EST LOW 20 MIN: CPT | Performed by: ORTHOPAEDIC SURGERY

## 2019-08-01 ASSESSMENT — MIFFLIN-ST. JEOR: SCORE: 1973.02

## 2019-08-01 NOTE — LETTER
8/1/2019         RE: Jorge Amaral  [address] [facility] [address]        Dear Colleague,    Thank you for referring your patient, Jorge Amaral, to the [facility]. Please see a copy of my visit note below.    SUBJECTIVE:   Jorge Amaral is a 48 year old male who is seen in follow-up for evaluation of left foot injury Nondisplaced navicular fracture of the left foot. After reviewing initial x-ray, weightbearing x-rays were ordered. Subsequent x-rays were negative for lisfranc type injury and showed a nondisplaced navicular fracture. . It has been approximately 1 month since the initial injury.    Present symptoms: feels that he is improving.   Treatments tried to this point: boot with weight bearing.  Tramadol made him ill. Didn't take it.    Review of Systems:  Constitutional:  NEGATIVE for fever, chills, change in weight  Integumentary/Skin:  NEGATIVE for worrisome rashes, moles or lesions  Eyes:  NEGATIVE for vision changes or irritation  ENT/Mouth:  NEGATIVE for ear, mouth and throat problems  Resp:  NEGATIVE for significant cough or SOB  Breast:  NEGATIVE for masses, tenderness or discharge  CV:  NEGATIVE for chest pain, palpitations or peripheral edema  GI:  NEGATIVE for nausea, abdominal pain, heartburn, or change in bowel habits  :  Negative   Musculoskeletal:  See HPI above  Neuro:  NEGATIVE for weakness, dizziness or paresthesias  Endocrine:  NEGATIVE for temperature intolerance, skin/hair changes  Heme/allergy/immune:  NEGATIVE for bleeding problems  Psychiatric:  NEGATIVE for changes in mood or affect    Past Medical History:   Past Medical History:   Diagnosis Date     Crohn's disease (H)      Hyperlipidemia LDL goal < 160     not on meds     Past Surgical History:   Past Surgical History:   Procedure Laterality Date     APPENDECTOMY       ARTHROSCOPY KNEE WITH MEDIAL MENISCECTOMY Left 1/14/2019    Procedure: LEFT KNEE ARTHROSCOPY WITH PARTIAL MEDIAL MENISCECTOMY;   "Surgeon: Jose Luis Jama MD;  Location: [facility] OR     C STOMACH SURGERY PROCEDURE UNLISTED      terminal ileum removed     ENT SURGERY  6-28-13    Left Tonsil biopsy     EXCISE MASS WRIST Right 11/7/2018    Procedure: EXCISION OF RIGHT DORSAL WRIST MASS;  Surgeon: Zohaib Edmond MD;  Location: [facility] OR     LAPAROSCOPY PROCEDURE UNLISTED       OPEN REDUCTION INTERNAL FIXATION HAND      right IV MCP hand 7/11     REMOVE HARDWARE LOWER EXTREMITY  4/6/2012    Procedure:REMOVE HARDWARE LOWER EXTREMITY; REMOVAL OF RIGHT TIBIAL SCREW; Surgeon:JOSE LUIS JAMA; Location:[facility]     Family History:   Family History   Problem Relation Age of Onset     Hypertension Mother      Respiratory Mother         SMOKER -EMPYSEMA     Hypertension Father      Diabetes Father      Diabetes Brother      Social History:   Social History     Tobacco Use     Smoking status: Former Smoker     Types: Cigars     Smokeless tobacco: Never Used     Tobacco comment: very rarely    Substance Use Topics     Alcohol use: Yes     Comment: social     OBJECTIVE:  Physical Exam:  /76 (BP Location: Left arm, Patient Position: Sitting, Cuff Size: Adult Regular)   Pulse 72   Ht 1.88 m (6' 2\")   Wt 104.3 kg (230 lb)   SpO2 96%   BMI 29.53 kg/m     General Appearance: healthy, alert and no distress   Skin: no suspicious lesions or rashes  Neuro: Normal strength and tone, mentation intact and speech normal  Vascular: good pulses, and cappillary refill   Lymph: no lymphadenopathy   Psych:  mentation appears normal and affect normal/bright  Resp: no increased work of breathing     Left Foot Exam:  Inspection: some swelling. No deformity.     Tender: 2nd and 3rd TMT.  Non-tender medially today  Mild pain with forefoot eversion with pain lateral midfoot.    Sensation: intact     Xrays: today: no change.  Compared with 2017 xrays the gap around the medial cuneiform is similar.  Ossicles dorsal to navicular unchanged from 2017 as well.    ASSESSMENT: "   Healing navicular fracture  Midfoot sprain.  Possible Lisfranc injury not appreciated on xrays.      PLAN:   MRI scan left foot  Follow up or my chart for results.    EMILY Edmond MD  Dept. Orthopedic Surgery  [facility]       Again, thank you for allowing me to participate in the care of your patient.        Sincerely,        Zohaib Edmond MD

## 2019-08-07 ENCOUNTER — TELEPHONE (OUTPATIENT)
Dept: FAMILY MEDICINE | Facility: CLINIC | Age: 49
End: 2019-08-07

## 2019-08-07 ENCOUNTER — TELEPHONE (OUTPATIENT)
Dept: ORTHOPEDICS | Facility: CLINIC | Age: 49
End: 2019-08-07

## 2019-08-07 DIAGNOSIS — S93.325A DISLOCATION OF TARSOMETATARSAL JOINT OF LEFT FOOT, INITIAL ENCOUNTER: Primary | ICD-10-CM

## 2019-08-07 NOTE — TELEPHONE ENCOUNTER
VM message left a patients listed cell # advising he can call to set up his L foot MRI. RTC to discuss treatment plan based on results. uCrry Gayle OPA

## 2019-08-07 NOTE — TELEPHONE ENCOUNTER
Patient is calling to ask provider to enter an order for his MRI    Please call to discuss  Thank you

## 2019-08-13 ENCOUNTER — ANCILLARY PROCEDURE (OUTPATIENT)
Dept: MRI IMAGING | Facility: CLINIC | Age: 49
End: 2019-08-13
Attending: ORTHOPAEDIC SURGERY
Payer: COMMERCIAL

## 2019-08-13 DIAGNOSIS — S93.325A DISLOCATION OF TARSOMETATARSAL JOINT OF LEFT FOOT, INITIAL ENCOUNTER: ICD-10-CM

## 2019-08-13 PROCEDURE — 73718 MRI LOWER EXTREMITY W/O DYE: CPT | Mod: TC

## 2019-08-20 ENCOUNTER — TELEPHONE (OUTPATIENT)
Dept: ORTHOPEDICS | Facility: CLINIC | Age: 49
End: 2019-08-20

## 2019-08-20 NOTE — TELEPHONE ENCOUNTER
Called patient and reviewed Dr. Saul's result note with him.  Patient verbalized understanding and had no other questions.    Myke Rossi RN....8/20/2019 1:04 PM

## 2019-09-04 NOTE — PROGRESS NOTES
SUBJECTIVE:   Jorge Amaral is here in follow up of his non displaced Navicular foot fracture from 7/1/2019.  It has been 9 weeks since the injury.      His recent 8/13/19 foot MRI showed no new ligament injury with some osteoarthritis of There are degenerative changes within the visualized mid  foot including mild degenerative change in the majority of the  tarsometatarsal joints.    Present symptoms: some pain with weight bearing, but mainly medial ankle ( without boot)       Review of Systems:  Constitutional/General: Negative for fever, chills, change in weight  Integumentary/Skin: Negative for worrisome rashes, moles, or lesions  Neuro: Negative for weakness, dizziness, or paresthesias   Psychiatric: negative for changes in mood or affect    OBJECTIVE:  Physical Exam:  /78 (BP Location: Left arm, Patient Position: Sitting, Cuff Size: Adult Regular)   Pulse 64   SpO2 99%   General Appearance: healthy, alert and no distress   Skin: no suspicious lesions or rashes  Neuro: Normal strength and tone, mentation intact and speech normal  Vascular: good pulses, and capillary refill   Lymph: no lymphadenopathy   Psych:  mentation appears normal and affect normal/bright  Resp: no increased work of breathing    Left Foot Exam:  Inspection: some hypertrophy around midfoot   ROM: within normal limits ankle   Tender: non-tender navicular   Sensation: intact     ASSESSMENT:   Healed left foot navicular  Osteoarthritis left foot    PLAN:   Wean out of boot  physical therapy ordered for ankle    Return to clinic: as needed     EMILY Edmond MD  Dept. Orthopedic Surgery  [facility]

## 2019-09-05 ENCOUNTER — OFFICE VISIT (OUTPATIENT)
Dept: ORTHOPEDICS | Facility: CLINIC | Age: 49
End: 2019-09-05
Payer: COMMERCIAL

## 2019-09-05 VITALS — OXYGEN SATURATION: 99 % | HEART RATE: 64 BPM | DIASTOLIC BLOOD PRESSURE: 78 MMHG | SYSTOLIC BLOOD PRESSURE: 125 MMHG

## 2019-09-05 DIAGNOSIS — S92.902A CLOSED FRACTURE OF LEFT FOOT, INITIAL ENCOUNTER: Primary | ICD-10-CM

## 2019-09-05 PROCEDURE — 99213 OFFICE O/P EST LOW 20 MIN: CPT | Performed by: ORTHOPAEDIC SURGERY

## 2019-09-05 ASSESSMENT — PAIN SCALES - GENERAL: PAINLEVEL: MILD PAIN (3)

## 2019-09-05 NOTE — LETTER
9/5/2019         RE: Jorge Amaral  [address] [place] [facility] [address]        Dear Colleague,    Thank you for referring your patient, Jorge Amaral, to the [facility]. Please see a copy of my visit note below.    SUBJECTIVE:   Jorge Amaral is here in follow up of his non displaced Navicular foot fracture from 7/1/2019.  It has been 9 weeks since the injury.      His recent 8/13/19 foot MRI showed no new ligament injury with some osteoarthritis of There are degenerative changes within the visualized mid  foot including mild degenerative change in the majority of the  tarsometatarsal joints.    Present symptoms: some pain with weight bearing, but mainly medial ankle ( without boot)       Review of Systems:  Constitutional/General: Negative for fever, chills, change in weight  Integumentary/Skin: Negative for worrisome rashes, moles, or lesions  Neuro: Negative for weakness, dizziness, or paresthesias   Psychiatric: negative for changes in mood or affect    OBJECTIVE:  Physical Exam:  /78 (BP Location: Left arm, Patient Position: Sitting, Cuff Size: Adult Regular)   Pulse 64   SpO2 99%   General Appearance: healthy, alert and no distress   Skin: no suspicious lesions or rashes  Neuro: Normal strength and tone, mentation intact and speech normal  Vascular: good pulses, and capillary refill   Lymph: no lymphadenopathy   Psych:  mentation appears normal and affect normal/bright  Resp: no increased work of breathing    Left Foot Exam:  Inspection: some hypertrophy around midfoot   ROM: within normal limits ankle   Tender: non-tender navicular   Sensation: intact     ASSESSMENT:   Healed left foot navicular  Osteoarthritis left foot    PLAN:   Wean out of boot  physical therapy ordered for ankle    Return to clinic: as needed     EMILY Edmond MD  Dept. Orthopedic Surgery  [facility]         Again, thank you for allowing me to participate in the care of your  patient.        Sincerely,        Zohaib Edmond MD

## 2019-09-18 ENCOUNTER — THERAPY VISIT (OUTPATIENT)
Dept: PHYSICAL THERAPY | Facility: CLINIC | Age: 49
End: 2019-09-18
Payer: COMMERCIAL

## 2019-09-18 DIAGNOSIS — S92.902A CLOSED FRACTURE OF LEFT FOOT, INITIAL ENCOUNTER: ICD-10-CM

## 2019-09-18 PROCEDURE — 97161 PT EVAL LOW COMPLEX 20 MIN: CPT | Mod: GP | Performed by: PHYSICAL THERAPIST

## 2019-09-18 PROCEDURE — 97112 NEUROMUSCULAR REEDUCATION: CPT | Mod: GP | Performed by: PHYSICAL THERAPIST

## 2019-09-18 NOTE — PROGRESS NOTES
[facility] Initial Evaluation  HPI  SUBJECTIVE  Jorge Amaral is a 49 year old male patient presenting to Physical Therapy with the following Primary Symptoms: Left foot/ankle pain s/p navicular fracture 7/1/19. He denies having related symptoms spreading distally to the toes.  He denies having focal pain at the 5th metatarsal head, lateral and medial malleoli, or pain limiting ability to walk >4 steps.     These pains are described as intermittent. Pain is rated 0/10 at rest, and 2/10 at worst. Patient describes pain as dull and aching. History of symptoms: These pains began suddenly July / 01 / 2019 as the result of L navicular fracture from traumatic kick while sparring.     Previous treatment has included Cold, boot immobilization x6 weeks. Patient notes that prior treatment has resulted in improvement in pain and full healing of bone per radiology reports. Since onset, these pains are improving.     Current Symptoms are worsened by: kicking, prolonged walking >2 hours, ascending stairs.     Current Symptoms are relieved by ice.     Patient states that current complaints are not affecting sleep pattern.     Recent surgical procedure: None.     Recent imaging studies have been performed with the following findings: MRI 8/13 revealed healed non-displaced navicular fracture with noted degenerative changes of multiple TMT joints.     General health as reported by patient is: good. Pertinent medical history: multiple fractures, R ACL-R x2. He denies any significant current illness or recent hospital admissions. He denies any regonal implanted devices.     Patient is no longer wearing walking boot at this time. He has weaned out of it successfully since seeing Dr. Edmond last on 9/5/19.    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 Occupational duties include: Prolonged standing, kicking, single leg activity. Previous functional status: fully functional prior to pain  onset/injury.    PATIENT GOALS: To be able to return to instructing and competing in [employer] with the following tasks painfree: Kicking, Planting, Rotating over fixed foot.      OBJECTIVE    STATIC FOOT POSTURE: Pes cavus bilaterally, mild ER of feet in quiet stance    GAIT: Patient with overall slowed gait speed for patient of his age. No obvioius deviations. Supinated foot throughout gait cycle with maintenance of ER during stance phase bilaterally.     FUNCTIONAL SCREEN:  -Double leg squat: Glute dominant form, parallel hips to floor depth, no pain  -Single leg stance: 30 seconds eyes open on even ground bilaterally, increased sway on L LE with occasional hip strategy compensation  -Double leg heel raises: 20 repetitions, no loss of height bilaterally  -Single leg heel raises: (R) - 15 reps limited by calf fatigue due to old injury; (L) - 12 reps limited by fatigue and pain in area of TMT joint      EDEMA: Mild L ankle joint effusion noted, no warmth surrounding TMT, TC, or SBT joints.     ROM:   Right   (AROM/PROM) Left  (AROM/PROM)   Dorsiflexion - weightbearing 15 cm 12 cm   Dorsiflexion - NWB, knee extended -5 -5   Dorsiflexion - NWB, knee flexed -10 -10   Plantarflexion 45 45   Eversion WNL WNL   Inversion WNL WNL   1st MTP dorsiflexion 90 90   *Denotes pain    End Feels: Dorsiflexion and eversion with mildly increased capsular end feel in L foot.      Strength (MMT):   Left Right   Dorsiflexion 5-/5* 5/5   Posterior Tib 5-/5* 5/5   EHL 5-/5* 5/5   Fibularis Longus 5/5 5/5   Extensor digitorum 5/5 5/5   *Denotes pain      PALPATION: Mild tenderness over L TMT joints 3-4. No bony tenderness at navicular tuberosity or body of navicular.    MIDFOOT/FOREFOOT MOBILITY: Mild loss of mobility through calcaneocuboid and cuboid-navicular-lateral cuneiform joint.        Therapist Assessment: Jorge Amaral is a 49 year old male patient presenting to Physical Therapy with left foot pain s/p immobilization from  navicular fracture. Patient demonstrates with mild loss of strength and balance on L LE, with mild joint mobility loss in midtarsal joints. Signs and symptoms are consistent with stiffness and loss of proprioception following immobilization. Skilled PT services are necessary in order to reduce impairments and improve independent function.                  System    Physical Exam    General     ROS    Assessment/Plan:    Patient is a 49 year old male with left side foot/ankle complaints.    Patient has the following significant findings with corresponding treatment plan.                Diagnosis 1:  L foot pain/hypomobility s/p navicular fracture  Pain -  hot/cold therapy, manual therapy, self management, education and home program  Decreased joint mobility - manual therapy, therapeutic exercise and home program  Impaired balance - neuro re-education, therapeutic activities and home program  Decreased function - therapeutic activities and home program    Therapy Evaluation Codes:   Cumulative Therapy Evaluation is: Low complexity.    Previous and current functional limitations:  (See Goal Flow Sheet for this information)    Short term and Long term goals: (See Goal Flow Sheet for this information)     Communication ability:  Patient appears to be able to clearly communicate and understand verbal and written communication and follow directions correctly.  Treatment Explanation - The following has been discussed with the patient:   RX ordered/plan of care  Anticipated outcomes  Possible risks and side effects  This patient would benefit from PT intervention to resume normal activities.   Rehab potential is excellent.    Frequency:  2 X a month, once daily  Duration:  for 2 months  Discharge Plan:  Achieve all LTG.  Independent in home treatment program.  Reach maximal therapeutic benefit.    Please refer to the daily flowsheet for treatment today, total treatment time and time spent performing 1:1 timed codes.

## 2019-10-02 ENCOUNTER — THERAPY VISIT (OUTPATIENT)
Dept: PHYSICAL THERAPY | Facility: CLINIC | Age: 49
End: 2019-10-02
Payer: COMMERCIAL

## 2019-10-02 DIAGNOSIS — S92.902A CLOSED FRACTURE OF LEFT FOOT, INITIAL ENCOUNTER: ICD-10-CM

## 2019-10-02 PROCEDURE — 97140 MANUAL THERAPY 1/> REGIONS: CPT | Mod: GP | Performed by: PHYSICAL THERAPIST

## 2019-10-02 PROCEDURE — 97110 THERAPEUTIC EXERCISES: CPT | Mod: GP | Performed by: PHYSICAL THERAPIST

## 2019-12-04 ENCOUNTER — OFFICE VISIT (OUTPATIENT)
Dept: FAMILY MEDICINE | Facility: CLINIC | Age: 49
End: 2019-12-04
Payer: COMMERCIAL

## 2019-12-04 VITALS
HEIGHT: 74 IN | TEMPERATURE: 97.9 F | OXYGEN SATURATION: 98 % | BODY MASS INDEX: 28.36 KG/M2 | HEART RATE: 77 BPM | WEIGHT: 221 LBS | SYSTOLIC BLOOD PRESSURE: 110 MMHG | DIASTOLIC BLOOD PRESSURE: 71 MMHG

## 2019-12-04 DIAGNOSIS — B00.1 HERPES LABIALIS: ICD-10-CM

## 2019-12-04 DIAGNOSIS — Z00.00 ROUTINE GENERAL MEDICAL EXAMINATION AT A HEALTH CARE FACILITY: Primary | ICD-10-CM

## 2019-12-04 DIAGNOSIS — Z80.42 FH: PROSTATE CANCER: ICD-10-CM

## 2019-12-04 LAB
ALBUMIN SERPL-MCNC: 3.7 G/DL (ref 3.4–5)
ALP SERPL-CCNC: 77 U/L (ref 40–150)
ALT SERPL W P-5'-P-CCNC: 46 U/L (ref 0–70)
ANION GAP SERPL CALCULATED.3IONS-SCNC: 5 MMOL/L (ref 3–14)
AST SERPL W P-5'-P-CCNC: 18 U/L (ref 0–45)
BILIRUB SERPL-MCNC: 0.4 MG/DL (ref 0.2–1.3)
BUN SERPL-MCNC: 17 MG/DL (ref 7–30)
CALCIUM SERPL-MCNC: 8.8 MG/DL (ref 8.5–10.1)
CHLORIDE SERPL-SCNC: 106 MMOL/L (ref 94–109)
CHOLEST SERPL-MCNC: 235 MG/DL
CO2 SERPL-SCNC: 32 MMOL/L (ref 20–32)
CREAT SERPL-MCNC: 1.08 MG/DL (ref 0.66–1.25)
GFR SERPL CREATININE-BSD FRML MDRD: 79 ML/MIN/{1.73_M2}
GLUCOSE SERPL-MCNC: 101 MG/DL (ref 70–99)
HDLC SERPL-MCNC: 38 MG/DL
LDLC SERPL CALC-MCNC: 132 MG/DL
NONHDLC SERPL-MCNC: 197 MG/DL
POTASSIUM SERPL-SCNC: 4.3 MMOL/L (ref 3.4–5.3)
PROT SERPL-MCNC: 7.4 G/DL (ref 6.8–8.8)
SODIUM SERPL-SCNC: 143 MMOL/L (ref 133–144)
TRIGL SERPL-MCNC: 324 MG/DL

## 2019-12-04 PROCEDURE — G0103 PSA SCREENING: HCPCS | Performed by: PHYSICIAN ASSISTANT

## 2019-12-04 PROCEDURE — 80061 LIPID PANEL: CPT | Performed by: PHYSICIAN ASSISTANT

## 2019-12-04 PROCEDURE — 99396 PREV VISIT EST AGE 40-64: CPT | Performed by: PHYSICIAN ASSISTANT

## 2019-12-04 PROCEDURE — 80053 COMPREHEN METABOLIC PANEL: CPT | Performed by: PHYSICIAN ASSISTANT

## 2019-12-04 PROCEDURE — 36415 COLL VENOUS BLD VENIPUNCTURE: CPT | Performed by: PHYSICIAN ASSISTANT

## 2019-12-04 RX ORDER — VALACYCLOVIR HYDROCHLORIDE 1 G/1
TABLET, FILM COATED ORAL
Qty: 30 TABLET | Refills: 0 | Status: SHIPPED | OUTPATIENT
Start: 2019-12-04 | End: 2021-02-02

## 2019-12-04 ASSESSMENT — ENCOUNTER SYMPTOMS
HEARTBURN: 0
SORE THROAT: 0
MYALGIAS: 0
FREQUENCY: 0
FEVER: 0
WEAKNESS: 0
SHORTNESS OF BREATH: 0
HEMATOCHEZIA: 0
CHILLS: 0
COUGH: 0
PARESTHESIAS: 0
CONSTIPATION: 0
PALPITATIONS: 0
NERVOUS/ANXIOUS: 0
ARTHRALGIAS: 0
DYSURIA: 0
HEADACHES: 1
DIZZINESS: 0
EYE PAIN: 0
DIARRHEA: 0
ABDOMINAL PAIN: 0
HEMATURIA: 0
JOINT SWELLING: 0
NAUSEA: 0

## 2019-12-04 ASSESSMENT — MIFFLIN-ST. JEOR: SCORE: 1931.17

## 2019-12-04 NOTE — PROGRESS NOTES
SUBJECTIVE:   CC: Jorge Amaral is an 49 year old male who presents for preventative health visit.     Healthy Habits:     Getting at least 3 servings of Calcium per day:  NO    Bi-annual eye exam:  Yes    Dental care twice a year:  Yes    Sleep apnea or symptoms of sleep apnea:  Daytime drowsiness    Diet:  Regular (no restrictions)    Frequency of exercise:  4-5 days/week    Duration of exercise:  45-60 minutes    Taking medications regularly:  Yes    Medication side effects:  None    PHQ-2 Total Score: 0    Additional concerns today:  Yes  He has a family history of prostate cancer in his father paternal uncle and paternal grandfather and he would like screening for prostate cancer today.  He does not elicit any symptoms.    Also has a history of cold sores he has not had one for over a year but would like a refill of his medication.      Today's PHQ-2 Score:   PHQ-2 ( 1999 [employer]) 12/4/2019   Q1: Little interest or pleasure in doing things 0   Q2: Feeling down, depressed or hopeless 0   PHQ-2 Score 0   Q1: Little interest or pleasure in doing things Not at all   Q2: Feeling down, depressed or hopeless Not at all   PHQ-2 Score 0       Abuse: Current or Past(Physical, Sexual or Emotional)- No  Do you feel safe in your environment? Yes        Social History     Tobacco Use     Smoking status: Former Smoker     Types: Cigars     Smokeless tobacco: Never Used     Tobacco comment: very rarely    Substance Use Topics     Alcohol use: Yes     Comment: social     If you drink alcohol do you typically have >3 drinks per day or >7 drinks per week? No    Alcohol Use 12/4/2019   Prescreen: >3 drinks/day or >7 drinks/week? No   Prescreen: >3 drinks/day or >7 drinks/week? -   No flowsheet data found.    Last PSA:   PSA   Date Value Ref Range Status   10/09/2018 0.50 0 - 4 ug/L Final     Comment:     Assay Method:  Chemiluminescence using Siemens Vista analyzer       Reviewed orders with patient. Reviewed health maintenance  and updated orders accordingly - Yes  Lab work is in process  Labs reviewed in EPIC  BP Readings from Last 3 Encounters:   12/04/19 110/71   09/05/19 125/78   08/01/19 116/76    Wt Readings from Last 3 Encounters:   12/04/19 100.2 kg (221 lb)   08/01/19 104.3 kg (230 lb)   07/11/19 104.3 kg (230 lb)                  Patient Active Problem List   Diagnosis     CARDIOVASCULAR SCREENING; LDL GOAL LESS THAN 160     Fracture, metacarpal     Delayed union of fracture     Family history of diabetes mellitus     ACL (anterior cruciate ligament) tear--recurrent     Cold sore     Common wart     Crohn's disease with complication, unspecified gastrointestinal tract location (H)     Herpes labialis     FH: prostate cancer     Closed fracture of left foot, initial encounter     BMI 28.0-28.9,adult     Past Surgical History:   Procedure Laterality Date     APPENDECTOMY       ARTHROSCOPY KNEE WITH MEDIAL MENISCECTOMY Left 1/14/2019    Procedure: LEFT KNEE ARTHROSCOPY WITH PARTIAL MEDIAL MENISCECTOMY;  Surgeon: Jose Luis Jama MD;  Location:  OR      STOMACH SURGERY PROCEDURE UNLISTED      terminal ileum removed     ENT SURGERY  6-28-13    Left Tonsil biopsy     EXCISE MASS WRIST Right 11/7/2018    Procedure: EXCISION OF RIGHT DORSAL WRIST MASS;  Surgeon: Zohaib Edmond MD;  Location: [facility] OR     LAPAROSCOPY PROCEDURE UNLISTED       OPEN REDUCTION INTERNAL FIXATION HAND      right IV MCP hand 7/11     REMOVE HARDWARE LOWER EXTREMITY  4/6/2012    Procedure:REMOVE HARDWARE LOWER EXTREMITY; REMOVAL OF RIGHT TIBIAL SCREW; Surgeon:JOSE LUIS JAMA; Location:[facility]       Social History     Tobacco Use     Smoking status: Former Smoker     Types: Cigars     Smokeless tobacco: Never Used     Tobacco comment: very rarely    Substance Use Topics     Alcohol use: Yes     Comment: social     Family History   Problem Relation Age of Onset     Hypertension Mother      Respiratory Mother         SMOKER -EMPYSEMA     Hypertension  Father      Diabetes Father      Prostate Cancer Father 50     Prostate Cancer Paternal Grandfather      Diabetes Brother      Prostate Cancer Paternal Uncle 50         Current Outpatient Medications   Medication Sig Dispense Refill     adalimumab (HUMIRA) 40 MG/0.8ML injection Inject 40 mg Subcutaneous once.       diphenoxylate-atropine (LOMOTIL) 2.5-0.025 MG per tablet Take 2 tablets by mouth       mercaptopurine (PURINETHOL) 50 MG tablet Take 50 mg by mouth daily. 1 and half tabs daily       valACYclovir (VALTREX) 1000 mg tablet two tablets by mouth twice daily for 1 day at onset of symptoms of a cold sore. 30 tablet 0     No Known Allergies    Reviewed and updated as needed this visit by clinical staff  Tobacco  Allergies  Meds  Problems  Med Hx  Surg Hx  Fam Hx  Soc Hx          Reviewed and updated as needed this visit by Provider  Tobacco  Allergies  Meds  Problems  Med Hx  Surg Hx  Fam Hx          Past Medical History:   Diagnosis Date     Crohn's disease (H)      Hyperlipidemia LDL goal < 160     not on meds     Nondisplaced fracture of navicular (scaphoid) of left foot, subsequent encounter for fracture with delayed healing 07/03/2019      Past Surgical History:   Procedure Laterality Date     APPENDECTOMY       ARTHROSCOPY KNEE WITH MEDIAL MENISCECTOMY Left 1/14/2019    Procedure: LEFT KNEE ARTHROSCOPY WITH PARTIAL MEDIAL MENISCECTOMY;  Surgeon: Jose Luis Jama MD;  Location: [facility] OR     C STOMACH SURGERY PROCEDURE UNLISTED      terminal ileum removed     ENT SURGERY  6-28-13    Left Tonsil biopsy     EXCISE MASS WRIST Right 11/7/2018    Procedure: EXCISION OF RIGHT DORSAL WRIST MASS;  Surgeon: Zohaib Edmond MD;  Location: [facility] OR     LAPAROSCOPY PROCEDURE UNLISTED       OPEN REDUCTION INTERNAL FIXATION HAND      right IV MCP hand 7/11     REMOVE HARDWARE LOWER EXTREMITY  4/6/2012    Procedure:REMOVE HARDWARE LOWER EXTREMITY; REMOVAL OF RIGHT TIBIAL SCREW; Surgeon:JOSE LUIS JAMA;  "Location:[facility]       Review of Systems   Constitutional: Negative for chills and fever.   HENT: Negative for congestion, ear pain, hearing loss and sore throat.    Eyes: Negative for pain and visual disturbance.   Respiratory: Negative for cough and shortness of breath.    Cardiovascular: Negative for chest pain, palpitations and peripheral edema.   Gastrointestinal: Negative for abdominal pain, constipation, diarrhea, heartburn, hematochezia and nausea.   Genitourinary: Negative for discharge, dysuria, frequency, genital sores, hematuria, impotence and urgency.   Musculoskeletal: Negative for arthralgias, joint swelling and myalgias.   Skin: Negative for rash.   Neurological: Positive for headaches. Negative for dizziness, weakness and paresthesias.   Psychiatric/Behavioral: Negative for mood changes. The patient is not nervous/anxious.        OBJECTIVE:   /71 (Cuff Size: Adult Large)   Pulse 77   Temp 97.9  F (36.6  C) (Oral)   Ht 1.886 m (6' 2.25\")   Wt 100.2 kg (221 lb)   SpO2 98%   BMI 28.18 kg/m      Physical Exam  GENERAL: healthy, alert and no distress  EYES: Eyes grossly normal to inspection, PERRL and conjunctivae and sclerae normal  HENT: ear canals and TM's normal, nose and mouth without ulcers or lesions  NECK: no adenopathy, no asymmetry, masses, or scars and thyroid normal to palpation  RESP: lungs clear to auscultation - no rales, rhonchi or wheezes  CV: regular rate and rhythm, normal S1 S2, no S3 or S4, no murmur, click or rub, no peripheral edema and peripheral pulses strong  ABDOMEN: soft, nontender, no hepatosplenomegaly, no masses and bowel sounds normal   (male): normal male genitalia without lesions or urethral discharge, no hernia  MS: no gross musculoskeletal defects noted, no edema  SKIN: no suspicious lesions or rashes  NEURO: Normal strength and tone, mentation intact and speech normal  PSYCH: mentation appears normal, affect normal/bright    Diagnostic Test Results:  Labs " "reviewed in Epic    ASSESSMENT/PLAN:       ICD-10-CM    1. Routine general medical examination at a health care facility Z00.00 Lipid panel reflex to direct LDL Fasting     Comprehensive metabolic panel   2. Herpes labialis B00.1 valACYclovir (VALTREX) 1000 mg tablet   3. FH: prostate cancer Z80.42 PSA, screen   4. BMI 28.0-28.9,adult Z68.28    1. Work on Healthy diet and exercise. Getting heart rate elevated for 30 mins most days of week.  2. Stable, ok for refill  3. Labs pending.     COUNSELING:   Reviewed preventive health counseling, as reflected in patient instructions       Regular exercise       Healthy diet/nutrition       Vision screening    Estimated body mass index is 28.18 kg/m  as calculated from the following:    Height as of this encounter: 1.886 m (6' 2.25\").    Weight as of this encounter: 100.2 kg (221 lb).     Weight management plan: Discussed healthy diet and exercise guidelines     reports that he has quit smoking. His smoking use included cigars. He has never used smokeless tobacco.      Counseling Resources:  ATP IV Guidelines  Pooled Cohorts Equation Calculator  FRAX Risk Assessment  ICSI Preventive Guidelines  Dietary Guidelines for Americans, 2010  USDA's MyPlate  ASA Prophylaxis  Lung CA Screening    Vince Swain PA-C  [facility]  "

## 2019-12-05 LAB — PSA SERPL-ACNC: 0.47 UG/L (ref 0–4)

## 2019-12-20 PROBLEM — S92.902A CLOSED FRACTURE OF LEFT FOOT, INITIAL ENCOUNTER: Status: RESOLVED | Noted: 2019-09-18 | Resolved: 2019-12-20

## 2019-12-20 NOTE — PROGRESS NOTES
Patient has failed to follow up within 30 days and current functional status is unknown due to lack of contact with clinic. Please see most recent note dated 10/2/19 for most recent functional status. Patient is to be discharged from formal PT at this time.

## 2020-02-06 ENCOUNTER — OFFICE VISIT (OUTPATIENT)
Dept: DERMATOLOGY | Facility: CLINIC | Age: 50
End: 2020-02-06
Payer: COMMERCIAL

## 2020-02-06 DIAGNOSIS — B07.9 VERRUCA: Primary | ICD-10-CM

## 2020-02-06 DIAGNOSIS — D84.9 IMMUNOSUPPRESSION (H): ICD-10-CM

## 2020-02-06 DIAGNOSIS — R23.4 FISSURE IN SKIN: ICD-10-CM

## 2020-02-06 DIAGNOSIS — R22.9 SUBCUTANEOUS NODULE: ICD-10-CM

## 2020-02-06 PROCEDURE — 99213 OFFICE O/P EST LOW 20 MIN: CPT | Mod: 25 | Performed by: DERMATOLOGY

## 2020-02-06 PROCEDURE — 17110 DESTRUCTION B9 LES UP TO 14: CPT | Performed by: DERMATOLOGY

## 2020-02-06 RX ORDER — HYDROCORTISONE 2.5 %
CREAM (GRAM) TOPICAL
Qty: 30 G | Refills: 0 | Status: SHIPPED | OUTPATIENT
Start: 2020-02-06 | End: 2020-11-16

## 2020-02-06 ASSESSMENT — PAIN SCALES - GENERAL: PAINLEVEL: NO PAIN (0)

## 2020-02-06 NOTE — LETTER
2/6/2020         RE: Jorge Amaral  [address] [facility] [address]        Dear Colleague,    Thank you for referring your patient, Jorge Amaral, to the [facility]. Please see a copy of my visit note below.    [facility] Dermatology Note      Dermatology Problem List:  1. History of immunosuppression, Chron's  2. Bowenoid papulosis, left abdomen  -s/p biopsy 4/18/15  -s/p Efudex x 6 weeks initiated 5/6/15, marked resolved 8/2015  3. Verruca plantaris  -Previous Tx: cantherone, trichloracetic acid, cryotherapy and PDL with Dr. Chris Stoddard, s/p candida 3X, cryotherapy  4. Tinea versicolor, back and chest  -Previous Tx:  ketoconazole 2% cream initiated 10/23/2015  5. 2 cm fissure on crease of R posterior ear - not healing  -current tx; hydrocortisone 2.5% cream    Encounter Date: Feb 6, 2020    CC:  Chief Complaint   Patient presents with     Skin Check     Hx of immunosupression     Wart     finger         History of Present Illness:  Mr. Jorge Amaral is a 49 year old male who presents for follow up for a history of immunosuppression in the setting of Chron's disease (on Humira). Last seen 02/21/2019 when cryotherapy was performed on a plantar wart. Today he reports a new wart has appeared on his finger. He also reports a spot of concern behind his right ear been there over 1 month that is not healing. He also reports a bump on his left bicep Feeling well over all    No other concerns.     Past Medical History:   Patient Active Problem List   Diagnosis     CARDIOVASCULAR SCREENING; LDL GOAL LESS THAN 160     Fracture, metacarpal     Delayed union of fracture     Family history of diabetes mellitus     ACL (anterior cruciate ligament) tear--recurrent     Cold sore     Common wart     Crohn's disease with complication, unspecified gastrointestinal tract location (H)     Herpes labialis     FH: prostate cancer     BMI 28.0-28.9,adult     Past Medical  History:   Diagnosis Date     Crohn's disease (H)      Hyperlipidemia LDL goal < 160     not on meds     Nondisplaced fracture of navicular (scaphoid) of left foot, subsequent encounter for fracture with delayed healing 07/03/2019     Past Surgical History:   Procedure Laterality Date     APPENDECTOMY       ARTHROSCOPY KNEE WITH MEDIAL MENISCECTOMY Left 1/14/2019    Procedure: LEFT KNEE ARTHROSCOPY WITH PARTIAL MEDIAL MENISCECTOMY;  Surgeon: Jose Luis Jama MD;  Location:  OR     C STOMACH SURGERY PROCEDURE UNLISTED      terminal ileum removed     ENT SURGERY  6-28-13    Left Tonsil biopsy     EXCISE MASS WRIST Right 11/7/2018    Procedure: EXCISION OF RIGHT DORSAL WRIST MASS;  Surgeon: Zohaib Edmond MD;  Location: [facility] OR     LAPAROSCOPY PROCEDURE UNLISTED       OPEN REDUCTION INTERNAL FIXATION HAND      right IV MCP hand 7/11     REMOVE HARDWARE LOWER EXTREMITY  4/6/2012    Procedure:REMOVE HARDWARE LOWER EXTREMITY; REMOVAL OF RIGHT TIBIAL SCREW; Surgeon:JOSE LUIS JAMA; Location:[facility]       Social History:  Patient  reports that he has quit smoking. His smoking use included cigars. He has never used smokeless tobacco. He reports current alcohol use. He reports that he does not use drugs.   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    Family History:  Family History   Problem Relation Age of Onset     Hypertension Mother      Respiratory Mother         SMOKER -EMPYSEMA     Hypertension Father      Diabetes Father      Prostate Cancer Father 50     Prostate Cancer Paternal Grandfather      Diabetes Brother      Prostate Cancer Paternal Uncle 50       Medications:  Current Outpatient Medications   Medication Sig Dispense Refill     adalimumab (HUMIRA) 40 MG/0.8ML injection Inject 40 mg Subcutaneous once.       diphenoxylate-atropine (LOMOTIL) 2.5-0.025 MG per tablet Take 2 tablets by mouth       mercaptopurine (PURINETHOL) 50 MG tablet Take 50 mg by mouth daily. 1 and half tabs daily       valACYclovir (VALTREX) 1000  mg tablet two tablets by mouth twice daily for 1 day at onset of symptoms of a cold sore. 30 tablet 0       No Known Allergies    Review of Systems:  -Constitutional: Otherwise feeling well today, in usual state of health.  -Skin: As above in HPI. No additional skin concerns.    Physical exam:  Vitals: There were no vitals taken for this visit.  GEN: This is a well developed, well-nourished male in no acute distress, in a pleasant mood.    SKIN: Full skin, which includes the head/face, both arms, chest, back, abdomen,both legs, genitalia and/or groin buttocks, digits and/or nails, was examined. Scribe present  -2-3 mm verrucous papule x2 right palm, x1 R second toe  -2 cm fissure on R postauricular  -subcutaneous nodule 1 cm, left bicep  -well healed scar at site of Jovanny  -No other lesions of concern on areas examined.       Impression/Plan:  1. Hx of immunosupression  -Discussed with the patient that he is at an increased risk of skin cancer.     2. Verruca plantaris, right second toe near distal nail plate  -Cryotherapy procedure note: After verbal consent and discussion of risks and benefits including but no limited to dyspigmentation/scar, blister, and pain, 3was(were) treated with 1-2mm freeze border for 2 cycles with liquid nitrogen. Post cryotherapy instructions were provided.   -Discussed chemocream as possible treatment options but patient declines.  -Will pursue candida  injections at next appointment.     3. Verruca vulgaris, x2 right palm  -Cryotherapy procedure note: After verbal consent and discussion of risks and benefits including but no limited to dyspigmentation/scar, blister, and pain, 3was(were) treated with 1-2mm freeze border for 2 cycles with liquid nitrogen. Post cryotherapy instructions were provided.       4. Hx of Bowenoid papulosis, left abdomen - no evidence of recurrence.     5. 2 cm fissure on crease of R posterior ear, very thin and possibly more consistent with intertrigo - not  healing.  -Start hydrocortisone 2.5% cream. Apply every day for 1 week, then switch to Vaseline bid.    6. subcutaneous nodule 1 cm, left bicep - mobile- cyst or other  -Referral to Dr. Vargas for excision. Pt would like to wait until after tournament season.     Follow-up with Dr. Vargas for nodule excision and with Chuck for plantar wart injections  earlier for new or changing lesions.      Staff Involved:  Scribe/Staff    Scribe Disclosure  I, Jimmy Brown, am serving as a scribe to document services personally performed by Dr. Sasha Woods MD, based on data collection and the provider's statements to me.    Provider Disclosure:   The documentation recorded by the scribe accurately reflects the services I personally performed and the decisions made by me.    Sasha Woods MD    Department of Dermatology  [facility]: Phone: [phone], Fax:[phone]  [facility]: Phone: [phone], Fax: [phone]                Again, thank you for allowing me to participate in the care of your patient.        Sincerely,        Sasha Woods MD

## 2020-02-06 NOTE — PATIENT INSTRUCTIONS
Cryotherapy    What is it?    Use of a very cold liquid, such as liquid nitrogen, to freeze and destroy abnormal skin cells that need to be removed    What should I expect?    Tenderness and redness    A small blister that might grow and fill with dark purple blood. There may be crusting.    More than one treatment may be needed if the lesions do not go away.    How do I care for the treated area?    Gently wash the area with your hands when bathing.    Use a thin layer of Vaseline to help with healing. You may use a Band-Aid.     The area should heal within 7-10 days and may leave behind a pink or lighter color.     Do not use an antibiotic or Neosporin ointment.     You may take acetaminophen (Tylenol) for pain.     Call your Doctor if you have:    Severe pain    Signs of infection (warmth, redness, cloudy yellow drainage, and or a bad smell)    Questions or concerns    Who should I call with questions?       [facility]: [phone]       [facility]: [phone]       For urgent needs outside of business hours call the [facility] at [phone]        and ask for the dermatology resident on call      For the body, start vanicream twice daily from the neck down.

## 2020-02-06 NOTE — PROGRESS NOTES
[facility] Dermatology Note      Dermatology Problem List:  1. History of immunosuppression, Chron's  2. Bowenoid papulosis, left abdomen  -s/p biopsy 4/18/15  -s/p Efudex x 6 weeks initiated 5/6/15, marked resolved 8/2015  3. Verruca plantaris  -Previous Tx: cantherone, trichloracetic acid, cryotherapy and PDL with Dr. Chris Stoddard, s/p candida 3X, cryotherapy  4. Tinea versicolor, back and chest  -Previous Tx:  ketoconazole 2% cream initiated 10/23/2015  5. 2 cm fissure on crease of R posterior ear - not healing  -current tx; hydrocortisone 2.5% cream    Encounter Date: Feb 6, 2020    CC:  Chief Complaint   Patient presents with     Skin Check     Hx of immunosupression     Wart     finger         History of Present Illness:  Mr. Jorge Amaral is a 49 year old male who presents for follow up for a history of immunosuppression in the setting of Chron's disease (on Humira). Last seen 02/21/2019 when cryotherapy was performed on a plantar wart. Today he reports a new wart has appeared on his finger. He also reports a spot of concern behind his right ear been there over 1 month that is not healing. He also reports a bump on his left bicep Feeling well over all    No other concerns.     Past Medical History:   Patient Active Problem List   Diagnosis     CARDIOVASCULAR SCREENING; LDL GOAL LESS THAN 160     Fracture, metacarpal     Delayed union of fracture     Family history of diabetes mellitus     ACL (anterior cruciate ligament) tear--recurrent     Cold sore     Common wart     Crohn's disease with complication, unspecified gastrointestinal tract location (H)     Herpes labialis     FH: prostate cancer     BMI 28.0-28.9,adult     Past Medical History:   Diagnosis Date     Crohn's disease (H)      Hyperlipidemia LDL goal < 160     not on meds     Nondisplaced fracture of navicular (scaphoid) of left foot, subsequent encounter for fracture with delayed healing 07/03/2019     Past Surgical  History:   Procedure Laterality Date     APPENDECTOMY       ARTHROSCOPY KNEE WITH MEDIAL MENISCECTOMY Left 1/14/2019    Procedure: LEFT KNEE ARTHROSCOPY WITH PARTIAL MEDIAL MENISCECTOMY;  Surgeon: Jose Luis Jama MD;  Location:  OR     C STOMACH SURGERY PROCEDURE UNLISTED      terminal ileum removed     ENT SURGERY  6-28-13    Left Tonsil biopsy     EXCISE MASS WRIST Right 11/7/2018    Procedure: EXCISION OF RIGHT DORSAL WRIST MASS;  Surgeon: Zohaib Edmond MD;  Location: [facility] OR     LAPAROSCOPY PROCEDURE UNLISTED       OPEN REDUCTION INTERNAL FIXATION HAND      right IV MCP hand 7/11     REMOVE HARDWARE LOWER EXTREMITY  4/6/2012    Procedure:REMOVE HARDWARE LOWER EXTREMITY; REMOVAL OF RIGHT TIBIAL SCREW; Surgeon:JOSE LUIS JAMA; Location:[facility]       Social History:  Patient  reports that he has quit smoking. His smoking use included cigars. He has never used smokeless tobacco. He reports current alcohol use. He reports that he does not use drugs.   In [employer] and boxing.    Family History:  Family History   Problem Relation Age of Onset     Hypertension Mother      Respiratory Mother         SMOKER -EMPYSEMA     Hypertension Father      Diabetes Father      Prostate Cancer Father 50     Prostate Cancer Paternal Grandfather      Diabetes Brother      Prostate Cancer Paternal Uncle 50       Medications:  Current Outpatient Medications   Medication Sig Dispense Refill     adalimumab (HUMIRA) 40 MG/0.8ML injection Inject 40 mg Subcutaneous once.       diphenoxylate-atropine (LOMOTIL) 2.5-0.025 MG per tablet Take 2 tablets by mouth       mercaptopurine (PURINETHOL) 50 MG tablet Take 50 mg by mouth daily. 1 and half tabs daily       valACYclovir (VALTREX) 1000 mg tablet two tablets by mouth twice daily for 1 day at onset of symptoms of a cold sore. 30 tablet 0       No Known Allergies    Review of Systems:  -Constitutional: Otherwise feeling well today, in usual state of health.  -Skin: As above in HPI.  No additional skin concerns.    Physical exam:  Vitals: There were no vitals taken for this visit.  GEN: This is a well developed, well-nourished male in no acute distress, in a pleasant mood.    SKIN: Full skin, which includes the head/face, both arms, chest, back, abdomen,both legs, genitalia and/or groin buttocks, digits and/or nails, was examined. Scribe present  -2-3 mm verrucous papule x2 right palm, x1 R second toe  -2 cm fissure on R postauricular  -subcutaneous nodule 1 cm, left bicep  -well healed scar at site of Jovanny  -No other lesions of concern on areas examined.       Impression/Plan:  1. Hx of immunosupression  -Discussed with the patient that he is at an increased risk of skin cancer.     2. Verruca plantaris, right second toe near distal nail plate  -Cryotherapy procedure note: After verbal consent and discussion of risks and benefits including but no limited to dyspigmentation/scar, blister, and pain, 3was(were) treated with 1-2mm freeze border for 2 cycles with liquid nitrogen. Post cryotherapy instructions were provided.   -Discussed chemocream as possible treatment options but patient declines.  -Will pursue candida  injections at next appointment.     3. Verruca vulgaris, x2 right palm  -Cryotherapy procedure note: After verbal consent and discussion of risks and benefits including but no limited to dyspigmentation/scar, blister, and pain, 3was(were) treated with 1-2mm freeze border for 2 cycles with liquid nitrogen. Post cryotherapy instructions were provided.       4. Hx of Bowenoid papulosis, left abdomen - no evidence of recurrence.     5. 2 cm fissure on crease of R posterior ear, very thin and possibly more consistent with intertrigo - not healing.  -Start hydrocortisone 2.5% cream. Apply every day for 1 week, then switch to Vaseline bid.    6. subcutaneous nodule 1 cm, left bicep - mobile- cyst or other  -Referral to Dr. Vargas for excision. Pt would like to wait until after tournament  season.     Follow-up with Dr. Vargas for nodule excision and with Chuck for plantar wart injections  earlier for new or changing lesions.      Staff Involved:  Scribe/Staff    Scribe Disclosure  I, Jimmy Brown, am serving as a scribe to document services personally performed by Dr. Sasha Woods MD, based on data collection and the provider's statements to me.    Provider Disclosure:   The documentation recorded by the scribe accurately reflects the services I personally performed and the decisions made by me.    Sasha Woods MD    Department of Dermatology  [facility]: Phone: [phone], Fax:[phone]  [facility]: Phone: [phone], Fax: [phone]

## 2020-02-06 NOTE — NURSING NOTE
@Jorge Amaral's goals for this visit include:   Chief Complaint   Patient presents with     Skin Check     Hx of immunosupression     Wart     finger       He requests these members of his care team be copied on today's visit information: NO    PCP: Vince Swain    Referring Provider:  No referring provider defined for this encounter.    There were no vitals taken for this visit.    Do you need any medication refills at today's visit? NO    Roz Reno [facility]

## 2020-03-03 ENCOUNTER — OFFICE VISIT (OUTPATIENT)
Dept: INTERNAL MEDICINE | Facility: CLINIC | Age: 50
End: 2020-03-03
Payer: COMMERCIAL

## 2020-03-03 VITALS
TEMPERATURE: 97.6 F | BODY MASS INDEX: 28.36 KG/M2 | HEART RATE: 65 BPM | OXYGEN SATURATION: 97 % | HEIGHT: 74 IN | SYSTOLIC BLOOD PRESSURE: 136 MMHG | WEIGHT: 221 LBS | DIASTOLIC BLOOD PRESSURE: 81 MMHG

## 2020-03-03 DIAGNOSIS — R05.9 COUGH: ICD-10-CM

## 2020-03-03 DIAGNOSIS — J06.9 VIRAL URI WITH COUGH: Primary | ICD-10-CM

## 2020-03-03 LAB
FLUAV+FLUBV AG SPEC QL: NEGATIVE
FLUAV+FLUBV AG SPEC QL: NEGATIVE
SPECIMEN SOURCE: NORMAL

## 2020-03-03 PROCEDURE — 87804 INFLUENZA ASSAY W/OPTIC: CPT | Performed by: NURSE PRACTITIONER

## 2020-03-03 PROCEDURE — 99213 OFFICE O/P EST LOW 20 MIN: CPT | Performed by: NURSE PRACTITIONER

## 2020-03-03 ASSESSMENT — MIFFLIN-ST. JEOR: SCORE: 1927.2

## 2020-03-03 NOTE — PATIENT INSTRUCTIONS
Influenza is negative.     No concern for COVID-19 screening at this time.  Must meet following criteria: fever PLUS cough/shortness of breath AND travel to affected area ([place], [place], [place], [place] and [place]).     This is likely a different viral illness.  No antibiotics indicated at this time.  Lungs sound good.  Vital signs look great.  Take good care of yourself- lots of rest, drink lots of fluids.  Over the counter cough medication.  Tylenol/ibuprofen for discomfort or fever.      Please return if worsening or failure to improve.

## 2020-03-03 NOTE — PROGRESS NOTES
Subjective     Jorge Amaral is a 49 year old male who presents to clinic today for the following health issues:    HPI   RESPIRATORY SYMPTOMS      Duration: 2 days    Description  nasal congestion, cough, headache, fatigue/malaise and nausea    Severity: moderate    Accompanying signs and symptoms: None    History (predisposing factors):  none    Precipitating or alleviating factors: None    Therapies tried and outcome:  aspirin    Patient reports that he was at a tournament in [place] 5 days ago.  2 days ago he developed nasal congestion, cough, headache, fatigue, and nausea.  He denies any shortness of breath or wheezing.  Has some pain in his chest, only when he coughs, otherwise no chest pain.  Denies any history of smoking, asthma, or COPD.  He does have a history of Crohn's and is on Humira.     Patient Active Problem List   Diagnosis     CARDIOVASCULAR SCREENING; LDL GOAL LESS THAN 160     Fracture, metacarpal     Delayed union of fracture     Family history of diabetes mellitus     ACL (anterior cruciate ligament) tear--recurrent     Cold sore     Common wart     Crohn's disease with complication, unspecified gastrointestinal tract location (H)     Herpes labialis     FH: prostate cancer     BMI 28.0-28.9,adult     Past Surgical History:   Procedure Laterality Date     APPENDECTOMY       ARTHROSCOPY KNEE WITH MEDIAL MENISCECTOMY Left 1/14/2019    Procedure: LEFT KNEE ARTHROSCOPY WITH PARTIAL MEDIAL MENISCECTOMY;  Surgeon: Joleen Jama MD;  Location: [facility] OR     C STOMACH SURGERY PROCEDURE UNLISTED      terminal ileum removed     ENT SURGERY  6-28-13    Left Tonsil biopsy     EXCISE MASS WRIST Right 11/7/2018    Procedure: EXCISION OF RIGHT DORSAL WRIST MASS;  Surgeon: Zohaib Edmond MD;  Location: [facility] OR     LAPAROSCOPY PROCEDURE UNLISTED       OPEN REDUCTION INTERNAL FIXATION HAND      right IV MCP hand 7/11     REMOVE HARDWARE LOWER EXTREMITY  4/6/2012    Procedure:REMOVE HARDWARE LOWER  "EXTREMITY; REMOVAL OF RIGHT TIBIAL SCREW; Surgeon:JOSE LUIS CARMICHAEL; Location[facility]       Social History     Tobacco Use     Smoking status: Former Smoker     Types: Cigars     Smokeless tobacco: Never Used     Tobacco comment: very rarely    Substance Use Topics     Alcohol use: Yes     Comment: social     Family History   Problem Relation Age of Onset     Hypertension Mother      Respiratory Mother         SMOKER -EMPYSEMA     Hypertension Father      Diabetes Father      Prostate Cancer Father 50     Prostate Cancer Paternal Grandfather      Diabetes Brother      Prostate Cancer Paternal Uncle 50         Current Outpatient Medications   Medication Sig Dispense Refill     adalimumab (HUMIRA) 40 MG/0.8ML injection Inject 40 mg Subcutaneous once.       diphenoxylate-atropine (LOMOTIL) 2.5-0.025 MG per tablet Take 2 tablets by mouth       mercaptopurine (PURINETHOL) 50 MG tablet Take 50 mg by mouth daily. 1 and half tabs daily       valACYclovir (VALTREX) 1000 mg tablet two tablets by mouth twice daily for 1 day at onset of symptoms of a cold sore. 30 tablet 0     hydrocortisone 2.5 % cream Apply twice daily for 1 week behind ear, then start vaseline twice daily (Patient not taking: Reported on 3/3/2020) 30 g 0     No Known Allergies      Reviewed and updated as needed this visit by Provider  Tobacco  Allergies  Meds  Problems  Med Hx  Surg Hx  Fam Hx         Review of Systems   ROS COMP: Constitutional, HEENT, cardiovascular, pulmonary, gi and gu systems are negative, except as otherwise noted.      Objective    /81   Pulse 65   Temp 97.6  F (36.4  C)   Ht 1.88 m (6' 2\")   Wt 100.2 kg (221 lb)   SpO2 97%   BMI 28.37 kg/m    Body mass index is 28.37 kg/m .  Physical Exam   GENERAL: healthy, alert and no distress  EYES: Eyes grossly normal to inspection, PERRL and conjunctivae and sclerae normal  HENT: ear canals and TM's normal, nose and mouth without ulcers or lesions  NECK: no adenopathy, no " asymmetry, masses, or scars and thyroid normal to palpation  RESP: lungs clear to auscultation - no rales, rhonchi or wheezes  CV: regular rate and rhythm, normal S1 S2, no S3 or S4, no murmur, click or rub, no peripheral edema and peripheral pulses strong  ABDOMEN: soft, nontender, no hepatosplenomegaly, no masses and bowel sounds normal  MS: no gross musculoskeletal defects noted, no edema  SKIN: no suspicious lesions or rashes  NEURO: Normal strength and tone, mentation intact and speech normal  PSYCH: mentation appears normal, affect normal/bright    Diagnostic Test Results:    Influenza negative.          Assessment & Plan       ICD-10-CM    1. Viral URI with cough J06.9     B97.89    2. Cough R05 Influenza A/B antigen       See Patient Instructions  Patient Instructions   Influenza is negative.     No concern for COVID-19 screening at this time.  Must meet following criteria: fever PLUS cough/shortness of breath AND travel to affected area ([place], [place], [place], [place] and [place]).     This is likely a different viral illness.  No antibiotics indicated at this time.  Lungs sound good.  Vital signs look great.  Take good care of yourself- lots of rest, drink lots of fluids.  Over the counter cough medication.  Tylenol/ibuprofen for discomfort or fever.      Please return if worsening or failure to improve.          Return if symptoms worsen or fail to improve.    Jihan Musa, CNP  [facility]

## 2020-03-06 ENCOUNTER — OFFICE VISIT (OUTPATIENT)
Dept: FAMILY MEDICINE | Facility: CLINIC | Age: 50
End: 2020-03-06
Payer: COMMERCIAL

## 2020-03-06 VITALS
OXYGEN SATURATION: 96 % | WEIGHT: 223 LBS | TEMPERATURE: 97.6 F | HEART RATE: 76 BPM | RESPIRATION RATE: 18 BRPM | DIASTOLIC BLOOD PRESSURE: 76 MMHG | SYSTOLIC BLOOD PRESSURE: 119 MMHG | BODY MASS INDEX: 28.63 KG/M2

## 2020-03-06 DIAGNOSIS — H69.91 DYSFUNCTION OF RIGHT EUSTACHIAN TUBE: Primary | ICD-10-CM

## 2020-03-06 PROCEDURE — 99213 OFFICE O/P EST LOW 20 MIN: CPT | Performed by: FAMILY MEDICINE

## 2020-03-06 ASSESSMENT — PAIN SCALES - GENERAL: PAINLEVEL: NO PAIN (0)

## 2020-03-06 NOTE — PROGRESS NOTES
SUBJECTIVE:  Jorge Amaral is a 49 year old male who presents with right ear pain that started 1 day(s) ago.  The patient (or parent) described the pain or symptoms as moderate.  The patient (or parent) reports that in addition to the ear pain he has symptoms that include:  Nasal congestion, chest congestion and a cough that started a few day(s) ago. He was seen 2 day(s) ago and had a negative influenza swab    The patient (or parent) reports a history of recurrent otitis.  The patient(or parent) denies that he has been swimming recently.  The patient (or parent) reports that he has put Q-Tips into the ear canal recently.  He reports that his right ear canal is smaller than his left.    Past Medical History:   Diagnosis Date     Crohn's disease (H)      Hyperlipidemia LDL goal < 160     not on meds     Nondisplaced fracture of navicular (scaphoid) of left foot, subsequent encounter for fracture with delayed healing 07/03/2019     Current Outpatient Medications   Medication Sig Dispense Refill     adalimumab (HUMIRA) 40 MG/0.8ML injection Inject 40 mg Subcutaneous once.       diphenoxylate-atropine (LOMOTIL) 2.5-0.025 MG per tablet Take 2 tablets by mouth       hydrocortisone 2.5 % cream Apply twice daily for 1 week behind ear, then start vaseline twice daily 30 g 0     mercaptopurine (PURINETHOL) 50 MG tablet Take 50 mg by mouth daily. 1 and half tabs daily       valACYclovir (VALTREX) 1000 mg tablet two tablets by mouth twice daily for 1 day at onset of symptoms of a cold sore. 30 tablet 0     Social History     Tobacco Use     Smoking status: Former Smoker     Types: Cigars     Smokeless tobacco: Never Used     Tobacco comment: very rarely    Substance Use Topics     Alcohol use: Yes     Comment: social         OBJECTIVE:  /76   Pulse 76   Temp 97.6  F (36.4  C) (Oral)   Resp 18   Wt 101.2 kg (223 lb)   SpO2 96%   BMI 28.63 kg/m     EXAM:  The right TM is normal: no effusions, no erythema, and  normal landmarks     The right auditory canal is normal and without drainage, edema or erythema    The left TM is normal: no effusions, no erythema, and normal landmarks  The left auditory canal is normal and without drainage, edema or erythema    Oropharynx exam is normal: no lesions, erythema, adenopathy or exudate.  GENERAL: no acute distress  EYES: EOMI,  PERRL, conjunctiva clear  NECK: supple, non-tender to palpation, no adenopathy noted  RESP: lungs clear to auscultation - no rales, rhonchi or wheezes  CV: regular rates and rhythm, normal S1 S2, no murmur noted  SKIN: no suspicious lesions or rashes     ASSESSMENT:  Eustachin tube dysfunction    PLAN:  Patient Instructions   Purchase some pseudoephedrine at you pharmacy. Please ask the pharmacist for it as it is now keep behind the counter.  Lots of fluids

## 2020-03-06 NOTE — NURSING NOTE
"Chief Complaint   Patient presents with     Otalgia     Employment Physical     order pended       Initial /76   Pulse 76   Temp 97.6  F (36.4  C) (Oral)   Resp 18   Wt 101.2 kg (223 lb)   SpO2 96%   BMI 28.63 kg/m   Estimated body mass index is 28.63 kg/m  as calculated from the following:    Height as of 3/3/20: 1.88 m (6' 2\").    Weight as of this encounter: 101.2 kg (223 lb).  Medication Reconciliation: complete  Tereza Jay, SCOTT  "

## 2020-03-06 NOTE — PATIENT INSTRUCTIONS
Purchase some pseudoephedrine at you pharmacy. Please ask the pharmacist for it as it is now keep behind the counter.  Lots of fluids

## 2020-03-11 ENCOUNTER — OFFICE VISIT (OUTPATIENT)
Dept: DERMATOLOGY | Facility: CLINIC | Age: 50
End: 2020-03-11
Payer: COMMERCIAL

## 2020-03-11 VITALS — SYSTOLIC BLOOD PRESSURE: 122 MMHG | HEART RATE: 85 BPM | DIASTOLIC BLOOD PRESSURE: 79 MMHG

## 2020-03-11 DIAGNOSIS — D17.20 LIPOMA OF UPPER ARM: Primary | ICD-10-CM

## 2020-03-11 PROCEDURE — 88304 TISSUE EXAM BY PATHOLOGIST: CPT | Mod: TC | Performed by: DERMATOLOGY

## 2020-03-11 PROCEDURE — 11402 EXC TR-EXT B9+MARG 1.1-2 CM: CPT | Mod: 51 | Performed by: DERMATOLOGY

## 2020-03-11 PROCEDURE — 12031 INTMD RPR S/A/T/EXT 2.5 CM/<: CPT | Performed by: DERMATOLOGY

## 2020-03-11 ASSESSMENT — PAIN SCALES - GENERAL: PAINLEVEL: NO PAIN (0)

## 2020-03-11 NOTE — NURSING NOTE
Excision Intake                                                    /79   Pulse 85     Do you take the following medications:  Coumadin, Eliquis, Pradaxa, Xarelto:   NO  -If on Coumadin, INR should be checked within 7 days of surgery.  Range is 3.5 or less or within therapeutic range.  Antibiotic Prophylaxis:  NO    Do you have an iodine allergy:  NO    Past Medical History                                                    Do you currently or have you previously had any of the following conditions:       HIV/AIDS:  NO    Hepatitis:  NO    Suppressed Immune System:  Yes-Humira    Autoimmune disorder (eg RA, Lupus):  NO    Fever blisters/herpes, cold sores:  NO    Kidney disease:  NO  Creatinine   Date Value Ref Range Status   12/04/2019 1.08 0.66 - 1.25 mg/dL Final   ]    Pacemaker or Defibrillator:  NO.      History of artificial or heart valve replacement:  NO.      Endocarditis: NO    Organ transplant: NO.      Joint replacement within past 2 years: NO    Previous prosthetic joint infection: N/A    Diabetes: NO    Pregnant:: NO    Tobacco use:  NO.    History   Smoking Status     Former Smoker     Types: Cigars   Smokeless Tobacco     Never Used     Comment: very rarely      Reviewed by:  Jeny Osullivan LPN

## 2020-03-11 NOTE — LETTER
3/11/2020         RE: Jorge Amaral  [address] [facility] [address]        Dear Colleague,    Thank you for referring your patient, Jorge Amaral, to the [facility]. Please see a copy of my visit note below.    DERMATOLOGY EXCISION PROCEDURE NOTE    [facility]   [address], [place] [address]     NAME OF PROCEDURE: Excision intermediate layered linear closure  Staff surgeon: Faisal Vargas DO  Scrub Nurse: Jeny Osullivan LPN     PRE-OPERATIVE DIAGNOSIS:  Cyst   POST-OPERATIVE DIAGNOSIS: Lipoma   FINAL EXCISION SIZE(DEFECT SIZE): 1.2 x 0.9 cm, with no margin   FINAL REPAIR LENGTH: 1.2 cm     INDICATIONS: This patient presented with a 1.2 x 0.9 cm cyst of the left bicep. Excision was indicated. We discussed the principles of treatment and most likely complications including scarring, bleeding, infection, incomplete excision, wound dehiscence, pain, nerve damage, and recurrence. Informed consent was obtained and the patient underwent the procedure as follows:    PROCEDURE: The patient was taken to the operative suite. Time-out was performed.  The treatment area was anesthetized with 1% lidocaine and epinephrine (1:100,000). The area was prepped with Chlorhexidine and rinsed with sterile saline and draped with sterile towels. The lesion was delineated and incised down to the firm adipose nodule. The nodule was dissected from adjacent connective tissue with scissors. Hemostasis was obtained by electrocoagulation.     REPAIR: An intermediate layered linear closure was selected as the procedure which would maximally preserve both function and cosmesis.    After the excision of the tumor, the area was carefully undermined. Hemostasis was obtained with electrocoagulation.  Closure was oriented so that the wound was in the patient's natural skin tension lines. The subcutaneous and dermal layers were then closed with 4-0 Monocryl sutures.  The epidermis was then carefully approximated along the length of the wound using 5-0 fast absorbing gut simple running sutures.     The final wound length was 1.2 cm. A total of 3.0 ml of anesthesia was administered for all surgical sites. Estimated blood loss was less than 10 ml for all surgical sites. A sterile pressure dressing was applied and wound care instructions, with a written handout, were given. The [facility] alert and ambulatory.    Follow-up as needed for wound evaluation.       Anatomic Pathology Results: pending      Staff Involved:    Scribe Disclosure  I, Panfilo Marcus, am serving as a scribe to document services personally performed by Dr. Faisal Vargas DO, based on data collection and the provider's statements to me.     Provider Disclosure:   The documentation recorded by the scribe accurately reflects the services I personally performed and the decisions made by me.  I personally performed the procedures today.    Faisal Vargas DO    Department of Dermatology  [facility]: Phone: [phone], Fax:[phone]  [facility]: Phone: [phone], Fax: [phone]    Again, thank you for allowing me to participate in the care of your patient.        Sincerely,        Faisal Vargas MD

## 2020-03-11 NOTE — PROGRESS NOTES
DERMATOLOGY EXCISION PROCEDURE NOTE    [facility]   [address], [place] [address]     NAME OF PROCEDURE: Excision intermediate layered linear closure  Staff surgeon: Faisal Vargas DO  Scrub Nurse: Jeny Osullivan LPN     PRE-OPERATIVE DIAGNOSIS:  Cyst   POST-OPERATIVE DIAGNOSIS: Lipoma   FINAL EXCISION SIZE(DEFECT SIZE): 1.2 x 0.9 cm, with no margin   FINAL REPAIR LENGTH: 1.2 cm     INDICATIONS: This patient presented with a 1.2 x 0.9 cm cyst of the left bicep. Excision was indicated. We discussed the principles of treatment and most likely complications including scarring, bleeding, infection, incomplete excision, wound dehiscence, pain, nerve damage, and recurrence. Informed consent was obtained and the patient underwent the procedure as follows:    PROCEDURE: The patient was taken to the operative suite. Time-out was performed.  The treatment area was anesthetized with 1% lidocaine and epinephrine (1:100,000). The area was prepped with Chlorhexidine and rinsed with sterile saline and draped with sterile towels. The lesion was delineated and incised down to the firm adipose nodule. The nodule was dissected from adjacent connective tissue with scissors. Hemostasis was obtained by electrocoagulation.     REPAIR: An intermediate layered linear closure was selected as the procedure which would maximally preserve both function and cosmesis.    After the excision of the tumor, the area was carefully undermined. Hemostasis was obtained with electrocoagulation.  Closure was oriented so that the wound was in the patient's natural skin tension lines. The subcutaneous and dermal layers were then closed with 4-0 Monocryl sutures. The epidermis was then carefully approximated along the length of the wound using 5-0 fast absorbing gut simple running sutures.     The final wound length was 1.2 cm. A total of 3.0 ml of anesthesia was administered for all surgical sites.  Estimated blood loss was less than 10 ml for all surgical sites. A sterile pressure dressing was applied and wound care instructions, with a written handout, were given. The patient was discharged from the [facility] alert and ambulatory.    Follow-up as needed for wound evaluation.       Anatomic Pathology Results: pending      Staff Involved:    Scribe Disclosure  I, Panfilo Marcus, am serving as a scribe to document services personally performed by Dr. Faisal Vargas DO, based on data collection and the provider's statements to me.     Provider Disclosure:   The documentation recorded by the scribe accurately reflects the services I personally performed and the decisions made by me.  I personally performed the procedures today.    Faisal Vargas DO    Department of Dermatology  [facility]: Phone: [phone], Fax:[phone]  [facility]: Phone: [phone], Fax: [phone]

## 2020-03-11 NOTE — PATIENT INSTRUCTIONS

## 2020-03-15 LAB — COPATH REPORT: NORMAL

## 2020-03-17 ENCOUNTER — TELEPHONE (OUTPATIENT)
Dept: DERMATOLOGY | Facility: CLINIC | Age: 50
End: 2020-03-17

## 2020-03-17 NOTE — TELEPHONE ENCOUNTER
Notes recorded by Fallon Ibarra RN on 3/17/2020 at 4:00 PM CDT   Patient notified of results and verbalized understanding.  He has no concerns with how the site is healing.   Fallon Ibarra RN     ------     Notes recorded by Jeny Osullivan LPN on 3/17/2020 at 8:14 AM CDT   Left message for patient to call [facility] in [place] back at [phone]     Jeny Osullivan LPN     ------     Notes recorded by Faisal Michel MD on 3/16/2020 at 5:22 PM CDT   Please call the patient with pathology results.     Pathology confirmed a benign lipoma was excised, not a cyst.   As long as the wound is healing well, no additional surgery is necessary.   Thank you.    Dermatological path order and indications   Order: [id number]   Status:  Final result   Visible to patient:  Yes (MyChart) Dx:  Lipoma of upper arm   Component  6d ago   Copath Report  Patient Name: SAUMYA VAIL   MR#: [id number]   Specimen #: [id number]   Collected: 3/11/2020   Received: 3/12/2020   Reported: 3/15/2020 15:41   Ordering Phy(s): FAISAL MICHEL     For improved result formatting, select 'View Enhanced Report Format' under    Linked Documents section.     SPECIMEN(S):   Skin, left bicep, excision     FINAL DIAGNOSIS:   Skin, left bicep, excision:   - Lipoma

## 2020-04-08 ENCOUNTER — OFFICE VISIT (OUTPATIENT)
Dept: FAMILY MEDICINE | Facility: CLINIC | Age: 50
End: 2020-04-08
Payer: COMMERCIAL

## 2020-04-08 ENCOUNTER — E-VISIT (OUTPATIENT)
Dept: FAMILY MEDICINE | Facility: CLINIC | Age: 50
End: 2020-04-08
Payer: COMMERCIAL

## 2020-04-08 VITALS
HEART RATE: 80 BPM | DIASTOLIC BLOOD PRESSURE: 76 MMHG | OXYGEN SATURATION: 98 % | SYSTOLIC BLOOD PRESSURE: 108 MMHG | BODY MASS INDEX: 28.25 KG/M2 | TEMPERATURE: 96.5 F | WEIGHT: 220 LBS
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
## 2020-04-08 PROCEDURE — 90715 TDAP VACCINE 7 YRS/> IM: CPT | Performed by: PHYSICIAN ASSISTANT

## 2020-04-08 PROCEDURE — 99207 ZZC NON-BILLABLE SERV PER CHARTING: CPT | Performed by: PHYSICIAN ASSISTANT

## 2020-04-08 PROCEDURE — 99213 OFFICE O/P EST LOW 20 MIN: CPT | Mod: 25 | Performed by: PHYSICIAN ASSISTANT

## 2020-04-08 PROCEDURE — 90471 IMMUNIZATION ADMIN: CPT | Performed by: PHYSICIAN ASSISTANT

## 2020-04-08 NOTE — PROGRESS NOTES
Subjective     Jorge Amaral is a 49 year old male who presents to clinic today for the following health issues:    HPI   Concern - Dog bite  Onset: 4/5/20    Description:   Bitten on left hand middle finger by family dog, swollen and painful    Intensity: 2-3/10    Progression of Symptoms:  Worsening- swelling noticed yeterday    Accompanying Signs & Symptoms:  none    Previous history of similar problem:   no    Precipitating factors:   Worsened by: pressing on it    Alleviating factors:  Improved by: nothing  Therapies Tried and outcome: band aid     Patient Active Problem List   Diagnosis     CARDIOVASCULAR SCREENING; LDL GOAL LESS THAN 160     Fracture, metacarpal     Delayed union of fracture     Family history of diabetes mellitus     ACL (anterior cruciate ligament) tear--recurrent     Cold sore     Common wart     Crohn's disease with complication, unspecified gastrointestinal tract location (H)     Herpes labialis     FH: prostate cancer     BMI 28.0-28.9,adult     Past Surgical History:   Procedure Laterality Date     APPENDECTOMY       ARTHROSCOPY KNEE WITH MEDIAL MENISCECTOMY Left 1/14/2019    Procedure: LEFT KNEE ARTHROSCOPY WITH PARTIAL MEDIAL MENISCECTOMY;  Surgeon: Jose Luis Jama MD;  Location:  OR     C STOMACH SURGERY PROCEDURE UNLISTED      terminal ileum removed     ENT SURGERY  6-28-13    Left Tonsil biopsy     EXCISE MASS WRIST Right 11/7/2018    Procedure: EXCISION OF RIGHT DORSAL WRIST MASS;  Surgeon: Zohaib Edmond MD;  Location:  OR     LAPAROSCOPY PROCEDURE UNLISTED       OPEN REDUCTION INTERNAL FIXATION HAND      right IV MCP hand 7/11     REMOVE HARDWARE LOWER EXTREMITY  4/6/2012    Procedure:REMOVE HARDWARE LOWER EXTREMITY; REMOVAL OF RIGHT TIBIAL SCREW; Surgeon:JOSE LUIS JAMA; Location:[facility]       Social History     Tobacco Use     Smoking status: Former Smoker     Types: Cigars     Smokeless tobacco: Never Used     Tobacco comment: very rarely    Substance Use  Topics     Alcohol use: Yes     Comment: social     Family History   Problem Relation Age of Onset     Hypertension Mother      Respiratory Mother         SMOKER -EMPYSEMA     Hypertension Father      Diabetes Father      Prostate Cancer Father 50     Prostate Cancer Paternal Grandfather      Diabetes Brother      Prostate Cancer Paternal Uncle 50         Current Outpatient Medications   Medication Sig Dispense Refill     adalimumab (HUMIRA) 40 MG/0.8ML injection Inject 40 mg Subcutaneous once.       amoxicillin-clavulanate (AUGMENTIN) 875-125 MG tablet Take 1 tablet by mouth 2 times daily for 10 days 20 tablet 0     diphenoxylate-atropine (LOMOTIL) 2.5-0.025 MG per tablet Take 2 tablets by mouth       mercaptopurine (PURINETHOL) 50 MG tablet Take 50 mg by mouth daily. 1 and half tabs daily       hydrocortisone 2.5 % cream Apply twice daily for 1 week behind ear, then start vaseline twice daily (Patient not taking: Reported on 4/8/2020) 30 g 0     valACYclovir (VALTREX) 1000 mg tablet two tablets by mouth twice daily for 1 day at onset of symptoms of a cold sore. (Patient not taking: Reported on 4/8/2020) 30 tablet 0     No Known Allergies  BP Readings from Last 3 Encounters:   04/08/20 108/76   03/11/20 122/79   03/06/20 119/76    Wt Readings from Last 3 Encounters:   04/08/20 99.8 kg (220 lb)   03/06/20 101.2 kg (223 lb)   03/03/20 100.2 kg (221 lb)             Reviewed and updated as needed this visit by Provider  Tobacco  Allergies  Meds  Problems  Med Hx  Surg Hx  Fam Hx         Review of Systems   ROS COMP: Constitutional, HEENT, cardiovascular, pulmonary, gi and gu systems are negative, except as otherwise noted.      Objective    /76   Pulse 80   Temp 96.5  F (35.8  C) (Tympanic)   Wt 99.8 kg (220 lb)   SpO2 98%   BMI 28.25 kg/m    Body mass index is 28.25 kg/m .  Physical Exam   GENERAL: healthy, alert and no distress  Left long finger.  Distal aspect of his long finger shows healing  puncture wound on the distal phalanx and another puncture wound at the radial nailbed.  The area is erythematous and swollen.  He does have full range of motion of his finger he is neuro vas intact distally.    Diagnostic Test Results:  none         Assessment & Plan       ICD-10-CM    1. Dog bite, initial encounter  W54.0XXA amoxicillin-clavulanate (AUGMENTIN) 875-125 MG tablet      Talk to patient about his concerns.  I am concerned that there may be secondary infection starting.  We will get him started on Augmentin twice a day for 10 days.  We talked about soapy water soaks for 10 minutes twice a day.  Warning signs were discussed and they should occur he should follow-up.  We discussed getting his immunizations updated today.  We also discussed finding out if the dog's immunizations are up-to-date.  He is can look into that as it was an outside family members dog.  They may have to quarantine the dog.  He is going to find out and maybe talk to the vet.    Return in about 1 week (around 4/15/2020) for recheck, As Needed.    Vince Swain PA-C  [facility]

## 2020-04-09 ENCOUNTER — MYC MEDICAL ADVICE (OUTPATIENT)
Dept: FAMILY MEDICINE | Facility: CLINIC | Age: 50
End: 2020-04-09

## 2020-04-09 NOTE — TELEPHONE ENCOUNTER
Routed to provider to review and advise. See 4/8/20 Evisit and Office Visit encounters.  STEFANO BennettN, RN

## 2020-04-09 NOTE — TELEPHONE ENCOUNTER
I recommend that he get hold of the dog's vet for advice on how to handle concerns. Mostly what to do about Rabies immunization.     Thanks    Vince Swain PA-C

## 2020-05-01 ENCOUNTER — MYC MEDICAL ADVICE (OUTPATIENT)
Dept: DERMATOLOGY | Facility: CLINIC | Age: 50
End: 2020-05-01

## 2020-08-21 ENCOUNTER — OFFICE VISIT (OUTPATIENT)
Dept: FAMILY MEDICINE | Facility: CLINIC | Age: 50
End: 2020-08-21
Payer: COMMERCIAL

## 2020-08-21 VITALS
OXYGEN SATURATION: 97 % | RESPIRATION RATE: 14 BRPM | HEART RATE: 71 BPM | WEIGHT: 224 LBS | BODY MASS INDEX: 28.75 KG/M2 | SYSTOLIC BLOOD PRESSURE: 106 MMHG | DIASTOLIC BLOOD PRESSURE: 72 MMHG | HEIGHT: 74 IN

## 2020-08-21 DIAGNOSIS — Z86.711 HISTORY OF PULMONARY EMBOLISM: Primary | ICD-10-CM

## 2020-08-21 PROCEDURE — 99213 OFFICE O/P EST LOW 20 MIN: CPT | Performed by: PHYSICIAN ASSISTANT

## 2020-08-21 ASSESSMENT — MIFFLIN-ST. JEOR: SCORE: 1935.81

## 2020-08-21 NOTE — PROGRESS NOTES
"Subjective     Jorge Amaral is a 50 year old male who presents to clinic today for the following health issues:    HPI         Hospital Follow-up Visit:    Hospital/Nursing Home/IP Rehab Facility: [facility]   Date of Admission: 8/15/20  Date of Discharge: 8/15/20  Reason(s) for Admission: acute pulmonary embolism       Was your hospitalization related to COVID-19? No   Problems taking medications regularly:  None  Medication changes since discharge: None  Problems adhering to non-medication therapy:  None    On 8/15 he woke up with some chest pain and pain down his left arm.  He thought he was having heart attack.  He was seen in emergency room and had a work-up and found to have a PE.  He is placed on Xarelto.  He states overall he is feeling much better.    Summary of hospitalization:  [facility] information obtained and reviewed  Diagnostic Tests/Treatments reviewed.  Follow up needed: hematology  Other Healthcare Providers Involved in Patient s Care:         None  Update since discharge: improved.       Post Discharge Medication Reconciliation: discharge medications reconciled and changed, per note/orders.  Plan of care communicated with patient                Review of Systems   Constitutional, HEENT, cardiovascular, pulmonary, gi and gu systems are negative, except as otherwise noted.      Objective    /72   Pulse 71   Resp 14   Ht 1.88 m (6' 2\")   Wt 101.6 kg (224 lb)   SpO2 97%   BMI 28.76 kg/m    Body mass index is 28.76 kg/m .  Physical Exam   GENERAL: healthy, alert and no distress  EYES: Eyes grossly normal to inspection, PERRL and conjunctivae and sclerae normal  HENT: ear canals and TM's normal, nose and mouth without ulcers or lesions  NECK: no adenopathy, no asymmetry, masses, or scars and thyroid normal to palpation  RESP: lungs clear to auscultation - no rales, rhonchi or wheezes  CV: regular rate and rhythm, normal S1 S2, no S3 or S4, no murmur, click or rub, no peripheral " edema and peripheral pulses strong  ABDOMEN: soft, nontender, no hepatosplenomegaly, no masses and bowel sounds normal  MS: no gross musculoskeletal defects noted, no edema  SKIN: no suspicious lesions or rashes  NEURO: Normal strength and tone, mentation intact and speech normal  PSYCH: mentation appears normal, affect normal/bright          Assessment & Plan     ICD-10-CM    1. History of pulmonary embolism  Z86.711 Oncology/Hematology Adult Referral     rivaroxaban ANTICOAGULANT (XARELTO ANTICOAGULANT) 15 MG TABS tablet   Talk to patient but his concerns.  We will have him continue his Xarelto.  We will have him follow-up with hematology for second opinion.  Warning signs were discussed.    Return in about 2 weeks (around 9/4/2020) for recheck.    Vince Swain PA-C  [facility]

## 2020-08-28 DIAGNOSIS — R23.4 FISSURE IN SKIN: ICD-10-CM

## 2020-08-28 NOTE — TELEPHONE ENCOUNTER
Medication: hydrocortisone 2.5% cream  Request from: fax (fax, call, my chart, other)    Pharmacy: [facility] [place]    If from fax:  Last date filled: 4/15/20  QTY: 28 g    Date fax was received: 8/28/20

## 2020-09-02 RX ORDER — HYDROCORTISONE 2.5 %
CREAM (GRAM) TOPICAL
Qty: 30 G | Refills: 0 | OUTPATIENT
Start: 2020-09-02

## 2020-09-17 DIAGNOSIS — Z86.711 HISTORY OF PULMONARY EMBOLISM: ICD-10-CM

## 2020-09-17 NOTE — TELEPHONE ENCOUNTER
Reason for Call:  Other prescription    Detailed comments: Patient's pulmonary appointment isn't until next week 09/23/20 but the patient will run out of his medication before then. Patient would like a short refill to last him until then.    rivaroxaban ANTICOAGULANT (XARELTO ANTICOAGULANT) 15 MG TABS tablet    Please send to [facility]pharmacy #[id number] - [place], [place] - [address]    Phone Number Patient can be reached at: Cell number on file:    Telephone Information:   Mobile [phone]       Best Time: any    Can we leave a detailed message on this number? YES    Call taken on 9/17/2020 at 12:52 PM by Chris Menjivar

## 2020-09-17 NOTE — TELEPHONE ENCOUNTER
Routing refill request to provider for review/approval because:  Medication is reported/historical    eKnnedi AGARWALN, RN, CPN

## 2020-09-23 ENCOUNTER — VIRTUAL VISIT (OUTPATIENT)
Dept: HEMATOLOGY | Facility: CLINIC | Age: 50
End: 2020-09-23
Attending: PHYSICIAN ASSISTANT
Payer: COMMERCIAL

## 2020-09-23 VITALS — BODY MASS INDEX: 28.89 KG/M2 | WEIGHT: 225 LBS

## 2020-09-23 DIAGNOSIS — Z86.711 HISTORY OF PULMONARY EMBOLISM: ICD-10-CM

## 2020-09-23 PROCEDURE — 99203 OFFICE O/P NEW LOW 30 MIN: CPT | Mod: 95 | Performed by: INTERNAL MEDICINE

## 2020-09-23 NOTE — PROGRESS NOTES
Patient was contacted to complete the pre-visit call prior to their video visit with the provider.  The following statement was read:       This visit will be billed to your insurance the same as an in-person visit. Because of Coronavirus we are instituting video visits when possible to keep everyone safe. This video visit will be conducted between you and the provider.  This service lets us provide the care you need with a video conversation.  If a prescription is necessary, we can send it directly to your pharmacy.If lab work or other testing is needed, we can help arrange a place/time for that to be done at a later date.If during the course of the call the provider feels a video visit is not appropriate, then your insurance company will not be billed.       Allergies and medications were reviewed and travel screening complete.     A test connection was Completed with Pt? No    I thanked them for their time to cover this information     [facility]        [facility]  [address] [place] [address]  Main: [phone], Fax: [phone]    Patient seen at: [facility] at [address] [place] [address]    Video Virtual Visit Note:    Patient: Jorge Amaral  MRN: [id number]  : 1970  CHONG: 2020    Due to the ongoing COVID-19 outbreak, this visit was conducted by video, with the patient's approval.    Physician has received verbal consent for a Video Visit from the patient? Yes    Video Start Time: 846 AM    Reason for Consultation:  Jorge Amaral is a referred by Dr Vince Swain for evaluation and treatment of pulmonary embolism.    History of Present Illness:  Jorge Amaral is a 50 year old man with history of Crohn's disease, which recently has been well controlled with high-dose Humira, who presented on August 15, 2022 his local emergency department with complaints of chest pain, was found to have a  pulmonary embolism.  He reports in the 30 days leading up to this event he had no hospital stays, major procedures, travel more than 4hours.  He had a minor orthopedic procedure due to the swelling - he had aspiration of the knee in the clinic.  He notes no changes in his Crohn's disease such as an acute flare or change in his regimen.      With regard to Crohn's disease, he reports that he had severe disease back in his 20s and 30s with multiple bowel resections and other surgeries.  He notes that since then he has had good control of his disease for about the last 10years.  He is on high-dose high Humira with essentially no symptoms from a disease now.    [facility] with Xarelto for anticoagulation.  He is tolerating his anticoagulation well with no bleeding symptoms.  He otherwise is feeling well.  He notes no unintentional weight loss, swollen lymph nodes or changes in his energy level or appetite.  One concern he voices today is about the safety of sparring.  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  He is been staying away from this since he has been on the blood thinner.    Past Medical History:  Past Medical History:   Diagnosis Date     Crohn's disease (H)      Hyperlipidemia LDL goal < 160     not on meds     Nondisplaced fracture of navicular (scaphoid) of left foot, subsequent encounter for fracture with delayed healing 07/03/2019       Past Surgical History:  Past Surgical History:   Procedure Laterality Date     APPENDECTOMY       ARTHROSCOPY KNEE WITH MEDIAL MENISCECTOMY Left 1/14/2019    Procedure: LEFT KNEE ARTHROSCOPY WITH PARTIAL MEDIAL MENISCECTOMY;  Surgeon: Joleen Jama MD;  Location:  OR     C STOMACH SURGERY PROCEDURE UNLISTED      terminal ileum removed     ENT SURGERY  6-28-13    Left Tonsil biopsy     EXCISE MASS WRIST Right 11/7/2018    Procedure: EXCISION OF RIGHT DORSAL WRIST MASS;  " Surgeon: Zohaib Edmond MD;  Location:  OR     LAPAROSCOPY PROCEDURE UNLISTED       OPEN REDUCTION INTERNAL FIXATION HAND      right IV MCP hand 7/11     REMOVE HARDWARE LOWER EXTREMITY  4/6/2012    Procedure:REMOVE HARDWARE LOWER EXTREMITY; REMOVAL OF RIGHT TIBIAL SCREW; Surgeon:JOSE LUIS CARMICHAEL; Location:[facility]       Medications:  Current Outpatient Medications   Medication Sig     adalimumab (HUMIRA) 40 MG/0.8ML injection Inject 40 mg Subcutaneous once.     diphenoxylate-atropine (LOMOTIL) 2.5-0.025 MG per tablet Take 2 tablets by mouth     hydrocortisone 2.5 % cream Apply twice daily for 1 week behind ear, then start vaseline twice daily     mercaptopurine (PURINETHOL) 50 MG tablet Take 50 mg by mouth daily. 1 and half tabs daily     rivaroxaban ANTICOAGULANT (XARELTO ANTICOAGULANT) 15 MG TABS tablet Take 1 tablet (15 mg) by mouth daily (with dinner)     valACYclovir (VALTREX) 1000 mg tablet two tablets by mouth twice daily for 1 day at onset of symptoms of a cold sore.     No current facility-administered medications for this visit.        Allergies:  No Known Allergies    ROS:  Denies any bleeding issues. No gum bleeding, No nose bleed. Denies any hematuria or blood in stools. Denies any ecchymosis. Denies any lower extremities swelling or pain. Denies any fever, no chest pain. Denies any shortness of breath.     Social History:  Denies any tobacco use. No significant alcohol use. Denies any illicit drug use.     Family History:  He is not aware of any family members with a deep vein thrombosis or pulmonary embolism    Objective:  Estimated body mass index is 28.89 kg/m  as calculated from the following:    Height as of 8/21/20: 1.88 m (6' 2\").    Weight as of this encounter: 102.1 kg (225 lb).    Complete \"Weight Managment Plan\" in the progress note from the Adult Preventative or Medicare smartsets, use phrase .WEIGHTPLAN, or choose an option from Weight Management Resources smartset " below.    Constitutional: Appears well, no distress  Eyes: no discharge, injection or icterus  Respiratory: no cough or labored breathing  Skin: no rashes or petechiae  Neurological: no deficits appreciated, speech is fluent  Psych: affect is normal    Labs:  Creatinine is normal    Imaging:  CT angiogram showed right-sided pulmonary embolism on 8/15/2020    Assessment:  In summary, Jorge Amaral is a 50 year old man with a history of Crohn's disease, which is severe but under good control for at least 10 years, and otherwise unprovoked pulmonary embolism.  His risk factors for this pulmonary embolism include male sex, Crohn's disease, and minor orthopedic procedure.  Overall, my impression is that he is likely at high risk for recurrence is all these except as minor procedure or persistent risk factors.  I think he is in the range of other unprovoked pulmonary embolism in terms of his recurrence risk of approximately 30 to 40% over the next 10 years.  Hence I think he should remain on indefinite anticoagulation once he completes 6 months of therapy.    Plan:  1. Majority of today's visit was spent counseling the patient regarding treatment, natural history, and risk of recurrence from unprovoked pulmonary embolism.  2.  Complete treatment for pulmonary embolism with 6 months of anticoagulation with Xarelto.  This treatment would end of February 2021  3.  Return to clinic in February 2021 discuss secondary prophylaxis.  I started this conversation today explaining that my estimated risk for recurrence is significantly high and he should seriously consider remaining on indefinite anticoagulation.  I would be comfortable with intermittently interrupting his therapy for quality of life-he specifically is interested in being able to be involved in other athletic activities which would be potentially dangerous on anticoagulation.    The patient is given our center's contact information and is instructed to call if he  should have any further questions or concerns.      Grey Plaza MD   of Medicine  [facility] [employer]       Video-Visit Details:    Type of service:  Video Visit    Video End Time (time video stopped): 920    Originating Location (pt. Location): Home    Distant Location (provider location):  [facility]     Mode of Communication:  Video Conference via [employer]

## 2020-09-23 NOTE — PATIENT INSTRUCTIONS
It was a pleasure meeting you today here at the [facility] at the [facility].  We discussed your recent history of a blood clot in the lung called pulmonary embolism.  I am glad to hear that your Xarelto is going well and you are feeling better since this diagnosis.    As we discussed, treatment for this kind of blood clot is the same in everyone, regardless of cause.  We treat with blood thinners for 6 months for the purpose of dissolving the clot and also allowing healing of the blood vessel that connects the heart to the lungs.    More challenging decision is whether to continue the blood thinner past 6 months (February 2021).  I think the most important risk factors for why this clot occurred included: The fact that you are a man, you are older than 40, your history of Crohn's disease and possibly your knee procedure.  These are all what we would consider to be minor risk factors.  This is a comparison to major factors such as the diagnosis and treatment of cancer, hospital stay or surgery major surgery, or travel for more than 4 hours by car or plane.  I think your risk is probably more like people who have an unexplained, or unprovoked, blood clot.  We know from past patients, that the risk of a second blood clot after treatment for a clot that is unexplained is in the 30 to 40% range over the next 10 to 15 years.  In general, that his risk range that is high enough that we recommend continuing your blood thinner to prevent future blood clots.  However, this is subjective and we realized that some patients we are treating will probably never have a clot, but we just cannot pick out who those people are.    For now:  1. Continue your blood thinner until February 2021.   2. Let's meet in February to discuss any questions you have and make a plan for prevention of blood clots.   3.  If you have questions or concerns, do not hesitate to reach out to us here at the  clinic.  We have a nurse on call each day from 8 AM to 5 PM who can answer many questions, and if they do not have the answers to questions can noise reach out to me for further guidance.  Were also available via my chart.  The clinic telephone number here is [phone].

## 2020-09-24 DIAGNOSIS — R23.4 FISSURE IN SKIN: ICD-10-CM

## 2020-09-24 NOTE — TELEPHONE ENCOUNTER
Hydrocortisone 2.5% cream        Last written prescription date: 2/6/20        Last fill quantity: 30g, # refills: 0        Last office visit: 2/6/20        Future office visit: 10/30/20        I      Routing refill request to provider for review/approval because: Drug not on the [facility], [facility] or [facility] refill protocol or controlled substance           Impression/Plan:  1. Hx of immunosupression  -Discussed with the patient that he is at an increased risk of skin cancer.      2. Verruca plantaris, right second toe near distal nail plate  -Cryotherapy procedure note: After verbal consent and discussion of risks and benefits including but no limited to dyspigmentation/scar, blister, and pain, 3was(were) treated with 1-2mm freeze border for 2 cycles with liquid nitrogen. Post cryotherapy instructions were provided.   -Discussed chemocream as possible treatment options but patient declines.  -Will pursue candida  injections at next appointment.      3. Verruca vulgaris, x2 right palm  -Cryotherapy procedure note: After verbal consent and discussion of risks and benefits including but no limited to dyspigmentation/scar, blister, and pain, 3was(were) treated with 1-2mm freeze border for 2 cycles with liquid nitrogen. Post cryotherapy instructions were provided.         4. Hx of Bowenoid papulosis, left abdomen - no evidence of recurrence.      5. 2 cm fissure on crease of R posterior ear, very thin and possibly more consistent with intertrigo - not healing.  -Start hydrocortisone 2.5% cream. Apply every day for 1 week, then switch to Vaseline bid.     6. subcutaneous nodule 1 cm, left bicep - mobile- cyst or other  -Referral to Dr. Vargas for excision. Pt would like to wait until after tournament season.      Follow-up with Dr. Vargas for nodule excision and with Chuck for plantar wart injections  earlier for new or changing lesions.       Staff Involved:  Scribe/Staff     Scribe Disclosure  I, Jimmy Brown am  serving as a scribe to document services personally performed by Dr. Sasha Woods MD, based on data collection and the provider's statements to me.     Provider Disclosure:   The documentation recorded by the scribe accurately reflects the services I personally performed and the decisions made by me.     Sasha Woods MD    Department of Dermatology  [facility]: Phone: [phone], Fax:[phone]  [facility] [place]: Phone: [phone], Fax: [phone]

## 2020-10-01 DIAGNOSIS — Z86.711 HISTORY OF PULMONARY EMBOLISM: ICD-10-CM

## 2020-10-30 ENCOUNTER — VIRTUAL VISIT (OUTPATIENT)
Dept: DERMATOLOGY | Facility: CLINIC | Age: 50
End: 2020-10-30
Payer: COMMERCIAL

## 2020-10-30 DIAGNOSIS — B07.0 VERRUCA PLANTARIS: ICD-10-CM

## 2020-10-30 DIAGNOSIS — R21 RASH: Primary | ICD-10-CM

## 2020-10-30 DIAGNOSIS — B07.9 VERRUCA VULGARIS: ICD-10-CM

## 2020-10-30 PROCEDURE — 99213 OFFICE O/P EST LOW 20 MIN: CPT | Mod: 95 | Performed by: DERMATOLOGY

## 2020-10-30 NOTE — PATIENT INSTRUCTIONS
[facility] Dermatology Visit    Thank you for allowing us to participate in your care. Your findings, instructions and follow-up plan are as follows:  bhydrocrotsi    When should I call my doctor?    If you are worsening or not improving, please, contact us or seek urgent care as noted below.     Who should I call with questions (adults)?    [facility] (adult and pediatric): [phone]     Health system (adult): [phone]    For urgent needs outside of business hours call the [facility] at [phone] and ask for the dermatology resident on call    If this is a medical emergency and you are unable to reach an ER, Call 911      Who should I call with questions (pediatric)?  [facility]- Pediatric Dermatology  Dr. Pattie Trivedi, Dr. John Cooper, Dr. Beckie Lamar, Alisa Ordonez, PA  Dr. Shaista Luu, Dr. Vicky Gonzalez & Dr. Antonio Mayorga  Non Urgent  Nurse Triage Line; [phone]- Hazel and Yelena POWELL Care Coordinators   Justa (/Complex ) [phone]    If you need a prescription refill, please contact your pharmacy. Refills are approved or denied by our Physicians during normal business hours, Monday through Fridays  Per office policy, refills will not be granted if you have not been seen within the past year (or sooner depending on your child's condition)    Scheduling Information:  Pediatric Appointment Scheduling and Call Center [phone]  Radiology Scheduling- [phone]  Sedation Unit Scheduling- [phone]  [facility] Scheduling- General [phone]; Pediatric Dermatology [phone]  Main  Services: [phone]  Faroese: [phone]  Indonesian: [phone]  Hmong/Polish/Turkish: [phone]  Preadmission Nursing Department Fax Number: [phone] (Fax all pre-operative paperwork to this number)    For urgent matters arising during  evenings, weekends, or holidays that cannot wait for normal business hours please call [phone] and ask for the Dermatology Resident On-Call to be paged.

## 2020-10-30 NOTE — LETTER
"    10/30/2020         RE: Jorge Amaral  [address] [place] [facility] [address]        Dear Colleague,    Thank you for referring your patient, Jorge Amaral, to the [facility]. Please see a copy of my visit note below.    [facility] Dermatology Record:  Store and Forward and Telephone [phone]      Dermatology Problem List:  1. History of immunosuppression, Chron's  2. Bowenoid papulosis, left abdomen  -s/p biopsy 4/18/15  -s/p Efudex x 6 weeks initiated 5/6/15, marked resolved 8/2015  3. Verruca plantaris  -Previous Tx: cantherone, trichloracetic acid, cryotherapy and PDL with Dr. Chris Stoddard, s/p candida 3X, cryotherapy  4. Tinea versicolor, back and chest  -Previous Tx:  ketoconazole 2% cream initiated 10/23/2015  5. 2 cm fissure on crease of R posterior ear - not healing  -current tx; hydrocortisone 2.5% cream  6. Lipoma, left bicep excision    Encounter Date: Oct 30, 2020    CC:   Chief Complaint   Patient presents with     Wart     Black dot on toe wart.  Has been pairing it down.     Derm Problem     Fissure under right ear is not improving.  Pt using hydrocortisone \"every now and then.\"  Admits that he could be better.       History of Present Illness:  Jorge Amaral is a 50 year old male who presents for  Follow up. Ear is not healing or improving. Feels toe is blooding from paring. This might be going under the nail bed. Area behind the ear has gotten worse since we lost saw it. He is scratching and irritating it. He cut his nails. It gets better. It is itchy. He has not used it for 2 full week.       ROS: Patient is generally feeling well today     Physical Examination:  General: Well-appearing  appropriately-developed individual.  Skin: Focused examination including   was performed.   right second toe hemorrhagic appearing macule  - scaly patch postauricular, right side  Labs:  SPECIMEN(S):   Skin, left bicep, excision     FINAL DIAGNOSIS:   Skin, left bicep, " excision:   - Lipoma - (see description)     Past Medical History:   Patient Active Problem List   Diagnosis     CARDIOVASCULAR SCREENING; LDL GOAL LESS THAN 160     Fracture, metacarpal     Delayed union of fracture     Family history of diabetes mellitus     ACL (anterior cruciate ligament) tear--recurrent     Cold sore     Common wart     Crohn's disease with complication, unspecified gastrointestinal tract location (H)     Herpes labialis     FH: prostate cancer     BMI 28.0-28.9,adult     History of pulmonary embolism     Past Medical History:   Diagnosis Date     Crohn's disease (H)      Hyperlipidemia LDL goal < 160     not on meds     Nondisplaced fracture of navicular (scaphoid) of left foot, subsequent encounter for fracture with delayed healing 07/03/2019     Past Surgical History:   Procedure Laterality Date     APPENDECTOMY       ARTHROSCOPY KNEE WITH MEDIAL MENISCECTOMY Left 1/14/2019    Procedure: LEFT KNEE ARTHROSCOPY WITH PARTIAL MEDIAL MENISCECTOMY;  Surgeon: Jose Luis Jama MD;  Location:  OR     C STOMACH SURGERY PROCEDURE UNLISTED      terminal ileum removed     ENT SURGERY  6-28-13    Left Tonsil biopsy     EXCISE MASS WRIST Right 11/7/2018    Procedure: EXCISION OF RIGHT DORSAL WRIST MASS;  Surgeon: Zohaib Edmond MD;  Location: [facility] OR     LAPAROSCOPY PROCEDURE UNLISTED       OPEN REDUCTION INTERNAL FIXATION HAND      right IV MCP hand 7/11     REMOVE HARDWARE LOWER EXTREMITY  4/6/2012    Procedure:REMOVE HARDWARE LOWER EXTREMITY; REMOVAL OF RIGHT TIBIAL SCREW; Surgeon:JOSE LUIS JAMA; Location:[facility]       Social History:  Patient reports that he has quit smoking. His smoking use included cigars. He has never used smokeless tobacco. He reports current alcohol use. He reports that he does not use drugs.    Family History:  Family History   Problem Relation Age of Onset     Hypertension Mother      Respiratory Mother         SMOKER -EMPYSEMA     Hypertension Father      Diabetes  Father      Prostate Cancer Father 50     Prostate Cancer Paternal Grandfather      Diabetes Brother      Prostate Cancer Paternal Uncle 50       Medications:  Current Outpatient Medications   Medication     adalimumab (HUMIRA) 40 MG/0.8ML injection     diphenoxylate-atropine (LOMOTIL) 2.5-0.025 MG per tablet     hydrocortisone 2.5 % cream     mercaptopurine (PURINETHOL) 50 MG tablet     rivaroxaban ANTICOAGULANT (XARELTO ANTICOAGULANT) 15 MG TABS tablet     rivaroxaban ANTICOAGULANT (XARELTO ANTICOAGULANT) 20 MG TABS tablet     valACYclovir (VALTREX) 1000 mg tablet     No current facility-administered medications for this visit.           No Known Allergies        Impression and Recommendations (Patient Counseled on the Following):  1. Hx of immunosupression  -last fulls skin exam 2/6/2020     2. Verruca plantaris, right second toe  in the past but photo with hemorrhagic macule  -stop interventions and assess in person     4. Hx of Bowenoid papulosis, left abdomen   -last skin exam 2/6/2020     5. 2 cm fissure on crease of R posterior ear, very thin and possibly more consistent with intertrigo - not healing.  -vaseline twice daily wih hydrocortisone 2.5% cream for 2 weeks  -needs in person assessment     6. subcutaneous nodule 1 cm, left bicep - mobile- cyst or other  -Referral to Dr. Vargas for excision. Pt would like to wait until after tournament season.       Follow-up:   Follow-up with dermatology in approximately within 6 weeks in person assessment for ear and toe and immunosuppression skin exam. Earlier for new or changing lesions or rash.      Staff only    Sasha Woods MD    Department of Dermatology  [facility]: Phone: [phone], Fax:[phone]  [facility]: Phone: [phone], Fax:  "[phone]      _____________________________________________________________________________    Teledermatology information:  - Location of patient: [place]  - Patient presented as: return  - Location of teledermatologist:  [facility] )  - Reason teledermatology is appropriate:  of National Emergency Regarding Coronavirus disease (COVID 19) Outbreak  - Image quality and interpretability: acceptable  - Physician has received verbal consent for a Video/Photos Visit from the patient? YES  - In-person dermatology visit recommendation: yes - for physician visit  - Date of images:  yesterday  - Service start time:12:59pm  - Service end time: 1:02pm  - Date of report: 10/30/2020    Teledermatology Nurse Call for RETURN patients seen within the last 3 years:      The patient was contacted by phone and we reviewed they have a visit in teledermatology upcoming with an MD or PAYANY;  Importantly, \"a teledermatology visit may not be as thorough as an in-person visit, and the quality of the photograph and/or video sent may not be of the same quality as that taken by the dermatology clinic.\"     We have found that certain health care needs can be provided without the need for an in-person physical exam.   If a prescription is necessary we can send it directly to your pharmacy.  If lab work is needed we can place an order for that and you can then stop by our lab to have the test done at a later time.Visits are billed at different rates depending on your insurance coverage. Please reach out to your insurance provider with any questions.    The patient chose to:         Consent to a teledermatology visit with BATS photos. Patient told by nursing these are already uploaded. Instructions sent to patient via BATS. They verified they will be in the state of [place] at the time of the encounter.                            The patient denied skin pain, fever, mucosal symptoms(lesions, blisters, sores in the " mouth, nose, eyes, or genitals)      Again, thank you for allowing me to participate in the care of your patient.        Sincerely,        Sasha Woods MD

## 2020-10-30 NOTE — PROGRESS NOTES
"[facility] Record:  Store and Forward and Telephone [phone]      Dermatology Problem List:  1. History of immunosuppression, Chron's  2. Bowenoid papulosis, left abdomen  -s/p biopsy 4/18/15  -s/p Efudex x 6 weeks initiated 5/6/15, marked resolved 8/2015  3. Verruca plantaris  -Previous Tx: cantherone, trichloracetic acid, cryotherapy and PDL with Dr. Chris Stoddard, s/p candida 3X, cryotherapy  4. Tinea versicolor, back and chest  -Previous Tx:  ketoconazole 2% cream initiated 10/23/2015  5. 2 cm fissure on crease of R posterior ear - not healing  -current tx; hydrocortisone 2.5% cream  6. Lipoma, left bicep excision    Encounter Date: Oct 30, 2020    CC:   Chief Complaint   Patient presents with     Wart     Black dot on toe wart.  Has been pairing it down.     Derm Problem     Fissure under right ear is not improving.  Pt using hydrocortisone \"every now and then.\"  Admits that he could be better.       History of Present Illness:  Jorge Amaral is a 50 year old male who presents for  Follow up. Ear is not healing or improving. Feels toe is blooding from paring. This might be going under the nail bed. Area behind the ear has gotten worse since we lost saw it. He is scratching and irritating it. He cut his nails. It gets better. It is itchy. He has not used it for 2 full week.       ROS: Patient is generally feeling well today     Physical Examination:  General: Well-appearing  appropriately-developed individual.  Skin: Focused examination including   was performed.   right second toe hemorrhagic appearing macule  - scaly patch postauricular, right side  Labs:  SPECIMEN(S):   Skin, left bicep, excision     FINAL DIAGNOSIS:   Skin, left bicep, excision:   - Lipoma - (see description)     Past Medical History:   Patient Active Problem List   Diagnosis     CARDIOVASCULAR SCREENING; LDL GOAL LESS THAN 160     Fracture, metacarpal     Delayed union of fracture     Family history of diabetes mellitus "     ACL (anterior cruciate ligament) tear--recurrent     Cold sore     Common wart     Crohn's disease with complication, unspecified gastrointestinal tract location (H)     Herpes labialis     FH: prostate cancer     BMI 28.0-28.9,adult     History of pulmonary embolism     Past Medical History:   Diagnosis Date     Crohn's disease (H)      Hyperlipidemia LDL goal < 160     not on meds     Nondisplaced fracture of navicular (scaphoid) of left foot, subsequent encounter for fracture with delayed healing 07/03/2019     Past Surgical History:   Procedure Laterality Date     APPENDECTOMY       ARTHROSCOPY KNEE WITH MEDIAL MENISCECTOMY Left 1/14/2019    Procedure: LEFT KNEE ARTHROSCOPY WITH PARTIAL MEDIAL MENISCECTOMY;  Surgeon: Jose Luis Jama MD;  Location:  OR      STOMACH SURGERY PROCEDURE UNLISTED      terminal ileum removed     ENT SURGERY  6-28-13    Left Tonsil biopsy     EXCISE MASS WRIST Right 11/7/2018    Procedure: EXCISION OF RIGHT DORSAL WRIST MASS;  Surgeon: Zohaib Edmond MD;  Location: [facility] OR     LAPAROSCOPY PROCEDURE UNLISTED       OPEN REDUCTION INTERNAL FIXATION HAND      right IV MCP hand 7/11     REMOVE HARDWARE LOWER EXTREMITY  4/6/2012    Procedure:REMOVE HARDWARE LOWER EXTREMITY; REMOVAL OF RIGHT TIBIAL SCREW; Surgeon:JOSE LUIS JAMA; Location:[facility]       Social History:  Patient reports that he has quit smoking. His smoking use included cigars. He has never used smokeless tobacco. He reports current alcohol use. He reports that he does not use drugs.    Family History:  Family History   Problem Relation Age of Onset     Hypertension Mother      Respiratory Mother         SMOKER -EMPYSEMA     Hypertension Father      Diabetes Father      Prostate Cancer Father 50     Prostate Cancer Paternal Grandfather      Diabetes Brother      Prostate Cancer Paternal Uncle 50       Medications:  Current Outpatient Medications   Medication     adalimumab (HUMIRA) 40 MG/0.8ML injection      diphenoxylate-atropine (LOMOTIL) 2.5-0.025 MG per tablet     hydrocortisone 2.5 % cream     mercaptopurine (PURINETHOL) 50 MG tablet     rivaroxaban ANTICOAGULANT (XARELTO ANTICOAGULANT) 15 MG TABS tablet     rivaroxaban ANTICOAGULANT (XARELTO ANTICOAGULANT) 20 MG TABS tablet     valACYclovir (VALTREX) 1000 mg tablet     No current facility-administered medications for this visit.           No Known Allergies        Impression and Recommendations (Patient Counseled on the Following):  1. Hx of immunosupression  -last fulls skin exam 2/6/2020     2. Verruca plantaris, right second toe  in the past but photo with hemorrhagic macule  -stop interventions and assess in person     4. Hx of Bowenoid papulosis, left abdomen   -last skin exam 2/6/2020     5. 2 cm fissure on crease of R posterior ear, very thin and possibly more consistent with intertrigo - not healing.  -vaseline twice daily wih hydrocortisone 2.5% cream for 2 weeks  -needs in person assessment     6. subcutaneous nodule 1 cm, left bicep - mobile- cyst or other  -Referral to Dr. Vargas for excision. Pt would like to wait until after tournament season.       Follow-up:   Follow-up with dermatology in approximately within 6 weeks in person assessment for ear and toe and immunosuppression skin exam. Earlier for new or changing lesions or rash.      Staff only    Sasha Woods MD    Department of Dermatology  [facility]: Phone: [phone], Fax:[phone]  [facility]: Phone: [phone], Fax: [phone]      _____________________________________________________________________________    Teledermatology information:  - Location of patient: [place]  - Patient presented as: return  - Location of teledermatologist:  ([facility] )  - Reason teledermatology is appropriate:  of National Emergency Regarding  "Coronavirus disease (COVID 19) Outbreak  - Image quality and interpretability: acceptable  - Physician has received verbal consent for a Video/Photos Visit from the patient? YES  - In-person dermatology visit recommendation: yes - for physician visit  - Date of images:  yesterday  - Service start time:12:59pm  - Service end time: 1:02pm  - Date of report: 10/30/2020    Teledermatology Nurse Call for RETURN patients seen within the last 3 years:      The patient was contacted by phone and we reviewed they have a visit in teledermatology upcoming with an MD or PAYANY;  Importantly, \"a teledermatology visit may not be as thorough as an in-person visit, and the quality of the photograph and/or video sent may not be of the same quality as that taken by the dermatology clinic.\"     We have found that certain health care needs can be provided without the need for an in-person physical exam.   If a prescription is necessary we can send it directly to your pharmacy.  If lab work is needed we can place an order for that and you can then stop by our lab to have the test done at a later time.Visits are billed at different rates depending on your insurance coverage. Please reach out to your insurance provider with any questions.    The patient chose to:         Consent to a teledermatology visit with [employer] photos. Patient told by nursing these are already uploaded. Instructions sent to patient via InvitedHome. They verified they will be in the state of [place] at the time of the encounter.                            The patient denied skin pain, fever, mucosal symptoms(lesions, blisters, sores in the mouth, nose, eyes, or genitals)  "

## 2020-11-13 NOTE — TELEPHONE ENCOUNTER
Okay for RN to refill but send message or call and reach out and see if this is still the spot on his ear? If this spot is back we should reassess in the next 3 monthns

## 2020-11-16 RX ORDER — HYDROCORTISONE 2.5 %
CREAM (GRAM) TOPICAL
Qty: 30 G | Refills: 0 | Status: SHIPPED | OUTPATIENT
Start: 2020-11-16 | End: 2022-05-06

## 2020-12-11 ENCOUNTER — OFFICE VISIT (OUTPATIENT)
Dept: DERMATOLOGY | Facility: CLINIC | Age: 50
End: 2020-12-11
Payer: COMMERCIAL

## 2020-12-11 DIAGNOSIS — D48.5 NEOPLASM OF UNCERTAIN BEHAVIOR OF SKIN: Primary | ICD-10-CM

## 2020-12-11 PROCEDURE — 99213 OFFICE O/P EST LOW 20 MIN: CPT | Mod: 25 | Performed by: DERMATOLOGY

## 2020-12-11 PROCEDURE — 88305 TISSUE EXAM BY PATHOLOGIST: CPT | Performed by: PATHOLOGY

## 2020-12-11 PROCEDURE — 88312 SPECIAL STAINS GROUP 1: CPT | Mod: GC | Performed by: PATHOLOGY

## 2020-12-11 PROCEDURE — 11103 TANGNTL BX SKIN EA SEP/ADDL: CPT | Performed by: DERMATOLOGY

## 2020-12-11 PROCEDURE — 11104 PUNCH BX SKIN SINGLE LESION: CPT | Performed by: DERMATOLOGY

## 2020-12-11 ASSESSMENT — PAIN SCALES - GENERAL: PAINLEVEL: NO PAIN (0)

## 2020-12-11 NOTE — PATIENT INSTRUCTIONS
Wound Care After a Biopsy    What is a skin biopsy?  A skin biopsy allows the doctor to examine a very small piece of tissue under the microscope to determine the diagnosis and the best treatment for the skin condition. A local anesthetic (numbing medicine)  is injected with a very small needle into the skin area to be tested. A small piece of skin is taken from the area. Sometimes a suture (stitch) is used.     What are the risks of a skin biopsy?  I will experience scar, bleeding, swelling, pain, crusting and redness. I may experience incomplete removal or recurrence. Risks of this procedure are excessive bleeding, bruising, infection, nerve damage, numbness, thick (hypertrophic or keloidal) scar and non-diagnostic biopsy.    How should I care for my wound for the first 24 hours?    Keep the wound dry and covered for 24 hours    If it bleeds, hold direct pressure on the area for 15 minutes. If bleeding does not stop then go to the emergency room    Avoid strenuous exercise the first 1-2 days or as your doctor instructs you    How should I care for the wound after 24 hours?    After 24 hours, remove the bandage    You may bathe or shower as normal    If you had a scalp biopsy, you can shampoo as usual and can use shower water to clean the biopsy site daily    Clean the wound twice a day with gentle soap and water    Do not scrub, be gentle    Apply white petroleum/Vaseline after cleaning the wound with a cotton swab or a clean finger, and keep the site covered with a Bandaid /bandage. Bandages are not necessary with a scalp biopsy    If you are unable to cover the site with a Bandaid /bandage, re-apply ointment 2-3 times a day to keep the site moist. Moisture will help with healing    Avoid strenuous activity for first 1-2 days    Avoid lakes, rivers, pools, and oceans until the stitches are removed or the site is healed    How do I clean my wound?    Wash hands thoroughly with soap or use hand  before all  wound care    Clean the wound with gentle soap and water    Apply white petroleum/Vaseline  to wound after it is clean    Replace the Bandaid /bandage to keep the wound covered for the first few days or as instructed by your doctor    If you had a scalp biopsy, warm shower water to the area on a daily basis should suffice    What should I use to clean my wound?     Cotton-tipped applicators (Qtips )    White petroleum jelly (Vaseline ). Use a clean new container and use Q-tips to apply.    Bandaids   as needed    Gentle soap     How should I care for my wound long term?    Do not get your wound dirty    Keep up with wound care for one week or until the area is healed.    A small scab will form and fall off by itself when the area is completely healed. The area will be red and will become pink in color as it heals. Sun protection is very important for how your scar will turn out. Sunscreen with an SPF 30 or greater is recommended once the area is healed.    You should have some soreness but it should be mild and slowly go away over several days. Talk to your doctor about using tylenol for pain,    When should I call my doctor?  If you have increased:     Pain or swelling    Pus or drainage (clear or slightly yellow drainage is ok)    Temperature over 100F    Spreading redness or warmth around wound    When will I hear about my results?  The biopsy results can take 2-3 weeks to come back. The clinic will call you with the results, send you a ReplyBuyt message, or have you schedule a follow-up clinic or phone time to discuss the results. Contact our clinics if you do not hear from us in 3 weeks.     Who should I call with questions?    [facility]: [phone]     [facility]: [phone]    For urgent needs outside of business hours call the [facility] at [phone] and ask for the dermatology resident on call        [facility]  Dermatology Visit    Thank you for allowing us to participate in your care. Your findings, instructions and follow-up plan are as follows:      When should I call my doctor?    If you are worsening or not improving, please, contact us or seek urgent care as noted below.     Who should I call with questions (adults)?    [facility] (adult and pediatric): [phone]     [facility] (adult): [phone]    For urgent needs outside of business hours call the [facility] at [phone] and ask for the dermatology resident on call    If this is a medical emergency and you are unable to reach an ER, Call [phone]      Who should I call with questions (pediatric)?  [facility]- Pediatric Dermatology  Dr. Pattie Trivedi, Dr. John Cooper, Dr. Beckie Lamar, Alisa Ordonez, SEUN Luu, Dr. Vicky Gonzalez & Dr. Antonio Mayorga  Non Urgent  Nurse Triage Line; [phone]- Hazel and Yelena POWELL Care Coordinatorluci Marr (/Complex ) [phone]    If you need a prescription refill, please contact your pharmacy. Refills are approved or denied by our Physicians during normal business hours, Monday through Fridays  Per office policy, refills will not be granted if you have not been seen within the past year (or sooner depending on your child's condition)    Scheduling Information:  Pediatric Appointment Scheduling and Call Center [phone]  Radiology Scheduling- [phone]  Sedation Unit Scheduling- [phone]  [place] Scheduling- General [phone]; Pediatric Dermatology [phone]  Main  Services: [phone]  Nepali: [phone]  Venezuelan: [phone]  Hmong/Albanian/Urdu: [phone]  Preadmission Nursing Department Fax Number: [phone] (Fax all pre-operative paperwork to this number)    For urgent matters arising during evenings, weekends, or holidays that cannot  wait for normal business hours please call [phone] and ask for the Dermatology Resident On-Call to be paged.      Patient Education     Checking for Skin Cancer  You can find cancer early by checking your skin each month. There are 3 kinds of skin cancer. They are melanoma, basal cell carcinoma, and squamous cell carcinoma. Doing monthly skin checks is the best way to find new marks or skin changes. Follow the instructions below for checking your skin.   The ABCDEs of checking moles for melanoma   Check your moles or growths for signs of melanoma using ABCDE:     Asymmetry: the sides of the mole or growth don t match    Border: the edges are ragged, notched, or blurred    Color: the color within the mole or growth varies    Diameter: the mole or growth is larger than 6 mm (size of a pencil eraser)    Evolving: the size, shape, or color of the mole or growth is changing (evolving is not shown in the images below)    Checking for other types of skin cancer  Basal cell carcinoma or squamous cell carcinoma have symptoms such as:       A spot or mole that looks different from all other marks on your skin    Changes in how an area feels, such as itching, tenderness, or pain    Changes in the skin's surface, such as oozing, bleeding, or scaliness    A sore that does not heal    New swelling or redness beyond the border of a mole    Who s at risk?  Anyone can get skin cancer. But you are at greater risk if you have:     Fair skin, light-colored hair, or light-colored eyes    Many moles or abnormal moles on your skin    A history of sunburns from sunlight or tanning beds    A family history of skin cancer    A history of exposure to radiation or chemicals    A weakened immune system  If you have had skin cancer in the past, you are at risk for recurring skin cancer.   How to check your skin  Do your monthly skin checkups in front of a full-length mirror. Check all parts of your body, including your:     Head (ears,  face, neck, and scalp)    Torso (front, back, and sides)    Arms (tops, undersides, upper, and lower armpits)    Hands (palms, backs, and fingers, including under the nails)    Buttocks and genitals    Legs (front, back, and sides)    Feet (tops, soles, toes, including under the nails, and between toes)  If you have a lot of moles, take digital photos of them each month. Make sure to take photos both up close and from a distance. These can help you see if any moles change over time.   Most skin changes are not cancer. But if you see any changes in your skin, call your doctor right away. Only he or she can diagnose a problem. If you have skin cancer, seeing your doctor can be the first step toward getting the treatment that could save your life.   [employer] last reviewed this educational content on 4/1/2019 2000-2020 [employer]. [address] [place] [address]. All rights reserved. This information is not intended as a substitute for professional medical care. Always follow your healthcare professional's instructions.       When should I call my doctor?    If you are worsening or not improving, please, contact us or seek urgent care as noted below.     Who should I call with questions (adults)?    [facility] (adult and pediatric): [phone]     [facility] (adult): [phone]    For urgent needs outside of business hours call the [facility] at [phone] and ask for the dermatology resident on call    If this is a medical emergency and you are unable to reach an ER, Call [phone]      Who should I call with questions (pediatric)?  [facility]- Pediatric Dermatology  Dr. Pattie Trivedi, Dr. John Cooper, Dr. Beckie Lamar, Alisacorona Ordonez, [place]  Dr. Shaista Luu, Dr. Vicky Gonzalez & Dr. Antonio Mayorga  Non Urgent  Nurse Triage Line; [phone]- Hazel and Yelena POWELL Care Coordinators   [facility]  (/Complex ) [phone]    If you need a prescription refill, please contact your pharmacy. Refills are approved or denied by our Physicians during normal business hours, Monday through Fridays  Per office policy, refills will not be granted if you have not been seen within the past year (or sooner depending on your child's condition)    Scheduling Information:  Pediatric Appointment Scheduling and Call Center [phone]  Radiology Scheduling- [phone]  Sedation Unit Scheduling- [phone]  [place] Scheduling- General [phone]; Pediatric Dermatology [phone]  Main  Services: [phone]  Georgian: [phone]  Azerbaijani: [phone]  Hmong/Pakistani/Georgian: [phone]  Preadmission Nursing Department Fax Number: [phone] (Fax all pre-operative paperwork to this number)    For urgent matters arising during evenings, weekends, or holidays that cannot wait for normal business hours please call [phone] and ask for the Dermatology Resident On-Call to be paged.        Wound Care After a Biopsy    What is a skin biopsy?  A skin biopsy allows the doctor to examine a very small piece of tissue under the microscope to determine the diagnosis and the best treatment for the skin condition. A local anesthetic (numbing medicine)  is injected with a very small needle into the skin area to be tested. A small piece of skin is taken from the area. Sometimes a suture (stitch) is used.     What are the risks of a skin biopsy?  I will experience scar, bleeding, swelling, pain, crusting and redness. I may experience incomplete removal or recurrence. Risks of this procedure are excessive bleeding, bruising, infection, nerve damage, numbness, thick (hypertrophic or keloidal) scar and non-diagnostic biopsy.    How should I care for my wound for the first 24 hours?    Keep the wound dry and covered for 24 hours    If it bleeds, hold direct pressure on the area for 15  minutes. If bleeding does not stop then go to the emergency room    Avoid strenuous exercise the first 1-2 days or as your doctor instructs you    How should I care for the wound after 24 hours?    After 24 hours, remove the bandage    You may bathe or shower as normal    If you had a scalp biopsy, you can shampoo as usual and can use shower water to clean the biopsy site daily    Clean the wound twice a day with gentle soap and water    Do not scrub, be gentle    Apply white petroleum/Vaseline after cleaning the wound with a cotton swab or a clean finger, and keep the site covered with a Bandaid /bandage. Bandages are not necessary with a scalp biopsy    If you are unable to cover the site with a Bandaid /bandage, re-apply ointment 2-3 times a day to keep the site moist. Moisture will help with healing    Avoid strenuous activity for first 1-2 days    Avoid lakes, rivers, pools, and oceans until the stitches are removed or the site is healed    How do I clean my wound?    Wash hands thoroughly with soap or use hand  before all wound care    Clean the wound with gentle soap and water    Apply white petroleum/Vaseline  to wound after it is clean    Replace the Bandaid /bandage to keep the wound covered for the first few days or as instructed by your doctor    If you had a scalp biopsy, warm shower water to the area on a daily basis should suffice    What should I use to clean my wound?     Cotton-tipped applicators (Qtips )    White petroleum jelly (Vaseline ). Use a clean new container and use Q-tips to apply.    Bandaids   as needed    Gentle soap     How should I care for my wound long term?    Do not get your wound dirty    Keep up with wound care for one week or until the area is healed.    A small scab will form and fall off by itself when the area is completely healed. The area will be red and will become pink in color as it heals. Sun protection is very important for how your scar will turn out.  Sunscreen with an SPF 30 or greater is recommended once the area is healed.    If you have stitches, stitches need to be removed in 10 days. You may return to our clinic for this or you may have it done locally at your doctor s office.    You should have some soreness but it should be mild and slowly go away over several days. Talk to your doctor about using tylenol for pain,    When should I call my doctor?  If you have increased:     Pain or swelling    Pus or drainage (clear or slightly yellow drainage is ok)    Temperature over 100F    Spreading redness or warmth around wound    When will I hear about my results?  The biopsy results can take 2-3 weeks to come back. The clinic will call you with the results, send you a Lavish Skate message, or have you schedule a follow-up clinic or phone time to discuss the results. Contact our clinics if you do not hear from us in 3 weeks.     Who should I call with questions?    [facility]: [phone]     [facility]: [phone]    For urgent needs outside of business hours call the [facility] at [phone] and ask for the dermatology resident on call

## 2020-12-11 NOTE — PROGRESS NOTES
[facility] Dermatology Note    Dermatology Problem List:  1. History of immunosuppression, Chron's  2. Bowenoid papulosis, left abdomen  -s/p biopsy 4/18/15  -s/p Efudex x 6 weeks initiated 5/6/15, marked resolved 8/2015  3. Verruca plantaris  -Previous Tx: cantherone, trichloracetic acid, cryotherapy and PDL with Dr. Chris Stoddard, s/p candida 3X, cryotherapy  4. Tinea versicolor, back and chest  -Previous Tx:  ketoconazole 2% cream initiated 10/23/2015  5. 2 cm fissure on crease of R posterior ear - not healing  -current tx; hydrocortisone 2.5% cream  6. Lipoma, left bicep excision    Last FBSE on 2/21/2019    Encounter Date: Dec 11, 2020    CC:  Chief Complaint   Patient presents with     Derm Problem     skin check hx of immunosurpression, recheck ear and toe        History of Present Illness:  Mr. Jorge Amaral is a 50 year old male with a history of immunosuppression who presents for a skin check. He usually is an in person patient, but was last seen on 10/30/2020 for a 2 cm fissure on crease of R posterior ear, and was instructed to follow up for in person evaluation. He also was instructed to follow up for assessment of a possible hemorrhagic macule, that he reported as a wart, on his right second toe. Patient feeling generally well. No changes in medical problems. Nothing bleeding, crusting, or changing.  He denies fever, chills or night sweats. Nothing bleeding, crusting, or changing.     The spot on his right ear gets better and worse, but has not fully resolved.    He has been scraping his wart, and notes it gets dead skin on it.      Past Medical History:   Patient Active Problem List   Diagnosis     CARDIOVASCULAR SCREENING; LDL GOAL LESS THAN 160     Fracture, metacarpal     Delayed union of fracture     Family history of diabetes mellitus     ACL (anterior cruciate ligament) tear--recurrent     Cold sore     Common wart     Crohn's disease with complication, unspecified  gastrointestinal tract location (H)     Herpes labialis     FH: prostate cancer     BMI 28.0-28.9,adult     History of pulmonary embolism     Past Medical History:   Diagnosis Date     Crohn's disease (H)      Hyperlipidemia LDL goal < 160     not on meds     Nondisplaced fracture of navicular (scaphoid) of left foot, subsequent encounter for fracture with delayed healing 07/03/2019     Past Surgical History:   Procedure Laterality Date     APPENDECTOMY       ARTHROSCOPY KNEE WITH MEDIAL MENISCECTOMY Left 1/14/2019    Procedure: LEFT KNEE ARTHROSCOPY WITH PARTIAL MEDIAL MENISCECTOMY;  Surgeon: Jose Luis Jama MD;  Location:  OR     C STOMACH SURGERY PROCEDURE UNLISTED      terminal ileum removed     ENT SURGERY  6-28-13    Left Tonsil biopsy     EXCISE MASS WRIST Right 11/7/2018    Procedure: EXCISION OF RIGHT DORSAL WRIST MASS;  Surgeon: Zohaib Edmond MD;  Location:  OR     LAPAROSCOPY PROCEDURE UNLISTED       OPEN REDUCTION INTERNAL FIXATION HAND      right IV MCP hand 7/11     REMOVE HARDWARE LOWER EXTREMITY  4/6/2012    Procedure:REMOVE HARDWARE LOWER EXTREMITY; REMOVAL OF RIGHT TIBIAL SCREW; Surgeon:JOSE LUIS JAMA; Location:[facility]       Social History:  Patient reports that he has quit smoking. His smoking use included cigars. He has never used smokeless tobacco. He reports current alcohol use. He reports that he does not use drugs.    Family History:  Family History   Problem Relation Age of Onset     Hypertension Mother      Respiratory Mother         SMOKER -EMPYSEMA     Hypertension Father      Diabetes Father      Prostate Cancer Father 50     Prostate Cancer Paternal Grandfather      Diabetes Brother      Prostate Cancer Paternal Uncle 50       Medications:  Current Outpatient Medications   Medication Sig Dispense Refill     adalimumab (HUMIRA) 40 MG/0.8ML injection Inject 40 mg Subcutaneous once.       diphenoxylate-atropine (LOMOTIL) 2.5-0.025 MG per tablet Take 2 tablets by mouth        hydrocortisone 2.5 % cream Apply twice daily for 1 week behind ear, then start vaseline twice daily 30 g 0     mercaptopurine (PURINETHOL) 50 MG tablet Take 50 mg by mouth daily. 1 and half tabs daily       rivaroxaban ANTICOAGULANT (XARELTO ANTICOAGULANT) 20 MG TABS tablet Take 1 tablet (20 mg) by mouth daily (with dinner) 30 tablet 4     valACYclovir (VALTREX) 1000 mg tablet two tablets by mouth twice daily for 1 day at onset of symptoms of a cold sore. 30 tablet 0       No Known Allergies    Review of Systems:  -Constitutional: Otherwise feeling well today, in usual state of health.  -Skin: As per HPI. No other concerns.      Physical exam:  Vitals: There were no vitals taken for this visit.  GEN: This is a well developed, well-nourished male in no acute distress, in a pleasant mood.    SKIN: Full skin excluding the undergarment areas was performed. The exam included the head/face, neck, both arms, chest, back, abdomen, both legs, digits, buttocks, genitals and/or nails.   -Slaughter skin type: IV  -Right Posterior Ear shiny erythematous patch, no fissue  -Right Second Toe: possibly a 2 mm violaceous macule, no verrucous papule seen  -Right inferior umbilicus: One brown papule, 3 mm  -Right superior umbilicus: 3 brown papules, each 1 mm  -well healed skin cancer scar  -No other lesions of concern on areas examined.     Labs:  NA    Impression/Plan:  1. History of immunosuppression  - ABCDs of melanoma were discussed and self skin checks were advised. , Sun precaution was advised including the use of sun screens of SPF 30 or higher, sun protective clothing, and avoidance of tanning beds.  - Last full skin exam on 12/11/2020    2. 2 cm fissure on crease of R postauricular- not resolved. Psoriasis or eczema or other, not responding  - Punch biopsy:  After discussion of benefits and risks including but not limited to bleeding/bruising, pain/swelling, infection, scar, incomplete removal, nerve damage/numbness,  recurrence, and non-diagnostic biopsy, written consent, verbal consent and photographs were obtained. Time-out was performed. The area was cleaned with isopropyl alcohol. 0.5mL of 1% lidocaine with 1:100,000 epinephrine was injected to obtain adequate anesthesia of the lesion on the right postauricular.  A 3 mm punch biopsy was performed. 4-0 prolene sutures were utilized to approximate the epidermal edges. White petroleum jelly/Vaseline and a bandage was applied to the wound. Explicit verbal and written wound care instructions were provided. The patient left the Dermatology Clinic in good condition. The patient was counseled to follow up for suture removal in approximately 10 days.    3. Possibly a 2 mm violaceous macule, no verrucous papule seen on right second toe-recommend opinion form Dr. Vargas  -Possibly resolving verruca, recommend follow up with Dr. Vargas to see if it can be removed.    4. Right Inferior Umbilicus: One brown papule- possibly sk or javi or wart  - Shave biopsy:  After discussion of benefits and risks including but not limited to bleeding/bruising, pain/swelling, infection, scar, incomplete removal, nerve damage/numbness, recurrence, and non-diagnostic biopsy, written consent, verbal consent and photographs were obtained. Time-out was performed. The area was cleaned with isopropyl alcohol. 0.5mL of 1% lidocaine with epinephrine was injected to obtain adequate anesthesia of the lesion. A shave biopsy was performed. Hemostasis was achieved with aluminium chloride. Vaseline and a sterile dressing were applied. The patient tolerated the procedure and no complications were noted. The patient was provided with verbal and written post care instructions.     5. Right Superior Umbilicus: 3 brown papules, each 1mm or less, too small to separate, possibly sk or javi or wart  -Shave biopsy:  After discussion of benefits and risks including but not limited to bleeding/bruising, pain/swelling,  infection, scar, incomplete removal, nerve damage/numbness, recurrence, and non-diagnostic biopsy, written consent, verbal consent and photographs were obtained. Time-out was performed. The area was cleaned with isopropyl alcohol. 0.5mL of 1% lidocaine with epinephrine was injected to obtain adequate anesthesia of the lesion. A shave biopsy was performed. Hemostasis was achieved with aluminium chloride. Vaseline and a sterile dressing were applied. The patient tolerated the procedure and no complications were noted. The patient was provided with verbal and written post care instructions.      6. Bowenoid papulosis, left abdomen- clear today  -s/p biopsy 4/18/15    CC No referring provider defined for this encounter. on close of this encounter.  Follow-up in 1 months, earlier for new or changing lesions. See Dr Vargas, marline skin exam in 1 year, earlier pending biopsy results      Staff Involved:  Scribe/Staff    Scribe Disclosure:   Weston ANDREA, am serving as a scribe to document services personally performed by this physician, Dr. Sasha Woods, based on data collection and the provider's statements to me.     Scribe Disclosure:   Antionette ANDREA, am serving as a scribe to document services personally performed by Dr. Sasha Woods, based on data collection and the provider's statements to me.   LXIONG3, MEDICAL ASSISTANT     Provider Disclosure:   The documentation recorded by the scribe accurately reflects the services I personally performed and the decisions made by me.    Sasha Woods MD    Department of Dermatology  [facility]: Phone: [phone], Fax:[phone]  [facility]: Phone: [phone], Fax: [phone]

## 2020-12-11 NOTE — LETTER
12/11/2020         RE: Jorge Amaral  [address] [facility] [address]        Dear Colleague,    Thank you for referring your patient, Jorge Amaral, to the [facility]. Please see a copy of my visit note below.    [facility] Dermatology Note    Dermatology Problem List:  1. History of immunosuppression, Chron's  2. Bowenoid papulosis, left abdomen  -s/p biopsy 4/18/15  -s/p Efudex x 6 weeks initiated 5/6/15, marked resolved 8/2015  3. Verruca plantaris  -Previous Tx: cantherone, trichloracetic acid, cryotherapy and PDL with Dr. Chris Stoddard, s/p candida 3X, cryotherapy  4. Tinea versicolor, back and chest  -Previous Tx:  ketoconazole 2% cream initiated 10/23/2015  5. 2 cm fissure on crease of R posterior ear - not healing  -current tx; hydrocortisone 2.5% cream  6. Lipoma, left bicep excision    Last FBSE on 2/21/2019    Encounter Date: Dec 11, 2020    CC:  Chief Complaint   Patient presents with     Derm Problem     skin check hx of immunosurpression, recheck ear and toe        History of Present Illness:  Mr. Jorge Amaral is a 50 year old male with a history of immunosuppression who presents for a skin check. He usually is an in person patient, but was last seen on 10/30/2020 for a 2 cm fissure on crease of R posterior ear, and was instructed to follow up for in person evaluation. He also was instructed to follow up for assessment of a possible hemorrhagic macule, that he reported as a wart, on his right second toe. Patient feeling generally well. No changes in medical problems. Nothing bleeding, crusting, or changing.  He denies fever, chills or night sweats. Nothing bleeding, crusting, or changing.     The spot on his right ear gets better and worse, but has not fully resolved.    He has been scraping his wart, and notes it gets dead skin on it.      Past Medical History:   Patient Active Problem List   Diagnosis     CARDIOVASCULAR SCREENING;  LDL GOAL LESS THAN 160     Fracture, metacarpal     Delayed union of fracture     Family history of diabetes mellitus     ACL (anterior cruciate ligament) tear--recurrent     Cold sore     Common wart     Crohn's disease with complication, unspecified gastrointestinal tract location (H)     Herpes labialis     FH: prostate cancer     BMI 28.0-28.9,adult     History of pulmonary embolism     Past Medical History:   Diagnosis Date     Crohn's disease (H)      Hyperlipidemia LDL goal < 160     not on meds     Nondisplaced fracture of navicular (scaphoid) of left foot, subsequent encounter for fracture with delayed healing 07/03/2019     Past Surgical History:   Procedure Laterality Date     APPENDECTOMY       ARTHROSCOPY KNEE WITH MEDIAL MENISCECTOMY Left 1/14/2019    Procedure: LEFT KNEE ARTHROSCOPY WITH PARTIAL MEDIAL MENISCECTOMY;  Surgeon: Jose Luis Jama MD;  Location:  OR      STOMACH SURGERY PROCEDURE UNLISTED      terminal ileum removed     ENT SURGERY  6-28-13    Left Tonsil biopsy     EXCISE MASS WRIST Right 11/7/2018    Procedure: EXCISION OF RIGHT DORSAL WRIST MASS;  Surgeon: Zohaib Edmond MD;  Location: [facility] OR     LAPAROSCOPY PROCEDURE UNLISTED       OPEN REDUCTION INTERNAL FIXATION HAND      right IV MCP hand 7/11     REMOVE HARDWARE LOWER EXTREMITY  4/6/2012    Procedure:REMOVE HARDWARE LOWER EXTREMITY; REMOVAL OF RIGHT TIBIAL SCREW; Surgeon:JOSE LUIS JAMA; Location:[facility]       Social History:  Patient reports that he has quit smoking. His smoking use included cigars. He has never used smokeless tobacco. He reports current alcohol use. He reports that he does not use drugs.    Family History:  Family History   Problem Relation Age of Onset     Hypertension Mother      Respiratory Mother         SMOKER -EMPYSEMA     Hypertension Father      Diabetes Father      Prostate Cancer Father 50     Prostate Cancer Paternal Grandfather      Diabetes Brother      Prostate Cancer Paternal Uncle 50        Medications:  Current Outpatient Medications   Medication Sig Dispense Refill     adalimumab (HUMIRA) 40 MG/0.8ML injection Inject 40 mg Subcutaneous once.       diphenoxylate-atropine (LOMOTIL) 2.5-0.025 MG per tablet Take 2 tablets by mouth       hydrocortisone 2.5 % cream Apply twice daily for 1 week behind ear, then start vaseline twice daily 30 g 0     mercaptopurine (PURINETHOL) 50 MG tablet Take 50 mg by mouth daily. 1 and half tabs daily       rivaroxaban ANTICOAGULANT (XARELTO ANTICOAGULANT) 20 MG TABS tablet Take 1 tablet (20 mg) by mouth daily (with dinner) 30 tablet 4     valACYclovir (VALTREX) 1000 mg tablet two tablets by mouth twice daily for 1 day at onset of symptoms of a cold sore. 30 tablet 0       No Known Allergies    Review of Systems:  -Constitutional: Otherwise feeling well today, in usual state of health.  -Skin: As per HPI. No other concerns.      Physical exam:  Vitals: There were no vitals taken for this visit.  GEN: This is a well developed, well-nourished male in no acute distress, in a pleasant mood.    SKIN: Full skin excluding the undergarment areas was performed. The exam included the head/face, neck, both arms, chest, back, abdomen, both legs, digits, buttocks, genitals and/or nails.   -Slaughter skin type: IV  -Right Posterior Ear shiny erythematous patch, no fissue  -Right Second Toe: possibly a 2 mm violaceous macule, no verrucous papule seen  -Right inferior umbilicus: One brown papule, 3 mm  -Right superior umbilicus: 3 brown papules, each 1 mm  -well healed skin cancer scar  -No other lesions of concern on areas examined.     Labs:  NA    Impression/Plan:  1. History of immunosuppression  - ABCDs of melanoma were discussed and self skin checks were advised. , Sun precaution was advised including the use of sun screens of SPF 30 or higher, sun protective clothing, and avoidance of tanning beds.  - Last full skin exam on 12/11/2020    2. 2 cm fissure on crease of R  postauricular- not resolved. Psoriasis or eczema or other, not responding  - Punch biopsy:  After discussion of benefits and risks including but not limited to bleeding/bruising, pain/swelling, infection, scar, incomplete removal, nerve damage/numbness, recurrence, and non-diagnostic biopsy, written consent, verbal consent and photographs were obtained. Time-out was performed. The area was cleaned with isopropyl alcohol. 0.5mL of 1% lidocaine with 1:100,000 epinephrine was injected to obtain adequate anesthesia of the lesion on the right postauricular.  A 3 mm punch biopsy was performed. 4-0 prolene sutures were utilized to approximate the epidermal edges. White petroleum jelly/Vaseline and a bandage was applied to the wound. Explicit verbal and written wound care instructions were provided. The patient left the Dermatology Clinic in good condition. The patient was counseled to follow up for suture removal in approximately 10 days.    3. Possibly a 2 mm violaceous macule, no verrucous papule seen on right second toe-recommend opinion form Dr. Vargas  -Possibly resolving verruca, recommend follow up with Dr. Vargas to see if it can be removed.    4. Right Inferior Umbilicus: One brown papule- possibly sk or javi or wart  - Shave biopsy:  After discussion of benefits and risks including but not limited to bleeding/bruising, pain/swelling, infection, scar, incomplete removal, nerve damage/numbness, recurrence, and non-diagnostic biopsy, written consent, verbal consent and photographs were obtained. Time-out was performed. The area was cleaned with isopropyl alcohol. 0.5mL of 1% lidocaine with epinephrine was injected to obtain adequate anesthesia of the lesion. A shave biopsy was performed. Hemostasis was achieved with aluminium chloride. Vaseline and a sterile dressing were applied. The patient tolerated the procedure and no complications were noted. The patient was provided with verbal and written post care  instructions.     5. Right Superior Umbilicus: 3 brown papules, each 1mm or less, too small to separate, possibly sk or javi or wart  -Shave biopsy:  After discussion of benefits and risks including but not limited to bleeding/bruising, pain/swelling, infection, scar, incomplete removal, nerve damage/numbness, recurrence, and non-diagnostic biopsy, written consent, verbal consent and photographs were obtained. Time-out was performed. The area was cleaned with isopropyl alcohol. 0.5mL of 1% lidocaine with epinephrine was injected to obtain adequate anesthesia of the lesion. A shave biopsy was performed. Hemostasis was achieved with aluminium chloride. Vaseline and a sterile dressing were applied. The patient tolerated the procedure and no complications were noted. The patient was provided with verbal and written post care instructions.      6. Bowenoid papulosis, left abdomen- clear today  -s/p biopsy 4/18/15    CC No referring provider defined for this encounter. on close of this encounter.  Follow-up in 1 months, earlier for new or changing lesions. See Dr Vargas, otherwise skin exam in 1 year, earlier pending biopsy results      Staff Involved:  Scribe/Staff    Scribe Disclosure:   Weston ANDREA, am serving as a scribe to document services personally performed by this physician, Dr. Sasha Woods, based on data collection and the provider's statements to me.     Scribe Disclosure:   Antionette ANDREA, am serving as a scribe to document services personally performed by Dr. Sasha Woods, based on data collection and the provider's statements to me.   LXIONG3, MEDICAL ASSISTANT     Provider Disclosure:   The documentation recorded by the scribe accurately reflects the services I personally performed and the decisions made by me.    Sasha Woods MD    Department of Dermatology  [facility]: Phone: [phone], Fax:[phone]  [facility]  [facility]: Phone: [phone], Fax: [phone]            Again, thank you for allowing me to participate in the care of your patient.        Sincerely,        Sasha Woods MD

## 2020-12-11 NOTE — LETTER
December 31, 2020      Saumya MAGO Munir  [address] [facility] [address]        Dear Saumya Amaral,     We are writing to inform you of your test results that show 2 harmless keratosis in the belly button region (no cancer). Also the rash on the ear is consistent with psoriasis. You can continue your hydrocortisone 2 weeks on and 2 weeks off for the ear. This may not completely go away but it is not a skin cancer. Lets regroup in 3 months.     Thank you for taking the time to be seen in our dermatology clinic. If you have further questions or concerns, please contact the clinic(see phone number listed below).       Sincerely,     Sasha Blakely MD      Department of Dermatology   [facility]: Phone: [phone], Fax:[phone]     Resulted Orders   Dermatological path order and indications   Result Value Ref Range    Copath Report       Patient Name: SAUMYA AMARAL  MR#: [id number]  Specimen #: [id number]  Collected: 12/11/2020  Received: 12/14/2020  Reported: 12/16/2020 19:16  Ordering Phy(s): SASHA BLAKELY    For improved result formatting, select 'View Enhanced Report Format' under   Linked Documents section.    SPECIMEN(S):  A: Skin, right superior umbilicus, shave  B: Skin, right inferior umbilicus, shave  C: Skin, right post auricular, punch    FINAL DIAGNOSIS:  A. Skin, right superior umbilicus, shave:  - Macular seborrheic keratosis - (see description)    B. Skin, right inferior umbilicus, shave:  - Macular seborrheic keratosis - (see description)    C. Skin, right post auricular, punch:  - Psoriasiform dermatitis - (see comment)    COMMENT:  C. These features favor a diagnosis of psoriasis. PAS is negative for   fungal elements.    I have personally reviewed all specimens and/or slides, including the   listed special stains, and used them  with my medical judgement to determine or confirm the final diagnosis.  "    Electronically signed out by:    Dmitri Palmer M.D., PhD, [facility]    CLINICAL HISTORY:  The patient is a 50-year-old male    GROSS:  A: The specimen is received in formalin with proper patient   identification, labeled \"R superior umbilicus\" and  the specimen consists of a single, irregular skin shave measuring 0.5 x   0.2 cm.  The skin surface displays no  distinct lesion.  The resection margin is inked black.  It is bisected.    Also present is a 0.1 cm piece of  white-tan skin.  The specimen is entirely submitted in cassette A1.    B: The specimen is received in formalin with proper patient   identification, labeled \"R inferior umbilicus\" and  the specimen consists of a single, irregular skin shave measuring 0.3 x   0.1 cm.  The skin surface displays no  distinct lesion.  The resection margin is inked black.  It is left intact   and entirely submitted in cassette  B1.    C: The specimen is received in formalin with proper patient   identification, labeled \"R postauric ular\" and the  specimen consists of a single, unoriented skin punch biopsy measuring 0.4   cm in diameter and is excised to a  depth of 0.3 cm.  The skin surface displays no distinct lesion.  It is   bisected and entirely submitted in  cassette C1. (Dictated by: Rosy Glass 12/14/2020 11:37 AM)    MICROSCOPIC:  A. The specimen exhibits compact orthokeratosis, mildly papillomatous   epidermal hyperplasia, elongate rete  ridges and increased keratinocyte pigmentation.    B. The specimen exhibits compact orthokeratosis, mildly papillomatous   epidermal hyperplasia, elongate rete  ridges and increased keratinocyte pigmentation.    C. The specimen exhibits broad parakeratosis above psoriasiform epidermal   hyperplasia with hypogranulosis,  mild spongiosis, ectatic vessels and mild perivascular lymphocytic   inflammation with sparse eosinophils. PAS  is negative for fungal elements.    The technical component of this testing was " completed at the [facility], with the professional component performed   at the [facility], [address] [place] [address],   [place] [address] ([phone])    CPT Codes:  A: 39267-IM4.P, 35560-MQ1.T  B: 21344-BP3.P, 54983-ML3.T  C: 50151-IO0.T, 64094-BH6.P, 18843-HZWUZ    COLLECTION SITE:  Client: [facility]  Location: [facility] (B)           If you have any questions or concerns, please call the clinic at the number listed above.       Sincerely,      Sasha Woods MD

## 2020-12-11 NOTE — NURSING NOTE
oJrge Amaral's goals for this visit include:   Chief Complaint   Patient presents with     Derm Problem     skin check hx of immunosurpression, recheck ear and toe        He requests these members of his care team be copied on today's visit information: No      PCP: Vince Swain    Referring Provider:  No referring provider defined for this encounter.    There were no vitals taken for this visit.    Do you need any medication refills at today's visit? No  [id number], MEDICAL ASSISTANT

## 2020-12-12 ENCOUNTER — HEALTH MAINTENANCE LETTER (OUTPATIENT)
Age: 50
End: 2020-12-12

## 2020-12-16 LAB — COPATH REPORT: NORMAL

## 2021-01-07 ENCOUNTER — TELEPHONE (OUTPATIENT)
Dept: DERMATOLOGY | Facility: CLINIC | Age: 51
End: 2021-01-07

## 2021-01-07 NOTE — TELEPHONE ENCOUNTER
[facility] Call Center    Phone Message    May a detailed message be left on voicemail: yes     Reason for Call: The patient called regarding his 2:45P appointment today. He says he has a head cold and headache, and feels it is best to reschedule his appointment. Please advise. Thank you.    Action Taken: Message routed to:  Adult Clinics: Dermatology p [phone]    Travel Screening: Not Applicable

## 2021-01-08 ENCOUNTER — MYC MEDICAL ADVICE (OUTPATIENT)
Dept: FAMILY MEDICINE | Facility: CLINIC | Age: 51
End: 2021-01-08

## 2021-01-08 DIAGNOSIS — Z00.00 ENCOUNTER FOR PREVENTIVE CARE: Primary | ICD-10-CM

## 2021-01-08 NOTE — TELEPHONE ENCOUNTER
Last PSA done 12/4/19 and was normal, pt has some concerns, can you order test.  Prema Benítez BSN, RN

## 2021-01-11 DIAGNOSIS — Z86.711 HISTORY OF PULMONARY EMBOLISM: ICD-10-CM

## 2021-01-11 NOTE — TELEPHONE ENCOUNTER
Let patient know that he is due for a physical and fasting labs.   Labs pending    Vince Swain PA-C

## 2021-01-12 RX ORDER — RIVAROXABAN 15 MG/1
TABLET, FILM COATED ORAL
Qty: 30 TABLET | Refills: 1 | Status: SHIPPED | OUTPATIENT
Start: 2021-01-12 | End: 2021-01-22

## 2021-01-12 NOTE — TELEPHONE ENCOUNTER
Routing refill request to provider for review/approval because:  Labs not current:  Platelet count, creatinine, creatinine clearance    Yessy AGARWALN, RN

## 2021-01-13 DIAGNOSIS — Z00.00 ENCOUNTER FOR PREVENTIVE CARE: ICD-10-CM

## 2021-01-13 LAB
ANION GAP SERPL CALCULATED.3IONS-SCNC: <1 MMOL/L (ref 3–14)
BUN SERPL-MCNC: 18 MG/DL (ref 7–30)
CALCIUM SERPL-MCNC: 9.1 MG/DL (ref 8.5–10.1)
CHLORIDE SERPL-SCNC: 106 MMOL/L (ref 94–109)
CHOLEST SERPL-MCNC: 234 MG/DL
CO2 SERPL-SCNC: 34 MMOL/L (ref 20–32)
CREAT SERPL-MCNC: 1.15 MG/DL (ref 0.66–1.25)
GFR SERPL CREATININE-BSD FRML MDRD: 73 ML/MIN/{1.73_M2}
GLUCOSE SERPL-MCNC: 93 MG/DL (ref 70–99)
HDLC SERPL-MCNC: 38 MG/DL
LDLC SERPL CALC-MCNC: 130 MG/DL
NONHDLC SERPL-MCNC: 196 MG/DL
POTASSIUM SERPL-SCNC: 4.4 MMOL/L (ref 3.4–5.3)
PSA SERPL-ACNC: 0.53 UG/L (ref 0–4)
SODIUM SERPL-SCNC: 139 MMOL/L (ref 133–144)
TRIGL SERPL-MCNC: 332 MG/DL

## 2021-01-13 PROCEDURE — 80061 LIPID PANEL: CPT | Performed by: PHYSICIAN ASSISTANT

## 2021-01-13 PROCEDURE — 80048 BASIC METABOLIC PNL TOTAL CA: CPT | Performed by: PHYSICIAN ASSISTANT

## 2021-01-13 PROCEDURE — G0103 PSA SCREENING: HCPCS | Performed by: PHYSICIAN ASSISTANT

## 2021-01-13 PROCEDURE — 36415 COLL VENOUS BLD VENIPUNCTURE: CPT | Performed by: PHYSICIAN ASSISTANT

## 2021-01-15 ENCOUNTER — HEALTH MAINTENANCE LETTER (OUTPATIENT)
Age: 51
End: 2021-01-15

## 2021-01-22 ENCOUNTER — OFFICE VISIT (OUTPATIENT)
Dept: FAMILY MEDICINE | Facility: CLINIC | Age: 51
End: 2021-01-22
Payer: COMMERCIAL

## 2021-01-22 VITALS
OXYGEN SATURATION: 97 % | TEMPERATURE: 97.2 F | HEIGHT: 74 IN | HEART RATE: 73 BPM | RESPIRATION RATE: 16 BRPM | SYSTOLIC BLOOD PRESSURE: 120 MMHG | WEIGHT: 220 LBS | BODY MASS INDEX: 28.23 KG/M2 | DIASTOLIC BLOOD PRESSURE: 77 MMHG

## 2021-01-22 DIAGNOSIS — R51.9 NONINTRACTABLE HEADACHE, UNSPECIFIED CHRONICITY PATTERN, UNSPECIFIED HEADACHE TYPE: Primary | ICD-10-CM

## 2021-01-22 DIAGNOSIS — Z86.711 HISTORY OF PULMONARY EMBOLISM: ICD-10-CM

## 2021-01-22 DIAGNOSIS — M79.621 PAIN OF RIGHT UPPER ARM: ICD-10-CM

## 2021-01-22 DIAGNOSIS — Z23 NEED FOR PROPHYLACTIC VACCINATION AND INOCULATION AGAINST INFLUENZA: ICD-10-CM

## 2021-01-22 DIAGNOSIS — K50.919 CROHN'S DISEASE WITH COMPLICATION, UNSPECIFIED GASTROINTESTINAL TRACT LOCATION (H): ICD-10-CM

## 2021-01-22 PROCEDURE — 90471 IMMUNIZATION ADMIN: CPT | Performed by: PHYSICIAN ASSISTANT

## 2021-01-22 PROCEDURE — 99213 OFFICE O/P EST LOW 20 MIN: CPT | Mod: 25 | Performed by: PHYSICIAN ASSISTANT

## 2021-01-22 PROCEDURE — 90682 RIV4 VACC RECOMBINANT DNA IM: CPT | Performed by: PHYSICIAN ASSISTANT

## 2021-01-22 ASSESSMENT — MIFFLIN-ST. JEOR: SCORE: 1917.66

## 2021-01-22 NOTE — PROGRESS NOTES
SUBJECTIVE:   CC: Jorge Amaral is an 50 year old male who has a history of Crohn's disease, PE, presents for:    HPI  Mostly here today to go over his lab results and to talk about headache and right arm pain.    headache for the last year. Mostly in the a.m. will get most days of the week.   Last eye exam: 1-2 months ago.  Caffeine: at least 1-2 days.  Sleep: restless- snores.   Diet/Exercise: martial arts  Stress: COVID  He does admit to being a snorer and daytime fatigue.  Denies any apneic episodes that he is aware of.    Right axillary pain for a month.  He states no injuries.  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  He states he does workout regularly.  He states it does not bother him with that.  Mostly bothers him when he lays on his right side while sleeping.  He denies any numbness or tingling.  Denies any weight gain or weight loss or night sweats.      Today's PHQ-2 Score:   PHQ-2 ( 1999 Pfizer) 1/22/2021   Q1: Little interest or pleasure in doing things 0   Q2: Feeling down, depressed or hopeless 0   PHQ-2 Score 0   Q1: Little interest or pleasure in doing things -   Q2: Feeling down, depressed or hopeless -   PHQ-2 Score -       Social History     Tobacco Use     Smoking status: Never Smoker     Smokeless tobacco: Never Used     Tobacco comment: very rarely cigar - every 3 years or so    Substance Use Topics     Alcohol use: Yes     Comment: social         Alcohol Use 12/4/2019   Prescreen: >3 drinks/day or >7 drinks/week? No   Prescreen: >3 drinks/day or >7 drinks/week? -       Last PSA:   PSA   Date Value Ref Range Status   01/13/2021 0.53 0 - 4 ug/L Final     Comment:     Assay Method:  Chemiluminescence using Siemens Vista analyzer       Reviewed orders with patient. Reviewed health maintenance and updated orders accordingly - Yes  Labs reviewed in EPIC  BP Readings from Last 3 Encounters:   01/22/21 120/77   08/21/20 106/72   04/08/20 108/76    Wt Readings from Last 3 Encounters:    01/22/21 99.8 kg (220 lb)   09/23/20 102.1 kg (225 lb)   08/21/20 101.6 kg (224 lb)                  Patient Active Problem List   Diagnosis     CARDIOVASCULAR SCREENING; LDL GOAL LESS THAN 160     Fracture, metacarpal     Delayed union of fracture     Family history of diabetes mellitus     ACL (anterior cruciate ligament) tear--recurrent     Cold sore     Common wart     Crohn's disease with complication, unspecified gastrointestinal tract location (H)     Herpes labialis     FH: prostate cancer     BMI 28.0-28.9,adult     History of pulmonary embolism     Past Surgical History:   Procedure Laterality Date     APPENDECTOMY       ARTHROSCOPY KNEE WITH MEDIAL MENISCECTOMY Left 1/14/2019    Procedure: LEFT KNEE ARTHROSCOPY WITH PARTIAL MEDIAL MENISCECTOMY;  Surgeon: Jose Luis Jama MD;  Location:  OR     C STOMACH SURGERY PROCEDURE UNLISTED      terminal ileum removed     ENT SURGERY  6-28-13    Left Tonsil biopsy     EXCISE MASS WRIST Right 11/7/2018    Procedure: EXCISION OF RIGHT DORSAL WRIST MASS;  Surgeon: Zohaib Edmond MD;  Location: [facility] OR     LAPAROSCOPY PROCEDURE UNLISTED       OPEN REDUCTION INTERNAL FIXATION HAND      right IV MCP hand 7/11     REMOVE HARDWARE LOWER EXTREMITY  4/6/2012    Procedure:REMOVE HARDWARE LOWER EXTREMITY; REMOVAL OF RIGHT TIBIAL SCREW; Surgeon:JOSE LUIS JAMA; Location:[facility]       Social History     Tobacco Use     Smoking status: Never Smoker     Smokeless tobacco: Never Used     Tobacco comment: very rarely cigar - every 3 years or so    Substance Use Topics     Alcohol use: Yes     Comment: social     Family History   Problem Relation Age of Onset     Hypertension Mother      Respiratory Mother         SMOKER -EMPYSEMA     Hypertension Father      Diabetes Father      Prostate Cancer Father 50     Prostate Cancer Paternal Grandfather      Diabetes Brother      Prostate Cancer Paternal Uncle 50         Current Outpatient Medications   Medication Sig Dispense  Refill     adalimumab (HUMIRA) 40 MG/0.8ML injection Inject 40 mg Subcutaneous once.       diphenoxylate-atropine (LOMOTIL) 2.5-0.025 MG per tablet Take 2 tablets by mouth       hydrocortisone 2.5 % cream Apply twice daily for 1 week behind ear, then start vaseline twice daily 30 g 0     mercaptopurine (PURINETHOL) 50 MG tablet Take 50 mg by mouth daily. 1 and half tabs daily       rivaroxaban ANTICOAGULANT (XARELTO ANTICOAGULANT) 20 MG TABS tablet Take 1 tablet (20 mg) by mouth daily (with dinner) 30 tablet 4     valACYclovir (VALTREX) 1000 mg tablet two tablets by mouth twice daily for 1 day at onset of symptoms of a cold sore. 30 tablet 0     No Known Allergies    Reviewed and updated as needed this visit by clinical staff  Tobacco  Allergies  Meds  Problems  Med Hx  Surg Hx  Fam Hx  Soc Hx          Reviewed and updated as needed this visit by Provider  Tobacco  Allergies  Meds  Problems  Med Hx  Surg Hx  Fam Hx           Past Medical History:   Diagnosis Date     Crohn's disease (H)      Hyperlipidemia LDL goal < 160     not on meds     Nondisplaced fracture of navicular (scaphoid) of left foot, subsequent encounter for fracture with delayed healing 07/03/2019      Past Surgical History:   Procedure Laterality Date     APPENDECTOMY       ARTHROSCOPY KNEE WITH MEDIAL MENISCECTOMY Left 1/14/2019    Procedure: LEFT KNEE ARTHROSCOPY WITH PARTIAL MEDIAL MENISCECTOMY;  Surgeon: Joleen Jama MD;  Location:  OR     C STOMACH SURGERY PROCEDURE UNLISTED      terminal ileum removed     ENT SURGERY  6-28-13    Left Tonsil biopsy     EXCISE MASS WRIST Right 11/7/2018    Procedure: EXCISION OF RIGHT DORSAL WRIST MASS;  Surgeon: Zohaib Edmond MD;  Location: [facility] OR     LAPAROSCOPY PROCEDURE UNLISTED       OPEN REDUCTION INTERNAL FIXATION HAND      right IV MCP hand 7/11     REMOVE HARDWARE LOWER EXTREMITY  4/6/2012    Procedure:REMOVE HARDWARE LOWER EXTREMITY; REMOVAL OF RIGHT TIBIAL SCREW;  "Surgeon:JOSE LUIS CARMICHAEL; Location:[facility]       Review of Systems  CONSTITUTIONAL: NEGATIVE for fever, chills, change in weight  INTEGUMENTARY/SKIN: NEGATIVE for worrisome rashes, moles or lesions  EYES: NEGATIVE for vision changes or irritation  ENT: NEGATIVE for ear, mouth and throat problems  RESP: NEGATIVE for significant cough or SOB  CV: NEGATIVE for chest pain, palpitations or peripheral edema  GI: NEGATIVE for nausea, abdominal pain, heartburn, or change in bowel habits   male: negative for dysuria, hematuria, decreased urinary stream, erectile dysfunction, urethral discharge  MUSCULOSKELETAL: NEGATIVE for significant arthralgias or myalgia  NEURO: NEGATIVE for weakness, dizziness or paresthesias  PSYCHIATRIC: NEGATIVE for changes in mood or affect    OBJECTIVE:   /77   Pulse 73   Temp 97.2  F (36.2  C)   Resp 16   Ht 1.88 m (6' 2\")   Wt 99.8 kg (220 lb)   SpO2 97%   BMI 28.25 kg/m      Physical Exam  GENERAL: healthy, alert and no distress  EYES: Eyes grossly normal to inspection, PERRL and conjunctivae and sclerae normal  HENT: ear canals and TM's normal, nose and mouth without ulcers or lesions  NECK: no adenopathy, no asymmetry, masses, or scars and thyroid normal to palpation  RESP: lungs clear to auscultation - no rales, rhonchi or wheezes  CV: regular rate and rhythm, normal S1 S2, no S3 or S4, no murmur, click or rub, no peripheral edema and peripheral pulses strong  ABDOMEN: soft, nontender, no hepatosplenomegaly, no masses and bowel sounds normal  MS: no gross musculoskeletal defects noted, no edema  SKIN: no suspicious lesions or rashes  NEURO: Normal strength and tone, sensory exam grossly normal, mentation intact and cranial nerves 2-12 intact  PSYCH: mentation appears normal, affect normal/bright  Right shoulder exam nontender.  He points to his axillary region and lateral pec where his discomfort is.  No lymphadenopathy noted.  He has 5-5 strength in his bilateral shoulders.  " "Neuro vas intact distally.  Diagnostic Test Results:  Labs reviewed in Epic    ASSESSMENT/PLAN:       ICD-10-CM    1. Nonintractable headache, unspecified chronicity pattern, unspecified headache type  R51.9 SLEEP EVALUATION & MANAGEMENT REFERRAL - [facility] [place]  [phone] (Age 15 and up)   2. Pain of right upper arm  M79.621    3. Crohn's disease with complication, unspecified gastrointestinal tract location (H)  K50.919    4. History of pulmonary embolism  Z86.711    5. Need for prophylactic vaccination and inoculation against influenza  Z23 INFLUENZA QUAD, RECOMBINANT, P-FREE (RIV4) (FLUBLOCK) [80223]     1.  Talk to patient about his concerns.  I would like to have him follow-up with sleep medicine for possible sleep apnea that may be secondarily causing his headaches.  Warning signs were discussed.  2.  Unclear etiology.  I suspect is more musculoskeletal and to treat this conservatively with Tylenol rest and activity modification.  Warning signs were discussed follow-up if they occur.  3-4 continue follow-up care with specialist.    Estimated body mass index is 28.25 kg/m  as calculated from the following:    Height as of this encounter: 1.88 m (6' 2\").    Weight as of this encounter: 99.8 kg (220 lb).     Weight management plan: Discussed healthy diet and exercise guidelines    He reports that he has never smoked. He has never used smokeless tobacco.      Counseling Resources:  ATP IV Guidelines  Pooled Cohorts Equation Calculator  FRAX Risk Assessment  ICSI Preventive Guidelines  Dietary Guidelines for Americans, 2010  USDA's MyPlate  ASA Prophylaxis  Lung CA Screening    Vince Swain PA-C  [facility]    "

## 2021-01-25 ENCOUNTER — DOCUMENTATION ONLY (OUTPATIENT)
Dept: HEMATOLOGY | Facility: CLINIC | Age: 51
End: 2021-01-25

## 2021-01-28 ENCOUNTER — OFFICE VISIT (OUTPATIENT)
Dept: DERMATOLOGY | Facility: CLINIC | Age: 51
End: 2021-01-28
Payer: COMMERCIAL

## 2021-01-28 DIAGNOSIS — D48.5 NEOPLASM OF UNCERTAIN BEHAVIOR OF SKIN OF TOE: Primary | ICD-10-CM

## 2021-01-28 PROCEDURE — 88305 TISSUE EXAM BY PATHOLOGIST: CPT | Performed by: DERMATOLOGY

## 2021-01-28 PROCEDURE — 11102 TANGNTL BX SKIN SINGLE LES: CPT | Mod: GC | Performed by: DERMATOLOGY

## 2021-01-28 ASSESSMENT — PAIN SCALES - GENERAL: PAINLEVEL: NO PAIN (0)

## 2021-01-28 NOTE — PATIENT INSTRUCTIONS

## 2021-01-28 NOTE — NURSING NOTE
Jorge Amaral's goals for this visit include:   Chief Complaint   Patient presents with     RECHECK     2mm violacious macule right second toe for years      He requests these members of his care team be copied on today's visit information:     PCP: Vince Swain    Referring Provider:  No referring provider defined for this encounter.    There were no vitals taken for this visit.    Do you need any medication refills at today's visit? N/a..Cathryn Ross RN

## 2021-01-28 NOTE — PROGRESS NOTES
[facility] Dermatology Note    Dermatology Problem List:  1. History of immunosuppression, Crohn's  2. Bowenoid papulosis, left abdomen  -s/p biopsy 4/18/15  -s/p Efudex x 6 weeks initiated 5/6/15, marked resolved 8/2015  3. Verruca plantaris  -Previous Tx: cantherone, trichloracetic acid, cryotherapy and PDL with Dr. Chris Stoddard, s/p candida 3X, cryotherapy  4. Tinea versicolor, back and chest  -Previous Tx:  ketoconazole 2% cream initiated 10/23/2015  5. 2 cm fissure on crease of R posterior ear - not addressed today  -current tx; hydrocortisone 2.5% cream  6. Lipoma, left bicep excision 3/11/2020  7. Verrucous / hyperkeratotic papule of right second distal tip of toe, s/p saucerization biopsy 1/28/2021    Last FBSE on 12/11/2020 with Dr. Woods    Encounter Date: Jan 28, 2021    CC:  Chief Complaint   Patient presents with     RECHECK     2mm violacious macule right second toe for years been treated with cryo, Candida, PDL,        History of Present Illness:  Mr. Jorge Amaral is a 50 year old male with a history of immunosuppression who presents for an examination and consideration for biopsy of a papule of the right second toe.     The patient follows with Dr. Woods and was referred for this specific issue.       Past Medical History:   Patient Active Problem List   Diagnosis     CARDIOVASCULAR SCREENING; LDL GOAL LESS THAN 160     Fracture, metacarpal     Delayed union of fracture     Family history of diabetes mellitus     ACL (anterior cruciate ligament) tear--recurrent     Cold sore     Common wart     Crohn's disease with complication, unspecified gastrointestinal tract location (H)     Herpes labialis     FH: prostate cancer     BMI 28.0-28.9,adult     History of pulmonary embolism     Past Medical History:   Diagnosis Date     Crohn's disease (H)      Hyperlipidemia LDL goal < 160     not on meds     Nondisplaced fracture of navicular (scaphoid) of left foot, subsequent  encounter for fracture with delayed healing 07/03/2019     Past Surgical History:   Procedure Laterality Date     APPENDECTOMY       ARTHROSCOPY KNEE WITH MEDIAL MENISCECTOMY Left 1/14/2019    Procedure: LEFT KNEE ARTHROSCOPY WITH PARTIAL MEDIAL MENISCECTOMY;  Surgeon: Jose Luis Jama MD;  Location:  OR     C STOMACH SURGERY PROCEDURE UNLISTED      terminal ileum removed     ENT SURGERY  6-28-13    Left Tonsil biopsy     EXCISE MASS WRIST Right 11/7/2018    Procedure: EXCISION OF RIGHT DORSAL WRIST MASS;  Surgeon: Zohaib Edmond MD;  Location: [facility] OR     LAPAROSCOPY PROCEDURE UNLISTED       OPEN REDUCTION INTERNAL FIXATION HAND      right IV MCP hand 7/11     REMOVE HARDWARE LOWER EXTREMITY  4/6/2012    Procedure:REMOVE HARDWARE LOWER EXTREMITY; REMOVAL OF RIGHT TIBIAL SCREW; Surgeon:JOSE LUIS JAMA; Location[facility]       Social History:  Patient reports that he has never smoked. He has never used smokeless tobacco. He reports current alcohol use. He reports that he does not use drugs.    Family History:  Family History   Problem Relation Age of Onset     Hypertension Mother      Respiratory Mother         SMOKER -EMPYSEMA     Hypertension Father      Diabetes Father      Prostate Cancer Father 50     Prostate Cancer Paternal Grandfather      Diabetes Brother      Prostate Cancer Paternal Uncle 50       Medications:  Current Outpatient Medications   Medication Sig Dispense Refill     adalimumab (HUMIRA) 40 MG/0.8ML injection Inject 40 mg Subcutaneous once.       diphenoxylate-atropine (LOMOTIL) 2.5-0.025 MG per tablet Take 2 tablets by mouth       hydrocortisone 2.5 % cream Apply twice daily for 1 week behind ear, then start vaseline twice daily 30 g 0     mercaptopurine (PURINETHOL) 50 MG tablet Take 50 mg by mouth daily. 1 and half tabs daily       rivaroxaban ANTICOAGULANT (XARELTO ANTICOAGULANT) 20 MG TABS tablet Take 1 tablet (20 mg) by mouth daily (with dinner) 30 tablet 4     valACYclovir  (VALTREX) 1000 mg tablet two tablets by mouth twice daily for 1 day at onset of symptoms of a cold sore. 30 tablet 0       No Known Allergies    Review of Systems:  -Constitutional: Otherwise feeling well today, in usual state of health.  -Skin: As per HPI. No other concerns.    Physical exam:  Vitals: There were no vitals taken for this visit.  GEN: This is a well developed, well-nourished male in no acute distress, in a pleasant mood.    SKIN: Focused skin exam of the right foot was performed today.   - Over the right second toe is a hyperkeratotic 4 mm papule     Labs:  NA    Impression/Plan:  1. History of immunosuppression   2. History of plantar warts  3. Neoplasm of uncertain behavior of the right second distal / subungual toe  - Discussed the possible nature of the condition and the differential diagnosis with the patient, which includes callous, clavus, verruca, lichen simplex chronicus, non-melanoma skin cancer.   - Patient would like to proceed with a biopsy today.     Shave biopsy: Location(s) Right 2nd Distal Toe.  After discussion of benefits and risks including but not limited to bleeding/bruising, pain/swelling, infection, scar, incomplete removal, nerve damage/numbness, recurrence, and non-diagnostic biopsy, written consent, verbal consent and photographs were obtained. Time-out was performed. The area was cleaned with isopropyl alcohol. 0.5mL of 1% lidocaine with epinephrine was injected to obtain adequate anesthesia of the lesion. The nail plate was scored with #15 blade to reveal the affected epithelium of the nail bed. Saucerization shave biopsy was performed with bendable blade. Hemostasis was achieved with aluminium chloride. Vaseline and a sterile dressing were applied. The patient tolerated the procedure and no complications were noted. The patient was provided with verbal and written post care instructions.     Charly Rivera MD  PGY-6    Micrographic Surgery and Dermatologic Oncology  Fellow  January 28, 2021    Staff Physician Comments:   I saw and evaluated the patient with the Mohs Surgery Fellow (Dr. Charly Rivera) and I agree with the assessment and plan and the above description of the procedure. I was present for the key portions of the procedure and entire exam.    Faisal Vargas DO    Department of Dermatology  [facility]: Phone: [phone], Fax:[phone]  [facility]: Phone: [phone], Fax: [phone]

## 2021-01-28 NOTE — LETTER
1/28/2021         RE: Jorge Amaral  [address] [facility] [address]        Dear Colleague,    Thank you for referring your patient, Jorge Amaral, to the [facility]. Please see a copy of my visit note below.      [facility] Dermatology Note    Dermatology Problem List:  1. History of immunosuppression, Crohn's  2. Bowenoid papulosis, left abdomen  -s/p biopsy 4/18/15  -s/p Efudex x 6 weeks initiated 5/6/15, marked resolved 8/2015  3. Verruca plantaris  -Previous Tx: cantherone, trichloracetic acid, cryotherapy and PDL with Dr. Chris Stoddard, s/p candida 3X, cryotherapy  4. Tinea versicolor, back and chest  -Previous Tx:  ketoconazole 2% cream initiated 10/23/2015  5. 2 cm fissure on crease of R posterior ear - not addressed today  -current tx; hydrocortisone 2.5% cream  6. Lipoma, left bicep excision 3/11/2020  7. Verrucous / hyperkeratotic papule of right second distal tip of toe, s/p saucerization biopsy 1/28/2021    Last FBSE on 12/11/2020 with Dr. Woods    Encounter Date: Jan 28, 2021    CC:  Chief Complaint   Patient presents with     RECHECK     2mm violacious macule right second toe for years been treated with cryo, Candida, PDL,        History of Present Illness:  Mr. Jorge Amaral is a 50 year old male with a history of immunosuppression who presents for an examination and consideration for biopsy of a papule of the right second toe.     The patient follows with Dr. Woods and was referred for this specific issue.       Past Medical History:   Patient Active Problem List   Diagnosis     CARDIOVASCULAR SCREENING; LDL GOAL LESS THAN 160     Fracture, metacarpal     Delayed union of fracture     Family history of diabetes mellitus     ACL (anterior cruciate ligament) tear--recurrent     Cold sore     Common wart     Crohn's disease with complication, unspecified gastrointestinal tract location (H)     Herpes labialis     FH: prostate cancer      BMI 28.0-28.9,adult     History of pulmonary embolism     Past Medical History:   Diagnosis Date     Crohn's disease (H)      Hyperlipidemia LDL goal < 160     not on meds     Nondisplaced fracture of navicular (scaphoid) of left foot, subsequent encounter for fracture with delayed healing 07/03/2019     Past Surgical History:   Procedure Laterality Date     APPENDECTOMY       ARTHROSCOPY KNEE WITH MEDIAL MENISCECTOMY Left 1/14/2019    Procedure: LEFT KNEE ARTHROSCOPY WITH PARTIAL MEDIAL MENISCECTOMY;  Surgeon: Jose Luis Jama MD;  Location:  OR     C STOMACH SURGERY PROCEDURE UNLISTED      terminal ileum removed     ENT SURGERY  6-28-13    Left Tonsil biopsy     EXCISE MASS WRIST Right 11/7/2018    Procedure: EXCISION OF RIGHT DORSAL WRIST MASS;  Surgeon: Zohaib Edmond MD;  Location: [facility] OR     LAPAROSCOPY PROCEDURE UNLISTED       OPEN REDUCTION INTERNAL FIXATION HAND      right IV MCP hand 7/11     REMOVE HARDWARE LOWER EXTREMITY  4/6/2012    Procedure:REMOVE HARDWARE LOWER EXTREMITY; REMOVAL OF RIGHT TIBIAL SCREW; Surgeon:JOSE LUIS JAMA; Location:[facility]       Social History:  Patient reports that he has never smoked. He has never used smokeless tobacco. He reports current alcohol use. He reports that he does not use drugs.    Family History:  Family History   Problem Relation Age of Onset     Hypertension Mother      Respiratory Mother         SMOKER -EMPYSEMA     Hypertension Father      Diabetes Father      Prostate Cancer Father 50     Prostate Cancer Paternal Grandfather      Diabetes Brother      Prostate Cancer Paternal Uncle 50       Medications:  Current Outpatient Medications   Medication Sig Dispense Refill     adalimumab (HUMIRA) 40 MG/0.8ML injection Inject 40 mg Subcutaneous once.       diphenoxylate-atropine (LOMOTIL) 2.5-0.025 MG per tablet Take 2 tablets by mouth       hydrocortisone 2.5 % cream Apply twice daily for 1 week behind ear, then start vaseline twice daily 30 g 0      mercaptopurine (PURINETHOL) 50 MG tablet Take 50 mg by mouth daily. 1 and half tabs daily       rivaroxaban ANTICOAGULANT (XARELTO ANTICOAGULANT) 20 MG TABS tablet Take 1 tablet (20 mg) by mouth daily (with dinner) 30 tablet 4     valACYclovir (VALTREX) 1000 mg tablet two tablets by mouth twice daily for 1 day at onset of symptoms of a cold sore. 30 tablet 0       No Known Allergies    Review of Systems:  -Constitutional: Otherwise feeling well today, in usual state of health.  -Skin: As per HPI. No other concerns.    Physical exam:  Vitals: There were no vitals taken for this visit.  GEN: This is a well developed, well-nourished male in no acute distress, in a pleasant mood.    SKIN: Focused skin exam of the right foot was performed today.   - Over the right second toe is a hyperkeratotic 4 mm papule     Labs:  NA    Impression/Plan:  1. History of immunosuppression   2. History of plantar warts  3. Neoplasm of uncertain behavior of the right second distal / subungual toe  - Discussed the possible nature of the condition and the differential diagnosis with the patient, which includes callous, clavus, verruca, lichen simplex chronicus, non-melanoma skin cancer.   - Patient would like to proceed with a biopsy today.     Shave biopsy: Location(s) Right 2nd Distal Toe.  After discussion of benefits and risks including but not limited to bleeding/bruising, pain/swelling, infection, scar, incomplete removal, nerve damage/numbness, recurrence, and non-diagnostic biopsy, written consent, verbal consent and photographs were obtained. Time-out was performed. The area was cleaned with isopropyl alcohol. 0.5mL of 1% lidocaine with epinephrine was injected to obtain adequate anesthesia of the lesion. The nail plate was scored with #15 blade to reveal the affected epithelium of the nail bed. Saucerization shave biopsy was performed with bendable blade. Hemostasis was achieved with aluminium chloride. Vaseline and a sterile  dressing were applied. The patient tolerated the procedure and no complications were noted. The patient was provided with verbal and written post care instructions.     Charly Rivera MD  PGY-6    Micrographic Surgery and Dermatologic Oncology Fellow  January 28, 2021    Staff Physician Comments:   I saw and evaluated the patient with the Mohs Surgery Fellow (Dr. Charly Rivera) and I agree with the assessment and plan and the above description of the procedure. I was present for the key portions of the procedure and entire exam.    Faisal Vargas DO    Department of Dermatology  [facility]: Phone: [phone], Fax:[phone]  [facility]: Phone: [phone], Fax: [phone]      Again, thank you for allowing me to participate in the care of your patient.        Sincerely,        Faisal Vargas MD

## 2021-01-28 NOTE — NURSING NOTE
Drug Administration Record    Drug Name: Bupivacaine  Dose: 1mL  Route administered: SQ  NDC #: [id number]  Amount of waste(mL): 1cc  Reason for waste: not needed    LOT #: [id number]  SITE: left 2nd toe  : [employer]  EXPIRATION DATE: 8/2021    Jeny Osullivan LPN

## 2021-02-01 LAB — COPATH REPORT: NORMAL

## 2021-02-02 ENCOUNTER — TELEPHONE (OUTPATIENT)
Dept: DERMATOLOGY | Facility: CLINIC | Age: 51
End: 2021-02-02

## 2021-02-02 ENCOUNTER — MYC REFILL (OUTPATIENT)
Dept: FAMILY MEDICINE | Facility: CLINIC | Age: 51
End: 2021-02-02

## 2021-02-02 DIAGNOSIS — B00.1 HERPES LABIALIS: ICD-10-CM

## 2021-02-02 DIAGNOSIS — Z79.2 PROPHYLACTIC ANTIBIOTIC: Primary | ICD-10-CM

## 2021-02-02 RX ORDER — MUPIROCIN 20 MG/G
OINTMENT TOPICAL 3 TIMES DAILY
Qty: 30 G | Refills: 1 | Status: SHIPPED | OUTPATIENT
Start: 2021-02-02 | End: 2021-03-18

## 2021-02-02 NOTE — TELEPHONE ENCOUNTER
I called and spoke to the patient about the diagnosis. He states that he is healing well. The pain has improved.     We will plan to do a virtual follow up visit in 4-6 weeks.     Charly Rivera MD  February 2, 2021

## 2021-02-02 NOTE — RESULT ENCOUNTER NOTE
Hi Team -     I called and spoke to the patient about the diagnosis. He states that he is healing well. The pain has improved. Can we reach out to the patient to arrange for a virtual visit in 4-6 weeks to follow up on his healing?    Thanks!

## 2021-02-03 RX ORDER — VALACYCLOVIR HYDROCHLORIDE 1 G/1
TABLET, FILM COATED ORAL
Qty: 30 TABLET | Refills: 0 | Status: SHIPPED | OUTPATIENT
Start: 2021-02-03 | End: 2022-03-15

## 2021-02-11 ENCOUNTER — VIRTUAL VISIT (OUTPATIENT)
Dept: HEMATOLOGY | Facility: CLINIC | Age: 51
End: 2021-02-11
Attending: INTERNAL MEDICINE
Payer: COMMERCIAL

## 2021-02-11 VITALS — WEIGHT: 222 LBS | BODY MASS INDEX: 28.5 KG/M2

## 2021-02-11 DIAGNOSIS — Z86.711 HISTORY OF PULMONARY EMBOLISM: Primary | ICD-10-CM

## 2021-02-11 PROCEDURE — 99213 OFFICE O/P EST LOW 20 MIN: CPT | Mod: 95 | Performed by: INTERNAL MEDICINE

## 2021-02-11 NOTE — PROGRESS NOTES
Patient was contacted to complete the pre-visit call prior to their video visit with the provider.      Allergies and medications were reviewed.    I thanked them for their time to cover this information     Marlo Crawford CMA      Due to the ongoing COVID-19 outbreak, this visit was conducted by video, with the patient's approval.        [facility]  [facility] [place] [address]  Phone: [phone], Fax: [phone]      Outpatient Visit Note:    Patient: Jorge Amaral  MRN: [id number]  : 1970  CHONG: 2021    Jorge Amaral is a 50 year old man with a history of unprovoked pulmonary embolism who returns for routine follow up.      Assessment:  In summary, Jorge Amaral is a 50 year old man with history of Crohns under good long-term control and unprovoked pulmonary embolism.  Completed treatment and will continue long-term anticoag due to high risk for recurrence.  Will interrupt to tests for medication-induced headaches and will interrupt for martial arts training/sparring    Recommendations:  1. I counseled the patient about the natural history, treatment and secondary prevention of VTE.  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 Continue rivarxoaban 20 mg daily  3. Ok to interrupt for martial arts training and sparring  4. Ok to interrupt for test of medication-induced headaches.  He will call if his headahces resolve after stopping the medication and consider changing to apixaban vs ASA  5. RTC 1 year.  Gave our contact info for any questions or concerns.  APS testing next follow up - hopefully in person    20 minutes spent on the date of the encounter doing chart review, review of test results, interpretation of tests, patient visit and documentation       Grey Plaza MD   of Medicine, Division of Hematology, Oncology and Transplantation  [facility]  [employer]     --------------------------------------------------------  Forward History:  Diagnosed with Crohn's as a young man, currently well-controlled on Humira  Aug 14, 2020 presented with chest pain, found to have pulmonary embolism     History: Jorge Amaral is a 50 year old man with a history of well-controlled Crohn's who returns for routine follow up after completing 6 months of treatment with anticoagulation.  He reports no concerns with bleeding or other side-effects of the medications.  He is concerned about the risk of recurrence is amenable to remaining on long-term anticoagulation.  He would like to be able to hold anticoagulation for martial arts sparring so we discussed this strategy today.    Objective:  Exam:  Body mass index is 28.5 kg/m .   Constitutional: Appears well, no distress  Eyes: no discharge, injection or icterus  Respiratory: no cough or labored breathing  Skin: no rashes or petechiae  Neurological: no deficits appreciated, speech is fluent  Psych: affect is normal    Labs:  none    Imaging:  none    Video-Visit Details:    Type of service:  Video Visit  Video Start Time: 1105 AM  Video End Time (time video stopped): 11: 17    Originating Location (pt. Location): Home    Distant Location (provider location):  [facility] FOR BLOOD AND CLOTTING DISORDERS     Mode of Communication:  Video Conference via [employer]

## 2021-03-08 ENCOUNTER — VIRTUAL VISIT (OUTPATIENT)
Dept: DERMATOLOGY | Facility: CLINIC | Age: 51
End: 2021-03-08
Payer: COMMERCIAL

## 2021-03-08 DIAGNOSIS — L82.1 VERRUCOUS KERATOSIS: Primary | ICD-10-CM

## 2021-03-08 PROCEDURE — 99024 POSTOP FOLLOW-UP VISIT: CPT | Mod: 95 | Performed by: DERMATOLOGY

## 2021-03-08 ASSESSMENT — PAIN SCALES - GENERAL: PAINLEVEL: NO PAIN (0)

## 2021-03-08 NOTE — PROGRESS NOTES
[facility] Dermatology Note  Encounter Date: Mar 8, 2021  Store-and-Forward and Telephone ([phone]). Location of teledermatologist: [facility].  Start time: 1200. End time: 1205.    Dermatology Problem List:  1. History of immunosuppression, Chron's  2. Bowenoid papulosis, left abdomen  -s/p biopsy 4/18/15  -s/p Efudex x 6 weeks initiated 5/6/15, marked resolved 8/2015  3. Verruca plantaris  -Previous Tx: cantherone, trichloracetic acid, cryotherapy and PDL with Dr. Chris Stoddard, s/p candida 3X, cryotherapy  4. Tinea versicolor, back and chest  -Previous Tx:  ketoconazole 2% cream initiated 10/23/2015  5. 2 cm fissure on crease of R posterior ear - not healing  -current tx; hydrocortisone 2.5% cream  6. Lipoma, left bicep excision  7. Verrucoid keratosis vs indolent wart, right 2nd toe.       Last FBSE on 2/21/2019    ____________________________________________    Assessment & Plan:     # Verrucoid keratosis vs indolent wart, right 2nd toe.    Healing appropriately following biopsy, but still causing pain.   Pain improving gradually.   Follow up virtual visit 6-8 weeks.     Procedures Performed:    None      Staff:     Faisal Vargas DO    Department of Dermatology  [facility]: Phone: [phone], Fax:[phone]  [facility]: Phone: [phone], Fax: [phone]      ____________________________________________    CC: Derm Problem (Jorge is following up for wart on right foot )    HPI:  Mr. Jorge Amaral is a(n) 50 year old male who presents today for follow-up  for shave biopsy on right 2nd toe.     Still painful, especially when touched.   The end of his toe swelled and got red, but seems to be calming down now.   He denies any weeping or bleeding.   He has discontinued wound care.       Patient is otherwise feeling well, without  additional skin concerns.    Labs Reviewed:  Dermpath 1/28/21 report reviewed,  Verrucoid keratosis vs indolent wart.     Physical Exam:  Vitals: There were no vitals taken for this visit.  SKIN: Teledermatology photos were reviewed; image quality and interpretability: acceptable. Image date: 3/8/21.  -  Right second toe, superficial erosion with tan brown crust.         The tip of the right second digit is pink with a collaret of fine scale.   - No other lesions of concern on areas examined.     Medications:  Current Outpatient Medications   Medication     adalimumab (HUMIRA) 40 MG/0.8ML injection     diphenoxylate-atropine (LOMOTIL) 2.5-0.025 MG per tablet     mercaptopurine (PURINETHOL) 50 MG tablet     rivaroxaban ANTICOAGULANT (XARELTO ANTICOAGULANT) 20 MG TABS tablet     valACYclovir (VALTREX) 1000 mg tablet     hydrocortisone 2.5 % cream     mupirocin (BACTROBAN) 2 % external ointment     No current facility-administered medications for this visit.       Past Medical/Surgical History:   Patient Active Problem List   Diagnosis     CARDIOVASCULAR SCREENING; LDL GOAL LESS THAN 160     Fracture, metacarpal     Delayed union of fracture     Family history of diabetes mellitus     ACL (anterior cruciate ligament) tear--recurrent     Cold sore     Common wart     Crohn's disease with complication, unspecified gastrointestinal tract location (H)     Herpes labialis     FH: prostate cancer     BMI 28.0-28.9,adult     History of pulmonary embolism     Past Medical History:   Diagnosis Date     Crohn's disease (H)      Hyperlipidemia LDL goal < 160     not on meds     Nondisplaced fracture of navicular (scaphoid) of left foot, subsequent encounter for fracture with delayed healing 07/03/2019       CC No referring provider defined for this encounter. on close of this encounter.    Teledermatology Nurse Call Patients:     Are you  in the [facility] at the time of the encounter? yes    Today's visit will be billed  to you and your insurance.    A teledermatology visit is not as thorough as an in-person visit and the quality of the photograph sent may not be of the same quality as that taken by the dermatology clinic.    Wart still there, hurts when he wears a shoe to the point where it gets red. No other concerns.

## 2021-03-08 NOTE — LETTER
3/8/2021         RE: Jorge Amaral  [address] [email or web address] [id number]        Dear Colleague,    Thank you for referring your patient, Jorge Amaral, to the [facility]. Please see a copy of my visit note below.    [facility] Dermatology Note  Encounter Date: Mar 8, 2021  Store-and-Forward and Telephone ([phone]). Location of teledermatologist: [facility].  Start time: 1200. End time: 1205.    Dermatology Problem List:  1. History of immunosuppression, Chron's  2. Bowenoid papulosis, left abdomen  -s/p biopsy 4/18/15  -s/p Efudex x 6 weeks initiated 5/6/15, marked resolved 8/2015  3. Verruca plantaris  -Previous Tx: cantherone, trichloracetic acid, cryotherapy and PDL with Dr. Chris Stoddard, s/p candida 3X, cryotherapy  4. Tinea versicolor, back and chest  -Previous Tx:  ketoconazole 2% cream initiated 10/23/2015  5. 2 cm fissure on crease of R posterior ear - not healing  -current tx; hydrocortisone 2.5% cream  6. Lipoma, left bicep excision  7. Verrucoid keratosis vs indolent wart, right 2nd toe.       Last FBSE on 2/21/2019    ____________________________________________    Assessment & Plan:     # Verrucoid keratosis vs indolent wart, right 2nd toe.    Healing appropriately following biopsy, but still causing pain.   Pain improving gradually.   Follow up virtual visit 6-8 weeks.     Procedures Performed:    None      Staff:     Faisal Vargas DO    Department of Dermatology  [facility]: Phone: [phone], Fax:[phone]  [facility]: Phone: [phone], Fax: [phone]      ____________________________________________    CC: Derm Problem (Jorge is following up for wart on right foot )    HPI:  Mr. Jorge Amaral is a(n) 50 year old male who presents today for follow-up  for shave biopsy on  right 2nd toe.     Still painful, especially when touched.   The end of his toe swelled and got red, but seems to be calming down now.   He denies any weeping or bleeding.   He has discontinued wound care.       Patient is otherwise feeling well, without additional skin concerns.    Labs Reviewed:  Dermpath 1/28/21 report reviewed,  Verrucoid keratosis vs indolent wart.     Physical Exam:  Vitals: There were no vitals taken for this visit.  SKIN: Teledermatology photos were reviewed; image quality and interpretability: acceptable. Image date: 3/8/21.  -  Right second toe, superficial erosion with tan brown crust.         The tip of the right second digit is pink with a collaret of fine scale.   - No other lesions of concern on areas examined.     Medications:  Current Outpatient Medications   Medication     adalimumab (HUMIRA) 40 MG/0.8ML injection     diphenoxylate-atropine (LOMOTIL) 2.5-0.025 MG per tablet     mercaptopurine (PURINETHOL) 50 MG tablet     rivaroxaban ANTICOAGULANT (XARELTO ANTICOAGULANT) 20 MG TABS tablet     valACYclovir (VALTREX) 1000 mg tablet     hydrocortisone 2.5 % cream     mupirocin (BACTROBAN) 2 % external ointment     No current facility-administered medications for this visit.       Past Medical/Surgical History:   Patient Active Problem List   Diagnosis     CARDIOVASCULAR SCREENING; LDL GOAL LESS THAN 160     Fracture, metacarpal     Delayed union of fracture     Family history of diabetes mellitus     ACL (anterior cruciate ligament) tear--recurrent     Cold sore     Common wart     Crohn's disease with complication, unspecified gastrointestinal tract location (H)     Herpes labialis     FH: prostate cancer     BMI 28.0-28.9,adult     History of pulmonary embolism     Past Medical History:   Diagnosis Date     Crohn's disease (H)      Hyperlipidemia LDL goal < 160     not on meds     Nondisplaced fracture of navicular (scaphoid) of left foot, subsequent encounter for fracture with  delayed healing 07/03/2019       CC No referring provider defined for this encounter. on close of this encounter.    Teledermatology Nurse Call Patients:     Are you  in the [facility] at the time of the encounter? yes    Today's visit will be billed to you and your insurance.    A teledermatology visit is not as thorough as an in-person visit and the quality of the photograph sent may not be of the same quality as that taken by the dermatology clinic.    Wart still there, hurts when he wears a shoe to the point where it gets red. No other concerns.           Again, thank you for allowing me to participate in the care of your patient.        Sincerely,        Faisal Vargas MD

## 2021-03-08 NOTE — PATIENT INSTRUCTIONS
[facility] Dermatology Visit    Thank you for allowing us to participate in your care. Your findings, instructions and follow-up plan are as follows:         When should I call my doctor?    If you are worsening or not improving, please, contact us or seek urgent care as noted below.     Who should I call with questions (adults)?    [facility] (adult and pediatric): [phone]     [facility] (adult): [phone]    For urgent needs outside of business hours call the [facility] at [phone] and ask for the dermatology resident on call    If this is a medical emergency and you are unable to reach an ER, Call 911      Who should I call with questions (pediatric)?  [facility]- Pediatric Dermatology  Dr. Pattie Trivedi, Dr. John Cooper, Dr. Beckie Lamar, Alisa Ordonez, PA  Dr. Shaista Luu, Dr. Vicky Gonzalez & Dr. Antonio Mayorga  Non Urgent  Nurse Triage Line; [phone]- Hazel and Yelena RN Care Coordinators   [facility] (/Complex ) [phone]    If you need a prescription refill, please contact your pharmacy. Refills are approved or denied by our Physicians during normal business hours, Monday through Fridays  Per office policy, refills will not be granted if you have not been seen within the past year (or sooner depending on your child's condition)    Scheduling Information:  Pediatric Appointment Scheduling and Call Center [phone]  Radiology Scheduling- [phone]  Sedation Unit Scheduling- [phone]  [place] Scheduling- General [phone]; Pediatric Dermatology [phone]  Main  Services: [phone]  Occitan: [phone]  Bruneian: [phone]  Hmong/Azeri/Pashto: [phone]  Preadmission Nursing Department Fax Number: [phone] (Fax all pre-operative paperwork to this number)    For urgent matters arising during evenings,  weekends, or holidays that cannot wait for normal business hours please call [phone] and ask for the Dermatology Resident On-Call to be paged.

## 2021-03-08 NOTE — NURSING NOTE
Jorge Amaral's goals for this visit include:   Chief Complaint   Patient presents with     Derm Problem     Jorge is following up for wart on right foot        He requests these members of his care team be copied on today's visit information:     PCP: Vince Swain    Referring Provider:  No referring provider defined for this encounter.    There were no vitals taken for this visit.    Do you need any medication refills at today's visit? No    Tiffany De Souza LPN

## 2021-03-18 ENCOUNTER — VIRTUAL VISIT (OUTPATIENT)
Dept: DERMATOLOGY | Facility: CLINIC | Age: 51
End: 2021-03-18
Payer: COMMERCIAL

## 2021-03-18 DIAGNOSIS — L30.9 DERMATITIS: Primary | ICD-10-CM

## 2021-03-18 PROCEDURE — 99214 OFFICE O/P EST MOD 30 MIN: CPT | Mod: 95 | Performed by: DERMATOLOGY

## 2021-03-18 RX ORDER — TACROLIMUS 1 MG/G
OINTMENT TOPICAL
Qty: 60 G | Refills: 3 | Status: SHIPPED | OUTPATIENT
Start: 2021-03-18 | End: 2021-11-12

## 2021-03-18 ASSESSMENT — PAIN SCALES - GENERAL: PAINLEVEL: NO PAIN (0)

## 2021-03-18 NOTE — LETTER
3/18/2021         RE: Jorge Amaral  [address] [facility] [address]        Dear Colleague,    Thank you for referring your patient, Jorge Amaral, to the [facility]. Please see a copy of my visit note below.    [facility] Dermatology Note  Encounter Date: Mar 18, 2021  Store-and-Forward and Telephone ([phone] ). Location of teledermatologist: [facility].  Approximately 9 minutes    Dermatology Problem List:  1. History of immunosuppression, Chron's  2. Bowenoid papulosis, left abdomen  -s/p biopsy 4/18/15  -s/p Efudex x 6 weeks initiated 5/6/15, marked resolved 8/2015  3. Verruca plantaris  -Previous Tx: cantherone, trichloracetic acid, cryotherapy and PDL with Dr. Chris Stoddard, s/p candida 3X, cryotherapy  4. Tinea versicolor, back and chest  -Previous Tx:  ketoconazole 2% cream initiated 10/23/2015  5. 2 cm fissure on crease of R posterior ear - not healing  -current tx; hydrocortisone 2.5% cream  6. Lipoma, left bicep excision     Last FBSE on 2/21/2019       ____________________________________________    Assessment & Plan:     1. History of immunosuppression  - next skin exam 12/2021       2. Dermatitis(psoriasiform on 12/2020 biopsy)R postauricular and likely on chest and neck. Coming off humira and switching to stelara  -hydrocortisone twice daily for 2 weeks, then 3 times weekly for 2 weeks, repeat cycle as needed. Side effects of  steroid use reviewed  Okay to use protopic     3. Bowenoid papulosis, left abdomen- clear today  -s/p biopsy 4/18/15    4. Nail biopsy site, pt with no complaints  -plan for vaseline    5. IF hives return   X1 episode  -use benadryl if these return and contact clinic      6. REcommend COVID 19 vacccination  Procedures Performed:    None    Follow-up: 10 months in person or earlier for new or changing lesions    Staff:     Patient seen and staffed with Dr. Chuck Wilks,  MS3  [facility] [employer]    Sasha Woods MD    Department of Dermatology  [facility]: Phone: [phone], Fax:[phone]  [facility]: Phone: [phone], Fax: [phone]      ____________________________________________    CC: Derm Problem (Psoriasis )    HPI:  Mr. Joreg Amaral is a(n) 50 year old male who presents today as a return patient for biopsy behind the ear and nail biopsy. He states that he has been using Protopic, prescribed by outside provider, for his ear, and he states that his R ear lesion is much improved since starting this medication. He is no longer having and discomfort with his R 2nd toe or nail and he states that he no longer has any concern with this area. He states that he has been getting pruritic erythematous papules after shaving on his neck, and he states that they resolve on their own after several days without any intervention or application, though he states he has also used Protopic on the chest and neck to good effect. Per pt., his last dose of Humira  was 3/12, and he is to start Stelara next week.    Patient is otherwise feeling well, without additional skin concerns.    Labs Reviewed:  FINAL DIAGNOSIS:   Skin, right second toe:   - Papillomatous epidermal hyperplasia with hypergranulosis and subungual   hyperkeratosis - (see comment and   description)     COMMENT:   Together with the clinical information, the features seen in this specimen    are favored to represent a   verrucoid keratosis or indolent wart, given the presence of subungual   hyperkeratosis, papillomatous epidermal   hyperplasia, and hypergranulosis, though definitive viral cytopathic   change is not identified. Features of   onychopapilloma are absent. Clinical correlation is recommended.     Physical Exam:  Vitals: There were no vitals taken for this  visit.  SKIN: Teledermatology photos were reviewed; image quality and interpretability: acceptable. Image date:   - 3/12 images  -right second toe with hemorrhagic crusing  -right infraauricular with plaque that is erythematous  -faint macular erythema neck and chest  - No other lesions of concern on areas examined.     Medications:  Current Outpatient Medications   Medication     mercaptopurine (PURINETHOL) 50 MG tablet     valACYclovir (VALTREX) 1000 mg tablet     adalimumab (HUMIRA) 40 MG/0.8ML injection     diphenoxylate-atropine (LOMOTIL) 2.5-0.025 MG per tablet     hydrocortisone 2.5 % cream     mupirocin (BACTROBAN) 2 % external ointment     rivaroxaban ANTICOAGULANT (XARELTO ANTICOAGULANT) 20 MG TABS tablet     No current facility-administered medications for this visit.       Past Medical/Surgical History:   Patient Active Problem List   Diagnosis     CARDIOVASCULAR SCREENING; LDL GOAL LESS THAN 160     Fracture, metacarpal     Delayed union of fracture     Family history of diabetes mellitus     ACL (anterior cruciate ligament) tear--recurrent     Cold sore     Common wart     Crohn's disease with complication, unspecified gastrointestinal tract location (H)     Herpes labialis     FH: prostate cancer     BMI 28.0-28.9,adult     History of pulmonary embolism     Past Medical History:   Diagnosis Date     Crohn's disease (H)      Hyperlipidemia LDL goal < 160     not on meds     Nondisplaced fracture of navicular (scaphoid) of left foot, subsequent encounter for fracture with delayed healing 07/03/2019       CC No referring provider defined for this encounter. on close of this encounter.        Again, thank you for allowing me to participate in the care of your patient.        Sincerely,        Sasha Woods MD

## 2021-03-18 NOTE — NURSING NOTE
"Teledermatology Nurse Call Patients:     Are you  in the [facility] at the time of the encounter? yes    Today's visit will be billed to you and your insurance.    A teledermatology visit is not as thorough as an in-person visit and the quality of the photograph sent may not be of the same quality as that taken by the dermatology clinic.         Chief Complaint   Patient presents with     Derm Problem     Psoriasis        Patient have stopped Humira and is in the process of transitioning to Stellara next week.  He was seen by [facility] dermatology clinic a month ago and was prescribed a cream (does not recall, will look that up and have info ready for MD). He received the cream a week and half ago and had been using that for his psoriasis; \"it helped a lot\". Psoriasis is stable, no recent flare.         [id number], MEDICAL ASSISTANT       "

## 2021-03-18 NOTE — PROGRESS NOTES
[facility] Dermatology Note  Encounter Date: Mar 18, 2021  Store-and-Forward and Telephone ([phone] ). Location of teledermatologist: [facility].  Approximately 9 minutes    Dermatology Problem List:  1. History of immunosuppression, Chron's  2. Bowenoid papulosis, left abdomen  -s/p biopsy 4/18/15  -s/p Efudex x 6 weeks initiated 5/6/15, marked resolved 8/2015  3. Verruca plantaris  -Previous Tx: cantherone, trichloracetic acid, cryotherapy and PDL with Dr. Chris Stoddard, s/p candida 3X, cryotherapy  4. Tinea versicolor, back and chest  -Previous Tx:  ketoconazole 2% cream initiated 10/23/2015  5. 2 cm fissure on crease of R posterior ear - not healing  -current tx; hydrocortisone 2.5% cream  6. Lipoma, left bicep excision     Last FBSE on 2/21/2019       ____________________________________________    Assessment & Plan:     1. History of immunosuppression  - next skin exam 12/2021       2. Dermatitis(psoriasiform on 12/2020 biopsy)R postauricular and likely on chest and neck. Coming off humira and switching to stelara  -hydrocortisone twice daily for 2 weeks, then 3 times weekly for 2 weeks, repeat cycle as needed. Side effects of  steroid use reviewed  Okay to use protopic     3. Bowenoid papulosis, left abdomen- clear today  -s/p biopsy 4/18/15    4. Nail biopsy site, pt with no complaints  -plan for vaseline    5. IF hives return   X1 episode  -use benadryl if these return and contact clinic      6. REcommend COVID 19 vacccination  Procedures Performed:    None    Follow-up: 10 months in person or earlier for new or changing lesions    Staff:     Patient seen and staffed with Dr. Chuck Wilks, MS3  [facility] [employer]    Sasha Woods MD    Department of Dermatology  [facility]: Phone: [phone], Fax:[phone]  [facility]  [facility]: Phone: [phone], Fax: [phone]      ____________________________________________    CC: Derm Problem (Psoriasis )    HPI:  Mr. Jorge Amaral is a(n) 50 year old male who presents today as a return patient for biopsy behind the ear and nail biopsy. He states that he has been using Protopic, prescribed by outside provider, for his ear, and he states that his R ear lesion is much improved since starting this medication. He is no longer having and discomfort with his R 2nd toe or nail and he states that he no longer has any concern with this area. He states that he has been getting pruritic erythematous papules after shaving on his neck, and he states that they resolve on their own after several days without any intervention or application, though he states he has also used Protopic on the chest and neck to good effect. Per pt., his last dose of Humira  was 3/12, and he is to start Stelara next week.    Patient is otherwise feeling well, without additional skin concerns.    Labs Reviewed:  FINAL DIAGNOSIS:   Skin, right second toe:   - Papillomatous epidermal hyperplasia with hypergranulosis and subungual   hyperkeratosis - (see comment and   description)     COMMENT:   Together with the clinical information, the features seen in this specimen    are favored to represent a   verrucoid keratosis or indolent wart, given the presence of subungual   hyperkeratosis, papillomatous epidermal   hyperplasia, and hypergranulosis, though definitive viral cytopathic   change is not identified. Features of   onychopapilloma are absent. Clinical correlation is recommended.     Physical Exam:  Vitals: There were no vitals taken for this visit.  SKIN: Teledermatology photos were reviewed; image quality and interpretability: acceptable. Image date:   - 3/12 images  -right second toe with hemorrhagic crusing  -right infraauricular with plaque that is erythematous  -faint macular erythema neck and  chest  - No other lesions of concern on areas examined.     Medications:  Current Outpatient Medications   Medication     mercaptopurine (PURINETHOL) 50 MG tablet     valACYclovir (VALTREX) 1000 mg tablet     adalimumab (HUMIRA) 40 MG/0.8ML injection     diphenoxylate-atropine (LOMOTIL) 2.5-0.025 MG per tablet     hydrocortisone 2.5 % cream     mupirocin (BACTROBAN) 2 % external ointment     rivaroxaban ANTICOAGULANT (XARELTO ANTICOAGULANT) 20 MG TABS tablet     No current facility-administered medications for this visit.       Past Medical/Surgical History:   Patient Active Problem List   Diagnosis     CARDIOVASCULAR SCREENING; LDL GOAL LESS THAN 160     Fracture, metacarpal     Delayed union of fracture     Family history of diabetes mellitus     ACL (anterior cruciate ligament) tear--recurrent     Cold sore     Common wart     Crohn's disease with complication, unspecified gastrointestinal tract location (H)     Herpes labialis     FH: prostate cancer     BMI 28.0-28.9,adult     History of pulmonary embolism     Past Medical History:   Diagnosis Date     Crohn's disease (H)      Hyperlipidemia LDL goal < 160     not on meds     Nondisplaced fracture of navicular (scaphoid) of left foot, subsequent encounter for fracture with delayed healing 07/03/2019       CC No referring provider defined for this encounter. on close of this encounter.

## 2021-03-18 NOTE — PATIENT INSTRUCTIONS
[facility] Dermatology Visit    Thank you for allowing us to participate in your care. Your findings, instructions and follow-up plan are as follows:         When should I call my doctor?    If you are worsening or not improving, please, contact us or seek urgent care as noted below.     Who should I call with questions (adults)?    [facility] (adult and pediatric): [phone]     [facility] (adult): [phone]    For urgent needs outside of business hours call the [facility] at [phone] and ask for the dermatology resident on call    If this is a medical emergency and you are unable to reach an ER, Call 911      Who should I call with questions (pediatric)?  [facility]- Pediatric Dermatology  Dr. Pattie Trivedi, Dr. John Cooper, Dr. Beckie Lamar, Alisa Ordonez, PA  Dr. Shaista Luu, Dr. Vicky Gonzalez & Dr. Antonio Mayorga  Non Urgent  Nurse Triage Line; [phone]- Hazel and Yelena RN Care Coordinators   [facility] (/Complex ) [phone]    If you need a prescription refill, please contact your pharmacy. Refills are approved or denied by our Physicians during normal business hours, Monday through Fridays  Per office policy, refills will not be granted if you have not been seen within the past year (or sooner depending on your child's condition)    Scheduling Information:  Pediatric Appointment Scheduling and Call Center [phone]  Radiology Scheduling- [phone]  Sedation Unit Scheduling- [phone]  [place] Scheduling- General [phone]; Pediatric Dermatology [phone]  Main  Services: [phone]  Maltese: [phone]  Malaysian: [phone]  Hmong/Turkish/English: [phone]  Preadmission Nursing Department Fax Number: [phone] (Fax all pre-operative paperwork to this number)    For urgent matters arising during evenings,  weekends, or holidays that cannot wait for normal business hours please call [phone] and ask for the Dermatology Resident On-Call to be paged.

## 2021-03-19 ENCOUNTER — TELEPHONE (OUTPATIENT)
Dept: FAMILY MEDICINE | Facility: CLINIC | Age: 51
End: 2021-03-19

## 2021-03-28 ENCOUNTER — TELEPHONE (OUTPATIENT)
Dept: FAMILY MEDICINE | Facility: CLINIC | Age: 51
End: 2021-03-28

## 2021-03-28 DIAGNOSIS — Z86.711 HISTORY OF PULMONARY EMBOLISM: ICD-10-CM

## 2021-03-29 NOTE — TELEPHONE ENCOUNTER
To Dr. Plaza to advise on refill request rivaroxaban ; med no longer on med list and pharmacy requesting the 15mg but most recent visit note states 20mg.  Per hematology visit 2/11/21:  In summary, Jorge Amaral is a 50 year old man with history of Crohns under good long-term control and unprovoked pulmonary embolism.  Completed treatment and will continue long-term anticoag due to high risk for recurrence.  Will interrupt to tests for medication-induced headaches and will interrupt for martial arts training/sparring     Recommendations:  1. I counseled the patient about the natural history, treatment and secondary prevention of VTE.  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 Continue rivarxoaban 20 mg daily  3. Ok to interrupt for martial arts training and sparring  4. Ok to interrupt for test of medication-induced headaches.  He will call if his headahces resolve after stopping the medication and consider changing to apixaban vs ASA  RTC 1 year.  Gave our contact info for any questions or concerns.  APS testing next follow up - hopefully in person

## 2021-03-30 DIAGNOSIS — Z86.711 HISTORY OF PULMONARY EMBOLISM: Primary | ICD-10-CM

## 2021-04-15 ENCOUNTER — MYC MEDICAL ADVICE (OUTPATIENT)
Dept: DERMATOLOGY | Facility: CLINIC | Age: 51
End: 2021-04-15

## 2021-04-16 ENCOUNTER — IMMUNIZATION (OUTPATIENT)
Dept: PEDIATRICS | Facility: CLINIC | Age: 51
End: 2021-04-16
Payer: COMMERCIAL

## 2021-04-16 PROCEDURE — 0001A PR COVID VAC PFIZER DIL RECON 30 MCG/0.3 ML IM: CPT

## 2021-04-16 PROCEDURE — 91300 PR COVID VAC PFIZER DIL RECON 30 MCG/0.3 ML IM: CPT

## 2021-04-19 ENCOUNTER — VIRTUAL VISIT (OUTPATIENT)
Dept: DERMATOLOGY | Facility: CLINIC | Age: 51
End: 2021-04-19
Payer: COMMERCIAL

## 2021-04-19 DIAGNOSIS — L82.1 SK (SEBORRHEIC KERATOSIS): Primary | ICD-10-CM

## 2021-04-19 PROCEDURE — 99024 POSTOP FOLLOW-UP VISIT: CPT | Performed by: DERMATOLOGY

## 2021-04-19 NOTE — LETTER
4/19/2021         RE: Jorge Amaral  [address] [facility] [address]        Dear Colleague,    Thank you for referring your patient, Jorge Amaral, to the [facility]. Please see a copy of my visit note below.    [facility] Dermatology Note  Encounter Date: Apr 19, 2021  Store-and-Forward and Telephone ([phone]). Location of teledermatologist: [facility].  Start time: 1143. End time: 1146.    Dermatology Problem List:  1. History of immunosuppression, Chron's  2. Bowenoid papulosis, left abdomen  -s/p biopsy 4/18/15  -s/p Efudex x 6 weeks initiated 5/6/15, marked resolved 8/2015  3. Verruca plantaris  -Previous Tx: cantherone, trichloracetic acid, cryotherapy and PDL with Dr. Chris Stoddard, s/p candida 3X, cryotherapy  4. Tinea versicolor, back and chest  -Previous Tx:  ketoconazole 2% cream initiated 10/23/2015  5. 2 cm fissure on crease of R posterior ear - not healing  -current tx; hydrocortisone 2.5% cream  6. Lipoma, left bicep excision  7. Verrucoid keratosis vs indolent wart, right 2nd toe.  s/p scoop shave biopsy.      Last FBSE on 2/21/2019  ____________________________________________    Assessment & Plan:     # verrucoid keratosis vs indolent wart; R 2nd toe  S/p shave biopsy.   Wound healing appropriately and nail growing normally.   No evidence of recurrence on photos.       Procedures Performed:    None    Follow-up: Jan 2022 with Dr. Woods    Staff:     Faisal Vargas DO    Department of Dermatology  [facility]: Phone: [phone], Fax:[phone]  [facility]: Phone: [phone], Fax: [phone]      ____________________________________________    CC: Wound Check (toe)    HPI:  Mr. Jorge Amaral is a(n) 50 year old male who presents today for follow-up  for verrucoid keratosis  on R 2nd toe.  Wound from biopsy appears to be healing well.  Pain has resolved.  He is doing normal physical activity.     Patient is otherwise feeling well, without additional skin concerns.    Labs Reviewed:  N/A    Physical Exam:  Vitals: There were no vitals taken for this visit.  SKIN: Teledermatology photos were reviewed; image quality and interpretability: acceptable. Image date: 4/19/21.  - Pink macule with fine scale overlying, or apparent recurrence  - No other lesions of concern on areas examined.     Medications:  Current Outpatient Medications   Medication     adalimumab (HUMIRA) 40 MG/0.8ML injection     diphenoxylate-atropine (LOMOTIL) 2.5-0.025 MG per tablet     hydrocortisone 2.5 % cream     mercaptopurine (PURINETHOL) 50 MG tablet     rivaroxaban ANTICOAGULANT (XARELTO ANTICOAGULANT) 20 MG TABS tablet     tacrolimus (PROTOPIC) 0.1 % external ointment     valACYclovir (VALTREX) 1000 mg tablet     No current facility-administered medications for this visit.       Past Medical/Surgical History:   Patient Active Problem List   Diagnosis     CARDIOVASCULAR SCREENING; LDL GOAL LESS THAN 160     Fracture, metacarpal     Delayed union of fracture     Family history of diabetes mellitus     ACL (anterior cruciate ligament) tear--recurrent     Cold sore     Common wart     Crohn's disease with complication, unspecified gastrointestinal tract location (H)     Herpes labialis     FH: prostate cancer     BMI 28.0-28.9,adult     History of pulmonary embolism     Past Medical History:   Diagnosis Date     Crohn's disease (H)      Hyperlipidemia LDL goal < 160     not on meds     Nondisplaced fracture of navicular (scaphoid) of left foot, subsequent encounter for fracture with delayed healing 07/03/2019             Again, thank you for allowing me to participate in the care of your patient.        Sincerely,        Faisal Vargas MD

## 2021-04-19 NOTE — NURSING NOTE
Teledermatology Nurse Call Patients:     Are you  in the [facility] at the time of the encounter? yes    Today's visit will be billed to you and your insurance.    A teledermatology visit is not as thorough as an in-person visit and the quality of the photograph sent may not be of the same quality as that taken by the dermatology clinic.       Jeny Osullivan LPN

## 2021-04-19 NOTE — Clinical Note
It didn't look like this patient's next appt was scheduled. He is due for skin cancer screening with Dr. Woods in Jan 2022. Thank you.

## 2021-04-19 NOTE — PROGRESS NOTES
[facility] Dermatology Note  Encounter Date: Apr 19, 2021  Store-and-Forward and Telephone ([phone]). Location of teledermatologist: [facility].  Start time: 1143. End time: 1146.    Dermatology Problem List:  1. History of immunosuppression, Chron's  2. Bowenoid papulosis, left abdomen  -s/p biopsy 4/18/15  -s/p Efudex x 6 weeks initiated 5/6/15, marked resolved 8/2015  3. Verruca plantaris  -Previous Tx: cantherone, trichloracetic acid, cryotherapy and PDL with Dr. Chris Stoddard, s/p candida 3X, cryotherapy  4. Tinea versicolor, back and chest  -Previous Tx:  ketoconazole 2% cream initiated 10/23/2015  5. 2 cm fissure on crease of R posterior ear - not healing  -current tx; hydrocortisone 2.5% cream  6. Lipoma, left bicep excision  7. Verrucoid keratosis vs indolent wart, right 2nd toe.  s/p scoop shave biopsy.      Last FBSE on 2/21/2019  ____________________________________________    Assessment & Plan:     # verrucoid keratosis vs indolent wart; R 2nd toe  S/p shave biopsy.   Wound healing appropriately and nail growing normally.   No evidence of recurrence on photos.       Procedures Performed:    None    Follow-up: Jan 2022 with Dr. Woods    Staff:     Faisal Vargas DO    Department of Dermatology  [facility]: Phone: [phone], Fax:[phone]  [facility]: Phone: [phone], Fax: [phone]      ____________________________________________    CC: Wound Check (toe)    HPI:  Mr. Jorge Amaral is a(n) 50 year old male who presents today for follow-up  for verrucoid keratosis on R 2nd toe.  Wound from biopsy appears to be healing well.  Pain has resolved.  He is doing normal physical activity.     Patient is otherwise feeling well, without additional skin concerns.    Labs Reviewed:  N/A    Physical Exam:  Vitals: There were no  vitals taken for this visit.  SKIN: Teledermatology photos were reviewed; image quality and interpretability: acceptable. Image date: 4/19/21.  - Pink macule with fine scale overlying, or apparent recurrence  - No other lesions of concern on areas examined.     Medications:  Current Outpatient Medications   Medication     adalimumab (HUMIRA) 40 MG/0.8ML injection     diphenoxylate-atropine (LOMOTIL) 2.5-0.025 MG per tablet     hydrocortisone 2.5 % cream     mercaptopurine (PURINETHOL) 50 MG tablet     rivaroxaban ANTICOAGULANT (XARELTO ANTICOAGULANT) 20 MG TABS tablet     tacrolimus (PROTOPIC) 0.1 % external ointment     valACYclovir (VALTREX) 1000 mg tablet     No current facility-administered medications for this visit.       Past Medical/Surgical History:   Patient Active Problem List   Diagnosis     CARDIOVASCULAR SCREENING; LDL GOAL LESS THAN 160     Fracture, metacarpal     Delayed union of fracture     Family history of diabetes mellitus     ACL (anterior cruciate ligament) tear--recurrent     Cold sore     Common wart     Crohn's disease with complication, unspecified gastrointestinal tract location (H)     Herpes labialis     FH: prostate cancer     BMI 28.0-28.9,adult     History of pulmonary embolism     Past Medical History:   Diagnosis Date     Crohn's disease (H)      Hyperlipidemia LDL goal < 160     not on meds     Nondisplaced fracture of navicular (scaphoid) of left foot, subsequent encounter for fracture with delayed healing 07/03/2019

## 2021-04-19 NOTE — PATIENT INSTRUCTIONS
[facility] Dermatology Visit    Thank you for allowing us to participate in your care. Your findings, instructions and follow-up plan are as follows:         When should I call my doctor?    If you are worsening or not improving, please, contact us or seek urgent care as noted below.     Who should I call with questions (adults)?    [facility] (adult and pediatric): [phone]     [facility] (adult): [phone]    For urgent needs outside of business hours call the [facility] at [phone] and ask for the dermatology resident on call    If this is a medical emergency and you are unable to reach an ER, Call 911      Who should I call with questions (pediatric)?  [facility]- Pediatric Dermatology  Dr. Pattie Trivedi, Dr. John Cooper, Dr. Beckie Lamar, Alisa Ordonez, PA  Dr. Shaista Luu, Dr. Vicky Gonzalez & Dr. Antonio Mayorga  Non Urgent  Nurse Triage Line; [phone]- Hazel and Yelena RN Care Coordinators   Justa (/Complex ) [phone]    If you need a prescription refill, please contact your pharmacy. Refills are approved or denied by our Physicians during normal business hours, Monday through Fridays  Per office policy, refills will not be granted if you have not been seen within the past year (or sooner depending on your child's condition)    Scheduling Information:  Pediatric Appointment Scheduling and Call Center [phone]  Radiology Scheduling- [phone]  Sedation Unit Scheduling- [phone]  [place] Scheduling- General [phone]; Pediatric Dermatology [phone]  Main  Services: [phone]  Greenlandic: [phone]  Niuean: [phone]  Hmong/Uzbek/Icelandic: [phone]  Preadmission Nursing Department Fax Number: [phone] (Fax all pre-operative paperwork to this number)    For urgent matters arising during evenings,  weekends, or holidays that cannot wait for normal business hours please call [phone] and ask for the Dermatology Resident On-Call to be paged.

## 2021-04-26 ENCOUNTER — VIRTUAL VISIT (OUTPATIENT)
Dept: SLEEP MEDICINE | Facility: CLINIC | Age: 51
End: 2021-04-26
Attending: PHYSICIAN ASSISTANT
Payer: COMMERCIAL

## 2021-04-26 VITALS — HEIGHT: 75 IN | BODY MASS INDEX: 27.6 KG/M2 | WEIGHT: 222 LBS

## 2021-04-26 DIAGNOSIS — G47.9 DISTURBANCE IN SLEEP BEHAVIOR: ICD-10-CM

## 2021-04-26 DIAGNOSIS — R06.83 SNORING: ICD-10-CM

## 2021-04-26 DIAGNOSIS — R51.9 NONINTRACTABLE HEADACHE, UNSPECIFIED CHRONICITY PATTERN, UNSPECIFIED HEADACHE TYPE: Primary | ICD-10-CM

## 2021-04-26 DIAGNOSIS — G47.10 ORGANIC HYPERSOMNIA: ICD-10-CM

## 2021-04-26 PROBLEM — Z86.711 HISTORY OF PULMONARY EMBOLISM: Status: ACTIVE | Noted: 2020-08-21

## 2021-04-26 PROBLEM — K50.919 CROHN'S DISEASE WITH COMPLICATION, UNSPECIFIED GASTROINTESTINAL TRACT LOCATION (H): Chronic | Status: ACTIVE | Noted: 2017-08-11

## 2021-04-26 PROBLEM — A41.9 SEPSIS (H): Status: RESOLVED | Noted: 2018-01-19 | Resolved: 2021-04-26

## 2021-04-26 PROBLEM — A41.9 SEPSIS (H): Status: ACTIVE | Noted: 2018-01-19

## 2021-04-26 PROBLEM — Z80.42 FH: PROSTATE CANCER: Status: ACTIVE | Noted: 2017-08-11

## 2021-04-26 PROBLEM — Z86.711 HISTORY OF PULMONARY EMBOLISM: Chronic | Status: ACTIVE | Noted: 2020-08-21

## 2021-04-26 PROCEDURE — 99204 OFFICE O/P NEW MOD 45 MIN: CPT | Mod: 95 | Performed by: INTERNAL MEDICINE

## 2021-04-26 RX ORDER — ZOLPIDEM TARTRATE 5 MG/1
TABLET ORAL
Qty: 1 TABLET | Refills: 0 | Status: SHIPPED | OUTPATIENT
Start: 2021-04-26 | End: 2022-03-15

## 2021-04-26 SDOH — ECONOMIC STABILITY: INCOME INSECURITY: HOW HARD IS IT FOR YOU TO PAY FOR THE VERY BASICS LIKE FOOD, HOUSING, MEDICAL CARE, AND HEATING?: NOT ASKED

## 2021-04-26 SDOH — ECONOMIC STABILITY: FOOD INSECURITY: WITHIN THE PAST 12 MONTHS, YOU WORRIED THAT YOUR FOOD WOULD RUN OUT BEFORE YOU GOT MONEY TO BUY MORE.: NOT ASKED

## 2021-04-26 SDOH — HEALTH STABILITY: MENTAL HEALTH: HOW OFTEN DO YOU HAVE A DRINK CONTAINING ALCOHOL?: MONTHLY OR LESS

## 2021-04-26 SDOH — HEALTH STABILITY: MENTAL HEALTH: HOW MANY STANDARD DRINKS CONTAINING ALCOHOL DO YOU HAVE ON A TYPICAL DAY?: NOT ASKED

## 2021-04-26 SDOH — ECONOMIC STABILITY: TRANSPORTATION INSECURITY
IN THE PAST 12 MONTHS, HAS THE LACK OF TRANSPORTATION KEPT YOU FROM MEDICAL APPOINTMENTS OR FROM GETTING MEDICATIONS?: NOT ASKED

## 2021-04-26 SDOH — HEALTH STABILITY: MENTAL HEALTH: HOW OFTEN DO YOU HAVE 6 OR MORE DRINKS ON ONE OCCASION?: NOT ASKED

## 2021-04-26 SDOH — ECONOMIC STABILITY: FOOD INSECURITY: WITHIN THE PAST 12 MONTHS, THE FOOD YOU BOUGHT JUST DIDN'T LAST AND YOU DIDN'T HAVE MONEY TO GET MORE.: NOT ASKED

## 2021-04-26 SDOH — ECONOMIC STABILITY: TRANSPORTATION INSECURITY
IN THE PAST 12 MONTHS, HAS LACK OF TRANSPORTATION KEPT YOU FROM MEETINGS, WORK, OR FROM GETTING THINGS NEEDED FOR DAILY LIVING?: NOT ASKED

## 2021-04-26 ASSESSMENT — MIFFLIN-ST. JEOR: SCORE: 1934.68

## 2021-04-26 NOTE — PATIENT INSTRUCTIONS
Your BMI is Body mass index is 28.12 kg/m .  Weight management is a personal decision.  If you are interested in exploring weight loss strategies, the following discussion covers the approaches that may be successful. Body mass index (BMI) is one way to tell whether you are at a healthy weight, overweight, or obese. It measures your weight in relation to your height.  A BMI of 18.5 to 24.9 is in the healthy range. A person with a BMI of 25 to 29.9 is considered overweight, and someone with a BMI of 30 or greater is considered obese. More than two-thirds of American adults are considered overweight or obese.  Being overweight or obese increases the risk for further weight gain. Excess weight may lead to heart disease and diabetes.  Creating and following plans for healthy eating and physical activity may help you improve your health.  Weight control is part of healthy lifestyle and includes exercise, emotional health, and healthy eating habits. Careful eating habits lifelong are the mainstay of weight control. Though there are significant health benefits from weight loss, long-term weight loss with diet alone may be very difficult to achieve- studies show long-term success with dietary management in less than 10% of people. Attaining a healthy weight may be especially difficult to achieve in those with severe obesity. In some cases, medications, devices and surgical management might be considered.  What can you do?  If you are overweight or obese and are interested in methods for weight loss, you should discuss this with your provider.     Consider reducing daily calorie intake by 500 calories.     Keep a food journal.     Avoiding skipping meals, consider cutting portions instead.    Diet combined with exercise helps maintain muscle while optimizing fat loss. Strength training is particularly important for building and maintaining muscle mass. Exercise helps reduce stress, increase energy, and improves fitness.  Increasing exercise without diet control, however, may not burn enough calories to loose weight.       Start walking three days a week 10-20 minutes at a time    Work towards walking thirty minutes five days a week     Eventually, increase the speed of your walking for 1-2 minutes at time    In addition, we recommend that you review healthy lifestyles and methods for weight loss available through the National Institutes of Health patient information sites:  http://win.niddk.nih.gov/publications/index.htm    And look into health and wellness programs that may be available through your health insurance provider, employer, local community center, or nnamdi club.

## 2021-04-26 NOTE — PROGRESS NOTES
Jorge is a 50 year old who is being evaluated via a billable video visit.      How would you like to obtain your AVS? MyChart  If the video visit is dropped, the invitation should be resent by: Text to cell phone: [phone]  Will anyone else be joining your video visit? No      Video Start Time: 12:54 PM     Video-Visit Details    Type of service:  Video Visit    Video End Time:12:54 PM    Originating Location (pt. Location): Home    Distant Location (provider location):  [facility]     Platform used for Video Visit: Luis SAMPSON CMA, [facility], 4/26/2021 12:07 PM        Sleep Consultation:    Date on this visit: 4/26/2021    Jorge Amaral is sent by Vince Swain for a sleep consultation regarding morning headaches, possible obstructive sleep apnea .    Primary Physician: Vince Swain     Chief Complaint   Patient presents with     Consult     Snoring, tired all the time        Jorge goes to bed at 12-1 AM during the week. He gets up at 7-8 AM with an alarm. Jorge denies difficulty falling asleep.  He wakes up 1-2 times a night with difficulty falling back to sleep 1-2 week taking >15-20 minutes. He reports having an overactive brain. He uses a calming xi. This has been a problem for 5 year. On weekends, Jorge goes to bed at 12-2 AM.  He gets up at 9-10 AM on Sunday.    Patient does use electronics in bed and watch TV in bed.     Jorge does snore. Patient does have a regular bed partner. There is not report of gasping, choking and snorting.  He does not have witnessed apneas. They never sleep separately.  Patient sleeps on his back < side and << stomach. He has morning headaches (current 1-2 /week).     He says he has rare episodes (1/month) of tingling or burning associated with urge to move. It occurs  Mostly at (1-2 AM) and is relieved by movement     Jorge denies any sleep walking, sleep talking, dream enactment, sleep paralysis, cataplexy  and hypnogogic/hypnopompic hallucinations.    Jorge has reflux at night (infrequent 1/month), denies difficulty breathing through his nose.      Patient's [place] Sleepiness score 13/24 consistent with excessive  daytime sleepiness.  This has been a problem for 1-2 years.     Jorge naps rarely. He takes no inadvertant naps.  He admits getting sleepy while driving. Patient was counseled on the importance of driving while alert, to pull over if drowsy, or nap before getting into the vehicle if sleepy.  He uses no caffeine currently.       Recent Labs   Lab Test 01/13/21  0815 12/04/19  1026    143   POTASSIUM 4.4 4.3   CHLORIDE 106 106   CO2 34* 32   ANIONGAP <1* 5   GLC 93 101*   BUN 18 17   CR 1.15 1.08   JOSELITO 9.1 8.8     CBC RESULTS:   Recent Labs   Lab Test 10/31/18  0946   HGB 14.9       Allergies:    No Known Allergies    Medications:    Current Outpatient Medications   Medication Sig Dispense Refill     adalimumab (HUMIRA) 40 MG/0.8ML injection Inject 40 mg Subcutaneous once.       diphenoxylate-atropine (LOMOTIL) 2.5-0.025 MG per tablet Take 2 tablets by mouth       hydrocortisone 2.5 % cream Apply twice daily for 1 week behind ear, then start vaseline twice daily 30 g 0     mercaptopurine (PURINETHOL) 50 MG tablet Take 50 mg by mouth daily. 1 and half tabs daily       rivaroxaban ANTICOAGULANT (XARELTO ANTICOAGULANT) 20 MG TABS tablet Take 1 tablet (20 mg) by mouth daily (with dinner) 30 tablet 11     tacrolimus (PROTOPIC) 0.1 % external ointment Apply twice daily as needed 60 g 3     valACYclovir (VALTREX) 1000 mg tablet two tablets by mouth twice daily for 1 day at onset of symptoms of a cold sore. 30 tablet 0       Problem List:  Patient Active Problem List    Diagnosis Date Noted     Crohn's disease with complication, unspecified gastrointestinal tract location (H) 08/11/2017     Priority: High     History of pulmonary embolism 08/21/2020     Priority: Medium     ED 8/15/20 CT - Acute pulmonary  emboli involving 2 small segmental and subsegmental right middle lobe and lower lobe pulmonary arteries.          BMI 28.0-28.9,adult 12/04/2019     Priority: Low     FH: prostate cancer 08/11/2017     Priority: Low     Common wart 10/14/2014     Priority: Low     Cold sore 08/14/2014     Priority: Low     Family history of diabetes mellitus 12/16/2011     Priority: Low     CARDIOVASCULAR SCREENING; LDL GOAL LESS THAN 160 10/31/2010     Priority: Low        Past Medical/Surgical History:  Past Medical History:   Diagnosis Date     ACL (anterior cruciate ligament) tear--recurrent 01/03/2013     Crohn's disease (H)      Delayed union of fracture 12/01/2011     Fracture, metacarpal 08/03/2011     Large bowel perforation (H) 08/16/2010     Nondisplaced fracture of navicular (scaphoid) of left foot, subsequent encounter for fracture with delayed healing 07/03/2019     Sepsis (H) 01/19/2018    He had an incomplete (due to stricture) colonoscopy day of presentation and after that developed acute abdominal pain and fever. He was septic and suspected to have a micro perforation. Imaging negative for larger perforation     Past Surgical History:   Procedure Laterality Date     APPENDECTOMY  1986     ARTHROSCOPY KNEE WITH MEDIAL MENISCECTOMY Left 01/14/2019    Procedure: LEFT KNEE ARTHROSCOPY WITH PARTIAL MEDIAL MENISCECTOMY;  Surgeon: Joleen Jama MD;  Location: [facility] OR     COLONOSCOPY  05/14/2020     ENT SURGERY  06/28/2013    Left Tonsil biopsy     EXCISE MASS WRIST Right 11/07/2018    Procedure: EXCISION OF RIGHT DORSAL WRIST MASS;  Surgeon: Zohaib Edmond MD;  Location: [facility] OR     LAPAROSCOPY PROCEDURE UNLISTED  08/16/2010    Exploratory laparotomy, lysis of adhesions, takedown enterocolonic fistula and colocononic fistula, with creation of loop ileostomy     OPEN REDUCTION INTERNAL FIXATION HAND  07/25/2011    right IV MCP hand 7/11     REMOVE HARDWARE LOWER EXTREMITY  04/06/2012    Procedure:REMOVE HARDWARE  LOWER EXTREMITY; REMOVAL OF RIGHT TIBIAL SCREW; Surgeon:JOSE LUIS CARMICHAEL; Location: [facility]     SMALL BOWEL RESECTION  1986    terminal ileum resection at age 16     TAKEDOWN COLOSTOMY  12/27/2011       Social History:  Social History     Socioeconomic History     Marital status:      Spouse name: Not on file     Number of children: Not on file     Years of education: Not on file     Highest education level: Not on file   Occupational History     Occupation:      Employer: SELF   Social Needs     Financial resource strain: Not on file     Food insecurity     Worry: Not on file     Inability: Not on file     Transportation needs     Medical: Not on file     Non-medical: Not on file   Tobacco Use     Smoking status: Never Smoker     Smokeless tobacco: Never Used     Tobacco comment: very rarely cigar - every 3 years or so    Substance and Sexual Activity     Alcohol use: Yes     Frequency: Monthly or less     Comment: social     Drug use: No     Sexual activity: Yes     Partners: Female   Lifestyle     Physical activity     Days per week: Not on file     Minutes per session: Not on file     Stress: Not on file   Relationships     Social connections     Talks on phone: Not on file     Gets together: Not on file     Attends Religion service: Not on file     Active member of club or organization: Not on file     Attends meetings of clubs or organizations: Not on file     Relationship status: Not on file     Intimate partner violence     Fear of current or ex partner: Not on file     Emotionally abused: Not on file     Physically abused: Not on file     Forced sexual activity: Not on file   Other Topics Concern     Parent/sibling w/ CABG, MI or angioplasty before 65F 55M? Not Asked   Social History Narrative     Not on file       Family History:  Family History   Problem Relation Age of Onset     Hypertension Mother      Respiratory Mother         SMOKER -EMPYSEMA     Hypertension Father       "Diabetes Father      Prostate Cancer Father 50     Prostate Cancer Paternal Grandfather      Diabetes Brother      Prostate Cancer Paternal Uncle 50       Review of Systems:  A complete review of systems reviewed by me is negative with the exeption of what has been mentioned in the history of present illness.  CONSTITUTIONAL:  POSITIVE for rare night sweats  EYES:  NEGATIVE for double vision  ENT:  POSITIVE for  sore throat at times in AM  CARDIAC:  NEGATIVE for  fast heart beats and fluttering in chest  NEUROLOGIC:  NEGATIVE for  weakness or numbness in the arms or legs  DERMATOLOGIC:  NEGATIVE for rashes.   PULMONARY:  POSITIVE for  SOB with activity at times  GASTROINTESTINAL:  POSITIVE for loose or watery stools 5/day modest volumes  GENITOURINARY:  NEGATIVE for  urinating more frequently than usual  MUSCULOSKELETAL:  POSITIVE for  bone or joint pain  ENDOCRINE:  NEGATIVE for  Diabetes   LYMPHATIC:  NEGATIVE for  swollen lymph nodes    Physical Examination:  Vitals: Ht 1.892 m (6' 2.5\")   Wt 100.7 kg (222 lb)   BMI 28.12 kg/m    BMI= Body mass index is 28.12 kg/m .    [place] Total Score 4/26/2021   Total score - [place] 13       [place]ON[place] Total Score: 14 (04/26/21 1200)    SpO2 Readings from Last 4 Encounters:   01/22/21 97%   08/21/20 97%   04/08/20 98%   03/06/20 96%       GENERAL APPEARANCE: alert and no distress  EYES: Eyes grossly normal to inspection  HENT: mouth without ulcers or lesions  NECK: 16 generous size  LUNGS: no shortness of breath, cough  NEURO: mentation intact, speech normal and cranial nerves 2-12 appear intact  PSYCH: affect normal/bright  Mallampati Class: 2       Impression/Plan:    Snoring, excessive daytime sleepiness (ESS 13), morning headaches, sleep maintenance difficulties, ?large neck.   Elevated bicarbonate, low anion-gap. No comorbidities  - Recommend Polysomnogram (using 4% desaturation/Medicare/ AASM 1B scoring rules) with Transcutaneous CO2 Monitoring (TCM) for modest probability " obstructive sleep apnea, possible hypoventilation. Ambien if needed  - Check VBG, Total protein, ESR    Sleep hygiene discussed    Restless leg syndrome   Mild symptoms   - No interventions    He will follow up with me in approximately two weeks after his sleep study has been competed to review the results and discuss plan of care.       Polysomnography reviewed.  Obstructive sleep apnea reviewed.  Complications of untreated sleep apnea were reviewed.    I spent 45 minutes with patient including counseling, and 5 minutes with chart review, and documentation     CC: Vince Swain

## 2021-04-29 ENCOUNTER — OFFICE VISIT (OUTPATIENT)
Dept: URGENT CARE | Facility: URGENT CARE | Age: 51
End: 2021-04-29
Payer: COMMERCIAL

## 2021-04-29 VITALS
HEART RATE: 89 BPM | DIASTOLIC BLOOD PRESSURE: 85 MMHG | TEMPERATURE: 97.7 F | OXYGEN SATURATION: 96 % | SYSTOLIC BLOOD PRESSURE: 142 MMHG

## 2021-04-29 DIAGNOSIS — S61.432A PUNCTURE WOUND OF LEFT HAND WITHOUT FOREIGN BODY, INITIAL ENCOUNTER: Primary | ICD-10-CM

## 2021-04-29 PROCEDURE — 99214 OFFICE O/P EST MOD 30 MIN: CPT | Performed by: FAMILY MEDICINE

## 2021-04-29 RX ORDER — CEPHALEXIN 500 MG/1
500 CAPSULE ORAL 3 TIMES DAILY
Qty: 21 CAPSULE | Refills: 0 | Status: SHIPPED | OUTPATIENT
Start: 2021-04-29 | End: 2021-05-06

## 2021-04-29 NOTE — PROGRESS NOTES
SUBJECTIVE:  HPI: Jorge Amaral is a 50 year old male who presents to the clinic with a laceration on the  Left palm       Last tetanus booster within 10 years: yes     Was doing some work  Slipped and jammed the tool into the palm of his left hand   Patient thinks went in about 1/4 inch deep  Declined xrays   Denies foreign body or any concern for      No Known Allergies    Past Medical History:   Diagnosis Date     ACL (anterior cruciate ligament) tear--recurrent 01/03/2013     Crohn's disease (H)      Delayed union of fracture 12/01/2011     Fracture, metacarpal 08/03/2011     Large bowel perforation (H) 08/16/2010     Nondisplaced fracture of navicular (scaphoid) of left foot, subsequent encounter for fracture with delayed healing 07/03/2019     Sepsis (H) 01/19/2018    He had an incomplete (due to stricture) colonoscopy day of presentation and after that developed acute abdominal pain and fever. He was septic and suspected to have a micro perforation. Imaging negative for larger perforation       hydrocortisone 2.5 % cream, Apply twice daily for 1 week behind ear, then start vaseline twice daily  mercaptopurine (PURINETHOL) 50 MG tablet, Take 50 mg by mouth daily. 1 and half tabs daily  rivaroxaban ANTICOAGULANT (XARELTO ANTICOAGULANT) 20 MG TABS tablet, Take 1 tablet (20 mg) by mouth daily (with dinner)  sodium chloride 0.9 % SOLN 250 mL with ustekinumab 130 MG/26ML SOLN infusion, Inject into the vein once  tacrolimus (PROTOPIC) 0.1 % external ointment, Apply twice daily as needed  adalimumab (HUMIRA) 40 MG/0.8ML injection, Inject 40 mg Subcutaneous once.  diphenoxylate-atropine (LOMOTIL) 2.5-0.025 MG per tablet, Take 2 tablets by mouth  valACYclovir (VALTREX) 1000 mg tablet, two tablets by mouth twice daily for 1 day at onset of symptoms of a cold sore. (Patient not taking: Reported on 4/29/2021)  zolpidem (AMBIEN) 5 MG tablet, Take tablet by mouth 15 minutes prior to sleep, for Sleep Study (Patient not  taking: Reported on 4/29/2021)    No current facility-administered medications on file prior to visit.         ROS:  GEN: no fever or chills  CARDIAC: no chest pain or shortness of breath  SKIN: as above  Musculoskeletal: as above      OBJECTIVE:  Blood pressure (!) 142/85, pulse 89, temperature 97.7  F (36.5  C), temperature source Tympanic, SpO2 96 %.     The patient appears today in no acute distress.  Laceration LOCATION: left palm   Size of laceration: 1 centimeters  Characteristics of the laceration: bleeding- mild extends to subcutaneous   Tendon function, sensation intact. No weakness. Good pulses. Good range of motion  Cap refill intact.  Wound clean. No Foreign body noted.     Assessment:    ICD-10-CM    1. Puncture wound of left hand without foreign body, initial encounter  S61.432A cephALEXin (KEFLEX) 500 MG capsule     Orthopedic & Spine  Referral     acetaminophen-codeine (TYLENOL #3) 300-30 MG tablet       PLAN:  Prescribed with keflex for prophylaxis   Patient on blood thinner was advised to avoid nsaids/ limited supply tylenol #3 prescribed.  Warned sedating and habit forming  Sedating medications given. Aware not to drive or operate machinery while on these medications. Caution with .   Do not take with other sedating meds    Because was more of a puncture wound than true lacerationi - risk of infection is high. We discussed suture vs leaving open to heal by secondary intention, patient chose the latter. Risks vs benefits of either one was discussed. He states it was truly dirty instrument he used     ansesthesia with lidocaine with epigastric   Copious irrigation with normal saline done.  Wound cleaned with saline and hibiclens. No FBs.    Bacitracin dressing.  Patient tolerated procedure well.    See AVS  Advised to watch for signs or symptoms of infection. If with any concerns of infection advised to come in immediately to be reassessed. Routine wound care discussed.    Referred to orthopedics for follow up of hand injury. Patient has no concerns at this time.   Geena Jamil MD

## 2021-04-29 NOTE — PATIENT INSTRUCTIONS
Patient Education     Puncture Wound    A puncture wound occurs when a pointed object pushes into the skin. It may go into the tissues below the skin, including fat and muscle. This type of wound is narrow and deep and can be hard to clean. Because of this, puncture wounds are at high risk for becoming infected.  X-rays may be done to check the wound for objects stuck under the skin. Your may also need a tetanus shot. This is given if you are not up-to-date on this vaccination and the object that caused the wound may lead to tetanus.  Home care    Your healthcare provider may prescribe an antibiotic. This is to help prevent infection. Follow all instructions for taking this medicine. Take the medicine every day until it is gone or you are told to stop. You should not have any left over.    The healthcare provider may prescribe medicines for pain. Follow instructions for taking them.    You can take acetaminophen or ibuprofen for pain, unless you were given a different pain medicine to use.     Follow the healthcare provider s instructions on how to care for the wound.    Keep the wound clean and dry. Don't get the wound wet until you are told it is OK to do so. If the area gets wet, gently pat it dry with a clean cloth. Replace the wet bandage with a dry one.    If a bandage was applied and it becomes wet or dirty, replace it. Otherwise, leave it in place for the first 24 hours.    Once you can get the wound wet, you may shower as usual but don't soak the wound in water (no tub baths or swimming)    Even with proper treatment, a puncture wound may become infected. Check the wound daily for signs of infection listed below.  Follow-up care  Follow up with your healthcare provider, or as advised.   When to seek medical advice  Call your healthcare provider right away if any of these occur:    Signs of infection, including:  ? Increasing redness or swelling around the wound  ? Increased warmth of the  wound  ? Worsening pain  ? Red streaking lines away from the wound  ? Draining pus    Fever of 100.4 F (38. C) or higher, or as directed by your healthcare provider    Wound changes colors    Numbness around the wound    Decreased movement around the injured area  Traci last reviewed this educational content on 3/1/2018    9337-8102 [employer]. All rights reserved. This information is not intended as a substitute for professional medical care. Always follow your healthcare professional's instructions.

## 2021-05-06 ENCOUNTER — OFFICE VISIT (OUTPATIENT)
Dept: ORTHOPEDICS | Facility: CLINIC | Age: 51
End: 2021-05-06
Payer: COMMERCIAL

## 2021-05-06 VITALS — HEART RATE: 74 BPM | DIASTOLIC BLOOD PRESSURE: 74 MMHG | OXYGEN SATURATION: 100 % | SYSTOLIC BLOOD PRESSURE: 122 MMHG

## 2021-05-06 DIAGNOSIS — S61.432A PUNCTURE WOUND OF LEFT HAND WITHOUT FOREIGN BODY, INITIAL ENCOUNTER: ICD-10-CM

## 2021-05-06 PROCEDURE — 99213 OFFICE O/P EST LOW 20 MIN: CPT | Performed by: ORTHOPAEDIC SURGERY

## 2021-05-06 ASSESSMENT — PAIN SCALES - GENERAL: PAINLEVEL: MILD PAIN (3)

## 2021-05-06 NOTE — LETTER
5/6/2021         RE: Jorge Amaral  [address] [facility] [address]        Dear Colleague,    Thank you for referring your patient, Jorge Amaral, to the [facility]. Please see a copy of my visit note below.    HISTORY OF PRESENT ILLNESS:    Jorge Amaral is a 50 year old male who was referred by Dr. Jamil for evaluation of puncture wound of the left hand,   Present symptoms: He accidentally jabbed a screwdriver into his palm about a week ago.  He was seen in urgent care  Treatments tried to this point: The wound was washed out and he was placed on antibiotics.  He has about 3 days of antibiotics left.    Other PMH:   Past Medical History:   Diagnosis Date     ACL (anterior cruciate ligament) tear--recurrent 01/03/2013     Crohn's disease (H)      Delayed union of fracture 12/01/2011     Fracture, metacarpal 08/03/2011     Large bowel perforation (H) 08/16/2010     Nondisplaced fracture of navicular (scaphoid) of left foot, subsequent encounter for fracture with delayed healing 07/03/2019     Puncture wound     L hand DOI 4/29/21     Sepsis (H) 01/19/2018    He had an incomplete (due to stricture) colonoscopy day of presentation and after that developed acute abdominal pain and fever. He was septic and suspected to have a micro perforation. Imaging negative for larger perforation     Surgical Hx:   Past Surgical History:   Procedure Laterality Date     APPENDECTOMY  1986     ARTHROSCOPY KNEE WITH MEDIAL MENISCECTOMY Left 01/14/2019    Procedure: LEFT KNEE ARTHROSCOPY WITH PARTIAL MEDIAL MENISCECTOMY;  Surgeon: Joleen Jama MD;  Location:  OR     COLONOSCOPY  05/14/2020     ENT SURGERY  06/28/2013    Left Tonsil biopsy     EXCISE MASS WRIST Right 11/07/2018    Procedure: EXCISION OF RIGHT DORSAL WRIST MASS;  Surgeon: Zohaib Edmond MD;  Location: [facility] OR     LAPAROSCOPY PROCEDURE UNLISTED  08/16/2010    Exploratory laparotomy, lysis of adhesions, takedown  enterocolonic fistula and colocononic fistula, with creation of loop ileostomy     OPEN REDUCTION INTERNAL FIXATION HAND  07/25/2011    right IV MCP hand 7/11     REMOVE HARDWARE LOWER EXTREMITY  04/06/2012    Procedure:REMOVE HARDWARE LOWER EXTREMITY; REMOVAL OF RIGHT TIBIAL SCREW; Surgeon:JOSE LUIS CARMICHAEL; Location:[facility]     SMALL BOWEL RESECTION  1986    terminal ileum resection at age 16     TAKEDOWN COLOSTOMY  12/27/2011      Family Hx: See EMR  Social Hx: See EMR    REVIEW OF SYSTEMS:   CONSTITUTIONAL:  NEGATIVE for fever, chills, change in weight  INTEGUMENTARY/SKIN:  NEGATIVE for worrisome rashes, moles or lesions  EYES:  NEGATIVE for vision changes or irritation  ENT/MOUTH:  NEGATIVE for ear, mouth and throat problems  RESP:  NEGATIVE for significant cough or SOB  BREAST:  NEGATIVE for masses, tenderness or discharge  CV:  NEGATIVE for chest pain, palpitations or peripheral edema  GI:  NEGATIVE for nausea, abdominal pain, heartburn, or change in bowel habits  :  Negative   MUSCULOSKELETAL:  See HPI above  NEURO:  NEGATIVE for weakness, dizziness or paresthesias  ENDOCRINE:  NEGATIVE for temperature intolerance, skin/hair changes  HEME/ALLERGY/IMMUNE:  NEGATIVE for bleeding problems  PSYCHIATRIC:  NEGATIVE for changes in mood or affect      PHYSICAL EXAM:  /74 (BP Location: Right arm, Patient Position: Sitting, Cuff Size: Adult Regular)   Pulse 74   SpO2 100%   There is no height or weight on file to calculate BMI.   GENERAL APPEARANCE: healthy, alert and no distress   SKIN: no suspicious lesions or rashes  NEURO: Normal strength and tone, mentation intact and speech normal  VASCULAR:  good pulses, and capillary refill   LYMPH: no lymphadenopathy   PSYCH:  mentation appears normal and affect normal/bright    MSK:  Puncture wound looks clean.  There is still little moistness at the base of the wound.  Flexor tendons are functioning.  Neurovascularly intact.  No evidence of infection.    Imaging:  None     Impression: Left palm puncture wound, healing well.    Plan: Continue antibiotics until finished.  Keep the wound covered until fully healed.  Use vitamin E on the wound once it is healed as needed.    Return to clinic MATA Edmond MD  Dept. Orthopedic Surgery  [facility]         Again, thank you for allowing me to participate in the care of your patient.        Sincerely,        Zohaib Edmond MD

## 2021-05-06 NOTE — PROGRESS NOTES
HISTORY OF PRESENT ILLNESS:    Jorge Amaral is a 50 year old male who was referred by Dr. Jamil for evaluation of puncture wound of the left hand,   Present symptoms: He accidentally jabbed a screwdriver into his palm about a week ago.  He was seen in urgent care  Treatments tried to this point: The wound was washed out and he was placed on antibiotics.  He has about 3 days of antibiotics left.    Other PMH:   Past Medical History:   Diagnosis Date     ACL (anterior cruciate ligament) tear--recurrent 01/03/2013     Crohn's disease (H)      Delayed union of fracture 12/01/2011     Fracture, metacarpal 08/03/2011     Large bowel perforation (H) 08/16/2010     Nondisplaced fracture of navicular (scaphoid) of left foot, subsequent encounter for fracture with delayed healing 07/03/2019     Puncture wound     L hand DOI 4/29/21     Sepsis (H) 01/19/2018    He had an incomplete (due to stricture) colonoscopy day of presentation and after that developed acute abdominal pain and fever. He was septic and suspected to have a micro perforation. Imaging negative for larger perforation     Surgical Hx:   Past Surgical History:   Procedure Laterality Date     APPENDECTOMY  1986     ARTHROSCOPY KNEE WITH MEDIAL MENISCECTOMY Left 01/14/2019    Procedure: LEFT KNEE ARTHROSCOPY WITH PARTIAL MEDIAL MENISCECTOMY;  Surgeon: Joleen Jama MD;  Location:  OR     COLONOSCOPY  05/14/2020     ENT SURGERY  06/28/2013    Left Tonsil biopsy     EXCISE MASS WRIST Right 11/07/2018    Procedure: EXCISION OF RIGHT DORSAL WRIST MASS;  Surgeon: Zohaib Edmond MD;  Location: [facility] OR     LAPAROSCOPY PROCEDURE UNLISTED  08/16/2010    Exploratory laparotomy, lysis of adhesions, takedown enterocolonic fistula and colocononic fistula, with creation of loop ileostomy     OPEN REDUCTION INTERNAL FIXATION HAND  07/25/2011    right IV MCP hand 7/11     REMOVE HARDWARE LOWER EXTREMITY  04/06/2012    Procedure:REMOVE HARDWARE LOWER EXTREMITY;  REMOVAL OF RIGHT TIBIAL SCREW; Surgeon:JOSE LUIS CARMICHAEL; Location:[facility]     SMALL BOWEL RESECTION  1986    terminal ileum resection at age 16     TAKEDOWN COLOSTOMY  12/27/2011      Family Hx: See EMR  Social Hx: See EMR    REVIEW OF SYSTEMS:   CONSTITUTIONAL:  NEGATIVE for fever, chills, change in weight  INTEGUMENTARY/SKIN:  NEGATIVE for worrisome rashes, moles or lesions  EYES:  NEGATIVE for vision changes or irritation  ENT/MOUTH:  NEGATIVE for ear, mouth and throat problems  RESP:  NEGATIVE for significant cough or SOB  BREAST:  NEGATIVE for masses, tenderness or discharge  CV:  NEGATIVE for chest pain, palpitations or peripheral edema  GI:  NEGATIVE for nausea, abdominal pain, heartburn, or change in bowel habits  :  Negative   MUSCULOSKELETAL:  See HPI above  NEURO:  NEGATIVE for weakness, dizziness or paresthesias  ENDOCRINE:  NEGATIVE for temperature intolerance, skin/hair changes  HEME/ALLERGY/IMMUNE:  NEGATIVE for bleeding problems  PSYCHIATRIC:  NEGATIVE for changes in mood or affect      PHYSICAL EXAM:  /74 (BP Location: Right arm, Patient Position: Sitting, Cuff Size: Adult Regular)   Pulse 74   SpO2 100%   There is no height or weight on file to calculate BMI.   GENERAL APPEARANCE: healthy, alert and no distress   SKIN: no suspicious lesions or rashes  NEURO: Normal strength and tone, mentation intact and speech normal  VASCULAR:  good pulses, and capillary refill   LYMPH: no lymphadenopathy   PSYCH:  mentation appears normal and affect normal/bright    MSK:  Puncture wound looks clean.  There is still little moistness at the base of the wound.  Flexor tendons are functioning.  Neurovascularly intact.  No evidence of infection.    Imaging: None     Impression: Left palm puncture wound, healing well.    Plan: Continue antibiotics until finished.  Keep the wound covered until fully healed.  Use vitamin E on the wound once it is healed as needed.    Return to clinic MELISSAN    EMILY Edmond  MD  Dept. Orthopedic Surgery  [facility]

## 2021-05-07 ENCOUNTER — IMMUNIZATION (OUTPATIENT)
Dept: PEDIATRICS | Facility: CLINIC | Age: 51
End: 2021-05-07
Attending: INTERNAL MEDICINE
Payer: COMMERCIAL

## 2021-05-07 PROCEDURE — 91300 PR COVID VAC PFIZER DIL RECON 30 MCG/0.3 ML IM: CPT

## 2021-05-07 PROCEDURE — 0002A PR COVID VAC PFIZER DIL RECON 30 MCG/0.3 ML IM: CPT

## 2021-05-10 ENCOUNTER — THERAPY VISIT (OUTPATIENT)
Dept: PHYSICAL THERAPY | Facility: CLINIC | Age: 51
End: 2021-05-10
Payer: COMMERCIAL

## 2021-05-10 DIAGNOSIS — M25.562 LEFT KNEE PAIN: ICD-10-CM

## 2021-05-10 PROCEDURE — 97161 PT EVAL LOW COMPLEX 20 MIN: CPT | Mod: GP | Performed by: PHYSICAL THERAPIST

## 2021-05-10 PROCEDURE — 97110 THERAPEUTIC EXERCISES: CPT | Mod: GP | Performed by: PHYSICAL THERAPIST

## 2021-05-10 ASSESSMENT — ACTIVITIES OF DAILY LIVING (ADL)
STAND: ACTIVITY IS SOMEWHAT DIFFICULT
SQUAT: ACTIVITY IS SOMEWHAT DIFFICULT
HOW_WOULD_YOU_RATE_THE_CURRENT_FUNCTION_OF_YOUR_KNEE_DURING_YOUR_USUAL_DAILY_ACTIVITIES_ON_A_SCALE_FROM_0_TO_100_WITH_100_BEING_YOUR_LEVEL_OF_KNEE_FUNCTION_PRIOR_TO_YOUR_INJURY_AND_0_BEING_THE_INABILITY_TO_PERFORM_ANY_OF_YOUR_USUAL_DAILY_ACTIVITIES?: 40
HOW_WOULD_YOU_RATE_THE_OVERALL_FUNCTION_OF_YOUR_KNEE_DURING_YOUR_USUAL_DAILY_ACTIVITIES?: ABNORMAL
KNEE_ACTIVITY_OF_DAILY_LIVING_SCORE: 60
KNEE_ACTIVITY_OF_DAILY_LIVING_SUM: 42
KNEEL ON THE FRONT OF YOUR KNEE: ACTIVITY IS SOMEWHAT DIFFICULT
GO DOWN STAIRS: ACTIVITY IS MINIMALLY DIFFICULT
SIT WITH YOUR KNEE BENT: ACTIVITY IS NOT DIFFICULT
GO UP STAIRS: ACTIVITY IS SOMEWHAT DIFFICULT
WALK: ACTIVITY IS MINIMALLY DIFFICULT
AS_A_RESULT_OF_YOUR_KNEE_INJURY,_HOW_WOULD_YOU_RATE_YOUR_CURRENT_LEVEL_OF_DAILY_ACTIVITY?: ABNORMAL
WEAKNESS: THE SYMPTOM AFFECTS MY ACTIVITY MODERATELY
LIMPING: THE SYMPTOM AFFECTS MY ACTIVITY SLIGHTLY
GIVING WAY, BUCKLING OR SHIFTING OF KNEE: THE SYMPTOM AFFECTS MY ACTIVITY SEVERELY
PAIN: THE SYMPTOM AFFECTS MY ACTIVITY SEVERELY
RAW_SCORE: 42
SWELLING: THE SYMPTOM AFFECTS MY ACTIVITY SLIGHTLY
RISE FROM A CHAIR: ACTIVITY IS MINIMALLY DIFFICULT
STIFFNESS: THE SYMPTOM AFFECTS MY ACTIVITY SLIGHTLY

## 2021-05-10 NOTE — LETTER
[facility]  [address]  [place] [address]  [phone]    May 11, 2021    Re: Jorge Amaral   : 1970  MRN: [id number]   REFERRING PHYSICIAN:   Joleen ROQUE [facility]    Date of Initial Evaluation:  5/10/2021  Visits:  Rxs Used: 1  Reason for Referral:  Left knee pain      Physical Therapy Initial Evaluation    Patient Health History  Jorge Amaral being seen for L knee pain.   Problem began: 2020.   Problem occurred: mixed martial arts injury   Pain is reported as 2/10 on pain scale.  General health as reported by patient is good.  Pertinent medical history includes: none.   Red flags:  None as reported by patient.  Medical allergies: none.   Surgeries include:  Other and orthopedic surgery. Other surgery history details: R ACLR, abdominal.    Current occupation is .   Primary job tasks include:  Lifting/carrying, prolonged standing and repetitive tasks.                KNEE:    PROM:   L  R   Hyperextension 2 2   Extension 0 0   Flexion 140 140     Strength:   L R   HIP     Flex 5/5 5/5   Ext 4/5 4/5   Abd 4/5 4/5   KNEE     Flex 5/5 5/5   Ext 5/5 5/5       Jorge Amaral   : 1970- page 2    Hip PROM:     L R   IR wnl wnl   ER wnl wnl   Mayuri's nt nt   Sherman nt nt       Special tests:   L R   Anterior Drawer     Posterior Drawer     Lachman's neg neg   Valgus 0 degrees  relieving neg   Valgus 30 degrees     Varus 0 degrees     Varus 30 degrees     Jose's     Appley's     Lateral Compression     Patellar Compression neg neg     Palpation: TTP medial L knee joint line  Patellar tracking: normal  Functional: painful walking and stairs  Gait: slight antalgia with B genu varum and L tibia vara    Assessment/Plan:    Patient is a 50 year old male with left side knee complaints.    Patient has the following significant findings with corresponding treatment plan.                 Diagnosis 1:  L knee pain  Pain -  hot/cold therapy, US, electric stimulation, manual therapy, splint/taping/bracing/orthotics, education and home program  Decreased strength - therapeutic exercise, therapeutic activities and home program  Impaired gait - gait training and home program    Cumulative Therapy Evaluation is: Low complexity.  Previous and current functional limitations:  (See Goal Flow Sheet for this information)    Short term and Long term goals: (See Goal Flow Sheet for this information)   Communication ability:  Patient appears to be able to clearly communicate and understand verbal and written communication and follow directions correctly.  Treatment Explanation - The following has been discussed with the patient:   RX ordered/plan of care  Anticipated outcomes  Possible risks and side effects  This patient would benefit from PT intervention to resume normal activities.   Rehab potential is good.  Frequency:  1 X week, once daily  Duration:  for 8 weeks  Discharge Plan:  Achieve all LTG.  Jorge Amaral   : 1970- page 3      Independent in home treatment program.  Reach maximal therapeutic benefit.      Thank you for your referral.    INQUIRIES  Therapist: Curry Webb DPT  [facility] [place] [facility]  [address]  [place] [address]  Phone: [phone]  Fax: [phone]

## 2021-05-10 NOTE — PROGRESS NOTES
Physical Therapy Initial Evaluation  Subjective:    Patient Health History  Jorge Amaral being seen for L knee pain.     Problem began: 8/12/2020.   Problem occurred: mixed martial arts injury   Pain is reported as 2/10 on pain scale.  General health as reported by patient is good.  Pertinent medical history includes: none.   Red flags:  None as reported by patient.  Medical allergies: none.   Surgeries include:  Other and orthopedic surgery. Other surgery history details: R ACLR, abdominal.        Current occupation is .   Primary job tasks include:  Lifting/carrying, prolonged standing and repetitive tasks.                                    Objective:        KNEE:    PROM:   L  R   Hyperextension 2 2   Extension 0 0   Flexion 140 140     Strength:   L R   HIP     Flex 5/5 5/5   Ext 4/5 4/5   Abd 4/5 4/5   KNEE     Flex 5/5 5/5   Ext 5/5 5/5     Hip PROM:     L R   IR wnl wnl   ER wnl wnl   Mayuri's nt nt   Sherman nt nt       Special tests:   L R   Anterior Drawer     Posterior Drawer     Lachman's neg neg   Valgus 0 degrees  relieving neg   Valgus 30 degrees     Varus 0 degrees     Varus 30 degrees     Jose's     Appley's     Lateral Compression     Patellar Compression neg neg       Palpation: TTP medial L knee joint line    Patellar tracking: normal    Functional: painful walking and stairs    Gait: slight antalgia with B genu varum and L tibia vara            System    Physical Exam    General     ROS    Assessment/Plan:    Patient is a 50 year old male with left side knee complaints.    Patient has the following significant findings with corresponding treatment plan.                Diagnosis 1:  L knee pain  Pain -  hot/cold therapy, US, electric stimulation, manual therapy, splint/taping/bracing/orthotics, education and home program  Decreased strength - therapeutic exercise, therapeutic activities and home program  Impaired gait - gait training and home program    Cumulative  Therapy Evaluation is: Low complexity.    Previous and current functional limitations:  (See Goal Flow Sheet for this information)    Short term and Long term goals: (See Goal Flow Sheet for this information)     Communication ability:  Patient appears to be able to clearly communicate and understand verbal and written communication and follow directions correctly.  Treatment Explanation - The following has been discussed with the patient:   RX ordered/plan of care  Anticipated outcomes  Possible risks and side effects  This patient would benefit from PT intervention to resume normal activities.   Rehab potential is good.    Frequency:  1 X week, once daily  Duration:  for 8 weeks  Discharge Plan:  Achieve all LTG.  Independent in home treatment program.  Reach maximal therapeutic benefit.    Please refer to the daily flowsheet for treatment today, total treatment time and time spent performing 1:1 timed codes.

## 2021-05-17 ENCOUNTER — THERAPY VISIT (OUTPATIENT)
Dept: PHYSICAL THERAPY | Facility: CLINIC | Age: 51
End: 2021-05-17
Payer: COMMERCIAL

## 2021-05-17 DIAGNOSIS — M25.562 LEFT KNEE PAIN: ICD-10-CM

## 2021-05-17 PROCEDURE — 97112 NEUROMUSCULAR REEDUCATION: CPT | Mod: GP | Performed by: PHYSICAL THERAPIST

## 2021-05-17 PROCEDURE — 97110 THERAPEUTIC EXERCISES: CPT | Mod: GP | Performed by: PHYSICAL THERAPIST

## 2021-05-24 ENCOUNTER — THERAPY VISIT (OUTPATIENT)
Dept: PHYSICAL THERAPY | Facility: CLINIC | Age: 51
End: 2021-05-24
Payer: COMMERCIAL

## 2021-05-24 DIAGNOSIS — M25.562 LEFT KNEE PAIN: ICD-10-CM

## 2021-05-24 PROCEDURE — 97010 HOT OR COLD PACKS THERAPY: CPT | Mod: GP | Performed by: PHYSICAL THERAPIST

## 2021-05-24 PROCEDURE — 97110 THERAPEUTIC EXERCISES: CPT | Mod: GP | Performed by: PHYSICAL THERAPIST

## 2021-05-24 PROCEDURE — 97112 NEUROMUSCULAR REEDUCATION: CPT | Mod: GP | Performed by: PHYSICAL THERAPIST

## 2021-05-25 ENCOUNTER — RECORDS - HEALTHEAST (OUTPATIENT)
Dept: ADMINISTRATIVE | Facility: CLINIC | Age: 51
End: 2021-05-25

## 2021-06-01 ENCOUNTER — TELEPHONE (OUTPATIENT)
Dept: PHYSICAL THERAPY | Facility: CLINIC | Age: 51
End: 2021-06-01

## 2021-06-03 ENCOUNTER — THERAPY VISIT (OUTPATIENT)
Dept: PHYSICAL THERAPY | Facility: CLINIC | Age: 51
End: 2021-06-03
Payer: COMMERCIAL

## 2021-06-03 DIAGNOSIS — M25.562 LEFT KNEE PAIN: ICD-10-CM

## 2021-06-03 PROCEDURE — 97110 THERAPEUTIC EXERCISES: CPT | Mod: GP | Performed by: PHYSICAL THERAPIST

## 2021-06-03 PROCEDURE — 97530 THERAPEUTIC ACTIVITIES: CPT | Mod: GP | Performed by: PHYSICAL THERAPIST

## 2021-06-03 PROCEDURE — 97112 NEUROMUSCULAR REEDUCATION: CPT | Mod: GP | Performed by: PHYSICAL THERAPIST

## 2021-08-16 ENCOUNTER — ANCILLARY PROCEDURE (OUTPATIENT)
Dept: GENERAL RADIOLOGY | Facility: CLINIC | Age: 51
End: 2021-08-16
Attending: ORTHOPAEDIC SURGERY
Payer: COMMERCIAL

## 2021-08-16 ENCOUNTER — OFFICE VISIT (OUTPATIENT)
Dept: ORTHOPEDICS | Facility: CLINIC | Age: 51
End: 2021-08-16
Payer: COMMERCIAL

## 2021-08-16 VITALS
DIASTOLIC BLOOD PRESSURE: 82 MMHG | SYSTOLIC BLOOD PRESSURE: 122 MMHG | HEIGHT: 74 IN | OXYGEN SATURATION: 97 % | WEIGHT: 220 LBS | HEART RATE: 70 BPM | BODY MASS INDEX: 28.23 KG/M2

## 2021-08-16 DIAGNOSIS — M25.531 RIGHT WRIST PAIN: ICD-10-CM

## 2021-08-16 DIAGNOSIS — M25.531 ACUTE PAIN OF RIGHT WRIST: Primary | ICD-10-CM

## 2021-08-16 DIAGNOSIS — S66.911A WRIST STRAIN, RIGHT, INITIAL ENCOUNTER: ICD-10-CM

## 2021-08-16 PROCEDURE — 99213 OFFICE O/P EST LOW 20 MIN: CPT | Mod: 25 | Performed by: ORTHOPAEDIC SURGERY

## 2021-08-16 PROCEDURE — 73110 X-RAY EXAM OF WRIST: CPT | Mod: RT | Performed by: RADIOLOGY

## 2021-08-16 PROCEDURE — 20605 DRAIN/INJ JOINT/BURSA W/O US: CPT | Mod: RT | Performed by: ORTHOPAEDIC SURGERY

## 2021-08-16 RX ORDER — LIDOCAINE HYDROCHLORIDE 10 MG/ML
0.5 INJECTION, SOLUTION EPIDURAL; INFILTRATION; INTRACAUDAL; PERINEURAL
Status: DISCONTINUED | OUTPATIENT
Start: 2021-08-16 | End: 2021-10-13 | Stop reason: ALTCHOICE

## 2021-08-16 RX ORDER — TRIAMCINOLONE ACETONIDE 40 MG/ML
20 INJECTION, SUSPENSION INTRA-ARTICULAR; INTRAMUSCULAR
Status: DISCONTINUED | OUTPATIENT
Start: 2021-08-16 | End: 2021-10-13 | Stop reason: ALTCHOICE

## 2021-08-16 RX ADMIN — TRIAMCINOLONE ACETONIDE 20 MG: 40 INJECTION, SUSPENSION INTRA-ARTICULAR; INTRAMUSCULAR at 11:09

## 2021-08-16 RX ADMIN — LIDOCAINE HYDROCHLORIDE 0.5 ML: 10 INJECTION, SOLUTION EPIDURAL; INFILTRATION; INTRACAUDAL; PERINEURAL at 11:09

## 2021-08-16 ASSESSMENT — PAIN SCALES - GENERAL: PAINLEVEL: SEVERE PAIN (6)

## 2021-08-16 ASSESSMENT — MIFFLIN-ST. JEOR: SCORE: 1912.66

## 2021-08-16 NOTE — PROGRESS NOTES
Chief Complaint:   Chief Complaint   Patient presents with     Right Wrist - Pain     Consult     right wrist pain         HPI: Jorge Amaral is a 51 year old male , right -hand dominant, who presents for evaluation and management of a right wrist pain, no known injury. Pain has been present for 1.5 months. Since that time, pain has waxed and waned. Thinks he's been more active preparing for martial arts competition. Sharp pain towards the thumb side of the wrist, worse with pressure. Flares during the day, worse at night when sleeping. No numbness and tingling. Treatment has been ice, heat, chiropractor. Maybe tylenol for pain.    He had surgical excision of what was thought to be a ganglion cyst with Dr Edmond 11/7/2018, but was noted to actually be Benign fragments of synovium, bone and fibrous tissue and no obvious cyst. That seemed to help until recently.    He saw a chiropractor and was told a bone was out of place. He's not sure about that.      He reports having moderate pain/discomfort around the wrist site. He denies associated numbness or tingling. He denies any other injuries to his upper extremity.   Symptoms: pain  Location of symptoms: top of wrist.  Pain severity: 6/10  Pain quality: aching and sharp  Frequency of symptoms: are constant  Aggravating factors: pressure, martial arts.  Relieving factors: positions..    Past medical history:  has a past medical history of ACL (anterior cruciate ligament) tear--recurrent (01/03/2013), Crohn's disease (H), Delayed union of fracture (12/01/2011), Fracture, metacarpal (08/03/2011), Large bowel perforation (H) (08/16/2010), Nondisplaced fracture of navicular (scaphoid) of left foot, subsequent encounter for fracture with delayed healing (07/03/2019), Puncture wound, and Sepsis (H) (01/19/2018). He also has no past medical history of Acne, Actinic keratosis, Allergies, Basal cell carcinoma, Bleeding disorder (H), Complication of anesthesia, Degenerative  joint disease, Eczema, Heart disease, Heart valve disorder, Hypertension, Inflammatory arthritis, Kidney disease, Malignant melanoma nos, Pacemaker, Photosensitive contact dermatitis, PONV (postoperative nausea and vomiting), Psoriasis, Skin cancer, Squamous cell carcinoma, Stroke (H), Surgical complications, Type II or unspecified type diabetes mellitus without mention of complication, not stated as uncontrolled, Unspecified asthma(493.90), or Urticaria.   Patient Active Problem List    Diagnosis Date Noted     Left knee pain 05/10/2021     Priority: Medium     History of pulmonary embolism 08/21/2020     Priority: Medium     ED 8/15/20 CT - Acute pulmonary emboli involving 2 small segmental and subsegmental right middle lobe and lower lobe pulmonary arteries.          BMI 28.0-28.9,adult 12/04/2019     Priority: Medium     Crohn's disease with complication, unspecified gastrointestinal tract location (H) 08/11/2017     Priority: Medium     FH: prostate cancer 08/11/2017     Priority: Medium     Common wart 10/14/2014     Priority: Medium     Cold sore 08/14/2014     Priority: Medium     Family history of diabetes mellitus 12/16/2011     Priority: Medium     CARDIOVASCULAR SCREENING; LDL GOAL LESS THAN 160 10/31/2010     Priority: Medium       Past surgical history:  has a past surgical history that includes appendectomy (1986); Open reduction internal fixation hand (07/25/2011); Remove hardware lower extremity (04/06/2012); ENT surgery (06/28/2013); Excise mass wrist (Right, 11/07/2018); laparoscopy procedure unlisted (08/16/2010); Arthroscopy knee with medial meniscectomy (Left, 01/14/2019); colonoscopy (05/14/2020); Takedown colostomy (12/27/2011); and small bowel resection (1986).     Medications:    Current Outpatient Medications   Medication Sig Dispense Refill     adalimumab (HUMIRA) 40 MG/0.8ML injection Inject 40 mg Subcutaneous once.       diphenoxylate-atropine (LOMOTIL) 2.5-0.025 MG per tablet Take 2  "tablets by mouth       hydrocortisone 2.5 % cream Apply twice daily for 1 week behind ear, then start vaseline twice daily 30 g 0     mercaptopurine (PURINETHOL) 50 MG tablet Take 50 mg by mouth daily. 1 and half tabs daily       rivaroxaban ANTICOAGULANT (XARELTO ANTICOAGULANT) 20 MG TABS tablet Take 1 tablet (20 mg) by mouth daily (with dinner) 30 tablet 11     sodium chloride 0.9 % SOLN 250 mL with ustekinumab 130 MG/26ML SOLN infusion Inject into the vein once       tacrolimus (PROTOPIC) 0.1 % external ointment Apply twice daily as needed 60 g 3     valACYclovir (VALTREX) 1000 mg tablet two tablets by mouth twice daily for 1 day at onset of symptoms of a cold sore. (Patient not taking: Reported on 4/29/2021) 30 tablet 0     zolpidem (AMBIEN) 5 MG tablet Take tablet by mouth 15 minutes prior to sleep, for Sleep Study (Patient not taking: Reported on 4/29/2021) 1 tablet 0        Allergies:   No Known Allergies     Family History: family history includes Diabetes in his brother and father; Hypertension in his father and mother; Prostate Cancer in his paternal grandfather; Prostate Cancer (age of onset: 50) in his father and paternal uncle; Respiratory in his mother.     Social History:  reports that he has never smoked. He has never used smokeless tobacco. He reports current alcohol use. He reports that he does not use drugs.    Review of Systems:  ROS: 10 point ROS neg other than the symptoms noted above in the HPI and past medical history.    Physical Exam  GENERAL APPEARANCE: healthy, alert, no distress. Accompanied by his son.  SKIN: no suspicious lesions or rashes  NEURO: Normal strength and tone, mentation intact and speech normal  PSYCH:  mentation appears normal and affect normal. Not anxious.  RESPIRATORY: No increased work of breathing.    /82 (BP Location: Right arm, Patient Position: Sitting, Cuff Size: Adult Large)   Pulse 70   Ht 1.88 m (6' 2\")   Wt 99.8 kg (220 lb)   SpO2 97%   BMI 28.25 " kg/m       right HAND / WRIST EXAM:    Skin intact. No abnormal skin discoloration, erythema or ecchymosis. Longitudinal incisional scar dorsum of wrist, ~2cm length.  Normal wear pattern, color and tone.  No observable or palpable masses of the fingers or palm or wrist.  No palpable triggering of fingers.   No observable or palpable cords or nodules of the fingers or palm.    There is no swelling in the wrist, hand of the right upper extremity .  There is moderate tenderness in the dorsum of the wrist around the incision, scapholunate area, anatomical snuff box, volar wrist and FCR tendon.  There is no ecchymosis.  There is no erythema of the surrounding skin.  There is no maceration of the skin.  There is no gross deformity in the area.  Intact extensors. No extensor lag.    Wrist range of motion within normal limits. Dorsal discomfort with extremes of range of motion.    Intact sensation light touch median, radial, ulnar nerves of the hand  Intact sensation to the radial and ulnar digital nerves of the fingers, as well as the finger tips.  Intact epl fpl fdp edc wrist flexion/extension biceps/triceps deltoid  Brisk capillary refill to all fingers.   Palpable radial pulse, 2+.    X-rays:  3 views right wrist from 8/16/2021 were reviewed in clinic today. On my review, No obvious acute fracture or dislocation. No obvious bony abnormality or lesion. Old, healed 4th metacarpal fracture.    Assessment: 52yo RHD male with acute right wrist pain, strain.    Plan:  * suspect strain from increased activities, in setting of previous right wrist problems and surgery.  * no obvious bone out of place on xrays  * discussed rest, activity modification, over the counter pain control, ice/heat, topical ointments  * wrist brace provided today.  * discussed trial of injection, patient elects to proceed. See procedure note below  * return to clinic as needed.        Cm Kim M.D., M.S.  Dept. of Orthopaedic Surgery  [facility]    Medium Joint Injection/Arthrocentesis: R radiocarpal    Date/Time: 8/16/2021 11:09 AM  Performed by: Vince Hagen PA  Authorized by: Cm Kim MD     Indications:  Pain  Needle Size:  22 G  Guidance: surface landmarks    Approach:  Dorsal  Location:  Wrist  Site:  R radiocarpal  Medications:  20 mg triamcinolone 40 MG/ML; 0.5 mL lidocaine (PF) 1 %  Outcome:  Tolerated well, no immediate complications  Procedure discussed: discussed risks, benefits, and alternatives    Prep: patient was prepped and draped in usual sterile fashion

## 2021-08-16 NOTE — LETTER
8/16/2021         RE: Jorge Amaral  [address] [facility] [address]        Dear Colleague,    Thank you for referring your patient, Jorge Amaral, to the [facility]. Please see a copy of my visit note below.    Chief Complaint:   Chief Complaint   Patient presents with     Right Wrist - Pain     Consult     right wrist pain         HPI: Jorge Amaral is a 51 year old male , right -hand dominant, who presents for evaluation and management of a right wrist pain, no known injury. Pain has been present for 1.5 months. Since that time, pain has waxed and waned. Thinks he's been more active preparing for martial arts competition. Sharp pain towards the thumb side of the wrist, worse with pressure. Flares during the day, worse at night when sleeping. No numbness and tingling. Treatment has been ice, heat, chiropractor. Maybe tylenol for pain.    He had surgical excision of what was thought to be a ganglion cyst with Dr Edmond 11/7/2018, but was noted to actually be Benign fragments of synovium, bone and fibrous tissue and no obvious cyst. That seemed to help until recently.    He saw a chiropractor and was told a bone was out of place. He's not sure about that.      He reports having moderate pain/discomfort around the wrist site. He denies associated numbness or tingling. He denies any other injuries to his upper extremity.   Symptoms: pain  Location of symptoms: top of wrist.  Pain severity: 6/10  Pain quality: aching and sharp  Frequency of symptoms: are constant  Aggravating factors: pressure, martial arts.  Relieving factors: positions..    Past medical history:  has a past medical history of ACL (anterior cruciate ligament) tear--recurrent (01/03/2013), Crohn's disease (H), Delayed union of fracture (12/01/2011), Fracture, metacarpal (08/03/2011), Large bowel perforation (H) (08/16/2010), Nondisplaced fracture of navicular (scaphoid) of left foot, subsequent  encounter for fracture with delayed healing (07/03/2019), Puncture wound, and Sepsis (H) (01/19/2018). He also has no past medical history of Acne, Actinic keratosis, Allergies, Basal cell carcinoma, Bleeding disorder (H), Complication of anesthesia, Degenerative joint disease, Eczema, Heart disease, Heart valve disorder, Hypertension, Inflammatory arthritis, Kidney disease, Malignant melanoma nos, Pacemaker, Photosensitive contact dermatitis, PONV (postoperative nausea and vomiting), Psoriasis, Skin cancer, Squamous cell carcinoma, Stroke (H), Surgical complications, Type II or unspecified type diabetes mellitus without mention of complication, not stated as uncontrolled, Unspecified asthma(493.90), or Urticaria.   Patient Active Problem List    Diagnosis Date Noted     Left knee pain 05/10/2021     Priority: Medium     History of pulmonary embolism 08/21/2020     Priority: Medium     ED 8/15/20 CT - Acute pulmonary emboli involving 2 small segmental and subsegmental right middle lobe and lower lobe pulmonary arteries.          BMI 28.0-28.9,adult 12/04/2019     Priority: Medium     Crohn's disease with complication, unspecified gastrointestinal tract location (H) 08/11/2017     Priority: Medium     FH: prostate cancer 08/11/2017     Priority: Medium     Common wart 10/14/2014     Priority: Medium     Cold sore 08/14/2014     Priority: Medium     Family history of diabetes mellitus 12/16/2011     Priority: Medium     CARDIOVASCULAR SCREENING; LDL GOAL LESS THAN 160 10/31/2010     Priority: Medium       Past surgical history:  has a past surgical history that includes appendectomy (1986); Open reduction internal fixation hand (07/25/2011); Remove hardware lower extremity (04/06/2012); ENT surgery (06/28/2013); Excise mass wrist (Right, 11/07/2018); laparoscopy procedure unlisted (08/16/2010); Arthroscopy knee with medial meniscectomy (Left, 01/14/2019); colonoscopy (05/14/2020); Takedown colostomy (12/27/2011); and  small bowel resection (1986).     Medications:    Current Outpatient Medications   Medication Sig Dispense Refill     adalimumab (HUMIRA) 40 MG/0.8ML injection Inject 40 mg Subcutaneous once.       diphenoxylate-atropine (LOMOTIL) 2.5-0.025 MG per tablet Take 2 tablets by mouth       hydrocortisone 2.5 % cream Apply twice daily for 1 week behind ear, then start vaseline twice daily 30 g 0     mercaptopurine (PURINETHOL) 50 MG tablet Take 50 mg by mouth daily. 1 and half tabs daily       rivaroxaban ANTICOAGULANT (XARELTO ANTICOAGULANT) 20 MG TABS tablet Take 1 tablet (20 mg) by mouth daily (with dinner) 30 tablet 11     sodium chloride 0.9 % SOLN 250 mL with ustekinumab 130 MG/26ML SOLN infusion Inject into the vein once       tacrolimus (PROTOPIC) 0.1 % external ointment Apply twice daily as needed 60 g 3     valACYclovir (VALTREX) 1000 mg tablet two tablets by mouth twice daily for 1 day at onset of symptoms of a cold sore. (Patient not taking: Reported on 4/29/2021) 30 tablet 0     zolpidem (AMBIEN) 5 MG tablet Take tablet by mouth 15 minutes prior to sleep, for Sleep Study (Patient not taking: Reported on 4/29/2021) 1 tablet 0        Allergies:   No Known Allergies     Family History: family history includes Diabetes in his brother and father; Hypertension in his father and mother; Prostate Cancer in his paternal grandfather; Prostate Cancer (age of onset: 50) in his father and paternal uncle; Respiratory in his mother.     Social History:  reports that he has never smoked. He has never used smokeless tobacco. He reports current alcohol use. He reports that he does not use drugs.    Review of Systems:  ROS: 10 point ROS neg other than the symptoms noted above in the HPI and past medical history.    Physical Exam  GENERAL APPEARANCE: healthy, alert, no distress. Accompanied by his son.  SKIN: no suspicious lesions or rashes  NEURO: Normal strength and tone, mentation intact and speech normal  PSYCH:  mentation  "appears normal and affect normal. Not anxious.  RESPIRATORY: No increased work of breathing.    /82 (BP Location: Right arm, Patient Position: Sitting, Cuff Size: Adult Large)   Pulse 70   Ht 1.88 m (6' 2\")   Wt 99.8 kg (220 lb)   SpO2 97%   BMI 28.25 kg/m       right HAND / WRIST EXAM:    Skin intact. No abnormal skin discoloration, erythema or ecchymosis. Longitudinal incisional scar dorsum of wrist, ~2cm length.  Normal wear pattern, color and tone.  No observable or palpable masses of the fingers or palm or wrist.  No palpable triggering of fingers.   No observable or palpable cords or nodules of the fingers or palm.    There is no swelling in the wrist, hand of the right upper extremity .  There is moderate tenderness in the dorsum of the wrist around the incision, scapholunate area, anatomical snuff box, volar wrist and FCR tendon.  There is no ecchymosis.  There is no erythema of the surrounding skin.  There is no maceration of the skin.  There is no gross deformity in the area.  Intact extensors. No extensor lag.    Wrist range of motion within normal limits. Dorsal discomfort with extremes of range of motion.    Intact sensation light touch median, radial, ulnar nerves of the hand  Intact sensation to the radial and ulnar digital nerves of the fingers, as well as the finger tips.  Intact epl fpl fdp edc wrist flexion/extension biceps/triceps deltoid  Brisk capillary refill to all fingers.   Palpable radial pulse, 2+.    X-rays:  3 views right wrist from 8/16/2021 were reviewed in clinic today. On my review, No obvious acute fracture or dislocation. No obvious bony abnormality or lesion. Old, healed 4th metacarpal fracture.    Assessment: 52yo RHD male with acute right wrist pain, strain.    Plan:  * suspect strain from increased activities, in setting of previous right wrist problems and surgery.  * no obvious bone out of place on xrays  * discussed rest, activity modification, over the counter " pain control, ice/heat, topical ointments  * wrist brace provided today.  * discussed trial of injection, patient elects to proceed. See procedure note below  * return to clinic as needed.        Cm Kim M.D., M.S.  Dept. of Orthopaedic Surgery  [facility]    Medium Joint Injection/Arthrocentesis: R radiocarpal    Date/Time: 8/16/2021 11:09 AM  Performed by: Vince Hagen PA  Authorized by: Cm Kim MD     Indications:  Pain  Needle Size:  22 G  Guidance: surface landmarks    Approach:  Dorsal  Location:  Wrist  Site:  R radiocarpal  Medications:  20 mg triamcinolone 40 MG/ML; 0.5 mL lidocaine (PF) 1 %  Outcome:  Tolerated well, no immediate complications  Procedure discussed: discussed risks, benefits, and alternatives    Prep: patient was prepped and draped in usual sterile fashion              Again, thank you for allowing me to participate in the care of your patient.        Sincerely,        Cm Kim MD

## 2021-08-19 ENCOUNTER — VIRTUAL VISIT (OUTPATIENT)
Dept: DERMATOLOGY | Facility: CLINIC | Age: 51
End: 2021-08-19
Payer: COMMERCIAL

## 2021-08-19 DIAGNOSIS — L81.0 POST-INFLAMMATORY HYPERPIGMENTATION: Primary | ICD-10-CM

## 2021-08-19 PROCEDURE — 99214 OFFICE O/P EST MOD 30 MIN: CPT | Mod: 95 | Performed by: DERMATOLOGY

## 2021-08-19 NOTE — PROGRESS NOTES
[facility] Dermatology Note  Encounter Date: Aug 19, 2021  Store-and-Forward and Telephone ([phone]). Location of teledermatologist: [facility].  Start time: 11:20am. End time: 11:31am.    Dermatology Problem List:  1. History of immunosuppression, Chron's  2. Bowenoid papulosis, left abdomen  -s/p biopsy 4/18/15  -s/p Efudex x 6 weeks initiated 5/6/15, marked resolved 8/2015  3. Verruca plantaris  -Previous Tx: cantherone, trichloracetic acid, cryotherapy and PDL with Dr. Chris Stoddard, s/p candida 3X, cryotherapy  4. Tinea versicolor, back and chest  -Previous Tx:  ketoconazole 2% cream initiated 10/23/2015  5. 2 cm fissure on crease of R posterior ear - not healing  -current tx; hydrocortisone 2.5% cream  6. Lipoma, left bicep excision  7. Verrucoid keratosis vs indolent wart, right 2nd toe.  s/p scoop shave biopsy.      Last FBSE on 2/21/2019  ____________________________________________     Assessment & Plan:      # verrucoid keratosis vs indolent wart; R 2nd toe  S/p shave biopsy.   -recheck in person     #. History of immunosuppression  - next skin exam 12/2021       #. Dermatitis(psoriasiform on 12/2020 biopsy)R postauricular and likely on chest and neck. Coming off humira and switching to stelara- no on humira- doing well  -hydrocortisone twice daily for 2 weeks, then 3 times weekly for 2 weeks, repeat cycle as needed.   Okay to use protopic     #. Bowenoid papulosis, left abdomen- clear today  -s/p biopsy 4/18/15     #. Nail biopsy site, pt with no complaints  -plan for vaseline     #. If hives return   X1 episode  -use benadryl if these return and contact clinic     #. COVID Vaccination. Reviewed he will need booster. Pfizer, he will contact GI to see if there is specific timing.     Procedures Performed:    None    Follow-up: in person and get covid.     Staff:     Sasha Woods MD    Department of Dermatology  [facility]  [facility]: Phone: [phone], Fax:[phone]  [facility]: Phone: [phone], Fax: [phone]      ____________________________________________    CC: Psoriasis (using protopic, having flares)    HPI:  Mr. Jorge Amaral is a(n) 51 year old male who presents today as a return patient for psoriasis. On protopic and having flares    Patient is otherwise feeling well, without additional skin concerns.    Labs Reviewed:  Last path report reviewed  Physical Exam:  Vitals: There were no vitals taken for this visit.  SKIN: Teledermatology photos were reviewed; image quality and interpretability: acceptable. Image date:  See upload date per pt.  - 2 acneiform papules on posterior occipital scalp  -image with red circles with hyperpigmentation preauricular and  Helix with focal erythema  - No other lesions of concern on areas examined.     Medications:  Current Outpatient Medications   Medication     adalimumab (HUMIRA) 40 MG/0.8ML injection     diphenoxylate-atropine (LOMOTIL) 2.5-0.025 MG per tablet     hydrocortisone 2.5 % cream     mercaptopurine (PURINETHOL) 50 MG tablet     rivaroxaban ANTICOAGULANT (XARELTO ANTICOAGULANT) 20 MG TABS tablet     sodium chloride 0.9 % SOLN 250 mL with ustekinumab 130 MG/26ML SOLN infusion     tacrolimus (PROTOPIC) 0.1 % external ointment     valACYclovir (VALTREX) 1000 mg tablet     zolpidem (AMBIEN) 5 MG tablet     Current Facility-Administered Medications   Medication     lidocaine (PF) (XYLOCAINE) 1 % injection 0.5 mL     triamcinolone (KENALOG-40) injection 20 mg      Past Medical/Surgical History:   Patient Active Problem List   Diagnosis     CARDIOVASCULAR SCREENING; LDL GOAL LESS THAN 160     Family history of diabetes mellitus     Cold sore     Common wart     Crohn's disease with complication, unspecified gastrointestinal tract location (H)     FH: prostate cancer     BMI  28.0-28.9,adult     History of pulmonary embolism     Left knee pain     Past Medical History:   Diagnosis Date     ACL (anterior cruciate ligament) tear--recurrent 01/03/2013     Crohn's disease (H)      Delayed union of fracture 12/01/2011     Fracture, metacarpal 08/03/2011     Large bowel perforation (H) 08/16/2010     Nondisplaced fracture of navicular (scaphoid) of left foot, subsequent encounter for fracture with delayed healing 07/03/2019     Puncture wound     L hand DOI 4/29/21     Sepsis (H) 01/19/2018    He had an incomplete (due to stricture) colonoscopy day of presentation and after that developed acute abdominal pain and fever. He was septic and suspected to have a micro perforation. Imaging negative for larger perforation       CC No referring provider defined for this encounter. on close of this encounter.    Teledermatology Nurse Call Patients:     Chief Complaint   Patient presents with     Psoriasis     using protopic, having flares     Are you  in the [facility] at the time of the encounter? yes    Today's visit will be billed to you and your insurance.    A teledermatology visit is not as thorough as an in-person visit and the quality of the photograph sent may not be of the same quality as that taken by the dermatology clinic.    Linda Santos, SCOTT on 8/19/2021 at 9:59 AM

## 2021-08-19 NOTE — LETTER
8/19/2021         RE: Jorge Amaral  [address] [facility] [address]        Dear Colleague,    Thank you for referring your patient, Jorge Amaral, to the [facility]. Please see a copy of my visit note below.    [facility] Dermatology Note  Encounter Date: Aug 19, 2021  Store-and-Forward and Telephone ([phone]). Location of teledermatologist: [facility].  Start time: 11:20am. End time: 11:31am.    Dermatology Problem List:  1. History of immunosuppression, Chron's  2. Bowenoid papulosis, left abdomen  -s/p biopsy 4/18/15  -s/p Efudex x 6 weeks initiated 5/6/15, marked resolved 8/2015  3. Verruca plantaris  -Previous Tx: cantherone, trichloracetic acid, cryotherapy and PDL with Dr. Chris Stoddard, s/p candida 3X, cryotherapy  4. Tinea versicolor, back and chest  -Previous Tx:  ketoconazole 2% cream initiated 10/23/2015  5. 2 cm fissure on crease of R posterior ear - not healing  -current tx; hydrocortisone 2.5% cream  6. Lipoma, left bicep excision  7. Verrucoid keratosis vs indolent wart, right 2nd toe.  s/p scoop shave biopsy.      Last FBSE on 2/21/2019  ____________________________________________     Assessment & Plan:      # verrucoid keratosis vs indolent wart; R 2nd toe  S/p shave biopsy.   -recheck in person     #. History of immunosuppression  - next skin exam 12/2021       #. Dermatitis(psoriasiform on 12/2020 biopsy)R postauricular and likely on chest and neck. Coming off humira and switching to stelara- no on humira- doing well  -hydrocortisone twice daily for 2 weeks, then 3 times weekly for 2 weeks, repeat cycle as needed.   Okay to use protopic     #. Bowenoid papulosis, left abdomen- clear today  -s/p biopsy 4/18/15     #. Nail biopsy site, pt with no complaints  -plan for vaseline     #. If hives return   X1 episode  -use benadryl if these return and contact clinic     #. COVID Vaccination. Reviewed he  will need booster. Pfizer, he will contact GI to see if there is specific timing.     Procedures Performed:    None    Follow-up: in person and get covid.     Staff:     Sasha Woods MD    Department of Dermatology  [facility]: Phone: [phone], Fax:[phone]  [facility]: Phone: [phone], Fax: [phone]      ____________________________________________    CC: Psoriasis (using protopic, having flares)    HPI:  Mr. Jorge Amaral is a(n) 51 year old male who presents today as a return patient for psoriasis. On protopic and having flares    Patient is otherwise feeling well, without additional skin concerns.    Labs Reviewed:  Last path report reviewed  Physical Exam:  Vitals: There were no vitals taken for this visit.  SKIN: Teledermatology photos were reviewed; image quality and interpretability: acceptable. Image date:  See upload date per pt.  - 2 acneiform papules on posterior occipital scalp  -image with red circles with hyperpigmentation preauricular and  Helix with focal erythema  - No other lesions of concern on areas examined.     Medications:  Current Outpatient Medications   Medication     adalimumab (HUMIRA) 40 MG/0.8ML injection     diphenoxylate-atropine (LOMOTIL) 2.5-0.025 MG per tablet     hydrocortisone 2.5 % cream     mercaptopurine (PURINETHOL) 50 MG tablet     rivaroxaban ANTICOAGULANT (XARELTO ANTICOAGULANT) 20 MG TABS tablet     sodium chloride 0.9 % SOLN 250 mL with ustekinumab 130 MG/26ML SOLN infusion     tacrolimus (PROTOPIC) 0.1 % external ointment     valACYclovir (VALTREX) 1000 mg tablet     zolpidem (AMBIEN) 5 MG tablet     Current Facility-Administered Medications   Medication     lidocaine (PF) (XYLOCAINE) 1 % injection 0.5 mL     triamcinolone (KENALOG-40) injection 20 mg      Past Medical/Surgical History:   Patient Active Problem List    Diagnosis     CARDIOVASCULAR SCREENING; LDL GOAL LESS THAN 160     Family history of diabetes mellitus     Cold sore     Common wart     Crohn's disease with complication, unspecified gastrointestinal tract location (H)     FH: prostate cancer     BMI 28.0-28.9,adult     History of pulmonary embolism     Left knee pain     Past Medical History:   Diagnosis Date     ACL (anterior cruciate ligament) tear--recurrent 01/03/2013     Crohn's disease (H)      Delayed union of fracture 12/01/2011     Fracture, metacarpal 08/03/2011     Large bowel perforation (H) 08/16/2010     Nondisplaced fracture of navicular (scaphoid) of left foot, subsequent encounter for fracture with delayed healing 07/03/2019     Puncture wound     L hand DOI 4/29/21     Sepsis (H) 01/19/2018    He had an incomplete (due to stricture) colonoscopy day of presentation and after that developed acute abdominal pain and fever. He was septic and suspected to have a micro perforation. Imaging negative for larger perforation       CC No referring provider defined for this encounter. on close of this encounter.    Teledermatology Nurse Call Patients:     Chief Complaint   Patient presents with     Psoriasis     using protopic, having flares     Are you  in the [facility] at the time of the encounter? yes    Today's visit will be billed to you and your insurance.    A teledermatology visit is not as thorough as an in-person visit and the quality of the photograph sent may not be of the same quality as that taken by the dermatology clinic.    Linda Santos, CMA on 8/19/2021 at 9:59 AM                                                                                                                                                                                                                                      Again, thank you for allowing me to participate in the care of your patient.        Sincerely,        Sasha Woods MD

## 2021-09-14 DIAGNOSIS — Z86.711 HISTORY OF PULMONARY EMBOLISM: ICD-10-CM

## 2021-09-15 RX ORDER — RIVAROXABAN 15 MG/1
TABLET, FILM COATED ORAL
Qty: 30 TABLET | Refills: 1 | Status: SHIPPED | OUTPATIENT
Start: 2021-09-15 | End: 2022-03-15

## 2021-09-26 ENCOUNTER — HEALTH MAINTENANCE LETTER (OUTPATIENT)
Age: 51
End: 2021-09-26

## 2021-10-07 ENCOUNTER — ANCILLARY PROCEDURE (OUTPATIENT)
Dept: GENERAL RADIOLOGY | Facility: CLINIC | Age: 51
End: 2021-10-07
Attending: PEDIATRICS
Payer: COMMERCIAL

## 2021-10-07 ENCOUNTER — OFFICE VISIT (OUTPATIENT)
Dept: ORTHOPEDICS | Facility: CLINIC | Age: 51
End: 2021-10-07
Payer: COMMERCIAL

## 2021-10-07 VITALS
SYSTOLIC BLOOD PRESSURE: 117 MMHG | HEIGHT: 74 IN | DIASTOLIC BLOOD PRESSURE: 77 MMHG | BODY MASS INDEX: 28.23 KG/M2 | WEIGHT: 220 LBS

## 2021-10-07 DIAGNOSIS — M25.552 LEFT HIP PAIN: Primary | ICD-10-CM

## 2021-10-07 DIAGNOSIS — M16.12 ARTHRITIS OF LEFT HIP: ICD-10-CM

## 2021-10-07 PROCEDURE — 99203 OFFICE O/P NEW LOW 30 MIN: CPT | Performed by: PEDIATRICS

## 2021-10-07 PROCEDURE — 73502 X-RAY EXAM HIP UNI 2-3 VIEWS: CPT | Mod: LT | Performed by: RADIOLOGY

## 2021-10-07 ASSESSMENT — MIFFLIN-ST. JEOR: SCORE: 1912.66

## 2021-10-07 NOTE — PATIENT INSTRUCTIONS
Discussed nature of degenerative arthritis of the hip. Discussed symptom treatment with over-the-counter medications and rest if needed. Discussed benefits physical therapy. Discussed injection therapy. Also briefly discussed future consideration of referral to orthopedic surgery for further evaluation and discussion of arthroplasty.    Plan:  - Today's Plan of Care:  Referral for US guided injection  Discussed activity considerations and other supportive care including Ice/Heat, OTC medications as needed.    -We also discussed other future treatment options:  Referral to Orthopedic Surgery  Referral to Physical Therapy    Follow Up: as needed    If you have any further questions for your physician or physician s care team you can call [phone] and use option 3 to leave a voice message. Calls received during business hours will be returned same day.

## 2021-10-07 NOTE — PROGRESS NOTES
ASSESSMENT & PLAN    Jorge was seen today for pain.    Diagnoses and all orders for this visit:    Left hip pain  -     XR Pelvis w Hip LT 1 View  -     Orthopedic  Referral; Future    Arthritis of left hip  -     Orthopedic  Referral; Future      This issue is acute on chronic and Unchanged.    Discussed nature of degenerative arthritis of the hip. Discussed symptom treatment with over-the-counter medications and rest if needed. Discussed benefits physical therapy. Discussed injection therapy. Also briefly discussed future consideration of referral to orthopedic surgery for further evaluation and discussion of arthroplasty.    Plan:  - Today's Plan of Care:  Referral for US guided injection  Discussed activity considerations and other supportive care including Ice/Heat, OTC medications as needed.    -We also discussed other future treatment options:  Referral to Orthopedic Surgery  Referral to Physical Therapy    Follow Up: as needed    Concerning signs and symptoms were reviewed.  The patient expressed understanding of this management plan and all questions were answered at this time.    Aysha Mon MD [facility]  Sports Medicine Physician  [facility]      -----  Chief Complaint   Patient presents with     Left Hip - Pain       SUBJECTIVE  Jorge Amaral is a/an 51 year old male who is seen as a self referral for evaluation of left hip pain.     The patient is seen by themselves.    Onset: 1 month(s) ago. Reports insidious onset without acute precipitating event.  Location of Pain: left hip, anterior aspect   Worsened by: kicking during martial arts, prolonged sitting  Better with: heat, massage, rolling out  Treatments tried: rest/activity avoidance, heat, Tylenol, home exercises, chiropractic care (1 visits)  Associated symptoms: numbness, tingling, weakness of hip/leg and feeling of instability    Orthopedic/Surgical history: YES - Date: chronic knee pain, Left  Social  "History/Occupation:  of [employer]    No family history pertinent to patient's problem today.    REVIEW OF SYSTEMS:  Review of Systems  Skin: no bruising, no swelling  Musculoskeletal: as above  Neurologic: no numbness, paresthesias  Remainder of review of systems is negative including constitutional, CV, pulmonary, GI, except as noted in HPI or medical history.    OBJECTIVE:  /77   Ht 1.88 m (6' 2\")   Wt 99.8 kg (220 lb)   BMI 28.25 kg/m     General: healthy, alert and in no distress  HEENT: no scleral icterus or conjunctival erythema  Skin: no suspicious lesions or rash. No jaundice.  CV: distal perfusion intact  Resp: normal respiratory effort without conversational dyspnea   Psych: normal mood and affect  Gait: normal steady gait with appropriate coordination and balance  Neuro: Normal light sensory exam of lower extremity    Bilateral hip exam  Inspection:      no edema or ecchymosis in hip area    Tender:      groin left    Non Tender:      remainder of the hip area bilateral    ROM:     Full active and passive ROM  bilateral    Strength:      flexion 5-/5 right       abduction 5/5 bilateral       adduction 5/5 bilateral    Sensation:      grossly intact in hip and thigh    Special Tests:      positive (+) ELLIOT left       neg (-) FADIR left    RADIOLOGY:  I independently ordered, visualized and reviewed these images with the patient  AP Pelvis and left lateral XR views of hip reviewed: no acute bony abnormality, no significant degenerative change  - will follow official read    Review of the result(s) of each unique test - XR       "

## 2021-10-07 NOTE — LETTER
10/7/2021         RE: Jorge Amaral  [address] [facility] [address]        Dear Colleague,    Thank you for referring your patient, Jorge Amaral, to the [facility]. Please see a copy of my visit note below.    ASSESSMENT & PLAN    Jorge was seen today for pain.    Diagnoses and all orders for this visit:    Left hip pain  -     XR Pelvis w Hip LT 1 View  -     Orthopedic  Referral; Future    Arthritis of left hip  -     Orthopedic  Referral; Future      This issue is acute on chronic and Unchanged.    Discussed nature of degenerative arthritis of the hip. Discussed symptom treatment with over-the-counter medications and rest if needed. Discussed benefits physical therapy. Discussed injection therapy. Also briefly discussed future consideration of referral to orthopedic surgery for further evaluation and discussion of arthroplasty.    Plan:  - Today's Plan of Care:  Referral for US guided injection  Discussed activity considerations and other supportive care including Ice/Heat, OTC medications as needed.    -We also discussed other future treatment options:  Referral to Orthopedic Surgery  Referral to Physical Therapy    Follow Up: as needed    Concerning signs and symptoms were reviewed.  The patient expressed understanding of this management plan and all questions were answered at this time.    Aysha Mon MD [facility]  Sports Medicine Physician  [facility]      -----  Chief Complaint   Patient presents with     Left Hip - Pain       SUBJECTIVE  Jorge Amaral is a/an 51 year old male who is seen as a self referral for evaluation of left hip pain.     The patient is seen by themselves.    Onset: 1 month(s) ago. Reports insidious onset without acute precipitating event.  Location of Pain: left hip, anterior aspect   Worsened by: kicking during martial arts, prolonged sitting  Better with: heat, massage, rolling  "out  Treatments tried: rest/activity avoidance, heat, Tylenol, home exercises, chiropractic care (1 visits)  Associated symptoms: numbness, tingling, weakness of hip/leg and feeling of instability    Orthopedic/Surgical history: YES - Date: chronic knee pain, Left  Social History/Occupation:  of [employer]    No family history pertinent to patient's problem today.    REVIEW OF SYSTEMS:  Review of Systems  Skin: no bruising, no swelling  Musculoskeletal: as above  Neurologic: no numbness, paresthesias  Remainder of review of systems is negative including constitutional, CV, pulmonary, GI, except as noted in HPI or medical history.    OBJECTIVE:  /77   Ht 1.88 m (6' 2\")   Wt 99.8 kg (220 lb)   BMI 28.25 kg/m     General: healthy, alert and in no distress  HEENT: no scleral icterus or conjunctival erythema  Skin: no suspicious lesions or rash. No jaundice.  CV: distal perfusion intact  Resp: normal respiratory effort without conversational dyspnea   Psych: normal mood and affect  Gait: normal steady gait with appropriate coordination and balance  Neuro: Normal light sensory exam of lower extremity    Bilateral hip exam  Inspection:      no edema or ecchymosis in hip area    Tender:      groin left    Non Tender:      remainder of the hip area bilateral    ROM:     Full active and passive ROM  bilateral    Strength:      flexion 5-/5 right       abduction 5/5 bilateral       adduction 5/5 bilateral    Sensation:      grossly intact in hip and thigh    Special Tests:      positive (+) ELLIOT left       neg (-) FADIR left    RADIOLOGY:  I independently ordered, visualized and reviewed these images with the patient  AP Pelvis and left lateral XR views of hip reviewed: no acute bony abnormality, no significant degenerative change  - will follow official read    Review of the result(s) of each unique test - XR           Again, thank you for allowing me to participate in the care of your patient.  "       Sincerely,        Aysha Mon MD

## 2021-10-13 ENCOUNTER — OFFICE VISIT (OUTPATIENT)
Dept: ORTHOPEDICS | Facility: CLINIC | Age: 51
End: 2021-10-13
Payer: COMMERCIAL

## 2021-10-13 VITALS — BODY MASS INDEX: 28.23 KG/M2 | WEIGHT: 220 LBS | HEIGHT: 74 IN

## 2021-10-13 DIAGNOSIS — M16.12 ARTHRITIS OF LEFT HIP: ICD-10-CM

## 2021-10-13 DIAGNOSIS — M25.552 LEFT HIP PAIN: ICD-10-CM

## 2021-10-13 PROCEDURE — 20611 DRAIN/INJ JOINT/BURSA W/US: CPT | Mod: LT | Performed by: FAMILY MEDICINE

## 2021-10-13 RX ORDER — ROPIVACAINE HYDROCHLORIDE 5 MG/ML
3 INJECTION, SOLUTION EPIDURAL; INFILTRATION; PERINEURAL
Status: DISCONTINUED | OUTPATIENT
Start: 2021-10-13 | End: 2024-02-09

## 2021-10-13 RX ORDER — TRIAMCINOLONE ACETONIDE 40 MG/ML
40 INJECTION, SUSPENSION INTRA-ARTICULAR; INTRAMUSCULAR
Status: DISCONTINUED | OUTPATIENT
Start: 2021-10-13 | End: 2024-02-09

## 2021-10-13 RX ADMIN — ROPIVACAINE HYDROCHLORIDE 3 ML: 5 INJECTION, SOLUTION EPIDURAL; INFILTRATION; PERINEURAL at 15:40

## 2021-10-13 RX ADMIN — TRIAMCINOLONE ACETONIDE 40 MG: 40 INJECTION, SUSPENSION INTRA-ARTICULAR; INTRAMUSCULAR at 15:40

## 2021-10-13 ASSESSMENT — MIFFLIN-ST. JEOR: SCORE: 1912.66

## 2021-10-13 NOTE — PROGRESS NOTES
Jorge MERCEDES Munir  :  1970  DOS: 10/13/2021  MRN: [id number]    Sports Medicine Clinic Procedure    Ultrasound Guided Left Intra-Articular Hip Injection    Clinical History: Gradual onset of left deep anterior hip pain with activity over the past ~ 4 - 6 weeks.    Diagnosis:   1. Left hip pain    2. Arthritis of left hip      Referring Physician: Aysha Mon MD  Large Joint Injection/Arthocentesis: L hip joint    Date/Time: 10/13/2021 3:40 PM  Performed by: Sanjay Hatch DO  Authorized by: Sanjay aHtch DO     Indications:  Pain, diagnostic evaluation and osteoarthritis  Needle Size:  22 G  Guidance: ultrasound    Approach:  Anterior  Location:  Hip      Site:  L hip joint  Medications:  3 mL ropivacaine 5 MG/ML; 40 mg triamcinolone 40 MG/ML  Outcome:  Tolerated well, no immediate complications  Procedure discussed: discussed risks, benefits, and alternatives    Consent Given by:  Patient  Timeout: timeout called immediately prior to procedure    Prep: patient was prepped and draped in usual sterile fashion     4 ml's of 1% lidocaine was used as local anesthetic prior to injection      Impression:  Successful Left intra-articular hip injection.    Plan:  Follow up as directed by Dr Mon  Good initial relief from local anesthetic effect today  Expectations and goals of CSI reviewed  Often 2-3 days for steroid effect, and can take up to two weeks for maximum effect  We discussed modified progressive pain-free activity as tolerated  Do not overuse in first two weeks if feeling better due to concern for vulnerability while steroid is working  Supportive care reviewed  All questions were answered today  Contact us with additional questions or concerns  Signs and sx of concern reviewed      Sanjay Hatch DO, CAQ  Primary Care Sports Medicine  [facility]

## 2021-10-13 NOTE — LETTER
10/13/2021         RE: Jorge Amaral  [address] [facility] [address]        Dear Colleague,    Thank you for referring your patient, Jorge Amaral, to the [facility]. Please see a copy of my visit note below.    Jorge Amaral  :  1970  DOS: 10/13/2021  MRN: [id number]    Sports Medicine Clinic Procedure    Ultrasound Guided Left Intra-Articular Hip Injection    Clinical History: Gradual onset of left deep anterior hip pain with activity over the past ~ 4 - 6 weeks.    Diagnosis:   1. Left hip pain    2. Arthritis of left hip      Referring Physician: Aysha Mon MD  Large Joint Injection/Arthocentesis: L hip joint    Date/Time: 10/13/2021 3:40 PM  Performed by: Sanjay Hatch DO  Authorized by: Sanjay Hatch DO     Indications:  Pain, diagnostic evaluation and osteoarthritis  Needle Size:  22 G  Guidance: ultrasound    Approach:  Anterior  Location:  Hip      Site:  L hip joint  Medications:  3 mL ropivacaine 5 MG/ML; 40 mg triamcinolone 40 MG/ML  Outcome:  Tolerated well, no immediate complications  Procedure discussed: discussed risks, benefits, and alternatives    Consent Given by:  Patient  Timeout: timeout called immediately prior to procedure    Prep: patient was prepped and draped in usual sterile fashion     4 ml's of 1% lidocaine was used as local anesthetic prior to injection      Impression:  Successful Left intra-articular hip injection.    Plan:  Follow up as directed by Dr Mon  Good initial relief from local anesthetic effect today  Expectations and goals of CSI reviewed  Often 2-3 days for steroid effect, and can take up to two weeks for maximum effect  We discussed modified progressive pain-free activity as tolerated  Do not overuse in first two weeks if feeling better due to concern for vulnerability while steroid is working  Supportive care reviewed  All questions were answered today  Contact us with  additional questions or concerns  Signs and sx of concern reviewed      Sanjay Hatch DO, CAQ  Primary Care Sports Medicine  [facility]         Again, thank you for allowing me to participate in the care of your patient.        Sincerely,        Sanjay Hatch DO

## 2021-11-09 ENCOUNTER — MYC MEDICAL ADVICE (OUTPATIENT)
Dept: DERMATOLOGY | Facility: CLINIC | Age: 51
End: 2021-11-09
Payer: COMMERCIAL

## 2021-11-12 ENCOUNTER — OFFICE VISIT (OUTPATIENT)
Dept: DERMATOLOGY | Facility: CLINIC | Age: 51
End: 2021-11-12
Payer: COMMERCIAL

## 2021-11-12 DIAGNOSIS — D84.9 IMMUNOSUPPRESSION (H): ICD-10-CM

## 2021-11-12 DIAGNOSIS — B07.0 VERRUCA PLANTARIS: Primary | ICD-10-CM

## 2021-11-12 DIAGNOSIS — L30.9 DERMATITIS: ICD-10-CM

## 2021-11-12 PROCEDURE — 99214 OFFICE O/P EST MOD 30 MIN: CPT | Performed by: DERMATOLOGY

## 2021-11-12 RX ORDER — TACROLIMUS 1 MG/G
OINTMENT TOPICAL
Qty: 60 G | Refills: 3 | Status: SHIPPED | OUTPATIENT
Start: 2021-11-12 | End: 2024-02-21

## 2021-11-12 RX ORDER — USTEKINUMAB 90 MG/ML
90 INJECTION, SOLUTION SUBCUTANEOUS
COMMUNITY
Start: 2021-08-25

## 2021-11-12 RX ORDER — CYCLOBENZAPRINE HCL 5 MG
5 TABLET ORAL DAILY
COMMUNITY
Start: 2021-11-06 | End: 2022-03-15

## 2021-11-12 NOTE — LETTER
11/12/2021         RE: Joreg Amaral  [address] [facility] [address]        Dear Colleague,    Thank you for referring your patient, Jorge Amaral, to the [facility]. Please see a copy of my visit note below.    [facility] Dermatology Note  Encounter Date: Nov 12, 2021  Office Visit     Dermatology Problem List:  Last skin check 11/12/21  1. History of immunosuppression, Chron's  2. Bowenoid papulosis, left abdomen  -s/p biopsy 4/18/15  -s/p Efudex x 6 weeks initiated 5/6/15, marked resolved 8/2015  3. Verruca plantaris  -Previous Tx: cantherone, trichloracetic acid, cryotherapy and PDL with Dr. Chris Stoddard, s/p candida 3X, cryotherapy  4. Tinea versicolor, back and chest  -Previous Tx:  ketoconazole 2% cream initiated 10/23/2015  5. 2 cm fissure on crease of R posterior ear - not healing  -current tx; hydrocortisone 2.5% cream  6. Lipoma, left bicep excision  7. Verrucoid keratosis vs indolent wart, right 2nd toe.  s/p scoop shave biopsy.   8. Dermatitis(psoriasiform on 12/2020 biopsy)R postauricular - possibly triggered by humira  ___________________________________________    Assessment & Plan:    # History of immunosuppression for Chron's Disease AND BOWENOID PAPULOSIS. Discussed increased risk of skin cancers with immunosuppressing medications.   - Recommend sunscreens SPF #30 or greater, protective clothing and avoidance of tanning beds.   - Recommended yearly skin exams. Next is due on 11/2022.  -consider HPV vaccination    # Dermatitis (psoriasiform on 12/2020 biopsy) on the right postauricular and likely on chest and neck. Stopped humira (possibly triggered psoriasis) for GI issues and switched now in 2021 to Stelara.  - Continue hydrocortisone twice daily for 2 weeks, then 3 times weekly for 2 weeks, repeat cycle as needed.   - Continue protopic. Refilled today.  - Recommended COVID-19 booster, given he is on Stelara with  flor    # Verrucous papule on the right 2nd toe. Present since 2016. Previously biopsied by Dr. Vargas. Also has had cryotherapy. Recommend evaluation by derm suurg for next steps  - Continue to monitor. Have Dr. Vargas recheck in 6 months.  - He is planning on fighting on Saturday.  - Recommended HPV vaccine.    # Tan patch on the right jaw line. Consistent with melasma.  - Discussed hydroquinone creams x 3 months at a time. Patient will let us know if would like to pursue.    # Papule c/wseborrheic keratosis, right back but do recommend it be shaved if removed given history of bowenoid papulosis  - Patient will return for removal.  He is planning of compition/sports on Saturday.     Procedures Performed:   None.    Follow-up: within  3 months or right second toe recheck with Dr. Vargas, 1 year in-person for skin check, or earlier for new or changing lesions.    Staff and Scribe:     Scribe Disclosure:   I, Leighann Alvarez, am serving as a scribe to document services personally performed by this physician, Dr. Sasha Woods, based on data collection and the provider's statements to me.    Provider Disclosure:   The documentation recorded by the scribe accurately reflects the services I personally performed and the decisions made by me.    Sasha Woods MD    Department of Dermatology  [facility]: Phone: [phone], Fax:[phone]  [facility]: Phone: [phone], Fax: [phone]      ____________________________________________    CC: Skin Check (FBSE. No area of concern. No personal or family hx of SC) and Psoriasis (Follow up psoriasis-doing well. )    HPI:  Mr. Jorge Amaral is a(n) 51 year old male who presents today as a return patient for skin check and psoriasis.    Last seen 8/19/21 virtually. Plan was to continue hydrocortisone x 4 weeks then switch to protopic for  dermatitis.    Today, patient notes no areas of concern. He plans on a fight on Saturday.    Patient is otherwise feeling well, without additional skin concerns.    Labs Reviewed:  N/A    Physical Exam:  Vitals: There were no vitals taken for this visit.  SKIN: Total skin excluding the undergarment areas was performed. The exam included the head/face, neck, both arms, chest, back, abdomen, both legs, digits and/or nails.   - No reoccurence of bowenoid papulosis.  - Right post auricular is clear.  - Right second toe: verrucous papule  - Tan patch on the left jaw line.  - There is a tan to brown waxy stuck on papule with surrounding erythema on the right back x 1.  - No other lesions of concern on areas examined.     Medications:  Current Outpatient Medications   Medication     cyclobenzaprine (FLEXERIL) 5 MG tablet     diphenoxylate-atropine (LOMOTIL) 2.5-0.025 MG per tablet     mercaptopurine (PURINETHOL) 50 MG tablet     rivaroxaban ANTICOAGULANT (XARELTO ANTICOAGULANT) 20 MG TABS tablet     tacrolimus (PROTOPIC) 0.1 % external ointment     valACYclovir (VALTREX) 1000 mg tablet     hydrocortisone 2.5 % cream     sodium chloride 0.9 % SOLN 250 mL with ustekinumab 130 MG/26ML SOLN infusion     XARELTO ANTICOAGULANT 15 MG TABS tablet     zolpidem (AMBIEN) 5 MG tablet     Current Facility-Administered Medications   Medication     ropivacaine (NAROPIN) injection 3 mL     triamcinolone (KENALOG-40) injection 40 mg      Past Medical History:   Patient Active Problem List   Diagnosis     CARDIOVASCULAR SCREENING; LDL GOAL LESS THAN 160     Family history of diabetes mellitus     Cold sore     Common wart     Crohn's disease with complication, unspecified gastrointestinal tract location (H)     FH: prostate cancer     BMI 28.0-28.9,adult     History of pulmonary embolism     Left knee pain     Past Medical History:   Diagnosis Date     ACL (anterior cruciate ligament) tear--recurrent 01/03/2013     Crohn's disease (H)       Delayed union of fracture 12/01/2011     Fracture, metacarpal 08/03/2011     Large bowel perforation (H) 08/16/2010     Nondisplaced fracture of navicular (scaphoid) of left foot, subsequent encounter for fracture with delayed healing 07/03/2019     Puncture wound     L hand DOI 4/29/21     Sepsis (H) 01/19/2018    He had an incomplete (due to stricture) colonoscopy day of presentation and after that developed acute abdominal pain and fever. He was septic and suspected to have a micro perforation. Imaging negative for larger perforation        CC No referring provider defined for this encounter. on close of this encounter.      Again, thank you for allowing me to participate in the care of your patient.        Sincerely,        Sasha Woods MD

## 2021-11-12 NOTE — NURSING NOTE
Jorge Amaral's goals for this visit include:   Chief Complaint   Patient presents with     Skin Check     FBSE. No area of concern. No personal or family hx of SC     Psoriasis     Follow up psoriasis-doing well.        He requests these members of his care team be copied on today's visit information:     PCP: Vince Swain    Referring Provider:  No referring provider defined for this encounter.    There were no vitals taken for this visit.    Do you need any medication refills at today's visit? Rajni Walsh on 11/12/2021 at 3:44 PM

## 2021-11-12 NOTE — PROGRESS NOTES
[facility] Dermatology Note  Encounter Date: Nov 12, 2021  Office Visit     Dermatology Problem List:  Last skin check 11/12/21  1. History of immunosuppression, Chron's  2. Bowenoid papulosis, left abdomen  -s/p biopsy 4/18/15  -s/p Efudex x 6 weeks initiated 5/6/15, marked resolved 8/2015  3. Verruca plantaris  -Previous Tx: cantherone, trichloracetic acid, cryotherapy and PDL with Dr. Chris Stoddard, s/p candida 3X, cryotherapy  4. Tinea versicolor, back and chest  -Previous Tx:  ketoconazole 2% cream initiated 10/23/2015  5. 2 cm fissure on crease of R posterior ear - not healing  -current tx; hydrocortisone 2.5% cream  6. Lipoma, left bicep excision  7. Verrucoid keratosis vs indolent wart, right 2nd toe.  s/p scoop shave biopsy.   8. Dermatitis(psoriasiform on 12/2020 biopsy)R postauricular - possibly triggered by humira  ___________________________________________    Assessment & Plan:    # History of immunosuppression for Chron's Disease AND BOWENOID PAPULOSIS. Discussed increased risk of skin cancers with immunosuppressing medications.   - Recommend sunscreens SPF #30 or greater, protective clothing and avoidance of tanning beds.   - Recommended yearly skin exams. Next is due on 11/2022.  -consider HPV vaccination    # Dermatitis (psoriasiform on 12/2020 biopsy) on the right postauricular and likely on chest and neck. Stopped humira (possibly triggered psoriasis) for GI issues and switched now in 2021 to Stelara.  - Continue hydrocortisone twice daily for 2 weeks, then 3 times weekly for 2 weeks, repeat cycle as needed.   - Continue protopic. Refilled today.  - Recommended COVID-19 booster, given he is on Stelara with rhemu    # Verrucous papule on the right 2nd toe. Present since 2016. Previously biopsied by Dr. Vargas. Also has had cryotherapy. Recommend evaluation by derm suurg for next steps  - Continue to monitor. Have Dr. Vargas recheck in 6 months.  - He is planning on fighting on  Saturday.  - Recommended HPV vaccine.    # Tan patch on the right jaw line. Consistent with melasma.  - Discussed hydroquinone creams x 3 months at a time. Patient will let us know if would like to pursue.    # Papule c/wseborrheic keratosis, right back but do recommend it be shaved if removed given history of bowenoid papulosis  - Patient will return for removal.  He is planning of compition/sports on Saturday.     Procedures Performed:   None.    Follow-up: within  3 months or right second toe recheck with Dr. Vargas, 1 year in-person for skin check, or earlier for new or changing lesions.    Staff and Scribe:     Scribe Disclosure:   I, Leighann Alvarez, am serving as a scribe to document services personally performed by this physician, Dr. Sasha Woods, based on data collection and the provider's statements to me.    Provider Disclosure:   The documentation recorded by the scribe accurately reflects the services I personally performed and the decisions made by me.    Sasha Woods MD    Department of Dermatology  [facility]: Phone: [phone], Fax:[phone]  [facility]: Phone: [phone], Fax: [phone]      ____________________________________________    CC: Skin Check (FBSE. No area of concern. No personal or family hx of SC) and Psoriasis (Follow up psoriasis-doing well. )    HPI:  Mr. Jorge Amaral is a(n) 51 year old male who presents today as a return patient for skin check and psoriasis.    Last seen 8/19/21 virtually. Plan was to continue hydrocortisone x 4 weeks then switch to protopic for dermatitis.    Today, patient notes no areas of concern. He plans on a fight on Saturday.    Patient is otherwise feeling well, without additional skin concerns.    Labs Reviewed:  N/A    Physical Exam:  Vitals: There were no vitals taken for this visit.  SKIN: Total skin excluding  the undergarment areas was performed. The exam included the head/face, neck, both arms, chest, back, abdomen, both legs, digits and/or nails.   - No reoccurence of bowenoid papulosis.  - Right post auricular is clear.  - Right second toe: verrucous papule  - Tan patch on the left jaw line.  - There is a tan to brown waxy stuck on papule with surrounding erythema on the right back x 1.  - No other lesions of concern on areas examined.     Medications:  Current Outpatient Medications   Medication     cyclobenzaprine (FLEXERIL) 5 MG tablet     diphenoxylate-atropine (LOMOTIL) 2.5-0.025 MG per tablet     mercaptopurine (PURINETHOL) 50 MG tablet     rivaroxaban ANTICOAGULANT (XARELTO ANTICOAGULANT) 20 MG TABS tablet     tacrolimus (PROTOPIC) 0.1 % external ointment     valACYclovir (VALTREX) 1000 mg tablet     hydrocortisone 2.5 % cream     sodium chloride 0.9 % SOLN 250 mL with ustekinumab 130 MG/26ML SOLN infusion     XARELTO ANTICOAGULANT 15 MG TABS tablet     zolpidem (AMBIEN) 5 MG tablet     Current Facility-Administered Medications   Medication     ropivacaine (NAROPIN) injection 3 mL     triamcinolone (KENALOG-40) injection 40 mg      Past Medical History:   Patient Active Problem List   Diagnosis     CARDIOVASCULAR SCREENING; LDL GOAL LESS THAN 160     Family history of diabetes mellitus     Cold sore     Common wart     Crohn's disease with complication, unspecified gastrointestinal tract location (H)     FH: prostate cancer     BMI 28.0-28.9,adult     History of pulmonary embolism     Left knee pain     Past Medical History:   Diagnosis Date     ACL (anterior cruciate ligament) tear--recurrent 01/03/2013     Crohn's disease (H)      Delayed union of fracture 12/01/2011     Fracture, metacarpal 08/03/2011     Large bowel perforation (H) 08/16/2010     Nondisplaced fracture of navicular (scaphoid) of left foot, subsequent encounter for fracture with delayed healing 07/03/2019     Puncture wound     L hand DOI  4/29/21     Sepsis (H) 01/19/2018    He had an incomplete (due to stricture) colonoscopy day of presentation and after that developed acute abdominal pain and fever. He was septic and suspected to have a micro perforation. Imaging negative for larger perforation        CC No referring provider defined for this encounter. on close of this encounter.

## 2021-11-12 NOTE — PATIENT INSTRUCTIONS
[facility] Dermatology Visit    Thank you for allowing us to participate in your care. Your findings, instructions and follow-up plan are as follows:         When should I call my doctor?    If you are worsening or not improving, please, contact us or seek urgent care as noted below.     Who should I call with questions (adults)?    [facility] (adult and pediatric): [phone]    [facility] (adult): [phone]    For urgent needs outside of business hours call the [facility] at [phone] and ask for the dermatology resident on call    If this is a medical emergency and you are unable to reach an ER, Call 911    Who should I call with questions (pediatric)?  [facility]- Pediatric Dermatology  Dr. Pattie Trivedi, Dr. John Cooper, Dr. Beckie Lamar, Alisa Ordonez, PA  Dr. Shaista Luu, Dr. Vicky Gonzalez & Dr. Antonio Mayorga  Non Urgent  Nurse Triage Line; [phone]- Hazel and Yelena POWELL Care Coordinators   [facility] (/Complex ) [phone]    If you need a prescription refill, please contact your pharmacy. Refills are approved or denied by our physicians during normal business hours, Monday through Fridays  Per office policy, refills will not be granted if you have not been seen within the past year (or sooner depending on your child's condition).    Scheduling Information:  Pediatric Appointment Scheduling and Call Center [phone]  Radiology Scheduling- [phone]  Sedation Unit Scheduling- [phone]  [place] Scheduling- General [phone]; Pediatric Dermatology [phone]  Main  Services: [phone]  Tajik: [phone]  Afghan: [phone]  Hmong/Ukrainian/Maltese: [phone]  Preadmission Nursing Department Fax Number: [phone] (fax all pre-operative paperwork to this number)    For urgent matters arising during evenings,  weekends, or holidays that cannot wait for normal business hours please call [phone] and ask for the dermatology resident on call to be paged.            I recommend discussing with PCP if you should get the HPV vaccine.

## 2021-12-01 ENCOUNTER — OFFICE VISIT (OUTPATIENT)
Dept: URGENT CARE | Facility: URGENT CARE | Age: 51
End: 2021-12-01
Payer: COMMERCIAL

## 2021-12-01 VITALS
HEART RATE: 78 BPM | SYSTOLIC BLOOD PRESSURE: 127 MMHG | DIASTOLIC BLOOD PRESSURE: 73 MMHG | RESPIRATION RATE: 16 BRPM | TEMPERATURE: 96.5 F | OXYGEN SATURATION: 98 %

## 2021-12-01 DIAGNOSIS — R42 DIZZINESS: Primary | ICD-10-CM

## 2021-12-01 PROCEDURE — 99213 OFFICE O/P EST LOW 20 MIN: CPT | Performed by: FAMILY MEDICINE

## 2021-12-01 ASSESSMENT — PAIN SCALES - GENERAL: PAINLEVEL: NO PAIN (0)

## 2021-12-01 NOTE — PATIENT INSTRUCTIONS
Patient Education     Dizziness (Uncertain Cause)  Dizziness is a common symptom. It may be described as lightheadedness, spinning, or feeling like you are going to faint. Dizziness can have many causes.   Tell the healthcare provider about:     All medicines you take, including prescription, over-the-counter, herbs, and supplements    Any other symptoms you have    Any health problems you are being treated for    Any past major health problems you've had, such as a heart attack, balance issues, hearing problems, or blood pressure problems    Anything that causes the dizziness to get worse or better  Today's exam did not show an exact cause for your dizziness . Other tests may be needed. Follow up with your healthcare provider.   Home care    Dizziness that occurs with sudden standing may be a sign of mild dehydration. Drink extra fluids for the next few days.    If you recently started a new medicine, stopped a medicine, or had the dose of a current medicine changed, talk with the prescribing healthcare provider. Your medicine plan may need adjustment.    If dizziness lasts more than a few seconds, sit or lie down until it passes. This may help prevent injury in case you pass out. Get up slowly when you feel better.    Don't drive or use power tools or dangerous equipment until you have had no dizziness for at least 48 hours.    Follow-up care  Follow up with your healthcare provider for further evaluation in the next 7 days, or as advised.   When to get medical advice  Call your healthcare provider for any of the following:     Worsening of symptoms or new symptoms    Repeated vomiting    Headache    Vision or hearing changes  Call 911  Call 911, or get medical care right away if any of these occur:     Chest, arm, neck, back, or jaw pain    Weakness of an arm or leg or one side of the face    Blood in vomit or stool (black or red color)    Shortness of breath    Feeling that your heart is fluttering or beating  fast or hard (palpitations)    Passing out or seizure    Trouble walking or speaking  [employer] last reviewed this educational content on 2/1/2020 2000-2021 [employer]. All rights reserved. This information is not intended as a substitute for professional medical care. Always follow your healthcare professional's instructions.           Patient Education     Dizziness (Vertigo) and Balance Problems: Staying Safe      Replace burned-out light bulbs to keep your home safe and well lit.   Falls or accidents can lead to pain, broken bones, a hospital stay, and a fear of future falls. Protect yourself and others by preparing for episodes. Simple steps can help you stay safe at home and wherever you go.  Lighting  Keep all areas well lit. This helps your eyes send the right signals to the brain. It also makes you less likely to trip and fall. If bright lights make symptoms worse, dim the lights or lie in a dark room until the dizziness passes. Then turn the lights back to their normal level.  Tips:    Keep a flashlight by the bed.    Place nightlights in bathrooms and hallways.    Replace burned-out bulbs. Or have someone replace them for you.  Preventing falls  To reduce your risk of falling:    Get out of bed or up from a chair slowly.    Wear low-heeled shoes that fit properly and have slip-resistant soles.    Remove throw rugs. Clear clutter from walkways.    Use handrails on stairs. Have handrails installed or adjusted if needed.    Install grab bars in the bathroom. Don't use towel racks for balance.    Use a shower stool. Also put adhesive strips in the shower or on the tub floor.  Going out  With a little time and preparation, you can get around safely.  Tips:    Bring a cane or walking aid if needed.    Give yourself plenty of time in case you start to get dizzy.    Ask your healthcare provider what type of exercise is safe for your condition.    Be patient. If an activity such as walking through  a crowded shop causes you stress, you may not be ready for it yet.  Driving  If you become dizzy or disoriented while driving, you could hurt yourself and others. That's why it's best to not drive until symptoms have gone away. In some cases, your license may be temporarily held until it's safe for you to drive again.  For safety:    Ask a friend to drive for you.    Take public transportation.    Walk to stores and other places when you can.     Don't be afraid to ask for help running errands, cooking meals, and doing exercise. Whether it's a friend, loved one, neighbor, or stranger on the street, a little help can make a world of difference. Ask your healthcare provider for a list of community resources if you need help maintaining your independence at home.  [employer] last reviewed this educational content on 1/1/2020 2000-2021 [employer]. All rights reserved. This information is not intended as a substitute for professional medical care. Always follow your healthcare professional's instructions.

## 2021-12-01 NOTE — PROGRESS NOTES
Chief complaint: dizzy    6 days got dizzy - described feels a bit spinning  When he walks feels unsteady  Very unusual for him  Was bad the first couple of days     Tried over the counter dramamine didn't help much    4 days ago was better 50%    And now still persistent 25%    Constant sensation   Sudden head movement seems to worse     Still - doesn't seem to bother  But when walking then more noticeable.\    3 days got covid test and was negative PCR     Aggravating factors:  When it was first started really triggered by head movement and now is a bit better  Relieving factors: above   Chest pain or palpitation: No  Syncope or presyncope: No  Motor,sensory weakness, or slurring of speech: No  Headache: No  Blurring of vision : No  Nausea: YES  Vomiting: No  Abdominal pain: No  Recent trauma: No    Tried supportive treatment  At home no relief  Additional Information:     Happened when they were driving down from [place]   No fevers or chills chest pain or shortness of breath  No loss of taste or smell  No nausea vomiting or diarrhea  No hematemesis hematochezia or melena  No known ill contacts     Problem list and histories reviewed & adjusted, as indicated.  Additional history: as documented    Problem list, Medication list, Allergies, and Medical/Social/Surgical histories reviewed in [facility] and updated as appropriate.    ROS:  Constitutional, HEENT, cardiovascular, pulmonary, gi and gu systems are negative, except as otherwise noted.    OBJECTIVE:                                                    /73   Pulse 78   Temp (!) 96.5  F (35.8  C) (Tympanic)   Resp 16   SpO2 98%   There is no height or weight on file to calculate BMI.  GENERAL: healthy, alert and no distress  EYES: Eyes grossly normal to inspection, PERRL and conjunctivae and sclerae normal  HENT: ear canals and TM's normal, nose and mouth without ulcers or lesions  NECK: no adenopathy, no asymmetry, masses, or scars and thyroid normal to  palpation  RESP: lungs clear to auscultation - no rales, rhonchi or wheezes  CV: regular rate and rhythm, normal S1 S2, no S3 or S4, no murmur, click or rub, no peripheral edema and peripheral pulses strong  ABDOMEN: soft, nontender, no hepatosplenomegaly, no masses and bowel sounds normal  MS: no gross musculoskeletal defects noted, no edema  SKIN: no suspicious lesions or rashes  NEURO: Normal strength and tone, mentation intact and speech normal  PSYCH: mentation appears normal, affect normal/bright    Diagnostic Test Results:  No results found for this or any previous visit (from the past 24 hour(s)).     ASSESSMENT/PLAN:                                                        ICD-10-CM    1. Dizziness  R42        Dizziness is likely peripheral and already improving.  Suspect either BPPV or vertigo from viral syndrome.   I discussed labs and imaging. Patient declined at this time  See AVS    See patient instructions.  Signs and symptoms of worsening dizziness discussed. Alarm symptoms of possible CVA or cardiac events discussed. If with any of these advised to go to ER.    No driving and avoid being on any unprotected heights until dizziness is resolved.    Recommend follow up with primary care provider if no relief in 3 days, sooner if worse  Adverse reactions of medications discussed.  Over the counter medications discussed.   Aware to come back in if with worsening symptoms or if no relief despite treatment plan  Patient voiced understanding and had no further questions.     MD Geena Erickson MD  [facility] CARE [place]

## 2022-01-16 ENCOUNTER — HEALTH MAINTENANCE LETTER (OUTPATIENT)
Age: 52
End: 2022-01-16

## 2022-02-01 ENCOUNTER — MYC MEDICAL ADVICE (OUTPATIENT)
Dept: DERMATOLOGY | Facility: CLINIC | Age: 52
End: 2022-02-01
Payer: COMMERCIAL

## 2022-02-02 ENCOUNTER — MYC MEDICAL ADVICE (OUTPATIENT)
Dept: FAMILY MEDICINE | Facility: CLINIC | Age: 52
End: 2022-02-02
Payer: COMMERCIAL

## 2022-02-09 NOTE — PROGRESS NOTES
[facility] Dermatology Note  Encounter Date: Feb 14, 2022  Office Visit     Dermatology Problem List:  Last skin check 11/12/21  1. History of immunosuppression, Chron's  2. Bowenoid papulosis, left abdomen  -s/p biopsy 4/18/15  -s/p Efudex x 6 weeks initiated 5/6/15, marked resolved 8/2015  3. Verruca plantaris  -Previous Tx: cantherone, trichloracetic acid, cryotherapy and PDL with Dr. Chris Stoddard, s/p candida 3X, cryotherapy  4. Tinea versicolor, back and chest  -Previous Tx:  ketoconazole 2% cream initiated 10/23/2015  5. 2 cm fissure on crease of R posterior ear - not healing  -current tx; hydrocortisone 2.5% cream  6. Lipoma, left bicep excision  7. Verrucoid keratosis vs indolent wart, right 2nd toe.  s/p scoop shave biopsy.   8. Dermatitis(psoriasiform on 12/2020 biopsy)R postauricular - possibly triggered by humira    ____________________________________________    Assessment & Plan:    # Right second toe: red maroon macule with adjacent thick scale. No thrombosed capillaries visible. Derm path from 1/28/21 reviewed.    - Apply Duoderm daily. Duoderm applied in clinic today.   - Photographed today.   - Will reevaluate in 3 months.    # Seborrheic keratosis, symptomatic - right mid back    - See procedure note.     Procedures Performed:   - Cryotherapy procedure note, location(s): right mid back. After verbal consent and discussion of risks and benefits including, but not limited to, dyspigmentation/scar, blister, and pain, 1 lesion(s) was(were) treated with 1-2 mm freeze border for 1-2 cycles with liquid nitrogen. Post cryotherapy instructions were provided.    Follow-up: 3 month(s) in-person, or earlier for new or changing lesions    Staff and Scribe:     Scribe Disclosure:   Weston ANDREA, am serving as a scribe to document services personally performed by this physician, Dr. Faisal Vargas, based on data collection and the provider's statements to me.     Provider Disclosure:  "  The documentation recorded by the scribe accurately reflects the services I personally performed and the decisions made by me.  I personally performed the procedures today.    Faisla Vargas DO    Department of Dermatology  [facility]: Phone: [phone], Fax:[phone]  [facility]: Phone: [phone], Fax: [phone]    ____________________________________________    CC: RECHECK (Verrucous papule on the right 2nd toe) and Derm Problem (possible removal  Papule c/wseborrheic keratosis, right back )    HPI:  Mr. Jorge Amaral is a(n) 51 year old male who presents today as a return patient for a spot check.    Last seen on 11/12/21 with Dr. Woods for a skin check. At that time, a verrucous papule on the right 2nd toe was noted, recommended reevaluation by derm surg. A papule c/w SK on the right back was also noted for potential removal.    Today, he presents with a persistent lesion on the right second toe. When he applies pressure to the area, it is painful. This is lead him to change the way he walks to avoid putting pressure on the area. He does not believe anything would have caused a callus to form here, as he can't recall any changes in his habits and shoes that could account for this. It has not been bleeding recently, but he does admit he has tried to \"peel it out\" occasionally using a nail clipper. He has taken photos of this lesion to be reviewed. 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    He also has a lesion on his right back that he would like examined today. This area has been bothersome and he is interested in having it treated.    Patient is otherwise feeling well, without additional skin concerns.    Labs Reviewed:  N/A    Physical Exam:  Vitals: There were no vitals taken for this visit.  SKIN: Focused examination of right second toe and right back " was performed.  - Right second toe: red maroon macule with adjacent thick scale. No thrombosed capillaries visible.  - There is a tan to brown waxy stuck on papule with surrounding erythema on the right mid back.  - No other lesions of concern on areas examined.     Medications:  Current Outpatient Medications   Medication     cyclobenzaprine (FLEXERIL) 5 MG tablet     diphenoxylate-atropine (LOMOTIL) 2.5-0.025 MG per tablet     hydrocortisone 2.5 % cream     mercaptopurine (PURINETHOL) 50 MG tablet     rivaroxaban ANTICOAGULANT (XARELTO ANTICOAGULANT) 20 MG TABS tablet     sodium chloride 0.9 % SOLN 250 mL with ustekinumab 130 MG/26ML SOLN infusion     tacrolimus (PROTOPIC) 0.1 % external ointment     ustekinumab (STELARA) 90 MG/ML     valACYclovir (VALTREX) 1000 mg tablet     XARELTO ANTICOAGULANT 15 MG TABS tablet     zolpidem (AMBIEN) 5 MG tablet     Current Facility-Administered Medications   Medication     ropivacaine (NAROPIN) injection 3 mL     triamcinolone (KENALOG-40) injection 40 mg      Past Medical History:   Patient Active Problem List   Diagnosis     CARDIOVASCULAR SCREENING; LDL GOAL LESS THAN 160     Family history of diabetes mellitus     Cold sore     Common wart     Crohn's disease with complication, unspecified gastrointestinal tract location (H)     FH: prostate cancer     BMI 28.0-28.9,adult     History of pulmonary embolism     Left knee pain     Past Medical History:   Diagnosis Date     ACL (anterior cruciate ligament) tear--recurrent 01/03/2013     Crohn's disease (H)      Delayed union of fracture 12/01/2011     Fracture, metacarpal 08/03/2011     Large bowel perforation (H) 08/16/2010     Nondisplaced fracture of navicular (scaphoid) of left foot, subsequent encounter for fracture with delayed healing 07/03/2019     Puncture wound     L hand DOI 4/29/21     Sepsis (H) 01/19/2018    He had an incomplete (due to stricture) colonoscopy day of presentation and after that developed acute  abdominal pain and fever. He was septic and suspected to have a micro perforation. Imaging negative for larger perforation        CC No referring provider defined for this encounter. on close of this encounter.

## 2022-02-10 ENCOUNTER — ALLIED HEALTH/NURSE VISIT (OUTPATIENT)
Dept: FAMILY MEDICINE | Facility: CLINIC | Age: 52
End: 2022-02-10
Payer: COMMERCIAL

## 2022-02-10 DIAGNOSIS — Z23 NEED FOR VACCINATION: Primary | ICD-10-CM

## 2022-02-10 PROCEDURE — 90471 IMMUNIZATION ADMIN: CPT

## 2022-02-10 PROCEDURE — 90651 9VHPV VACCINE 2/3 DOSE IM: CPT

## 2022-02-10 PROCEDURE — 99207 PR NO CHARGE NURSE ONLY: CPT

## 2022-02-10 NOTE — PROGRESS NOTES
Clinic Administered Medication Documentation          Injectable Medication Documentation    Patient was given HPV. Per dermatology and Vince KOHLI Prior to medication administration, verified patients identity using patient s name and date of birth. Please see MAR and medication order for additional information. Patient instructed to remain in clinic for 15 minutes.    Rossana Logan MA

## 2022-02-11 ENCOUNTER — OFFICE VISIT (OUTPATIENT)
Dept: FAMILY MEDICINE | Facility: CLINIC | Age: 52
End: 2022-02-11
Payer: COMMERCIAL

## 2022-02-11 ENCOUNTER — MYC MEDICAL ADVICE (OUTPATIENT)
Dept: FAMILY MEDICINE | Facility: CLINIC | Age: 52
End: 2022-02-11
Payer: COMMERCIAL

## 2022-02-11 VITALS
HEART RATE: 70 BPM | TEMPERATURE: 96.2 F | RESPIRATION RATE: 20 BRPM | SYSTOLIC BLOOD PRESSURE: 110 MMHG | BODY MASS INDEX: 27.99 KG/M2 | DIASTOLIC BLOOD PRESSURE: 70 MMHG | WEIGHT: 218 LBS | OXYGEN SATURATION: 98 %

## 2022-02-11 DIAGNOSIS — N48.89 PENILE PAIN: Primary | ICD-10-CM

## 2022-02-11 PROCEDURE — 99213 OFFICE O/P EST LOW 20 MIN: CPT | Performed by: FAMILY MEDICINE

## 2022-02-11 NOTE — PROGRESS NOTES
SUBJECTIVE:  Jorge Amaral, a 51 year old male scheduled an appointment to discuss the following issues:  Pain at the base of penis for a few weeks , felt a lump this morning   No concern about STD   No rashes/ulcers. No urine changes or ulcers. No discharge.   Lump noted. Painful.   Crohn's stable. No bowel changes. No med changes.   Rectal pain.   Medical, social, surgical, and family histories reviewed.    ROS:  All other ROS negative.     OBJECTIVE:  /70   Pulse 70   Temp (!) 96.2  F (35.7  C) (Tympanic)   Resp 20   Wt 98.9 kg (218 lb)   SpO2 98%   BMI 27.99 kg/m    EXAM:  GENERAL APPEARANCE: healthy, alert and no distress  GU_male: testicles normal without atrophy or masses, no hernias and penis normal without urethral discharge except mild tenderness dorsum proximal penis.   MS: extremities normal- no gross deformities noted, no evidence of inflammation in joints, FROM in all extremities.  PSYCH: mentation appears normal and affect normal/bright    ASSESSMENT / PLAN:  (N48.89) Penile pain  (primary encounter diagnosis)  Comment: strain vs superficial venous occulsion vs?  Plan: Adult Urology Referral        Heat/rest. Follow-up urology if not improving in next couple weeks or sooner if worse/new issues. Expected course and warning signs reviewed. Call/email with questions/concerns     Brock Duffy MD     Answers for HPI/ROS submitted by the patient on 2/11/2022  How many servings of fruits and vegetables do you eat daily?: 0-1  On average, how many sweetened beverages do you drink each day (Examples: soda, juice, sweet tea, etc.  Do NOT count diet or artificially sweetened beverages)?: 5  How many minutes a day do you exercise enough to make your heart beat faster?: 60 or more  How many days a week do you exercise enough to make your heart beat faster?: 4  How many days per week do you miss taking your medication?: 2

## 2022-02-14 ENCOUNTER — OFFICE VISIT (OUTPATIENT)
Dept: DERMATOLOGY | Facility: CLINIC | Age: 52
End: 2022-02-14
Payer: COMMERCIAL

## 2022-02-14 ENCOUNTER — MYC MEDICAL ADVICE (OUTPATIENT)
Dept: FAMILY MEDICINE | Facility: CLINIC | Age: 52
End: 2022-02-14

## 2022-02-14 DIAGNOSIS — L82.0 SEBORRHEIC KERATOSES, INFLAMED: ICD-10-CM

## 2022-02-14 DIAGNOSIS — L82.1 VERRUCOUS KERATOSIS: Primary | ICD-10-CM

## 2022-02-14 PROCEDURE — 17110 DESTRUCTION B9 LES UP TO 14: CPT | Performed by: DERMATOLOGY

## 2022-02-14 ASSESSMENT — PAIN SCALES - GENERAL: PAINLEVEL: NO PAIN (0)

## 2022-02-14 NOTE — LETTER
2/14/2022         RE: Jorge Amaral  [address] [facility] [address]        Dear Colleague,    Thank you for referring your patient, Jorge Amaral, to the [facility]. Please see a copy of my visit note below.    [facility] Note  Encounter Date: Feb 14, 2022  Office Visit     Dermatology Problem List:  Last skin check 11/12/21  1. History of immunosuppression, Chron's  2. Bowenoid papulosis, left abdomen  -s/p biopsy 4/18/15  -s/p Efudex x 6 weeks initiated 5/6/15, marked resolved 8/2015  3. Verruca plantaris  -Previous Tx: cantherone, trichloracetic acid, cryotherapy and PDL with Dr. Chris Stoddard, s/p candida 3X, cryotherapy  4. Tinea versicolor, back and chest  -Previous Tx:  ketoconazole 2% cream initiated 10/23/2015  5. 2 cm fissure on crease of R posterior ear - not healing  -current tx; hydrocortisone 2.5% cream  6. Lipoma, left bicep excision  7. Verrucoid keratosis vs indolent wart, right 2nd toe.  s/p scoop shave biopsy.   8. Dermatitis(psoriasiform on 12/2020 biopsy)R postauricular - possibly triggered by humira    ____________________________________________    Assessment & Plan:    # Right second toe: red maroon macule with adjacent thick scale. No thrombosed capillaries visible. Derm path from 1/28/21 reviewed.    - Apply Duoderm daily. Duoderm applied in clinic today.   - Photographed today.   - Will reevaluate in 3 months.    # Seborrheic keratosis, symptomatic - right mid back    - See procedure note.     Procedures Performed:   - Cryotherapy procedure note, location(s): right mid back. After verbal consent and discussion of risks and benefits including, but not limited to, dyspigmentation/scar, blister, and pain, 1 lesion(s) was(were) treated with 1-2 mm freeze border for 1-2 cycles with liquid nitrogen. Post cryotherapy instructions were provided.    Follow-up: 3 month(s) in-person, or earlier for new or changing  "lesions    Staff and Scribe:     Scribe Disclosure:   I, Weston Juan, am serving as a scribe to document services personally performed by this physician, Dr. Faisal Vargas, based on data collection and the provider's statements to me.     Provider Disclosure:   The documentation recorded by the scribe accurately reflects the services I personally performed and the decisions made by me.  I personally performed the procedures today.    Faisal Vargas DO    Department of Dermatology  [facility]: Phone: [phone], Fax:[phone]  [facility]: Phone: [phone], Fax: [phone]    ____________________________________________    CC: RECHECK (Verrucous papule on the right 2nd toe) and Derm Problem (possible removal  Papule c/wseborrheic keratosis, right back )    HPI:  Mr. Jorge Amaral is a(n) 51 year old male who presents today as a return patient for a spot check.    Last seen on 11/12/21 with Dr. Woods for a skin check. At that time, a verrucous papule on the right 2nd toe was noted, recommended reevaluation by derm surg. A papule c/w SK on the right back was also noted for potential removal.    Today, he presents with a persistent lesion on the right second toe. When he applies pressure to the area, it is painful. This is lead him to change the way he walks to avoid putting pressure on the area. He does not believe anything would have caused a callus to form here, as he can't recall any changes in his habits and shoes that could account for this. It has not been bleeding recently, but he does admit he has tried to \"peel it out\" occasionally using a nail clipper. He has taken photos of this lesion to be reviewed. 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    He also has a lesion on his right back that he would like examined today. This area has been bothersome and " he is interested in having it treated.    Patient is otherwise feeling well, without additional skin concerns.    Labs Reviewed:  N/A    Physical Exam:  Vitals: There were no vitals taken for this visit.  SKIN: Focused examination of right second toe and right back was performed.  - Right second toe: red maroon macule with adjacent thick scale. No thrombosed capillaries visible.  - There is a tan to brown waxy stuck on papule with surrounding erythema on the right mid back.  - No other lesions of concern on areas examined.     Medications:  Current Outpatient Medications   Medication     cyclobenzaprine (FLEXERIL) 5 MG tablet     diphenoxylate-atropine (LOMOTIL) 2.5-0.025 MG per tablet     hydrocortisone 2.5 % cream     mercaptopurine (PURINETHOL) 50 MG tablet     rivaroxaban ANTICOAGULANT (XARELTO ANTICOAGULANT) 20 MG TABS tablet     sodium chloride 0.9 % SOLN 250 mL with ustekinumab 130 MG/26ML SOLN infusion     tacrolimus (PROTOPIC) 0.1 % external ointment     ustekinumab (STELARA) 90 MG/ML     valACYclovir (VALTREX) 1000 mg tablet     XARELTO ANTICOAGULANT 15 MG TABS tablet     zolpidem (AMBIEN) 5 MG tablet     Current Facility-Administered Medications   Medication     ropivacaine (NAROPIN) injection 3 mL     triamcinolone (KENALOG-40) injection 40 mg      Past Medical History:   Patient Active Problem List   Diagnosis     CARDIOVASCULAR SCREENING; LDL GOAL LESS THAN 160     Family history of diabetes mellitus     Cold sore     Common wart     Crohn's disease with complication, unspecified gastrointestinal tract location (H)     FH: prostate cancer     BMI 28.0-28.9,adult     History of pulmonary embolism     Left knee pain     Past Medical History:   Diagnosis Date     ACL (anterior cruciate ligament) tear--recurrent 01/03/2013     Crohn's disease (H)      Delayed union of fracture 12/01/2011     Fracture, metacarpal 08/03/2011     Large bowel perforation (H) 08/16/2010     Nondisplaced fracture of navicular  (scaphoid) of left foot, subsequent encounter for fracture with delayed healing 07/03/2019     Puncture wound     L hand DOI 4/29/21     Sepsis (H) 01/19/2018    He had an incomplete (due to stricture) colonoscopy day of presentation and after that developed acute abdominal pain and fever. He was septic and suspected to have a micro perforation. Imaging negative for larger perforation        CC No referring provider defined for this encounter. on close of this encounter.      Again, thank you for allowing me to participate in the care of your patient.        Sincerely,        Faisal Vargas MD

## 2022-02-14 NOTE — NURSING NOTE
Jorge Amaral's goals for this visit include:   Chief Complaint   Patient presents with     RECHECK     Verrucous papule on the right 2nd toe     Derm Problem     possible removal  Papule c/wseborrheic keratosis, right back        He requests these members of his care team be copied on today's visit information:     PCP: Vince Swain    Referring Provider:  No referring provider defined for this encounter.    There were no vitals taken for this visit.    Do you need any medication refills at today's visit? Rajni Osullivan LPN

## 2022-02-20 ENCOUNTER — TRANSFERRED RECORDS (OUTPATIENT)
Dept: HEALTH INFORMATION MANAGEMENT | Facility: CLINIC | Age: 52
End: 2022-02-20

## 2022-03-01 ENCOUNTER — MYC MEDICAL ADVICE (OUTPATIENT)
Dept: HEMATOLOGY | Facility: CLINIC | Age: 52
End: 2022-03-01
Payer: COMMERCIAL

## 2022-03-09 NOTE — PROGRESS NOTES
[facility]  [address] [place] [address]  Phone: [phone], Fax: [phone]    Outpatient Visit Note:    Patient: Jorge Amaral  MRN: [id number]  : 1970  CHONG: Mar 11, 2022      Assessment:  In summary, Jorge Amaral is a 51 year old man that had an unprovoked pulmonary embolism on 2020 and is therefore presently maintained on indefinite anticoagulation with Xarelto (rivaroxaban) 20mg daily. He is tolerating this well and remains an appropriate candidate at this time.    Plan:  1) Continue Xarelto (rivaroxaban) at 20mg daily.  2) OK to temporarily hold for martial arts training/sparring.  3) Due for CMP today.  4) Contact the clinic if any upcoming surgeries/procedures.     The patient is given our center's contact information and is instructed to call if he should have any further questions or concerns.  Otherwise, we will plan on seeing him back in 1 year.    Shea Holliday, nurse clinician is present throughout the entire clinic visit with the patient today.  Patient understands and agrees with the above plan and recommendation.    15 minutes spent on the date of the encounter doing chart review, review of outside records, review of test results, interpretation of tests, patient visit and documentation             Wendi Wheeler PA-C, [facility]  [facility]  [address] [place] [address], [facility] [address], [place] [address]  Main: [phone], Fax: [phone]  ------------------------------------------------------------------------------------------------------------------------------------------------------------------  History of Present Illness:  Jorge Amaral is a 51 year old man with a past medical history significant for Crohn's disease--controlled on Humira and unprovoked pulmonary embolism in 2020.    He is presently maintained on indefinite anticoagulation with  Xarelto (rivaroxaban) 20mg daily due to the unprovoked nature of his PE in August 2020. He is tolerating this well and reports no nuisance bleeding. He holds anticoagulation for martial arts sparring events/competitions, never for more than 2 days at a time. No upcoming planned surgeries/procedures. Reports no major changes to his health in the past year.    Venous Thromboembolism History:  Presented to ED on August 15, 2022 with complaints of chest pain and was found to have a pulmonary embolism. At the time he denied any recent periods of immobility, hospital stays, long distance travel, infection, or trauma/injury. He IBD was well controlled--not flaring. He did report a recent knee aspiration--but no other surgeries/procedures.    Initial consult with the [facility] was with Dr. Plaza on 9/23/20. Due to unprovoked/minimally provoked nature of this venous thromboembolism--indefinite anticoagulation was recommended.     Past Medical History:  Past Medical History:   Diagnosis Date     ACL (anterior cruciate ligament) tear--recurrent 01/03/2013     Crohn's disease (H)      Delayed union of fracture 12/01/2011     Fracture, metacarpal 08/03/2011     Large bowel perforation (H) 08/16/2010     Nondisplaced fracture of navicular (scaphoid) of left foot, subsequent encounter for fracture with delayed healing 07/03/2019     Puncture wound     L hand DOI 4/29/21     Sepsis (H) 01/19/2018    He had an incomplete (due to stricture) colonoscopy day of presentation and after that developed acute abdominal pain and fever. He was septic and suspected to have a micro perforation. Imaging negative for larger perforation       Past Surgical History:  Past Surgical History:   Procedure Laterality Date     APPENDECTOMY  1986     ARTHROSCOPY KNEE WITH MEDIAL MENISCECTOMY Left 01/14/2019    Procedure: LEFT KNEE ARTHROSCOPY WITH PARTIAL MEDIAL MENISCECTOMY;  Surgeon: Joleen Jama MD;  Location:  [facility] OR     COLONOSCOPY  05/14/2020     ENT SURGERY  06/28/2013    Left Tonsil biopsy     EXCISE MASS WRIST Right 11/07/2018    Procedure: EXCISION OF RIGHT DORSAL WRIST MASS;  Surgeon: Zohaib Edmond MD;  Location:  OR     LAPAROSCOPY PROCEDURE UNLISTED  08/16/2010    Exploratory laparotomy, lysis of adhesions, takedown enterocolonic fistula and colocononic fistula, with creation of loop ileostomy     OPEN REDUCTION INTERNAL FIXATION HAND  07/25/2011    right IV MCP hand 7/11     REMOVE HARDWARE LOWER EXTREMITY  04/06/2012    Procedure:REMOVE HARDWARE LOWER EXTREMITY; REMOVAL OF RIGHT TIBIAL SCREW; Surgeon:JOSE LUIS CARMICHAEL; Location: [facility]     SMALL BOWEL RESECTION  1986    terminal ileum resection at age 16     TAKEDOWN COLOSTOMY  12/27/2011       Medications:  Current Outpatient Medications   Medication Sig Dispense Refill     cyclobenzaprine (FLEXERIL) 5 MG tablet Take 5 mg by mouth daily        diphenoxylate-atropine (LOMOTIL) 2.5-0.025 MG per tablet Take 2 tablets by mouth        hydrocortisone 2.5 % cream Apply twice daily for 1 week behind ear, then start vaseline twice daily 30 g 0     mercaptopurine (PURINETHOL) 50 MG tablet Take 50 mg by mouth daily. 1 and half tabs daily       rivaroxaban ANTICOAGULANT (XARELTO ANTICOAGULANT) 20 MG TABS tablet Take 1 tablet (20 mg) by mouth daily (with dinner) 30 tablet 11     sodium chloride 0.9 % SOLN 250 mL with ustekinumab 130 MG/26ML SOLN infusion Inject into the vein once        tacrolimus (PROTOPIC) 0.1 % external ointment Apply twice daily as needed 60 g 3     ustekinumab (STELARA) 90 MG/ML 90 mg Every 8 weeks       valACYclovir (VALTREX) 1000 mg tablet two tablets by mouth twice daily for 1 day at onset of symptoms of a cold sore. 30 tablet 0     XARELTO ANTICOAGULANT 15 MG TABS tablet TAKE 1 TABLET (15 MG) BY MOUTH DAILY (WITH DINNER) 30 tablet 1     zolpidem (AMBIEN) 5 MG tablet Take tablet by mouth 15 minutes prior to sleep, for Sleep Study 1 tablet 0       Allergies:  No Known Allergies    ROS:  Denies epistaxis, oral/mucosal bleeding, excessive bruising/ecchymosis, hematuria, hematochezia, and melena.  Denies LE pain/swelling. Denies chest pain. Denies shortness of breath.   No abdominal pain.    Family History:  No Known family history of venous thromboembolism.    Objective:  Vitals: B/P: Data Unavailable, T: Data Unavailable, P: Data Unavailable, R: Data Unavailable, Wt: 0 lbs 0 oz  Exam:   Constitutional: Appears well, no distress  Respiratory: Normal work of breathing.  Skin: no petechiae, no ecchymosis.  Neuro: AOx3

## 2022-03-11 ENCOUNTER — OFFICE VISIT (OUTPATIENT)
Dept: HEMATOLOGY | Facility: CLINIC | Age: 52
End: 2022-03-11
Attending: PHYSICIAN ASSISTANT
Payer: COMMERCIAL

## 2022-03-11 VITALS
WEIGHT: 218.7 LBS | BODY MASS INDEX: 28.07 KG/M2 | HEART RATE: 76 BPM | OXYGEN SATURATION: 97 % | HEIGHT: 74 IN | RESPIRATION RATE: 15 BRPM | TEMPERATURE: 97.6 F | SYSTOLIC BLOOD PRESSURE: 136 MMHG | DIASTOLIC BLOOD PRESSURE: 84 MMHG

## 2022-03-11 DIAGNOSIS — Z86.711 HISTORY OF PULMONARY EMBOLISM: Primary | ICD-10-CM

## 2022-03-11 LAB
ALBUMIN SERPL-MCNC: 3.5 G/DL (ref 3.4–5)
ALP SERPL-CCNC: 76 U/L (ref 40–150)
ALT SERPL W P-5'-P-CCNC: 44 U/L (ref 0–70)
ANION GAP SERPL CALCULATED.3IONS-SCNC: <1 MMOL/L (ref 3–14)
AST SERPL W P-5'-P-CCNC: 21 U/L (ref 0–45)
BILIRUB SERPL-MCNC: 0.5 MG/DL (ref 0.2–1.3)
BUN SERPL-MCNC: 14 MG/DL (ref 7–30)
CALCIUM SERPL-MCNC: 9.4 MG/DL (ref 8.5–10.1)
CHLORIDE BLD-SCNC: 110 MMOL/L (ref 94–109)
CO2 SERPL-SCNC: 32 MMOL/L (ref 20–32)
CREAT SERPL-MCNC: 1.01 MG/DL (ref 0.66–1.25)
GFR SERPL CREATININE-BSD FRML MDRD: 89 ML/MIN/1.73M2
GLUCOSE BLD-MCNC: 100 MG/DL (ref 70–99)
POTASSIUM BLD-SCNC: 4.5 MMOL/L (ref 3.4–5.3)
PROT SERPL-MCNC: 7.4 G/DL (ref 6.8–8.8)
SODIUM SERPL-SCNC: 142 MMOL/L (ref 133–144)

## 2022-03-11 PROCEDURE — 80053 COMPREHEN METABOLIC PANEL: CPT | Performed by: PHYSICIAN ASSISTANT

## 2022-03-11 PROCEDURE — G0463 HOSPITAL OUTPT CLINIC VISIT: HCPCS

## 2022-03-11 PROCEDURE — 36415 COLL VENOUS BLD VENIPUNCTURE: CPT | Performed by: PHYSICIAN ASSISTANT

## 2022-03-11 PROCEDURE — 99212 OFFICE O/P EST SF 10 MIN: CPT | Performed by: PHYSICIAN ASSISTANT

## 2022-03-11 ASSESSMENT — PAIN SCALES - GENERAL: PAINLEVEL: NO PAIN (0)

## 2022-03-15 ENCOUNTER — OFFICE VISIT (OUTPATIENT)
Dept: UROLOGY | Facility: CLINIC | Age: 52
End: 2022-03-15
Attending: FAMILY MEDICINE
Payer: COMMERCIAL

## 2022-03-15 VITALS — OXYGEN SATURATION: 97 % | DIASTOLIC BLOOD PRESSURE: 79 MMHG | SYSTOLIC BLOOD PRESSURE: 122 MMHG | HEART RATE: 100 BPM

## 2022-03-15 DIAGNOSIS — N48.89 PENILE PAIN: ICD-10-CM

## 2022-03-15 DIAGNOSIS — N48.6 PEYRONIE'S DISEASE: Primary | ICD-10-CM

## 2022-03-15 PROCEDURE — 99243 OFF/OP CNSLTJ NEW/EST LOW 30: CPT | Performed by: UROLOGY

## 2022-03-15 NOTE — PROGRESS NOTES
S: Patient is a pleasant 51-year-old male who was requested to be seen by Dr. Brock Duffy for a consultation with regard to patient's penile pain and lesion.  Several weeks ago patient noticed pain at the base of the penis along with a lump.  He denies any trauma to the area.  He has no problem with erections.  Has no curvature to the penis.  Current Outpatient Medications   Medication Sig Dispense Refill     diphenoxylate-atropine (LOMOTIL) 2.5-0.025 MG per tablet Take 2 tablets by mouth As Needed       mercaptopurine (PURINETHOL) 50 MG tablet Take 50 mg by mouth daily. 1 and half tabs daily       rivaroxaban ANTICOAGULANT (XARELTO ANTICOAGULANT) 20 MG TABS tablet Take 1 tablet (20 mg) by mouth daily with food . Take at the same time each day. 30 tablet 11     ustekinumab (STELARA) 90 MG/ML 90 mg Every 8 weeks       hydrocortisone 2.5 % cream Apply twice daily for 1 week behind ear, then start vaseline twice daily (Patient not taking: Reported on 3/11/2022) 30 g 0     tacrolimus (PROTOPIC) 0.1 % external ointment Apply twice daily as needed (Patient not taking: Reported on 3/11/2022) 60 g 3     No Known Allergies  Past Medical History:   Diagnosis Date     ACL (anterior cruciate ligament) tear--recurrent 01/03/2013     Crohn's disease (H)      Delayed union of fracture 12/01/2011     Fracture, metacarpal 08/03/2011     Large bowel perforation (H) 08/16/2010     Nondisplaced fracture of navicular (scaphoid) of left foot, subsequent encounter for fracture with delayed healing 07/03/2019     Puncture wound     L hand DOI 4/29/21     Sepsis (H) 01/19/2018    He had an incomplete (due to stricture) colonoscopy day of presentation and after that developed acute abdominal pain and fever. He was septic and suspected to have a micro perforation. Imaging negative for larger perforation     Past Surgical History:   Procedure Laterality Date     APPENDECTOMY  1986     ARTHROSCOPY KNEE WITH MEDIAL MENISCECTOMY Left 01/14/2019     Procedure: LEFT KNEE ARTHROSCOPY WITH PARTIAL MEDIAL MENISCECTOMY;  Surgeon: Jose Luis Jama MD;  Location:  OR     COLONOSCOPY  05/14/2020     ENT SURGERY  06/28/2013    Left Tonsil biopsy     EXCISE MASS WRIST Right 11/07/2018    Procedure: EXCISION OF RIGHT DORSAL WRIST MASS;  Surgeon: Zohaib Edmond MD;  Location:  OR     LAPAROSCOPY PROCEDURE UNLISTED  08/16/2010    Exploratory laparotomy, lysis of adhesions, takedown enterocolonic fistula and colocononic fistula, with creation of loop ileostomy     OPEN REDUCTION INTERNAL FIXATION HAND  07/25/2011    right IV MCP hand 7/11     REMOVE HARDWARE LOWER EXTREMITY  04/06/2012    Procedure:REMOVE HARDWARE LOWER EXTREMITY; REMOVAL OF RIGHT TIBIAL SCREW; Surgeon:JOSE LUIS JAMA; Location: [facility]     SMALL BOWEL RESECTION  1986    terminal ileum resection at age 16     TAKEDOWN COLOSTOMY  12/27/2011      Family History   Problem Relation Age of Onset     Hypertension Mother      Respiratory Mother         SMOKER -EMPYSEMA     Hypertension Father      Diabetes Father      Prostate Cancer Father 50     Prostate Cancer Paternal Grandfather      Diabetes Brother      Prostate Cancer Paternal Uncle 50     Social History     Socioeconomic History     Marital status: Patient Declined     Spouse name: None     Number of children: None     Years of education: None     Highest education level: None   Occupational History     Occupation:      Employer: SELF   Tobacco Use     Smoking status: Never Smoker     Smokeless tobacco: Never Used     Tobacco comment: very rarely cigar - every 3 years or so    Vaping Use     Vaping Use: Never used   Substance and Sexual Activity     Alcohol use: Yes     Comment: social     Drug use: No     Sexual activity: Yes     Partners: Female   Other Topics Concern     Parent/sibling w/ CABG, MI or angioplasty before 65F 55M? Not Asked   Social History Narrative     None     Social Determinants of Health      Financial Resource Strain: Not on file   Food Insecurity: Not on file   Transportation Needs: Not on file   Physical Activity: Not on file   Stress: Not on file   Social Connections: Not on file   Intimate Partner Violence: Not on file   Housing Stability: Not on file       REVIEW OF SYSTEMS  =================  C: NEGATIVE for fever, chills, change in weight  I: NEGATIVE for worrisome rashes, moles or lesions  E/M: NEGATIVE for ear, mouth and throat problems  R: NEGATIVE for significant cough or SHORTNESS OF BREATH  CV:  NEGATIVE for chest pain, palpitations or peripheral edema  GI: NEGATIVE for nausea, abdominal pain, heartburn, or change in bowel habits  NEURO: NEGATIVE numbness/weakness  : see HPI  PSYCH: NEGATIVE depression/anxiety  LYmph: no new enlarged lymph nodes  Ortho: no new trauma/movements      Physical Exam:  /79 (BP Location: Right arm, Patient Position: Chair, Cuff Size: Adult Large)   Pulse 100   SpO2 97%    Patient is pleasant, in no acute distress, good general condition.  Heart:  negative, PMI normal  Lung: no evidence of respiratory distress    Abdomen: Soft, nondistended, non tender. No masses. No rebound or guarding.   Exam: Penis with dorsal penile plaque testes no masses but no scrotal skin lesion.  Skin: Warm and dry.  No redness.  Neuro: grossly normal  Musculaskeletal: moving all extremities  Psych normal mood and affect  Musculoskeletal  moving all extremities  Hematologic/Lymphatic/Immunologic: normal ant/post cervical, axillary, supraclavicular and inguinal nodes    Assessment/Plan:       Pleasant 51-year-old male with penile pain due to early Peyronie's disease.  Pathophysiology of this condition discussed at length.  Treatment options discussed which include observation, Xiaflex and lastly surgical correction.  Patient was reassured.  Follow-up with me as needed.

## 2022-05-05 NOTE — PROGRESS NOTES
SUBJECTIVE:   CC: Jorge Amaral is an 51 year old male who presents for preventative health visit.     Patient states that he has been doing well aside from his diet. He eats [employer] often and consumes energy drinks regularly. He competes in [employer] for his reported exercise. He has no significant concerns during today's encounter other than his diet and that he would like his PSA checked due to a family history of prostate cancer. Patient continues to take medications as prescribed and does not need any refills at this time.      Patient has been advised of split billing requirements and indicates understanding: Yes  Healthy Habits:    Getting at least 3 servings of Calcium per day:  Yes    Bi-annual eye exam:  Yes    Dental care twice a year:  Yes    Sleep apnea or symptoms of sleep apnea:  None    Diet:  Regular (no restrictions)    Frequency of exercise:  6-7 days/week    Duration of exercise:  Greater than 60 minutes    Taking medications regularly:  No    Barriers to taking medications:  Problems remembering to take them    Medication side effects:  None    PHQ-2 Total Score:    Additional concerns today:  Yes (tired all the time, pain on wrist, prostate cancer rescreen)    Ability to successfully perform activities of daily living: Yes, no assistance needed  Home safety:  none identified   Hearing impairment: None    History of crohn and see's GI.     Today's PHQ-2 Score:   PHQ-2 ( 1999 Pfizer) 2/11/2022   Q1: Little interest or pleasure in doing things 0   Q2: Feeling down, depressed or hopeless 0   PHQ-2 Score 0   PHQ-2 Total Score (12-17 Years)- Positive if 3 or more points; Administer PHQ-A if positive -   Q1: Little interest or pleasure in doing things Not at all   Q2: Feeling down, depressed or hopeless Not at all   PHQ-2 Score 0       Abuse: Current or Past(Physical, Sexual or Emotional)- No  Do you feel safe in your environment? Yes    Have you ever done Advance Care Planning? (For example,  a Health Directive, POLST, or a discussion with a medical provider or your loved ones about your wishes): No, advance care planning information given to patient to review.  Patient declined advance care planning discussion at this time.    Social History     Tobacco Use     Smoking status: Never Smoker     Smokeless tobacco: Never Used     Tobacco comment: very rarely cigar - every 3 years or so    Substance Use Topics     Alcohol use: Yes     Comment: social         Alcohol Use 12/4/2019   Prescreen: >3 drinks/day or >7 drinks/week? No   Prescreen: >3 drinks/day or >7 drinks/week? -       Last PSA:   PSA   Date Value Ref Range Status   01/13/2021 0.53 0 - 4 ug/L Final     Comment:     Assay Method:  Chemiluminescence using Siemens Vista analyzer       Reviewed orders with patient. Reviewed health maintenance and updated orders accordingly - Yes  Lab work is in process    Reviewed and updated as needed this visit by clinical staff   Tobacco  Allergies  Meds   Med Hx  Surg Hx  Fam Hx  Soc Hx           ICD-10-CM    1. Routine general medical examination at a health care facility  Z00.00 Lipid panel reflex to direct LDL Fasting     Comprehensive metabolic panel (BMP + Alb, Alk Phos, ALT, AST, Total. Bili, TP)     PSA, screen     Lipid panel reflex to direct LDL Fasting     Comprehensive metabolic panel (BMP + Alb, Alk Phos, ALT, AST, Total. Bili, TP)     PSA, screen   2. Crohn's disease with complication, unspecified gastrointestinal tract location (H)  K50.919    3. FH: prostate cancer  Z80.42    4. BMI 28.0-28.9,adult  Z68.28    5. High priority for 2019-nCoV vaccine  Z23 COVID-19,PF,PFIZER (12+ Yrs GRAY LABEL)   6. Fatigue, unspecified type  R53.83 Adult Sleep Eval & Management  Referral         Reviewed and updated as needed this visit by Provider                       Review of Systems   Constitutional: Negative.  Negative for activity change, appetite change, fatigue and fever.   HENT: Negative.   "Negative for ear pain, hearing loss and tinnitus.    Eyes: Negative.  Negative for photophobia, pain and visual disturbance.   Respiratory: Negative.  Negative for chest tightness, shortness of breath and wheezing.    Cardiovascular: Negative.  Negative for chest pain and palpitations.   Gastrointestinal: Negative.  Negative for abdominal pain, constipation, diarrhea, hematochezia, nausea and vomiting.   Genitourinary: Negative.  Negative for difficulty urinating, dysuria, frequency and urgency.   Musculoskeletal: Positive for arthralgias and myalgias. Negative for back pain.        Pain associated with physical activity   Skin: Negative.  Negative for color change and rash.   Neurological: Positive for headaches. Negative for dizziness, syncope and weakness.        Slight headaches daily attributed to sleep       OBJECTIVE:   /80   Pulse 110   Temp 97.2  F (36.2  C) (Tympanic)   Resp 18   Ht 1.88 m (6' 2\")   Wt 98.9 kg (218 lb)   SpO2 96%   BMI 27.99 kg/m      Physical Exam  Constitutional:       Appearance: Normal appearance. He is normal weight.   HENT:      Head: Normocephalic.      Right Ear: Tympanic membrane, ear canal and external ear normal.      Left Ear: Tympanic membrane, ear canal and external ear normal.      Nose: Nose normal.      Mouth/Throat:      Mouth: Mucous membranes are moist.      Pharynx: Oropharynx is clear.   Eyes:      Extraocular Movements: Extraocular movements intact.      Conjunctiva/sclera: Conjunctivae normal.      Pupils: Pupils are equal, round, and reactive to light.   Cardiovascular:      Rate and Rhythm: Normal rate and regular rhythm.      Pulses: Normal pulses.      Heart sounds: Normal heart sounds. No murmur heard.    No friction rub.   Pulmonary:      Effort: Pulmonary effort is normal. No respiratory distress.      Breath sounds: Normal breath sounds.   Chest:      Chest wall: No tenderness.   Abdominal:      General: Abdomen is flat. Bowel sounds are normal. " "There is no distension.      Palpations: Abdomen is soft.      Tenderness: There is no abdominal tenderness.   Musculoskeletal:         General: No tenderness, deformity or signs of injury. Normal range of motion.      Cervical back: Normal range of motion.   Skin:     General: Skin is warm.      Findings: No bruising or erythema.   Neurological:      General: No focal deficit present.      Mental Status: He is alert and oriented to person, place, and time. Mental status is at baseline.      Cranial Nerves: No cranial nerve deficit.      Sensory: No sensory deficit.      Motor: No weakness.   Psychiatric:         Mood and Affect: Mood normal.         Behavior: Behavior normal.         Thought Content: Thought content normal.         Judgment: Judgment normal.         Diagnostic Test Results:  Labs reviewed in Epic    ASSESSMENT/PLAN:     Problem List Items Addressed This Visit     Crohn's disease with complication, unspecified gastrointestinal tract location (H) (Chronic)    BMI 28.0-28.9,adult (Chronic)    FH: prostate cancer      Other Visit Diagnoses     Routine general medical examination at a health care facility    -  Primary    Relevant Orders    Lipid panel reflex to direct LDL Fasting    Comprehensive metabolic panel (BMP + Alb, Alk Phos, ALT, AST, Total. Bili, TP)    PSA, screen    High priority for 2019-nCoV vaccine        Relevant Orders    COVID-19,PF,PFIZER (12+ Yrs GRAY LABEL) (Completed)    Fatigue, unspecified type        Relevant Orders    Adult Sleep Eval & Management  Referral          Patient has been advised of split billing requirements and indicates understanding: Yes    COUNSELING:   Reviewed preventive health counseling, as reflected in patient instructions       Regular exercise       Healthy diet/nutrition    Estimated body mass index is 27.99 kg/m  as calculated from the following:    Height as of this encounter: 1.88 m (6' 2\").    Weight as of this encounter: 98.9 kg (218 " lb).     Weight management plan: Discussed healthy diet and exercise guidelines    He reports that he has never smoked. He has never used smokeless tobacco.      Counseling Resources:  ATP IV Guidelines  Pooled Cohorts Equation Calculator  FRAX Risk Assessment  ICSI Preventive Guidelines  Dietary Guidelines for Americans, 2010  USDA's MyPlate  ASA Prophylaxis  Lung CA Screening      ----- Services Performed by a MEDICAL STUDENT in Presence of ATTENDING Physician-------    Ollie MCALLISTER    The student acted as a scribe and the encounter documented above was completely performed by myself and the documentation reflects the work I have performed today.    Vince Swain PA-C

## 2022-05-06 ENCOUNTER — OFFICE VISIT (OUTPATIENT)
Dept: FAMILY MEDICINE | Facility: CLINIC | Age: 52
End: 2022-05-06
Payer: COMMERCIAL

## 2022-05-06 VITALS
BODY MASS INDEX: 27.98 KG/M2 | HEART RATE: 110 BPM | DIASTOLIC BLOOD PRESSURE: 80 MMHG | HEIGHT: 74 IN | RESPIRATION RATE: 18 BRPM | TEMPERATURE: 97.2 F | WEIGHT: 218 LBS | OXYGEN SATURATION: 96 % | SYSTOLIC BLOOD PRESSURE: 123 MMHG

## 2022-05-06 DIAGNOSIS — Z80.42 FH: PROSTATE CANCER: ICD-10-CM

## 2022-05-06 DIAGNOSIS — Z00.00 ROUTINE GENERAL MEDICAL EXAMINATION AT A HEALTH CARE FACILITY: Primary | ICD-10-CM

## 2022-05-06 DIAGNOSIS — R53.83 FATIGUE, UNSPECIFIED TYPE: ICD-10-CM

## 2022-05-06 DIAGNOSIS — K50.919 CROHN'S DISEASE WITH COMPLICATION, UNSPECIFIED GASTROINTESTINAL TRACT LOCATION (H): Chronic | ICD-10-CM

## 2022-05-06 DIAGNOSIS — Z23 HIGH PRIORITY FOR 2019-NCOV VACCINE: ICD-10-CM

## 2022-05-06 LAB
ALBUMIN SERPL-MCNC: 3.9 G/DL (ref 3.4–5)
ALP SERPL-CCNC: 81 U/L (ref 40–150)
ALT SERPL W P-5'-P-CCNC: 46 U/L (ref 0–70)
ANION GAP SERPL CALCULATED.3IONS-SCNC: 9 MMOL/L (ref 3–14)
AST SERPL W P-5'-P-CCNC: 26 U/L (ref 0–45)
BILIRUB SERPL-MCNC: 0.6 MG/DL (ref 0.2–1.3)
BUN SERPL-MCNC: 16 MG/DL (ref 7–30)
CALCIUM SERPL-MCNC: 9.2 MG/DL (ref 8.5–10.1)
CHLORIDE BLD-SCNC: 108 MMOL/L (ref 94–109)
CHOLEST SERPL-MCNC: 207 MG/DL
CO2 SERPL-SCNC: 25 MMOL/L (ref 20–32)
CREAT SERPL-MCNC: 1.16 MG/DL (ref 0.66–1.25)
FASTING STATUS PATIENT QL REPORTED: NO
GFR SERPL CREATININE-BSD FRML MDRD: 75 ML/MIN/1.73M2
GLUCOSE BLD-MCNC: 95 MG/DL (ref 70–99)
HDLC SERPL-MCNC: 45 MG/DL
LDLC SERPL CALC-MCNC: 118 MG/DL
NONHDLC SERPL-MCNC: 162 MG/DL
POTASSIUM BLD-SCNC: 3.5 MMOL/L (ref 3.4–5.3)
PROT SERPL-MCNC: 7.8 G/DL (ref 6.8–8.8)
PSA SERPL-MCNC: 0.67 UG/L (ref 0–4)
SODIUM SERPL-SCNC: 142 MMOL/L (ref 133–144)
TRIGL SERPL-MCNC: 220 MG/DL

## 2022-05-06 PROCEDURE — 90750 HZV VACC RECOMBINANT IM: CPT | Performed by: PHYSICIAN ASSISTANT

## 2022-05-06 PROCEDURE — 90471 IMMUNIZATION ADMIN: CPT | Performed by: PHYSICIAN ASSISTANT

## 2022-05-06 PROCEDURE — 91305 COVID-19,PF,PFIZER (12+ YRS): CPT | Performed by: PHYSICIAN ASSISTANT

## 2022-05-06 PROCEDURE — G0103 PSA SCREENING: HCPCS | Performed by: PHYSICIAN ASSISTANT

## 2022-05-06 PROCEDURE — 80061 LIPID PANEL: CPT | Performed by: PHYSICIAN ASSISTANT

## 2022-05-06 PROCEDURE — 80053 COMPREHEN METABOLIC PANEL: CPT | Performed by: PHYSICIAN ASSISTANT

## 2022-05-06 PROCEDURE — 0054A COVID-19,PF,PFIZER (12+ YRS): CPT | Performed by: PHYSICIAN ASSISTANT

## 2022-05-06 PROCEDURE — 99396 PREV VISIT EST AGE 40-64: CPT | Mod: 25 | Performed by: PHYSICIAN ASSISTANT

## 2022-05-06 PROCEDURE — 36415 COLL VENOUS BLD VENIPUNCTURE: CPT | Performed by: PHYSICIAN ASSISTANT

## 2022-05-06 ASSESSMENT — ENCOUNTER SYMPTOMS
BACK PAIN: 0
ACTIVITY CHANGE: 0
WHEEZING: 0
DYSURIA: 0
WEAKNESS: 0
APPETITE CHANGE: 0
VOMITING: 0
DIFFICULTY URINATING: 0
EYE PAIN: 0
MYALGIAS: 1
HEMATOCHEZIA: 0
CONSTITUTIONAL NEGATIVE: 1
CONSTIPATION: 0
CARDIOVASCULAR NEGATIVE: 1
ABDOMINAL PAIN: 0
ARTHRALGIAS: 1
DIARRHEA: 0
PHOTOPHOBIA: 0
COLOR CHANGE: 0
FEVER: 0
HEADACHES: 1
SHORTNESS OF BREATH: 0
FREQUENCY: 0
CHEST TIGHTNESS: 0
DIZZINESS: 0
RESPIRATORY NEGATIVE: 1
FATIGUE: 0
GASTROINTESTINAL NEGATIVE: 1
NAUSEA: 0
PALPITATIONS: 0
EYES NEGATIVE: 1

## 2022-05-06 ASSESSMENT — PAIN SCALES - GENERAL: PAINLEVEL: NO PAIN (0)

## 2022-08-25 ENCOUNTER — PRE VISIT (OUTPATIENT)
Dept: UROLOGY | Facility: CLINIC | Age: 52
End: 2022-08-25

## 2022-08-25 NOTE — TELEPHONE ENCOUNTER
MEDICAL RECORDS REQUEST   [facility]  Urology Clinic  [address] [place] [address]  PHONE: [phone]  Fax: [phone]        FUTURE VISIT INFORMATION                                                   Jorge Amaral : 1970 scheduled for future visit at [facility]    APPOINTMENT INFORMATION:    Date: 10/20/2022    Provider:  Cliff Langston MD    Reason for Visit/Diagnosis: peyronies    RECORDS REQUESTED FOR VISIT                                                     NOTES  STATUS/DETAILS   OFFICE NOTE from other specialist  yes, 03/15/2022 -- Samson Crenshaw MD @ [facility]   MEDICATION LIST  yes     PRE-VISIT CHECKLIST      Record collection complete Yes   Appointment appropriately scheduled           (right time/right provider) Yes   Joint diagnostic appointment coordinated correctly          (ensure right order & amount of time) Yes   MyChart activation Yes   Questionnaire complete If no, please explain PENDING

## 2022-09-14 ENCOUNTER — MYC MEDICAL ADVICE (OUTPATIENT)
Dept: FAMILY MEDICINE | Facility: CLINIC | Age: 52
End: 2022-09-14

## 2022-09-22 ENCOUNTER — ALLIED HEALTH/NURSE VISIT (OUTPATIENT)
Dept: FAMILY MEDICINE | Facility: CLINIC | Age: 52
End: 2022-09-22
Payer: COMMERCIAL

## 2022-09-22 DIAGNOSIS — Z23 NEED FOR VACCINATION: Primary | ICD-10-CM

## 2022-09-22 PROCEDURE — 99207 PR NO CHARGE NURSE ONLY: CPT

## 2022-09-22 PROCEDURE — 90471 IMMUNIZATION ADMIN: CPT

## 2022-09-22 PROCEDURE — 90651 9VHPV VACCINE 2/3 DOSE IM: CPT

## 2022-09-22 NOTE — PROGRESS NOTES
Prior to immunization administration, verified patients identity using patient s name and date of birth. Please see Immunization Activity for additional information.     Screening Questionnaire for Adult Immunization    Are you sick today?   No   Do you have allergies to medications, food, a vaccine component or latex?   No   Have you ever had a serious reaction after receiving a vaccination?   No   Do you have a long-term health problem with heart, lung, kidney, or metabolic disease (e.g., diabetes), asthma, a blood disorder, no spleen, complement component deficiency, a cochlear implant, or a spinal fluid leak?  Are you on long-term aspirin therapy?   No   Do you have cancer, leukemia, HIV/AIDS, or any other immune system problem?   No   Do you have a parent, brother, or sister with an immune system problem?   No   In the past 3 months, have you taken medications that affect  your immune system, such as prednisone, other steroids, or anticancer drugs; drugs for the treatment of rheumatoid arthritis, Crohn s disease, or psoriasis; or have you had radiation treatments?   No   Have you had a seizure, or a brain or other nervous system problem?   No   During the past year, have you received a transfusion of blood or blood    products, or been given immune (gamma) globulin or antiviral drug?   No   For women: Are you pregnant or is there a chance you could become       pregnant during the next month?   No   Have you received any vaccinations in the past 4 weeks?   No     Immunization questionnaire answers were all negative.        Per orders of Dr. Swain, injection of HPV given by Toña Magallon CMA. Patient instructed to remain in clinic for 15 minutes afterwards, and to report any adverse reaction to me immediately.       Screening performed by Toña Magallon CMA on 9/22/2022 at 8:57 AM.

## 2022-09-28 ENCOUNTER — PRE VISIT (OUTPATIENT)
Dept: UROLOGY | Facility: CLINIC | Age: 52
End: 2022-09-28

## 2022-10-20 ENCOUNTER — OFFICE VISIT (OUTPATIENT)
Dept: UROLOGY | Facility: CLINIC | Age: 52
End: 2022-10-20
Payer: COMMERCIAL

## 2022-10-20 VITALS
HEART RATE: 76 BPM | WEIGHT: 218 LBS | DIASTOLIC BLOOD PRESSURE: 81 MMHG | HEIGHT: 74 IN | BODY MASS INDEX: 27.98 KG/M2 | SYSTOLIC BLOOD PRESSURE: 115 MMHG

## 2022-10-20 DIAGNOSIS — N48.6 PEYRONIE'S DISEASE: Primary | ICD-10-CM

## 2022-10-20 PROCEDURE — 99213 OFFICE O/P EST LOW 20 MIN: CPT | Mod: GC | Performed by: UROLOGY

## 2022-10-20 NOTE — LETTER
10/20/2022       RE: Jorge Amaral  [address] [facility] [address]     Dear Colleague,    Thank you for referring your patient, Jorge Amaral, to the [facility] [place] [facility]. Please see a copy of my visit note below.    I am seeing Jorge Amaral in consultation for evaluation of Peyronie's disease.      HPI:  Jorge Amaral is a 52 year old male with history of Peyronie's disease seen by Dr. Crenshaw in March of 2022.    Onset: late winter/spring 2022.  Inciting trauma:  No, possibly related to martial arts but doesn't remember specific event.  Severity/Degree of curve now: none  ED: none  Dupuytren's contractures: No  Pain with erection: Continues to have pain with erections. Not taking any tylenol, NSAIDS.   Degree of curve precludes intercourse:  No    REVIEW OF SYSTEMS:  General: negative  Skin: negative  Eyes: negative  Ears/Nose/Throat: negative  Respiratory: negative  Cardiovascular: negative  Gastrointestinal: negative  Genitourinary: see HPI  Musculoskeletal: negative  Neurologic: negative  Psychiatric: negative  Hematologic/Lymphatic/Immunologic: negative  Endocrine: negative    PAST MEDICAL HX:  Past Medical History:   Diagnosis Date     ACL (anterior cruciate ligament) tear--recurrent 01/03/2013     Crohn's disease (H)      Delayed union of fracture 12/01/2011     Fracture, metacarpal 08/03/2011     Large bowel perforation (H) 08/16/2010     Nondisplaced fracture of navicular (scaphoid) of left foot, subsequent encounter for fracture with delayed healing 07/03/2019     Puncture wound     L hand DOI 4/29/21     Sepsis (H) 01/19/2018    He had an incomplete (due to stricture) colonoscopy day of presentation and after that developed acute abdominal pain and fever. He was septic and suspected to have a micro perforation. Imaging negative for larger perforation       PAST SURG HX:  Past Surgical History:   Procedure  Laterality Date     APPENDECTOMY  1986     ARTHROSCOPY KNEE WITH MEDIAL MENISCECTOMY Left 01/14/2019    Procedure: LEFT KNEE ARTHROSCOPY WITH PARTIAL MEDIAL MENISCECTOMY;  Surgeon: Jose Luis Jama MD;  Location:  OR     COLONOSCOPY  05/14/2020     ENT SURGERY  06/28/2013    Left Tonsil biopsy     EXCISE MASS WRIST Right 11/07/2018    Procedure: EXCISION OF RIGHT DORSAL WRIST MASS;  Surgeon: Zohaib Edmond MD;  Location:  OR     LAPAROSCOPY PROCEDURE UNLISTED  08/16/2010    Exploratory laparotomy, lysis of adhesions, takedown enterocolonic fistula and colocononic fistula, with creation of loop ileostomy     OPEN REDUCTION INTERNAL FIXATION HAND  07/25/2011    right IV MCP hand 7/11     REMOVE HARDWARE LOWER EXTREMITY  04/06/2012    Procedure:REMOVE HARDWARE LOWER EXTREMITY; REMOVAL OF RIGHT TIBIAL SCREW; Surgeon:JOSE LUIS JAMA; Location: [facility]     SMALL BOWEL RESECTION  1986    terminal ileum resection at age 16     TAKEDOWN COLOSTOMY  12/27/2011        FAMILY HX:  Family History   Problem Relation Age of Onset     Hypertension Mother      Respiratory Mother         SMOKER -EMPYSEMA     Hypertension Father      Diabetes Father      Prostate Cancer Father 50     Prostate Cancer Paternal Grandfather      Diabetes Brother      Prostate Cancer Paternal Uncle 50       SOCIAL HX:  Social History     Tobacco Use     Smoking status: Never     Smokeless tobacco: Never     Tobacco comments:     very rarely cigar - every 3 years or so    Vaping Use     Vaping Use: Never used   Substance Use Topics     Alcohol use: Yes     Comment: social     Drug use: No       MEDICATIONS:  Current Outpatient Medications   Medication Sig     mercaptopurine (PURINETHOL) 50 MG tablet Take 50 mg by mouth daily 1 and half tabs daily     rivaroxaban ANTICOAGULANT (XARELTO ANTICOAGULANT) 20 MG TABS tablet Take 1 tablet (20 mg) by mouth daily with food . Take at the same time each day.     diphenoxylate-atropine (LOMOTIL) 2.5-0.025  "MG per tablet Take 2 tablets by mouth As Needed (Patient not taking: Reported on 10/20/2022)     tacrolimus (PROTOPIC) 0.1 % external ointment Apply twice daily as needed (Patient not taking: Reported on 10/20/2022)     ustekinumab (STELARA) 90 MG/ML 90 mg Every 8 weeks (Patient not taking: Reported on 10/20/2022)     Current Facility-Administered Medications   Medication     ropivacaine (NAROPIN) injection 3 mL     triamcinolone (KENALOG-40) injection 40 mg       ALLERGIES:  Patient has no known allergies.      GENERAL PHYSICAL EXAM:     Ht 1.88 m (6' 2\")   Wt 98.9 kg (218 lb)   BMI 27.99 kg/m     Constitutional: No acute distress. Well nourished.   PSYCH: normal mood and affect.  NEURO: normal gait, no focal deficits.   EYES: anicteric, EOMI, PERR.  CARDIOPULMONARY: breathing non-labored, pulse regular rate/rhythm, no peripheral edema.  GI: Abdomen soft, non-tender, nondistended   MUSCULOSKELETAL: normal limb proportions, no muscle wasting, no contractures.  SKIN: Normal virilized hair distribution, no lesions, warts or rashes over genitalia, abdomen extremities or face.  HEME/LYMPH: no ecchymosis, no lymphadenopathy in groin, no lymphedema.     EXAM:  Phallus Circumcised, meatus adequate. Dorsal plaque felt at base of penis (2cm length x 0.5cm width)  Left testis descended , size is normal, consistency is normal. No intra-testicular masses.   Right testis descended , size is normal with possibly small asymptomatic hydrocele , consistency is difficult to ascertain with surrounding hydrocele. No intra-testicular masses.   Epididymes present, non-tender, non-enlarged.   Left cord: Vas present. No varicocele noted.  Right cord: Vas present. Possible Grade II varicocele noted.     Imaging/labs:  Lab Results   Component Value Date    PSA 0.67 05/06/2022    PSA 0.53 01/13/2021    PSA 0.47 12/04/2019    PSA 0.50 10/09/2018    PSA 0.51 08/11/2017        ASSESSMENT:     Peyronie's disease with penile pain with " erections. ~8 months since onset. No curvature or ED associated with this. Discussed that he may still be in the acute phase and recommended conservative measures to help with discomfort. We also discussed that there is level B evidence supporting the use of ESWL for peyronie's disease related penile pain, but will defer this unless pain becomes more problematic in the future.    We discussed that additional therapies such as xiaflex or surgery is typically for patients who have entered the chronic phase of the disease and is aimed at reducing curvature in order to facilitate penetrative intercourse. Neither would be indicated in his case at this time. We discussed that if he does develop significant curvature or difficulty with erections, he can reach out to reconsider treatment options.    PLAN:    Conservative therapy for peyronies related pain    Follow up prn if develops further sequelae     James Dumont MD  Urology Resident    Thank-you,  Cliff Langston MD     Urological Surgeon       I saw and examined the patient with the resident today.  I agree with the resident note and plan of care as above.   Recommended conservative management, observation, take care not to injure penis, NSAIDS.  Discussed there is evidence for shock wave treatment helping for Peyronie's disease pain.    Cliff Langston MD  Urology Staff       Additional Coding Information:    Problems:  3 -- one stable chronic illness    Data Reviewed  N/A     Tests ordered/pending: N/A     Level of risk:  3 -- low risk (e.g., OTC medication or observation, minor surgery without risks)    Time spent:  15 minutes spent on the date of the encounter doing chart review, history and exam, documentation and further activities per the note

## 2022-10-20 NOTE — PROGRESS NOTES
I am seeing Jorge Amaral in consultation for evaluation of Peyronie's disease.      HPI:  Jorge Amaral is a 52 year old male with history of Peyronie's disease seen by Dr. Crenshaw in March of 2022.    Onset: late winter/spring 2022.  Inciting trauma:  No, possibly related to martial arts but doesn't remember specific event.  Severity/Degree of curve now: none  ED: none  Dupuytren's contractures: No  Pain with erection: Continues to have pain with erections. Not taking any tylenol, NSAIDS.   Degree of curve precludes intercourse:  No    REVIEW OF SYSTEMS:  General: negative  Skin: negative  Eyes: negative  Ears/Nose/Throat: negative  Respiratory: negative  Cardiovascular: negative  Gastrointestinal: negative  Genitourinary: see HPI  Musculoskeletal: negative  Neurologic: negative  Psychiatric: negative  Hematologic/Lymphatic/Immunologic: negative  Endocrine: negative    PAST MEDICAL HX:  Past Medical History:   Diagnosis Date     ACL (anterior cruciate ligament) tear--recurrent 01/03/2013     Crohn's disease (H)      Delayed union of fracture 12/01/2011     Fracture, metacarpal 08/03/2011     Large bowel perforation (H) 08/16/2010     Nondisplaced fracture of navicular (scaphoid) of left foot, subsequent encounter for fracture with delayed healing 07/03/2019     Puncture wound     L hand DOI 4/29/21     Sepsis (H) 01/19/2018    He had an incomplete (due to stricture) colonoscopy day of presentation and after that developed acute abdominal pain and fever. He was septic and suspected to have a micro perforation. Imaging negative for larger perforation       PAST SURG HX:  Past Surgical History:   Procedure Laterality Date     APPENDECTOMY  1986     ARTHROSCOPY KNEE WITH MEDIAL MENISCECTOMY Left 01/14/2019    Procedure: LEFT KNEE ARTHROSCOPY WITH PARTIAL MEDIAL MENISCECTOMY;  Surgeon: Joleen Jama MD;  Location: [facility]     COLONOSCOPY  05/14/2020     ENT SURGERY  06/28/2013    Left Tonsil biopsy     EXCISE  MASS WRIST Right 11/07/2018    Procedure: EXCISION OF RIGHT DORSAL WRIST MASS;  Surgeon: Zohaib Edmond MD;  Location: [facility]     LAPAROSCOPY PROCEDURE UNLISTED  08/16/2010    Exploratory laparotomy, lysis of adhesions, takedown enterocolonic fistula and colocononic fistula, with creation of loop ileostomy     OPEN REDUCTION INTERNAL FIXATION HAND  07/25/2011    right IV MCP hand 7/11     REMOVE HARDWARE LOWER EXTREMITY  04/06/2012    Procedure:REMOVE HARDWARE LOWER EXTREMITY; REMOVAL OF RIGHT TIBIAL SCREW; Surgeon:JOSE LUIS CARMICHAEL; Location:[facility]     SMALL BOWEL RESECTION  1986    terminal ileum resection at age 16     TAKEDOWN COLOSTOMY  12/27/2011        FAMILY HX:  Family History   Problem Relation Age of Onset     Hypertension Mother      Respiratory Mother         SMOKER -EMPYSEMA     Hypertension Father      Diabetes Father      Prostate Cancer Father 50     Prostate Cancer Paternal Grandfather      Diabetes Brother      Prostate Cancer Paternal Uncle 50       SOCIAL HX:  Social History     Tobacco Use     Smoking status: Never     Smokeless tobacco: Never     Tobacco comments:     very rarely cigar - every 3 years or so    Vaping Use     Vaping Use: Never used   Substance Use Topics     Alcohol use: Yes     Comment: social     Drug use: No       MEDICATIONS:  Current Outpatient Medications   Medication Sig     mercaptopurine (PURINETHOL) 50 MG tablet Take 50 mg by mouth daily 1 and half tabs daily     rivaroxaban ANTICOAGULANT (XARELTO ANTICOAGULANT) 20 MG TABS tablet Take 1 tablet (20 mg) by mouth daily with food . Take at the same time each day.     diphenoxylate-atropine (LOMOTIL) 2.5-0.025 MG per tablet Take 2 tablets by mouth As Needed (Patient not taking: Reported on 10/20/2022)     tacrolimus (PROTOPIC) 0.1 % external ointment Apply twice daily as needed (Patient not taking: Reported on 10/20/2022)     ustekinumab (STELARA) 90 MG/ML 90 mg Every 8 weeks (Patient not taking: Reported on  "10/20/2022)     Current Facility-Administered Medications   Medication     ropivacaine (NAROPIN) injection 3 mL     triamcinolone (KENALOG-40) injection 40 mg       ALLERGIES:  Patient has no known allergies.      GENERAL PHYSICAL EXAM:     Ht 1.88 m (6' 2\")   Wt 98.9 kg (218 lb)   BMI 27.99 kg/m     Constitutional: No acute distress. Well nourished.   PSYCH: normal mood and affect.  NEURO: normal gait, no focal deficits.   EYES: anicteric, EOMI, PERR.  CARDIOPULMONARY: breathing non-labored, pulse regular rate/rhythm, no peripheral edema.  GI: Abdomen soft, non-tender, nondistended   MUSCULOSKELETAL: normal limb proportions, no muscle wasting, no contractures.  SKIN: Normal virilized hair distribution, no lesions, warts or rashes over genitalia, abdomen extremities or face.  HEME/LYMPH: no ecchymosis, no lymphadenopathy in groin, no lymphedema.     EXAM:  Phallus Circumcised, meatus adequate. Dorsal plaque felt at base of penis (2cm length x 0.5cm width)  Left testis descended , size is normal, consistency is normal. No intra-testicular masses.   Right testis descended , size is normal with possibly small asymptomatic hydrocele , consistency is difficult to ascertain with surrounding hydrocele. No intra-testicular masses.   Epididymes present, non-tender, non-enlarged.   Left cord: Vas present. No varicocele noted.  Right cord: Vas present. Possible Grade II varicocele noted.     Imaging/labs:  Lab Results   Component Value Date    PSA 0.67 05/06/2022    PSA 0.53 01/13/2021    PSA 0.47 12/04/2019    PSA 0.50 10/09/2018    PSA 0.51 08/11/2017        ASSESSMENT:     Peyronie's disease with penile pain with erections. ~8 months since onset. No curvature or ED associated with this. Discussed that he may still be in the acute phase and recommended conservative measures to help with discomfort. We also discussed that there is level B evidence supporting the use of ESWL for peyronie's disease related penile pain, but " will defer this unless pain becomes more problematic in the future.    We discussed that additional therapies such as xiaflex or surgery is typically for patients who have entered the chronic phase of the disease and is aimed at reducing curvature in order to facilitate penetrative intercourse. Neither would be indicated in his case at this time. We discussed that if he does develop significant curvature or difficulty with erections, he can reach out to reconsider treatment options.    PLAN:    Conservative therapy for peyronies related pain    Follow up prn if develops further sequelae     James Dumont MD  Urology Resident    Thank-you,  Cliff Langston MD     Urological Surgeon       I saw and examined the patient with the resident today.  I agree with the resident note and plan of care as above.   Recommended conservative management, observation, take care not to injure penis, NSAIDS.  Discussed there is evidence for shock wave treatment helping for Peyronie's disease pain.    Cliff Langston MD  Urology Staff       Additional Coding Information:    Problems:  3 -- one stable chronic illness    Data Reviewed  N/A     Tests ordered/pending: N/A     Level of risk:  3 -- low risk (e.g., OTC medication or observation, minor surgery without risks)    Time spent:  15 minutes spent on the date of the encounter doing chart review, history and exam, documentation and further activities per the note

## 2022-10-20 NOTE — NURSING NOTE
"Chief Complaint   Patient presents with     Consult     Peyronie's disease       Height 1.88 m (6' 2\"), weight 98.9 kg (218 lb). Body mass index is 27.99 kg/m .    Patient Active Problem List   Diagnosis     CARDIOVASCULAR SCREENING; LDL GOAL LESS THAN 160     Family history of diabetes mellitus     Cold sore     Common wart     Crohn's disease with complication, unspecified gastrointestinal tract location (H)     FH: prostate cancer     BMI 28.0-28.9,adult     History of pulmonary embolism     Left knee pain       No Known Allergies    Current Outpatient Medications   Medication Sig Dispense Refill     mercaptopurine (PURINETHOL) 50 MG tablet Take 50 mg by mouth daily 1 and half tabs daily       rivaroxaban ANTICOAGULANT (XARELTO ANTICOAGULANT) 20 MG TABS tablet Take 1 tablet (20 mg) by mouth daily with food . Take at the same time each day. 30 tablet 11     diphenoxylate-atropine (LOMOTIL) 2.5-0.025 MG per tablet Take 2 tablets by mouth As Needed (Patient not taking: Reported on 10/20/2022)       tacrolimus (PROTOPIC) 0.1 % external ointment Apply twice daily as needed (Patient not taking: Reported on 10/20/2022) 60 g 3     ustekinumab (STELARA) 90 MG/ML 90 mg Every 8 weeks (Patient not taking: Reported on 10/20/2022)         Social History     Tobacco Use     Smoking status: Never     Smokeless tobacco: Never     Tobacco comments:     very rarely cigar - every 3 years or so    Vaping Use     Vaping Use: Never used   Substance Use Topics     Alcohol use: Yes     Comment: social     Drug use: No       Yomi Ocampo EMT  10/20/2022  9:19 AM  "

## 2023-01-08 ENCOUNTER — HEALTH MAINTENANCE LETTER (OUTPATIENT)
Age: 53
End: 2023-01-08

## 2023-03-03 ENCOUNTER — OFFICE VISIT (OUTPATIENT)
Dept: ORTHOPEDICS | Facility: CLINIC | Age: 53
End: 2023-03-03
Payer: COMMERCIAL

## 2023-03-03 VITALS — BODY MASS INDEX: 29.02 KG/M2 | DIASTOLIC BLOOD PRESSURE: 75 MMHG | WEIGHT: 226 LBS | SYSTOLIC BLOOD PRESSURE: 126 MMHG

## 2023-03-03 DIAGNOSIS — M25.461 KNEE EFFUSION, RIGHT: ICD-10-CM

## 2023-03-03 DIAGNOSIS — Z98.890 HX OF ANTERIOR CRUCIATE LIGAMENT TEAR RECONSTRUCTION: ICD-10-CM

## 2023-03-03 DIAGNOSIS — M25.561 CHRONIC PAIN OF RIGHT KNEE: ICD-10-CM

## 2023-03-03 DIAGNOSIS — M71.21 BAKER'S CYST, RIGHT: ICD-10-CM

## 2023-03-03 DIAGNOSIS — M17.31 POST-TRAUMATIC OSTEOARTHRITIS OF RIGHT KNEE: ICD-10-CM

## 2023-03-03 DIAGNOSIS — M16.12 ARTHRITIS OF LEFT HIP: Primary | ICD-10-CM

## 2023-03-03 DIAGNOSIS — G89.29 CHRONIC PAIN OF RIGHT KNEE: ICD-10-CM

## 2023-03-03 DIAGNOSIS — M25.552 LEFT HIP PAIN: ICD-10-CM

## 2023-03-03 PROCEDURE — 20611 DRAIN/INJ JOINT/BURSA W/US: CPT | Mod: LT | Performed by: FAMILY MEDICINE

## 2023-03-03 PROCEDURE — 99214 OFFICE O/P EST MOD 30 MIN: CPT | Mod: 25 | Performed by: FAMILY MEDICINE

## 2023-03-03 RX ORDER — LIDOCAINE HYDROCHLORIDE 10 MG/ML
4 INJECTION, SOLUTION INFILTRATION; PERINEURAL
Status: DISCONTINUED | OUTPATIENT
Start: 2023-03-03 | End: 2024-02-09

## 2023-03-03 RX ORDER — ROPIVACAINE HYDROCHLORIDE 5 MG/ML
3 INJECTION, SOLUTION EPIDURAL; INFILTRATION; PERINEURAL
Status: DISCONTINUED | OUTPATIENT
Start: 2023-03-03 | End: 2024-02-09

## 2023-03-03 RX ORDER — TRIAMCINOLONE ACETONIDE 40 MG/ML
40 INJECTION, SUSPENSION INTRA-ARTICULAR; INTRAMUSCULAR
Status: DISCONTINUED | OUTPATIENT
Start: 2023-03-03 | End: 2024-02-09

## 2023-03-03 RX ADMIN — ROPIVACAINE HYDROCHLORIDE 3 ML: 5 INJECTION, SOLUTION EPIDURAL; INFILTRATION; PERINEURAL at 11:04

## 2023-03-03 RX ADMIN — LIDOCAINE HYDROCHLORIDE 4 ML: 10 INJECTION, SOLUTION INFILTRATION; PERINEURAL at 11:04

## 2023-03-03 RX ADMIN — TRIAMCINOLONE ACETONIDE 40 MG: 40 INJECTION, SUSPENSION INTRA-ARTICULAR; INTRAMUSCULAR at 11:04

## 2023-03-03 NOTE — PROGRESS NOTES
Jorge Amaral  :  1970  DOS: 3/3/2023  MRN: [id number]    Sports Medicine Clinic Visit    PCP: Vince Swain    Interim History - March 3, 2023  Since last visit on 10/13/2021 patient has noticed pain increasing within the past month or so.  Left hip injection completed on 10/13/2021 provided good relief for 1+ years.  No new injury in the interim.    Social History: currently employed as     Review of Systems  Musculoskeletal: as above  Remainder of review of systems is negative including constitutional, CV, pulmonary, GI, Skin and Neurologic except as noted in HPI or medical history.    Past Medical History:   Diagnosis Date     ACL (anterior cruciate ligament) tear--recurrent 2013     Crohn's disease (H)      Delayed union of fracture 2011     Fracture, metacarpal 2011     Large bowel perforation (H) 2010     Nondisplaced fracture of navicular (scaphoid) of left foot, subsequent encounter for fracture with delayed healing 2019     Puncture wound     L hand DOI 21     Sepsis (H) 2018    He had an incomplete (due to stricture) colonoscopy day of presentation and after that developed acute abdominal pain and fever. He was septic and suspected to have a micro perforation. Imaging negative for larger perforation     Past Surgical History:   Procedure Laterality Date     APPENDECTOMY       ARTHROSCOPY KNEE WITH MEDIAL MENISCECTOMY Left 2019    Procedure: LEFT KNEE ARTHROSCOPY WITH PARTIAL MEDIAL MENISCECTOMY;  Surgeon: Joleen Jama MD;  Location: [facility]     COLONOSCOPY  2020     ENT SURGERY  2013    Left Tonsil biopsy     EXCISE MASS WRIST Right 2018    Procedure: EXCISION OF RIGHT DORSAL WRIST MASS;  Surgeon: Zohaib Edmond MD;  Location: [facility]     LAPAROSCOPY PROCEDURE UNLISTED  2010    Exploratory laparotomy, lysis of adhesions, takedown enterocolonic fistula and colocononic fistula, with  creation of loop ileostomy     OPEN REDUCTION INTERNAL FIXATION HAND  07/25/2011    right IV MCP hand 7/11     REMOVE HARDWARE LOWER EXTREMITY  04/06/2012    Procedure:REMOVE HARDWARE LOWER EXTREMITY; REMOVAL OF RIGHT TIBIAL SCREW; Surgeon:JOSE LUIS CARMICHAEL; Location:[facility]     SMALL BOWEL RESECTION  1986    terminal ileum resection at age 16     TAKEDOWN COLOSTOMY  12/27/2011     Family History   Problem Relation Age of Onset     Hypertension Mother      Respiratory Mother         SMOKER -EMPYSEMA     Hypertension Father      Diabetes Father      Prostate Cancer Father 50     Prostate Cancer Paternal Grandfather      Diabetes Brother      Prostate Cancer Paternal Uncle 50       Objective  /75   Wt 102.5 kg (226 lb)   BMI 29.02 kg/m        General: healthy, alert and in no distress      HEENT: no scleral icterus or conjunctival erythema     Skin: no suspicious lesions or rash. No jaundice.     CV: regular rhythm by palpation, 2+ distal pulses, no pedal edema      Resp: normal respiratory effort without conversational dyspnea     Psych: normal mood and affect      Gait: nonantalgic, appropriate coordination and balance     Neuro: normal light touch sensory exam of the extremities. Motor strength as noted below     Left hip exam    Inspection:        no edema or ecchymosis in hip area    ROM:       Mildly decreased terminal active and passive ROM     Strength:        flexion 5/5       extension 5/5       abduction 5/5       adduction 5/5    Tender:        greater trochanter       Mild anterior hip joint    Non Tender:        remainder of hip area       illiac crest       ASIS       SI joint    Sensation:        grossly intact in hip and thigh    Skin:       well perfused       capillary refill brisk    Special Tests:        neg (-) ELLIOT       positive (+) FADIR       positive (+) scour       neg (-) Mayuri    Right Knee exam    ROM:        Flexion 140 degrees       Extension 0 degrees       Range of motion  limited by mild pain in terminal flexion    Inspection:       no visible ecchymosis        effusion noted small       Palpable swelling medial popliteal space    Skin:       no visible deformities       well perfused       capillary refill brisk    Patellar Motion:        Lateral tilt noted in patella       Crepitus noted in the patellofemoral joint    Tender:        Medial popliteal space       medial joint line    Non Tender:         remainder of knee area    Special Tests:        neg (-) Jose       Equivocal Lachmans       neg (-) varus at 0 deg and 30 deg       neg (-) valgus at 0 deg and 30 deg      Radiology  PELVIS AND HIP LEFT ONE VIEW   10/7/2021 1:33 PM      HISTORY:  Left hip pain.     FINDINGS: Lower lumbar spine degenerative change.                                                                      IMPRESSION: Mild-moderate left hip osteoarthritis. No fracture  identified.     MR RIGHT KNEE WITHOUT CONTRAST 12/13/2012 7:50 PM       HISTORY:Medial knee pain for 3 weeks after recurrent injury. ACL   graft repair March 2011.       TECHNIQUE: Sagittal proton density and T2 weighted images, coronal T1   and coronal and axial T2 fat suppressed images were obtained.       COMPARISON: 3/2/2011.       FINDINGS:   Menisci:   Medial meniscus: Interval partial meniscectomy involving the midbody   and posterior horn. The remaining peripheral mid body is   unremarkable. The remaining peripheral posterior horn shows an   irregular free edge which could indicate tear or degeneration. There   is a small meniscal flap or fragment adjacent to the far anterior   horn (image 18 of series 4 and image 12 of series   5. This may have originated from a tear in the far anterior horn or   may be displaced from the residual posterior horn.       Lateral meniscus: Normal.       Cruciate ligaments:   Anterior cruciate ligament: Interval placement of ACL allograft.   There is no definite graft continuity, especially in the  proximal   graft. This is suspicious for a complete tear of the graft although   there are no bone injuries to indicate that this is acute. Clinical   correlation for ACL insufficiency is recommended.       Posterior cruciate ligament: Normal.       Collateral supporting structures:   Medial collateral ligament: Normal.       Lateral supporting structures: Previously seen mild edema around the   fibular collateral ligament has resolved. The biceps tendon, fibular   collateral ligament, distal iliotibial tract and popliteus tendon are   intact.       Osseous structures and cartilaginous surfaces: The previously seen   bone marrow contusions have resolved. No acute appearing bony   abnormalities are seen. Metallic artifacts obscure some regions.   There is focal articular cartilage irregularity in the posterolateral   weightbearing medial femoral condyle without change. Remainder of the   articular cartilages in the medial compartment show mild diffuse   thinning, also unchanged. Articular cartilages in the weightbearing   lateral compartment are intact. There is again question of very early   chondromalacia at the patellar apex. Remainder of patellofemoral   cartilages are normal.       Additional findings: Minimal joint space fluid without change. Small   popliteal cyst, probably unchanged in size. Previously seen   nonspecific soft tissue edema about the knee has resolved.       Impression   IMPRESSION:   1. Interval placement of ACL allograft. Continuity of the fascicles   is not demonstrated, especially proximally. Disruption of the graft   is not excluded and clinical correlation is recommended.   2. Resolution of previously seen bone marrow injuries.   3. Interval partial medial meniscectomy involving the midbody and   posterior horn. There is a new small meniscal flap or fragment   projecting adjacent to the anterior horn which may have originated   from a flap tear of the far anterior horn or could be a  fragment   originating from the posterior horn.   4. Mild chondromalacia medial compartment and question of minimal   chondromalacia patellar apex, both without significant change.        Assessment:  1. Arthritis of left hip    2. Left hip pain    3. Chronic pain of right knee    4. Post-traumatic osteoarthritis of right knee    5. Hx of anterior cruciate ligament tear reconstruction    6. Knee effusion, right    7. Baker's cyst, right         Large Joint Injection/Arthocentesis: L hip joint    Date/Time: 3/3/2023 11:04 AM  Performed by: Sanjay Hatch DO  Authorized by: Sanjay Hatch DO     Indications:  Pain  Guidance: ultrasound    Approach:  Anterior  Location:  Hip      Site:  L hip joint  Medications:  40 mg triamcinolone 40 MG/ML; 3 mL ropivacaine 5 MG/ML; 4 mL lidocaine 1 %  Outcome:  Tolerated well, no immediate complications  Consent Given by:  Patient  Prep: patient was prepped and draped in usual sterile fashion          Plan:  Discussed the assessment with the patient.  Follow up: prn based on clinical progress  Acute flare in the left hip over the last 1-2 months, known DJD and signs of JACQUI  Reviewed PT options and low impact and core building strategies  Repeat US guided CSI to left hip joint today, reviewed option to trial PRP in the future if desired  Also with acute on chronic right knee pain, hx of ACL reconstruction though MRI from 2012 suggested possible graft failure  Underlying chondromalacia as well at that time, presumed that has progressed, consider repeat XR in the future based on progress  Also having posterior knee pain, bedside US confirms small joint effusion as well as medial popliteal cyst  Consider US guided CSI and aspiration of the knee and aspiration of Baker's cyst in about 6 weeks, 2 weeks prior to upcoming trip to [place] for martial arts training/exhibition  PT options reviewed for this as well  Oral Tylenol and topical Voltaren gel reviewed as safe OTC  options, reviewed safe dosing strategies  XR images independently visualized and reviewed with patient today in clinic  Expectations and goals of CSI reviewed  Often 2-3 days for steroid effect, and can take up to two weeks for maximum effect  We discussed modified progressive pain-free activity as tolerated  Do not overuse in first two weeks if feeling better due to concern for vulnerability while steroid is working  Supportive care reviewed  All questions were answered today  Contact us with additional questions or concerns  Signs and sx of concern reviewed      Sanjay Hatch DO, CASLAIMA  Sports Medicine Physician  [facility]      Time spent in chart review, one-on-one evaluation, discussion with patient regarding: nature of problem, clinical course, prior treatments, therapeutic options, shared-decision making, potential procedures and referrals, and charting related to the visit: 32 minutes.  If applicable, time does not include time spent performing any procedure.        Disclaimer: This note consists of symbols derived from keyboarding, dictation and/or voice recognition software. As a result, there may be errors in the script that have gone undetected. Please consider this when interpreting information found in this chart.

## 2023-03-03 NOTE — LETTER
3/3/2023         RE: Jorge Amaral  [address] [facility] [address]        Dear Colleague,    Thank you for referring your patient, Jorge Amaral, to the [facility]. Please see a copy of my visit note below.    Jorge Amaral  :  1970  DOS: 3/3/2023  MRN: [id number]    Sports Medicine Clinic Visit    PCP: Vince Swain    Interim History - March 3, 2023  Since last visit on 10/13/2021 patient has noticed pain increasing within the past month or so.  Left hip injection completed on 10/13/2021 provided good relief for 1+ years.  No new injury in the interim.    Social History: currently employed as     Review of Systems  Musculoskeletal: as above  Remainder of review of systems is negative including constitutional, CV, pulmonary, GI, Skin and Neurologic except as noted in HPI or medical history.    Past Medical History:   Diagnosis Date     ACL (anterior cruciate ligament) tear--recurrent 2013     Crohn's disease (H)      Delayed union of fracture 2011     Fracture, metacarpal 2011     Large bowel perforation (H) 2010     Nondisplaced fracture of navicular (scaphoid) of left foot, subsequent encounter for fracture with delayed healing 2019     Puncture wound     L hand DOI 21     Sepsis (H) 2018    He had an incomplete (due to stricture) colonoscopy day of presentation and after that developed acute abdominal pain and fever. He was septic and suspected to have a micro perforation. Imaging negative for larger perforation     Past Surgical History:   Procedure Laterality Date     APPENDECTOMY       ARTHROSCOPY KNEE WITH MEDIAL MENISCECTOMY Left 2019    Procedure: LEFT KNEE ARTHROSCOPY WITH PARTIAL MEDIAL MENISCECTOMY;  Surgeon: Joleen Jama MD;  Location: [facility] OR     COLONOSCOPY  2020     ENT SURGERY  2013    Left Tonsil biopsy     EXCISE MASS WRIST Right  11/07/2018    Procedure: EXCISION OF RIGHT DORSAL WRIST MASS;  Surgeon: Zohaib Edmond MD;  Location: [facility] OR     LAPAROSCOPY PROCEDURE UNLISTED  08/16/2010    Exploratory laparotomy, lysis of adhesions, takedown enterocolonic fistula and colocononic fistula, with creation of loop ileostomy     OPEN REDUCTION INTERNAL FIXATION HAND  07/25/2011    right IV MCP hand 7/11     REMOVE HARDWARE LOWER EXTREMITY  04/06/2012    Procedure:REMOVE HARDWARE LOWER EXTREMITY; REMOVAL OF RIGHT TIBIAL SCREW; Surgeon:JOSE LUIS CARMICHAEL; Location: [facility]     SMALL BOWEL RESECTION  1986    terminal ileum resection at age 16     TAKEDOWN COLOSTOMY  12/27/2011     Family History   Problem Relation Age of Onset     Hypertension Mother      Respiratory Mother         SMOKER -EMPYSEMA     Hypertension Father      Diabetes Father      Prostate Cancer Father 50     Prostate Cancer Paternal Grandfather      Diabetes Brother      Prostate Cancer Paternal Uncle 50       Objective  /75   Wt 102.5 kg (226 lb)   BMI 29.02 kg/m        General: healthy, alert and in no distress      HEENT: no scleral icterus or conjunctival erythema     Skin: no suspicious lesions or rash. No jaundice.     CV: regular rhythm by palpation, 2+ distal pulses, no pedal edema      Resp: normal respiratory effort without conversational dyspnea     Psych: normal mood and affect      Gait: nonantalgic, appropriate coordination and balance     Neuro: normal light touch sensory exam of the extremities. Motor strength as noted below     Left hip exam    Inspection:        no edema or ecchymosis in hip area    ROM:       Mildly decreased terminal active and passive ROM     Strength:        flexion 5/5       extension 5/5       abduction 5/5       adduction 5/5    Tender:        greater trochanter       Mild anterior hip joint    Non Tender:        remainder of hip area       illiac crest       ASIS       SI joint    Sensation:        grossly intact in hip and  thigh    Skin:       well perfused       capillary refill brisk    Special Tests:        neg (-) ELLIOT       positive (+) FADIR       positive (+) scour       neg (-) Mayuri    Right Knee exam    ROM:        Flexion 140 degrees       Extension 0 degrees       Range of motion limited by mild pain in terminal flexion    Inspection:       no visible ecchymosis        effusion noted small       Palpable swelling medial popliteal space    Skin:       no visible deformities       well perfused       capillary refill brisk    Patellar Motion:        Lateral tilt noted in patella       Crepitus noted in the patellofemoral joint    Tender:        Medial popliteal space       medial joint line    Non Tender:         remainder of knee area    Special Tests:        neg (-) Jose       Equivocal Lachmans       neg (-) varus at 0 deg and 30 deg       neg (-) valgus at 0 deg and 30 deg      Radiology  PELVIS AND HIP LEFT ONE VIEW   10/7/2021 1:33 PM      HISTORY:  Left hip pain.     FINDINGS: Lower lumbar spine degenerative change.                                                                      IMPRESSION: Mild-moderate left hip osteoarthritis. No fracture  identified.     MR RIGHT KNEE WITHOUT CONTRAST 12/13/2012 7:50 PM       HISTORY:Medial knee pain for 3 weeks after recurrent injury. ACL   graft repair March 2011.       TECHNIQUE: Sagittal proton density and T2 weighted images, coronal T1   and coronal and axial T2 fat suppressed images were obtained.       COMPARISON: 3/2/2011.       FINDINGS:   Menisci:   Medial meniscus: Interval partial meniscectomy involving the midbody   and posterior horn. The remaining peripheral mid body is   unremarkable. The remaining peripheral posterior horn shows an   irregular free edge which could indicate tear or degeneration. There   is a small meniscal flap or fragment adjacent to the far anterior   horn (image 18 of series 4 and image 12 of series   5. This may have originated from a  tear in the far anterior horn or   may be displaced from the residual posterior horn.       Lateral meniscus: Normal.       Cruciate ligaments:   Anterior cruciate ligament: Interval placement of ACL allograft.   There is no definite graft continuity, especially in the proximal   graft. This is suspicious for a complete tear of the graft although   there are no bone injuries to indicate that this is acute. Clinical   correlation for ACL insufficiency is recommended.       Posterior cruciate ligament: Normal.       Collateral supporting structures:   Medial collateral ligament: Normal.       Lateral supporting structures: Previously seen mild edema around the   fibular collateral ligament has resolved. The biceps tendon, fibular   collateral ligament, distal iliotibial tract and popliteus tendon are   intact.       Osseous structures and cartilaginous surfaces: The previously seen   bone marrow contusions have resolved. No acute appearing bony   abnormalities are seen. Metallic artifacts obscure some regions.   There is focal articular cartilage irregularity in the posterolateral   weightbearing medial femoral condyle without change. Remainder of the   articular cartilages in the medial compartment show mild diffuse   thinning, also unchanged. Articular cartilages in the weightbearing   lateral compartment are intact. There is again question of very early   chondromalacia at the patellar apex. Remainder of patellofemoral   cartilages are normal.       Additional findings: Minimal joint space fluid without change. Small   popliteal cyst, probably unchanged in size. Previously seen   nonspecific soft tissue edema about the knee has resolved.       Impression   IMPRESSION:   1. Interval placement of ACL allograft. Continuity of the fascicles   is not demonstrated, especially proximally. Disruption of the graft   is not excluded and clinical correlation is recommended.   2. Resolution of previously seen bone marrow  injuries.   3. Interval partial medial meniscectomy involving the midbody and   posterior horn. There is a new small meniscal flap or fragment   projecting adjacent to the anterior horn which may have originated   from a flap tear of the far anterior horn or could be a fragment   originating from the posterior horn.   4. Mild chondromalacia medial compartment and question of minimal   chondromalacia patellar apex, both without significant change.        Assessment:  1. Arthritis of left hip    2. Left hip pain    3. Chronic pain of right knee    4. Post-traumatic osteoarthritis of right knee    5. Hx of anterior cruciate ligament tear reconstruction    6. Knee effusion, right    7. Baker's cyst, right         Large Joint Injection/Arthocentesis: L hip joint    Date/Time: 3/3/2023 11:04 AM  Performed by: Sanjay Hatch DO  Authorized by: Sanjay Hatch DO     Indications:  Pain  Guidance: ultrasound    Approach:  Anterior  Location:  Hip      Site:  L hip joint  Medications:  40 mg triamcinolone 40 MG/ML; 3 mL ropivacaine 5 MG/ML; 4 mL lidocaine 1 %  Outcome:  Tolerated well, no immediate complications  Consent Given by:  Patient  Prep: patient was prepped and draped in usual sterile fashion          Plan:  Discussed the assessment with the patient.  Follow up: prn based on clinical progress  Acute flare in the left hip over the last 1-2 months, known DJD and signs of JACQUI  Reviewed PT options and low impact and core building strategies  Repeat US guided CSI to left hip joint today, reviewed option to trial PRP in the future if desired  Also with acute on chronic right knee pain, hx of ACL reconstruction though MRI from 2012 suggested possible graft failure  Underlying chondromalacia as well at that time, presumed that has progressed, consider repeat XR in the future based on progress  Also having posterior knee pain, bedside US confirms small joint effusion as well as medial popliteal  cyst  Consider US guided CSI and aspiration of the knee and aspiration of Baker's cyst in about 6 weeks, 2 weeks prior to upcoming trip to [place] for martial arts training/exhibition  PT options reviewed for this as well  Oral Tylenol and topical Voltaren gel reviewed as safe OTC options, reviewed safe dosing strategies  XR images independently visualized and reviewed with patient today in clinic  Expectations and goals of CSI reviewed  Often 2-3 days for steroid effect, and can take up to two weeks for maximum effect  We discussed modified progressive pain-free activity as tolerated  Do not overuse in first two weeks if feeling better due to concern for vulnerability while steroid is working  Supportive care reviewed  All questions were answered today  Contact us with additional questions or concerns  Signs and sx of concern reviewed      Sanjay Hatch DO, JAYY  Sports Medicine Physician  [facility]      Time spent in chart review, one-on-one evaluation, discussion with patient regarding: nature of problem, clinical course, prior treatments, therapeutic options, shared-decision making, potential procedures and referrals, and charting related to the visit: 32 minutes.  If applicable, time does not include time spent performing any procedure.        Disclaimer: This note consists of symbols derived from keyboarding, dictation and/or voice recognition software. As a result, there may be errors in the script that have gone undetected. Please consider this when interpreting information found in this chart.            Again, thank you for allowing me to participate in the care of your patient.        Sincerely,        Sanjay Hatch DO

## 2023-03-08 ENCOUNTER — MYC MEDICAL ADVICE (OUTPATIENT)
Dept: FAMILY MEDICINE | Facility: CLINIC | Age: 53
End: 2023-03-08
Payer: COMMERCIAL

## 2023-04-13 NOTE — PROGRESS NOTES
Jorge Amaral  :  1970  DOS: 2023  MRN: [id number]    Sports Medicine Clinic Procedure    Ultrasound Guided Bilateral Intra-Articular Knee Injection, +/- Aspiration    Clinical History: ongoing significant right knee pain, worsening left knee pain and swelling.  Trip to [place] for Martial arts competition in 2 weeks.    Diagnosis:   1. Chronic pain of both knees    2. Post-traumatic osteoarthritis of right knee    3. Bilateral knee effusions      Large Joint Injection/Arthocentesis: bilateral knee    Date/Time: 2023 12:07 PM    Performed by: Sanjay Hatch DO  Authorized by: Sanjay Hatch DO    Indications:  Pain  Needle Size:  22 G  Guidance: ultrasound    Approach:  Superolateral  Location:  Knee  Laterality:  Bilateral      Medications (Right):  40 mg triamcinolone 40 MG/ML; 3 mL ropivacaine 5 MG/ML  Aspirate amount (mL):  8  Aspirate:  Clear and yellow  Medications (Left):  40 mg triamcinolone 40 MG/ML; 3 mL ropivacaine 5 MG/ML  Aspirate amount (mL):  30  Aspirate:  Clear and yellow  Outcome:  Tolerated well, no immediate complications  Procedure discussed: discussed risks, benefits, and alternatives    Consent Given by:  Patient  Timeout: timeout called immediately prior to procedure    Prep: patient was prepped and draped in usual sterile fashion     Ultrasound images of procedure were permanently stored.           Impression:  Successful Bilateral intra-articular knee injection and aspiration.    Plan:  Follow up prn based on clinical progress  Hip joint pain improved, knee injections today before competition  Expectations, relative risks and goals of CSI reviewed  Often 2-3 days for steroid effect, and can take up to two weeks for maximum effect  We discussed modified progressive pain-free activity as tolerated  Do not overuse in first two weeks if feeling better due to concern for vulnerability while steroid is working  Supportive care reviewed  All questions were  answered today  Contact us with additional questions or concerns  Signs and sx of concern reviewed      Sanjay Hatch DO, JAYY  Primary Care Sports Medicine  [facility]

## 2023-04-14 ENCOUNTER — OFFICE VISIT (OUTPATIENT)
Dept: ORTHOPEDICS | Facility: CLINIC | Age: 53
End: 2023-04-14
Payer: COMMERCIAL

## 2023-04-14 DIAGNOSIS — G89.29 CHRONIC PAIN OF BOTH KNEES: Primary | ICD-10-CM

## 2023-04-14 DIAGNOSIS — M25.562 CHRONIC PAIN OF BOTH KNEES: Primary | ICD-10-CM

## 2023-04-14 DIAGNOSIS — M25.561 CHRONIC PAIN OF BOTH KNEES: Primary | ICD-10-CM

## 2023-04-14 DIAGNOSIS — M25.462 BILATERAL KNEE EFFUSIONS: ICD-10-CM

## 2023-04-14 DIAGNOSIS — M17.31 POST-TRAUMATIC OSTEOARTHRITIS OF RIGHT KNEE: ICD-10-CM

## 2023-04-14 DIAGNOSIS — M25.461 BILATERAL KNEE EFFUSIONS: ICD-10-CM

## 2023-04-14 PROCEDURE — 20611 DRAIN/INJ JOINT/BURSA W/US: CPT | Mod: 50 | Performed by: FAMILY MEDICINE

## 2023-04-14 RX ORDER — ROPIVACAINE HYDROCHLORIDE 5 MG/ML
3 INJECTION, SOLUTION EPIDURAL; INFILTRATION; PERINEURAL
Status: DISCONTINUED | OUTPATIENT
Start: 2023-04-14 | End: 2024-02-09

## 2023-04-14 RX ORDER — TRIAMCINOLONE ACETONIDE 40 MG/ML
40 INJECTION, SUSPENSION INTRA-ARTICULAR; INTRAMUSCULAR
Status: DISCONTINUED | OUTPATIENT
Start: 2023-04-14 | End: 2024-02-09

## 2023-04-14 RX ADMIN — ROPIVACAINE HYDROCHLORIDE 3 ML: 5 INJECTION, SOLUTION EPIDURAL; INFILTRATION; PERINEURAL at 12:07

## 2023-04-14 RX ADMIN — TRIAMCINOLONE ACETONIDE 40 MG: 40 INJECTION, SUSPENSION INTRA-ARTICULAR; INTRAMUSCULAR at 12:07

## 2023-04-14 NOTE — LETTER
2023         RE: Jorge Amaral  [address] [facility] [address]        Dear Colleague,    Thank you for referring your patient, Jorge Amaral, to the [facility]. Please see a copy of my visit note below.    Jorge Amaral  :  1970  DOS: 2023  MRN: [id number]    Sports Medicine Clinic Procedure    Ultrasound Guided Bilateral Intra-Articular Knee Injection, +/- Aspiration    Clinical History: ongoing significant right knee pain, worsening left knee pain and swelling.  Trip to [place] for Martial arts competition in 2 weeks.    Diagnosis:   1. Chronic pain of both knees    2. Post-traumatic osteoarthritis of right knee    3. Bilateral knee effusions      Large Joint Injection/Arthocentesis: bilateral knee    Date/Time: 2023 12:07 PM    Performed by: Sanjay Hatch DO  Authorized by: Sanjay Hatch DO    Indications:  Pain  Needle Size:  22 G  Guidance: ultrasound    Approach:  Superolateral  Location:  Knee  Laterality:  Bilateral      Medications (Right):  40 mg triamcinolone 40 MG/ML; 3 mL ropivacaine 5 MG/ML  Aspirate amount (mL):  8  Aspirate:  Clear and yellow  Medications (Left):  40 mg triamcinolone 40 MG/ML; 3 mL ropivacaine 5 MG/ML  Aspirate amount (mL):  30  Aspirate:  Clear and yellow  Outcome:  Tolerated well, no immediate complications  Procedure discussed: discussed risks, benefits, and alternatives    Consent Given by:  Patient  Timeout: timeout called immediately prior to procedure    Prep: patient was prepped and draped in usual sterile fashion     Ultrasound images of procedure were permanently stored.           Impression:  Successful Bilateral intra-articular knee injection and aspiration.    Plan:  Follow up prn based on clinical progress  Hip joint pain improved, knee injections today before competition  Expectations, relative risks and goals of CSI reviewed  Often 2-3 days for steroid effect, and can take  up to two weeks for maximum effect  We discussed modified progressive pain-free activity as tolerated  Do not overuse in first two weeks if feeling better due to concern for vulnerability while steroid is working  Supportive care reviewed  All questions were answered today  Contact us with additional questions or concerns  Signs and sx of concern reviewed      Sanjay Hatch DO, CAQ  Primary Care Sports Medicine  [facility]             Again, thank you for allowing me to participate in the care of your patient.        Sincerely,        Sanjay Hatch DO

## 2023-04-19 ENCOUNTER — IMMUNIZATION (OUTPATIENT)
Dept: FAMILY MEDICINE | Facility: CLINIC | Age: 53
End: 2023-04-19
Payer: COMMERCIAL

## 2023-04-19 DIAGNOSIS — Z23 NEED FOR PROPHYLACTIC VACCINATION AND INOCULATION AGAINST INFLUENZA: ICD-10-CM

## 2023-04-19 DIAGNOSIS — Z23 HIGH PRIORITY FOR 2019-NCOV VACCINE: Primary | ICD-10-CM

## 2023-04-19 PROCEDURE — 90471 IMMUNIZATION ADMIN: CPT

## 2023-04-19 PROCEDURE — 91312 COVID-19 VACCINE BIVALENT BOOSTER 12+ (PFIZER): CPT

## 2023-04-19 PROCEDURE — 90682 RIV4 VACC RECOMBINANT DNA IM: CPT

## 2023-04-19 PROCEDURE — 0124A COVID-19 VACCINE BIVALENT BOOSTER 12+ (PFIZER): CPT

## 2023-04-20 ENCOUNTER — PATIENT OUTREACH (OUTPATIENT)
Dept: CARE COORDINATION | Facility: CLINIC | Age: 53
End: 2023-04-20
Payer: COMMERCIAL

## 2023-04-24 DIAGNOSIS — Z86.711 HISTORY OF PULMONARY EMBOLISM: ICD-10-CM

## 2023-04-24 NOTE — PROGRESS NOTES
Jorge Amaral is followed at the [facility] for their history of VTE.     They were last evaluated by our team on 3-11-22 with the plan to remain on long term anticoagulation and return to clinic in 1 year.    Prescription updated and refills given.     This message will not be routed to  for scheduling. Scheduled with MM on 6-8-23. He will be leaving for [facility] on Sunday and returning May 20th.  Sent 90 day fill to his pharmacy.  Juana Holliday, MSN, RN, PHN -Nurse Clinician, [facility], [facility] [phone]

## 2023-05-04 ENCOUNTER — PATIENT OUTREACH (OUTPATIENT)
Dept: CARE COORDINATION | Facility: CLINIC | Age: 53
End: 2023-05-04
Payer: COMMERCIAL

## 2023-06-08 ENCOUNTER — TELEPHONE (OUTPATIENT)
Dept: HEMATOLOGY | Facility: CLINIC | Age: 53
End: 2023-06-08
Payer: COMMERCIAL

## 2023-07-16 ENCOUNTER — HEALTH MAINTENANCE LETTER (OUTPATIENT)
Age: 53
End: 2023-07-16

## 2023-08-04 ENCOUNTER — TELEPHONE (OUTPATIENT)
Dept: UROLOGY | Facility: CLINIC | Age: 53
End: 2023-08-04
Payer: COMMERCIAL

## 2023-08-04 NOTE — TELEPHONE ENCOUNTER
Health Call Center    Phone Message    May a detailed message be left on voicemail: yes     Reason for Call: Other: . Pt is calling, he had a penile injury in January 2023 from [place]. Pt is looking to switch providers from  and .  Pt is wondering if  or [facility] would be appropriate, as he might need some form of reconstructive surgery, writer is unsure who to schedule with, please call Jorge, thank you    Action Taken: Message routed to:  [facility] ([facility] uro    Travel Screening: Not Applicable

## 2023-08-07 NOTE — TELEPHONE ENCOUNTER
Pt is scheduled with Dr. Langston, miscommunication, he would like to see Dr. Langston. He said knot has doubled in size since he seen chambers

## 2023-09-27 ENCOUNTER — OFFICE VISIT (OUTPATIENT)
Dept: DERMATOLOGY | Facility: CLINIC | Age: 53
End: 2023-09-27
Payer: COMMERCIAL

## 2023-09-27 DIAGNOSIS — D22.9 MULTIPLE MELANOCYTIC NEVI: ICD-10-CM

## 2023-09-27 DIAGNOSIS — L81.8 IDIOPATHIC GUTTATE HYPOMELANOSIS: ICD-10-CM

## 2023-09-27 DIAGNOSIS — L40.9 PSORIASIS: ICD-10-CM

## 2023-09-27 DIAGNOSIS — L73.8 SENILE SEBACEOUS GLAND HYPERPLASIA: ICD-10-CM

## 2023-09-27 DIAGNOSIS — B07.8 OTHER VIRAL WARTS: ICD-10-CM

## 2023-09-27 DIAGNOSIS — L81.4 SOLAR LENTIGO: ICD-10-CM

## 2023-09-27 DIAGNOSIS — L82.0 INFLAMED SEBORRHEIC KERATOSIS: Primary | ICD-10-CM

## 2023-09-27 DIAGNOSIS — D18.01 CHERRY ANGIOMA: ICD-10-CM

## 2023-09-27 PROCEDURE — 99213 OFFICE O/P EST LOW 20 MIN: CPT | Mod: 25 | Performed by: DERMATOLOGY

## 2023-09-27 PROCEDURE — 17110 DESTRUCTION B9 LES UP TO 14: CPT | Performed by: DERMATOLOGY

## 2023-09-27 RX ORDER — TACROLIMUS 1 MG/G
OINTMENT TOPICAL 2 TIMES DAILY
Qty: 100 G | Refills: 3 | Status: SHIPPED | OUTPATIENT
Start: 2023-09-27

## 2023-09-27 NOTE — LETTER
9/27/2023         RE: Jorge Amaral  [address] [facility] [address]        Dear Colleague,    Thank you for referring your patient, Jorge Amaral, to the [facility]. Please see a copy of my visit note below.    [facility] Dermatology Note    Encounter Date: Sep 27, 2023    Dermatology Problem List:  Last skin check 11/12/21  1. History of immunosuppression, Chron's  2. Bowenoid papulosis, left abdomen  -s/p biopsy 4/18/15  -s/p Efudex x 6 weeks initiated 5/6/15, marked resolved 8/2015  3. Verruca plantaris  -Previous Tx: cantherone, trichloracetic acid, cryotherapy and PDL with Dr. Chris Stoddard, s/p candida 3X, cryotherapy  4. Tinea versicolor, back and chest  -Previous Tx:  ketoconazole 2% cream initiated 10/23/2015  5. 2 cm fissure on crease of R posterior ear - not healing  -current tx; hydrocortisone 2.5% cream  6. Lipoma, left bicep excision  7. Verrucoid keratosis vs indolent wart, right 2nd toe.  s/p scoop shave biopsy.   - cryotherapy 09/27/23  8. Dermatitis(psoriasiform on 12/2020 biopsy)R postauricular - possibly triggered by humira  -Mild involving the right ear simin.   -Will continue to tacrolimus 0.1% ointment.   9. Inflamed Seborrheic keratoses  - Cryotherapy 09/27/2023    ______________________________________    Impression/Plan:  Inflamed Seborrheic keratoses  - Cryotherapy procedure note: After verbal consent and discussion of risks and benefits including, but not limited to, dyspigmentation/scar, blister, and pain, 1 was(were) treated with 1-2 mm freeze border for 1-2 cycles with liquid nitrogen. Post cryotherapy instructions were provided.     2. Wart on left third toe  Cryotherapy procedure note: After verbal consent and discussion of risks and benefits including, but not limited to, dyspigmentation/scar, blister, and pain, 1 was(were) treated with 1-2 mm freeze border for 1-2 cycles with liquid nitrogen. Post cryotherapy instructions  were provided.     3 Reassurance provided for benign lesions not treated today including cherry angiomata, solar lentigines, idiopathic guttatte hypomelanosis, senile sebaceous gland hyperplasia, and banal-appearing melanocytic nevi.    4. Psoriasis  Mild involving the right ear canal. Will continue to tacrolimus 0.1% ointment.       Follow-up in 2 years.       Staff Involved:  Staff Only    I, JULEE DANIELS, am serving as a scribe; to document services personally performed by Antonio Cid MD -based on data collection and the provider's statements to me.     Provider Disclosure:   The documentation recorded by the scribe accurately reflects the services I personally performed and the decisions made by me.    Antonio Cid MD   of Dermatology  Department of Dermatology  [facility]        CC:   Chief Complaint   Patient presents with     Derm Problem     Skin check, rough spot on scalp, mole on back that we previously removed, wart on toe       History of Present Illness:  Mr. Jorge Amaral is a 53 year old male who presents as a return patient. PT states that he has rough spot on his scalp. Also reports a wart on his left toe. Uses Protopic for the spots on the back of his ear. Reports tanning in his adolescence.      Labs:  Reviewed prior biopsy results [id number].    Physical exam:  Vitals: There were no vitals taken for this visit.  GEN: This is a well developed, well-nourished male in no acute distress, in a pleasant mood.    SKIN: Slaughter phototype 4  - Full skin, which includes the head/face, both arms, chest, back, abdomen,both legs, genitalia and/or groin buttocks, digits and/or nails, was examined.  -There are dome shaped bright red papules on the head/neck, trunk, extremities.   - Multiple regular brown pigmented macules and papules are identified on the head/neck, trunk, extremities.   - Scattered brown macules on sun exposed areas.  - one  brown stuck on papule with surrounding erythema on the right mid back   -1 verrucous papule on the left third toe.   - multiple hypopigmented macules on the trunk and extremities.   - right ear canal with erythema and scaling   - few umbilicated yellowish papules on the face  - No other lesions of concern on areas examined.     Past Medical History:   Past Medical History:   Diagnosis Date     ACL (anterior cruciate ligament) tear--recurrent 01/03/2013     Crohn's disease (H)      Delayed union of fracture 12/01/2011     Fracture, metacarpal 08/03/2011     Large bowel perforation (H) 08/16/2010     Nondisplaced fracture of navicular (scaphoid) of left foot, subsequent encounter for fracture with delayed healing 07/03/2019     Puncture wound     L hand DOI 4/29/21     Sepsis (H) 01/19/2018    He had an incomplete (due to stricture) colonoscopy day of presentation and after that developed acute abdominal pain and fever. He was septic and suspected to have a micro perforation. Imaging negative for larger perforation     Past Surgical History:   Procedure Laterality Date     APPENDECTOMY  1986     ARTHROSCOPY KNEE WITH MEDIAL MENISCECTOMY Left 01/14/2019    Procedure: LEFT KNEE ARTHROSCOPY WITH PARTIAL MEDIAL MENISCECTOMY;  Surgeon: Jose Luis Jama MD;  Location:  OR     COLONOSCOPY  05/14/2020     ENT SURGERY  06/28/2013    Left Tonsil biopsy     EXCISE MASS WRIST Right 11/07/2018    Procedure: EXCISION OF RIGHT DORSAL WRIST MASS;  Surgeon: Zohaib Edmond MD;  Location:  OR     LAPAROSCOPY PROCEDURE UNLISTED  08/16/2010    Exploratory laparotomy, lysis of adhesions, takedown enterocolonic fistula and colocononic fistula, with creation of loop ileostomy     OPEN REDUCTION INTERNAL FIXATION HAND  07/25/2011    right IV MCP hand 7/11     REMOVE HARDWARE LOWER EXTREMITY  04/06/2012    Procedure:REMOVE HARDWARE LOWER EXTREMITY; REMOVAL OF RIGHT TIBIAL SCREW; Surgeon:JOSE LUIS JAMA; [facility]      SMALL BOWEL RESECTION  1986    terminal ileum resection at age 16     TAKEDOWN COLOSTOMY  12/27/2011       Social History:   reports that he has never smoked. He has never used smokeless tobacco. He reports current alcohol use. He reports that he does not use drugs.    Family History:  Family History   Problem Relation Age of Onset     Hypertension Mother      Respiratory Mother         SMOKER -EMPYSEMA     Hypertension Father      Diabetes Father      Prostate Cancer Father 50     Prostate Cancer Paternal Grandfather      Diabetes Brother      Prostate Cancer Paternal Uncle 50       Medications:  Current Outpatient Medications   Medication Sig Dispense Refill     mercaptopurine (PURINETHOL) 50 MG tablet Take 50 mg by mouth daily 1 and half tabs daily       rivaroxaban ANTICOAGULANT (XARELTO ANTICOAGULANT) 20 MG TABS tablet Take 1 tablet (20 mg) by mouth daily with food . Take at the same time each day. 90 tablet 0     ustekinumab (STELARA) 90 MG/ML 90 mg Every 8 weeks       diphenoxylate-atropine (LOMOTIL) 2.5-0.025 MG per tablet Take 2 tablets by mouth As Needed (Patient not taking: Reported on 9/27/2023)       tacrolimus (PROTOPIC) 0.1 % external ointment Apply twice daily as needed (Patient not taking: Reported on 10/20/2022) 60 g 3     No Known Allergies                Again, thank you for allowing me to participate in the care of your patient.        Sincerely,        nAtonio Cid MD

## 2023-09-27 NOTE — NURSING NOTE
Jorge Amaral's goals for this visit include:   Chief Complaint   Patient presents with    Derm Problem     Skin check, rough spot on scalp, mole on back that we previously removed, wart on toe       He requests these members of his care team be copied on today's visit information: no    PCP: Vince Swain    Referring Provider:  Referred Self, MD  No address on file    There were no vitals taken for this visit.    Do you need any medication refills at today's visit? No    Shari Razo LPN

## 2023-09-27 NOTE — PROGRESS NOTES
[facility] Dermatology Note    Encounter Date: Sep 27, 2023    Dermatology Problem List:  Last skin check 11/12/21  1. History of immunosuppression, Chron's  2. Bowenoid papulosis, left abdomen  -s/p biopsy 4/18/15  -s/p Efudex x 6 weeks initiated 5/6/15, marked resolved 8/2015  3. Verruca plantaris  -Previous Tx: cantherone, trichloracetic acid, cryotherapy and PDL with Dr. Chris Stoddard, s/p candida 3X, cryotherapy  4. Tinea versicolor, back and chest  -Previous Tx:  ketoconazole 2% cream initiated 10/23/2015  5. 2 cm fissure on crease of R posterior ear - not healing  -current tx; hydrocortisone 2.5% cream  6. Lipoma, left bicep excision  7. Verrucoid keratosis vs indolent wart, right 2nd toe.  s/p scoop shave biopsy.   - cryotherapy 09/27/23  8. Dermatitis(psoriasiform on 12/2020 biopsy)R postauricular - possibly triggered by humira  -Mild involving the right ear simin.   -Will continue to tacrolimus 0.1% ointment.   9. Inflamed Seborrheic keratoses  - Cryotherapy 09/27/2023    ______________________________________    Impression/Plan:  Inflamed Seborrheic keratoses  - Cryotherapy procedure note: After verbal consent and discussion of risks and benefits including, but not limited to, dyspigmentation/scar, blister, and pain, 1 was(were) treated with 1-2 mm freeze border for 1-2 cycles with liquid nitrogen. Post cryotherapy instructions were provided.     2. Wart on left third toe  Cryotherapy procedure note: After verbal consent and discussion of risks and benefits including, but not limited to, dyspigmentation/scar, blister, and pain, 1 was(were) treated with 1-2 mm freeze border for 1-2 cycles with liquid nitrogen. Post cryotherapy instructions were provided.     3 Reassurance provided for benign lesions not treated today including cherry angiomata, solar lentigines, idiopathic guttatte hypomelanosis, senile sebaceous gland hyperplasia, and banal-appearing melanocytic nevi.    4.  Psoriasis  Mild involving the right ear canal. Will continue to tacrolimus 0.1% ointment.       Follow-up in 2 years.       Staff Involved:  Staff Only    I, JULEE DANIELS, am serving as a scribe; to document services personally performed by Antonio Cid MD -based on data collection and the provider's statements to me.     Provider Disclosure:   The documentation recorded by the scribe accurately reflects the services I personally performed and the decisions made by me.    Antonio Cid MD   of Dermatology  Department of Dermatology  [facility]        CC:   Chief Complaint   Patient presents with    Derm Problem     Skin check, rough spot on scalp, mole on back that we previously removed, wart on toe       History of Present Illness:  Mr. Jorge Amaral is a 53 year old male who presents as a return patient. PT states that he has rough spot on his scalp. Also reports a wart on his left toe. Uses Protopic for the spots on the back of his ear. Reports tanning in his adolescence.      Labs:  Reviewed prior biopsy results [id number].    Physical exam:  Vitals: There were no vitals taken for this visit.  GEN: This is a well developed, well-nourished male in no acute distress, in a pleasant mood.    SKIN: Slaughter phototype 4  - Full skin, which includes the head/face, both arms, chest, back, abdomen,both legs, genitalia and/or groin buttocks, digits and/or nails, was examined.  -There are dome shaped bright red papules on the head/neck, trunk, extremities.   - Multiple regular brown pigmented macules and papules are identified on the head/neck, trunk, extremities.   - Scattered brown macules on sun exposed areas.  - one brown stuck on papule with surrounding erythema on the right mid back   -1 verrucous papule on the left third toe.   - multiple hypopigmented macules on the trunk and extremities.   - right ear canal with erythema and scaling   - few  umbilicated yellowish papules on the face  - No other lesions of concern on areas examined.     Past Medical History:   Past Medical History:   Diagnosis Date    ACL (anterior cruciate ligament) tear--recurrent 01/03/2013    Crohn's disease (H)     Delayed union of fracture 12/01/2011    Fracture, metacarpal 08/03/2011    Large bowel perforation (H) 08/16/2010    Nondisplaced fracture of navicular (scaphoid) of left foot, subsequent encounter for fracture with delayed healing 07/03/2019    Puncture wound     L hand DOI 4/29/21    Sepsis (H) 01/19/2018    He had an incomplete (due to stricture) colonoscopy day of presentation and after that developed acute abdominal pain and fever. He was septic and suspected to have a micro perforation. Imaging negative for larger perforation     Past Surgical History:   Procedure Laterality Date    APPENDECTOMY  1986    ARTHROSCOPY KNEE WITH MEDIAL MENISCECTOMY Left 01/14/2019    Procedure: LEFT KNEE ARTHROSCOPY WITH PARTIAL MEDIAL MENISCECTOMY;  Surgeon: Jose Luis Jama MD;  Location:  OR    COLONOSCOPY  05/14/2020    ENT SURGERY  06/28/2013    Left Tonsil biopsy    EXCISE MASS WRIST Right 11/07/2018    Procedure: EXCISION OF RIGHT DORSAL WRIST MASS;  Surgeon: Zohaib Edmond MD;  Location:  OR    LAPAROSCOPY PROCEDURE UNLISTED  08/16/2010    Exploratory laparotomy, lysis of adhesions, takedown enterocolonic fistula and colocononic fistula, with creation of loop ileostomy    OPEN REDUCTION INTERNAL FIXATION HAND  07/25/2011    right IV MCP hand 7/11    REMOVE HARDWARE LOWER EXTREMITY  04/06/2012    Procedure:REMOVE HARDWARE LOWER EXTREMITY; REMOVAL OF RIGHT TIBIAL SCREW; Surgeon:JOSE LUIS JAMA; Location:[facility]    SMALL BOWEL RESECTION  1986    terminal ileum resection at age 16    TAKEDOWN COLOSTOMY  12/27/2011       Social History:   reports that he has never smoked. He has never used smokeless tobacco. He reports current alcohol use. He reports that he does not  use drugs.    Family History:  Family History   Problem Relation Age of Onset    Hypertension Mother     Respiratory Mother         SMOKER -EMPYSEMA    Hypertension Father     Diabetes Father     Prostate Cancer Father 50    Prostate Cancer Paternal Grandfather     Diabetes Brother     Prostate Cancer Paternal Uncle 50       Medications:  Current Outpatient Medications   Medication Sig Dispense Refill    mercaptopurine (PURINETHOL) 50 MG tablet Take 50 mg by mouth daily 1 and half tabs daily      rivaroxaban ANTICOAGULANT (XARELTO ANTICOAGULANT) 20 MG TABS tablet Take 1 tablet (20 mg) by mouth daily with food . Take at the same time each day. 90 tablet 0    ustekinumab (STELARA) 90 MG/ML 90 mg Every 8 weeks      diphenoxylate-atropine (LOMOTIL) 2.5-0.025 MG per tablet Take 2 tablets by mouth As Needed (Patient not taking: Reported on 9/27/2023)      tacrolimus (PROTOPIC) 0.1 % external ointment Apply twice daily as needed (Patient not taking: Reported on 10/20/2022) 60 g 3     No Known Allergies

## 2023-09-29 ENCOUNTER — PRE VISIT (OUTPATIENT)
Dept: UROLOGY | Facility: CLINIC | Age: 53
End: 2023-09-29
Payer: COMMERCIAL

## 2023-09-29 NOTE — CONFIDENTIAL NOTE
"Reason for visit: follow-up for peyronie's disease     Relevant information: \"knot has doubled in size\"    Records/imaging/labs/orders: in [facility]    At Rooming: lacho Grene  9/29/2023  12:42 PM  "

## 2023-10-06 DIAGNOSIS — Z86.711 HISTORY OF PULMONARY EMBOLISM: ICD-10-CM

## 2023-10-06 NOTE — PROGRESS NOTES
Fax received from [facility] for refill of Xarelto 20mg. Pt overdue for followup. Call to pt, pt scheduled for 10/19 @11am for virtual visit with Wendi. Pt denies questions or concerns at this time    Martha AGARWALN, RN, PHN   [facility]   Office: [phone]  Fax: [phone]

## 2023-10-12 ENCOUNTER — OFFICE VISIT (OUTPATIENT)
Dept: UROLOGY | Facility: CLINIC | Age: 53
End: 2023-10-12
Payer: COMMERCIAL

## 2023-10-12 VITALS
HEIGHT: 74 IN | HEART RATE: 88 BPM | SYSTOLIC BLOOD PRESSURE: 118 MMHG | WEIGHT: 225 LBS | DIASTOLIC BLOOD PRESSURE: 75 MMHG | BODY MASS INDEX: 28.88 KG/M2

## 2023-10-12 DIAGNOSIS — N48.6 PEYRONIE'S DISEASE: ICD-10-CM

## 2023-10-12 DIAGNOSIS — N48.6 PEYRONIE'S DISEASE: Primary | ICD-10-CM

## 2023-10-12 PROCEDURE — 99214 OFFICE O/P EST MOD 30 MIN: CPT | Performed by: UROLOGY

## 2023-10-12 RX ORDER — SILDENAFIL 100 MG/1
50-100 TABLET, FILM COATED ORAL DAILY PRN
Qty: 10 TABLET | Refills: 11 | Status: SHIPPED | OUTPATIENT
Start: 2023-10-12 | End: 2024-02-21

## 2023-10-12 RX ORDER — SILDENAFIL 100 MG/1
50-100 TABLET, FILM COATED ORAL DAILY PRN
Qty: 10 TABLET | Refills: 11 | OUTPATIENT
Start: 2023-10-12

## 2023-10-12 ASSESSMENT — PAIN SCALES - GENERAL: PAINLEVEL: NO PAIN (0)

## 2023-10-12 NOTE — PROGRESS NOTES
"HPI:  Jorge Amaral is a 53 year old male being seen for follow-up Peyronie's disease.      Onset: late winter/spring 2022.  Inciting trauma:  No, possibly related to martial arts but doesn't remember specific event.  Severity/Degree of curve now: no curve but having hinge effect at the base.  ED: adequate for intercourse but less firm compared to prior to scar.  Dupuytren's contractures: No  Pain with erection: Continues to have pain with erections. Not taking any tylenol, NSAIDS.   Degree of curve precludes intercourse:  No, no significant curve    Exam:  /75   Pulse 88   Ht 1.88 m (6' 2\")   Wt 102.1 kg (225 lb)   BMI 28.89 kg/m      General: Alert, oriented, nad  Eyes: anicteric, EOMI.  Pulse: regular  Resps: normal, non-labored.  Abdomen:  nondistended.  Phallus Circumcised, meatus adequate. Dorsal septal midline plaque felt at base of penis (3cm length x 0.5cm width) this is tender to palpation.  Goes slightly deeper past the skin edge.      Assessment & Plan   1. Peyronie's disease  a. Recommended conservative management, observation, take care not to injure penis, avoid intercourse with a less than firm erection, NSAIDS.  Discussed that there are not particular prescription medications or surgery that would be recommended to treat his case of Peyronie's disease.  Hinge effect cannot effectively dealt with except for plaque excision and grafting.  I think this would be overly invasive and probably do more harm than good.  Discussed that shockwave therapy has been shown effective for Peyronie's pain.  b. Sildenafil prescription sent, he will consider trying this to help maintain rigid erections.  Discussed side effects and how to take.  c. I'm happy to see him back in the future as needed.     Cliff Langston MD  [facility] UROLOGY [facility]      ==========================      Additional Coding Information:    Problems:  4 -- one or more chronic illnesses with exacerbation or side " effects    Data Reviewed  N/A    Level of risk:  4 -- prescription drug management    Time spent:  13 minutes spent by me on the date of the encounter doing chart review, history and exam, documentation and further activities per the note

## 2023-10-12 NOTE — LETTER
"10/12/2023       RE: Jorge Amaral  [address]  [facility] [address]     Dear Colleague,    Thank you for referring your patient, Jorge Amaral, to the [facility] [place] [facility]. Please see a copy of my visit note below.    HPI:  Jorge Amaral is a 53 year old male being seen for follow-up Peyronie's disease.      Onset: late winter/spring 2022.  Inciting trauma:  No, possibly related to martial arts but doesn't remember specific event.  Severity/Degree of curve now: no curve but having hinge effect at the base.  ED: adequate for intercourse but less firm compared to prior to scar.  Dupuytren's contractures: No  Pain with erection: Continues to have pain with erections. Not taking any tylenol, NSAIDS.   Degree of curve precludes intercourse:  No, no significant curve    Exam:  /75   Pulse 88   Ht 1.88 m (6' 2\")   Wt 102.1 kg (225 lb)   BMI 28.89 kg/m      General: Alert, oriented, nad  Eyes: anicteric, EOMI.  Pulse: regular  Resps: normal, non-labored.  Abdomen:  nondistended.  Phallus Circumcised, meatus adequate. Dorsal septal midline plaque felt at base of penis (3cm length x 0.5cm width) this is tender to palpation.  Goes slightly deeper past the skin edge.      Assessment & Plan   Peyronie's disease  Recommended conservative management, observation, take care not to injure penis, avoid intercourse with a less than firm erection, NSAIDS.  Discussed that there are not particular prescription medications or surgery that would be recommended to treat his case of Peyronie's disease.  Hinge effect cannot effectively dealt with except for plaque excision and grafting.  I think this would be overly invasive and probably do more harm than good.  Discussed that shockwave therapy has been shown effective for Peyronie's pain.  Sildenafil prescription sent, he will consider trying this to help maintain rigid erections.  " Discussed side effects and how to take.  I'm happy to see him back in the future as needed.     Cliff Langston MD  [facility] UROLOGY [facility]      ==========================      Additional Coding Information:    Problems:  4 -- one or more chronic illnesses with exacerbation or side effects    Data Reviewed  N/A    Level of risk:  4 -- prescription drug management    Time spent:  13 minutes spent by me on the date of the encounter doing chart review, history and exam, documentation and further activities per the note

## 2023-10-12 NOTE — NURSING NOTE
"Jorge Amaral is a 53 year old male patient that presents today in clinic for the following:    Chief Complaint   Patient presents with    Follow Up     Knot on penile are has tripled in size       The patient's allergies and medications were reviewed as noted. A set of vitals were recorded as noted without incident. The patient does not have any other questions for the provider.    Blood pressure 118/75, pulse 88, height 1.88 m (6' 2\"), weight 102.1 kg (225 lb). Body mass index is 28.89 kg/m .    Patient Active Problem List   Diagnosis    CARDIOVASCULAR SCREENING; LDL GOAL LESS THAN 160    Family history of diabetes mellitus    Cold sore    Common wart    Crohn's disease with complication, unspecified gastrointestinal tract location (H)    FH: prostate cancer    BMI 28.0-28.9,adult    History of pulmonary embolism    Left knee pain       No Known Allergies    Current Outpatient Medications   Medication Sig Dispense Refill    diphenoxylate-atropine (LOMOTIL) 2.5-0.025 MG per tablet Take 2 tablets by mouth As Needed (Patient not taking: Reported on 9/27/2023)      mercaptopurine (PURINETHOL) 50 MG tablet Take 50 mg by mouth daily 1 and half tabs daily      rivaroxaban ANTICOAGULANT (XARELTO ANTICOAGULANT) 20 MG TABS tablet Take 1 tablet (20 mg) by mouth daily with food . Take at the same time each day. 90 tablet 0    tacrolimus (PROTOPIC) 0.1 % external ointment Apply topically 2 times daily 100 g 3    tacrolimus (PROTOPIC) 0.1 % external ointment Apply twice daily as needed (Patient not taking: Reported on 10/20/2022) 60 g 3    ustekinumab (STELARA) 90 MG/ML 90 mg Every 8 weeks         Social History     Tobacco Use    Smoking status: Never    Smokeless tobacco: Never    Tobacco comments:     very rarely cigar - every 3 years or so    Vaping Use    Vaping Use: Never used   Substance Use Topics    Alcohol use: Yes     Comment: social    Drug use: No       Cherri Green  10/12/2023  9:55 AM  "

## 2023-10-13 ENCOUNTER — TELEPHONE (OUTPATIENT)
Dept: NURSING | Facility: CLINIC | Age: 53
End: 2023-10-13
Payer: COMMERCIAL

## 2023-10-13 DIAGNOSIS — N48.6 PEYRONIE'S DISEASE: ICD-10-CM

## 2023-10-13 RX ORDER — SILDENAFIL 100 MG/1
50-100 TABLET, FILM COATED ORAL DAILY PRN
Qty: 10 TABLET | Refills: 11 | OUTPATIENT
Start: 2023-10-13

## 2023-10-13 NOTE — TELEPHONE ENCOUNTER
Alternative Requested:PRIOR AUTH Addressed in a different encounter.  Sent to PA team. Initial script sent 10/12/23 to pharmacy

## 2023-10-14 ENCOUNTER — OFFICE VISIT (OUTPATIENT)
Dept: URGENT CARE | Facility: URGENT CARE | Age: 53
End: 2023-10-14
Payer: COMMERCIAL

## 2023-10-14 VITALS
TEMPERATURE: 97 F | WEIGHT: 229 LBS | SYSTOLIC BLOOD PRESSURE: 126 MMHG | OXYGEN SATURATION: 98 % | HEART RATE: 78 BPM | BODY MASS INDEX: 29.4 KG/M2 | RESPIRATION RATE: 18 BRPM | DIASTOLIC BLOOD PRESSURE: 81 MMHG

## 2023-10-14 DIAGNOSIS — S60.352A FOREIGN BODY OF LEFT THUMB WITH INFECTION: Primary | ICD-10-CM

## 2023-10-14 DIAGNOSIS — M79.645 THUMB PAIN, LEFT: ICD-10-CM

## 2023-10-14 DIAGNOSIS — L08.9 FOREIGN BODY OF LEFT THUMB WITH INFECTION: Primary | ICD-10-CM

## 2023-10-14 DIAGNOSIS — Z79.01 ANTICOAGULATED: ICD-10-CM

## 2023-10-14 PROCEDURE — 99207 PR NO CHARGE LOS: CPT | Performed by: NURSE PRACTITIONER

## 2023-10-14 NOTE — PROGRESS NOTES
Assessment & Plan     Foreign body of left thumb with infection    Anticoagulated    Thumb pain, left       Recommend further evaluation in emergency room for foreign body retained in thumb with infection due to anticoagulated and how much time has elapsed, no longer able to visualize or always palpate. Patient agreeable and is discharged in stable condition.         Missy Medrano NP  [facility] [place]    Emily Patel is a 53 year old male who presents to clinic today for the following health issues:  Chief Complaint   Patient presents with    Foreign Body in Skin     Splinter in left thumb- happened Thursday. Noticed some swelling and pus     Patient presents for evaluation of wood splinter in left thumb. Symptoms started 2 days ago and have been worsening. Developed pain, swelling, pus, warmth, redness. Denies fever. Tried to get it out but it seems deeper and he cannot see it anymore.     He is anticoagulated in Xarelto for history of PE. No history of diabetes.     Problem list, Medication list, Allergies, and Medical history reviewed in EPIC.    ROS:  Review of systems negative except for noted above        Objective    /81   Pulse 78   Temp 97  F (36.1  C) (Tympanic)   Resp 18   Wt 103.9 kg (229 lb)   SpO2 98%   BMI 29.40 kg/m    Physical Exam  Constitutional:       General: He is not in acute distress.     Appearance: He is not toxic-appearing or diaphoretic.   Cardiovascular:      Pulses: Normal pulses.   Musculoskeletal:      Left hand: Swelling and tenderness present. Decreased range of motion.      Comments: Severe tenderness with palpation   Skin:     General: Skin is warm.      Findings: Erythema present.      Comments: Erythema left thumb with increased warmth. Puncture wound without drainage, able to palpate foreign body at times, unable to visualize   Neurological:      Mental Status: He is alert.

## 2023-10-17 RX ORDER — SILDENAFIL 100 MG/1
50-100 TABLET, FILM COATED ORAL DAILY PRN
Qty: 10 TABLET | Refills: 11 | OUTPATIENT
Start: 2023-10-17

## 2023-10-17 NOTE — TELEPHONE ENCOUNTER
PRIOR AUTHORIZATION DENIED    Medication: SILDENAFIL CITRATE 100 MG PO TABS    Insurance Company: [employer] - Phone [phone] Fax [phone]    Denial Date: 10/17/2023    Denial Rational: Excluded

## 2023-10-17 NOTE — TELEPHONE ENCOUNTER
SILDENAFIL 100 MG TABLET   Second request for PA    Send request  Sent follow up request to PA team    Sonia Bishop RN  Central Triage Red Flags/Med Refills

## 2023-10-18 NOTE — PROGRESS NOTES
[facility]  [facility]  [facility], [address], [place] [address]  Main: [phone], Fax: [phone]          Outpatient Visit Note:    Patient: Jorge Amaral  MRN: [id number]  : 1970  CHONG: Oct 19, 2023    History of Present Illness:  Jorge Amaral is a 53 year old male with a history of Crohn's disease (controlled on Humira) and unprovoked/minimally pulmonary embolism in 2020. Please refer to previous notes by myself and my colleague Dr. Plaza for additional details regarding his history and initial presentation. In summary, he presented to the ED on August 15, 2020 with complaints of chest pain and was found to have a pulmonary embolism. At the time he denied any recent periods of immobility, hospital stays, long distance travel, infection, or trauma/injury. He IBD was well controlled--not flaring. He did report a recent knee aspiration, but no other surgeries/procedures. He is maintained on indefinite anticoagulation (Xarelto 20mg daily) and presents today for routine follow-up.     Interval History:  Jorge was last seen by me on 2022. In the interim, he was seen in the ED on 2022 for left lower extremity pain. Ultrasound was negative for DVT. He has otherwise been well this past year. He continues on Xarelto 20mg daily. He is tolerating it well. No bleeding concerns--major or nuisance. No recurrent venous thromboembolism. Continues to follow closely with [facility] for his Crohn's disease. He gets routine lab work there, including CMP. He reports that there have been no abnormalities with this. 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 He will hold his anticoagulation for martial arts competitions.    ROS:  Denies epistaxis, oral/mucosal bleeding, excessive bruising/ecchymosis, hematuria, hematochezia, and melena.  Currently denies LE pain/swelling. Denies chest pain. Denies  shortness of breath.     Exam via Televideo:  Constitutional: Well appearing. Not in distress.  Respiratory: Breathing comfortably on room air.  Neuro: Aox3.    Labs:  Component      Latest Ref Rng 5/6/2022  3:32 PM   Sodium      133 - 144 mmol/L 142    Potassium      3.4 - 5.3 mmol/L 3.5    Chloride      94 - 109 mmol/L 108    Carbon Dioxide      20 - 32 mmol/L 25    Anion Gap      3 - 14 mmol/L 9    Urea Nitrogen      7 - 30 mg/dL 16    Creatinine      0.66 - 1.25 mg/dL 1.16    Calcium      8.5 - 10.1 mg/dL 9.2    Glucose      70 - 99 mg/dL 95    Alkaline Phosphatase      40 - 150 U/L 81    AST      0 - 45 U/L 26    ALT      0 - 70 U/L 46    Protein Total      6.8 - 8.8 g/dL 7.8    Albumin      3.4 - 5.0 g/dL 3.9    Bilirubin Total      0.2 - 1.3 mg/dL 0.6    GFR Estimate      >60 mL/min/1.73m2 75      Assessment:  In summary, Jorge Amaral is a 51 year old man that had an unprovoked/minimally provoked pulmonary embolism in August 2020 and is therefore presently maintained on indefinite anticoagulation with Xarelto (rivaroxaban) 20mg daily. He is tolerating this well and remains an appropriate candidate at this time.     Plan:  1) Continue Xarelto (rivaroxaban) at 20mg daily.  2) OK to hold Xarelto for 24-48 hours surrounding [employer] events.  3) Will place a standing order for CMP to be drawn with his next set of labs.  4) Contact the clinic if any upcoming surgeries/procedures and/or if any new bleeding concerns.      15 minutes spent on the date of the encounter doing chart review, history and exam, documentation and further activities per the note.    Video Call  Patient Location: Home.  Provider Location: [facility].  Joined the call at 10/19/2023, 11:13:19?am.  Left the call at 10/19/2023, 11:21:26?am.  You were on the call for 8 minutes 6 seconds .         Wendi Wheeler PA-C, [facility]  [facility]  [facility]  [facility], [address], [place] [address]  Main: [phone], Fax: [phone]

## 2023-10-19 ENCOUNTER — VIRTUAL VISIT (OUTPATIENT)
Dept: HEMATOLOGY | Facility: CLINIC | Age: 53
End: 2023-10-19
Attending: PHYSICIAN ASSISTANT
Payer: COMMERCIAL

## 2023-10-19 DIAGNOSIS — Z79.01 CHRONIC ANTICOAGULATION: ICD-10-CM

## 2023-10-19 DIAGNOSIS — Z86.711 HISTORY OF PULMONARY EMBOLISM: Primary | ICD-10-CM

## 2023-10-19 PROCEDURE — 99212 OFFICE O/P EST SF 10 MIN: CPT | Mod: 95 | Performed by: PHYSICIAN ASSISTANT

## 2023-10-19 NOTE — PROGRESS NOTES
Patient was contacted to complete the pre-visit call prior to their video visit with the provider.      Allergies and medications were reviewed.    I thanked them for their time to cover this information   Yonas Johnson CMA

## 2023-11-01 ENCOUNTER — OFFICE VISIT (OUTPATIENT)
Dept: FAMILY MEDICINE | Facility: CLINIC | Age: 53
End: 2023-11-01
Payer: COMMERCIAL

## 2023-11-01 VITALS
TEMPERATURE: 98.8 F | BODY MASS INDEX: 28.88 KG/M2 | HEIGHT: 74 IN | HEART RATE: 87 BPM | SYSTOLIC BLOOD PRESSURE: 124 MMHG | DIASTOLIC BLOOD PRESSURE: 78 MMHG | WEIGHT: 225 LBS | RESPIRATION RATE: 16 BRPM | OXYGEN SATURATION: 97 %

## 2023-11-01 DIAGNOSIS — Z12.5 SCREENING PSA (PROSTATE SPECIFIC ANTIGEN): ICD-10-CM

## 2023-11-01 DIAGNOSIS — Z86.711 HISTORY OF PULMONARY EMBOLISM: ICD-10-CM

## 2023-11-01 DIAGNOSIS — Z80.42 FH: PROSTATE CANCER: ICD-10-CM

## 2023-11-01 DIAGNOSIS — Z00.00 ROUTINE GENERAL MEDICAL EXAMINATION AT A HEALTH CARE FACILITY: Primary | ICD-10-CM

## 2023-11-01 DIAGNOSIS — R06.83 SNORES: ICD-10-CM

## 2023-11-01 DIAGNOSIS — Z79.01 CHRONIC ANTICOAGULATION: ICD-10-CM

## 2023-11-01 LAB
ALBUMIN SERPL BCG-MCNC: 4.2 G/DL (ref 3.5–5.2)
ALP SERPL-CCNC: 90 U/L (ref 40–129)
ALT SERPL W P-5'-P-CCNC: 52 U/L (ref 0–70)
ANION GAP SERPL CALCULATED.3IONS-SCNC: 6 MMOL/L (ref 7–15)
AST SERPL W P-5'-P-CCNC: 35 U/L (ref 0–45)
BILIRUB SERPL-MCNC: 0.3 MG/DL
BUN SERPL-MCNC: 11 MG/DL (ref 6–20)
CALCIUM SERPL-MCNC: 9.7 MG/DL (ref 8.6–10)
CHLORIDE SERPL-SCNC: 105 MMOL/L (ref 98–107)
CHOLEST SERPL-MCNC: 244 MG/DL
CREAT SERPL-MCNC: 0.97 MG/DL (ref 0.67–1.17)
DEPRECATED HCO3 PLAS-SCNC: 31 MMOL/L (ref 22–29)
EGFRCR SERPLBLD CKD-EPI 2021: >90 ML/MIN/1.73M2
GLUCOSE SERPL-MCNC: 89 MG/DL (ref 70–99)
HCV AB SERPL QL IA: NONREACTIVE
HDLC SERPL-MCNC: 36 MG/DL
LDLC SERPL CALC-MCNC: 151 MG/DL
NONHDLC SERPL-MCNC: 208 MG/DL
POTASSIUM SERPL-SCNC: 4.4 MMOL/L (ref 3.4–5.3)
PROT SERPL-MCNC: 7.3 G/DL (ref 6.4–8.3)
PSA SERPL DL<=0.01 NG/ML-MCNC: 0.57 NG/ML (ref 0–3.5)
SODIUM SERPL-SCNC: 142 MMOL/L (ref 135–145)
TRIGL SERPL-MCNC: 286 MG/DL

## 2023-11-01 PROCEDURE — 90686 IIV4 VACC NO PRSV 0.5 ML IM: CPT | Performed by: PHYSICIAN ASSISTANT

## 2023-11-01 PROCEDURE — 90471 IMMUNIZATION ADMIN: CPT | Performed by: PHYSICIAN ASSISTANT

## 2023-11-01 PROCEDURE — 99396 PREV VISIT EST AGE 40-64: CPT | Mod: 25 | Performed by: PHYSICIAN ASSISTANT

## 2023-11-01 PROCEDURE — 86803 HEPATITIS C AB TEST: CPT | Performed by: PHYSICIAN ASSISTANT

## 2023-11-01 PROCEDURE — 90480 ADMN SARSCOV2 VAC 1/ONLY CMP: CPT | Performed by: PHYSICIAN ASSISTANT

## 2023-11-01 PROCEDURE — 91320 SARSCV2 VAC 30MCG TRS-SUC IM: CPT | Performed by: PHYSICIAN ASSISTANT

## 2023-11-01 PROCEDURE — G0103 PSA SCREENING: HCPCS | Performed by: PHYSICIAN ASSISTANT

## 2023-11-01 PROCEDURE — 80053 COMPREHEN METABOLIC PANEL: CPT | Performed by: PHYSICIAN ASSISTANT

## 2023-11-01 PROCEDURE — 36415 COLL VENOUS BLD VENIPUNCTURE: CPT | Performed by: PHYSICIAN ASSISTANT

## 2023-11-01 PROCEDURE — 80061 LIPID PANEL: CPT | Performed by: PHYSICIAN ASSISTANT

## 2023-11-01 PROCEDURE — 99213 OFFICE O/P EST LOW 20 MIN: CPT | Mod: 25 | Performed by: PHYSICIAN ASSISTANT

## 2023-11-01 ASSESSMENT — ENCOUNTER SYMPTOMS
HEADACHES: 1
MYALGIAS: 1
NAUSEA: 0
FEVER: 0
COUGH: 0
NERVOUS/ANXIOUS: 0
EYE PAIN: 0
ABDOMINAL PAIN: 0
JOINT SWELLING: 0
HEMATURIA: 0
FREQUENCY: 0
HEMATOCHEZIA: 0
HEARTBURN: 0
SHORTNESS OF BREATH: 0
WEAKNESS: 0
ARTHRALGIAS: 1
PALPITATIONS: 0
CHILLS: 0
SORE THROAT: 0
DIZZINESS: 0
DYSURIA: 0
PARESTHESIAS: 0
CONSTIPATION: 0
DIARRHEA: 0

## 2023-11-01 ASSESSMENT — PAIN SCALES - GENERAL: PAINLEVEL: NO PAIN (0)

## 2023-11-01 NOTE — PROGRESS NOTES
SUBJECTIVE:   CC: Jorge is an 53 year old who presents for preventative health visit.       11/1/2023     9:10 AM   Additional Questions   Roomed by Anastasiya CHAVEZ   Accompanied by Self       Healthy Habits:     Getting at least 3 servings of Calcium per day:  Yes    Bi-annual eye exam:  Yes    Dental care twice a year:  Yes    Sleep apnea or symptoms of sleep apnea:  Daytime drowsiness and Excessive snoring    Diet:  Regular (no restrictions)    Frequency of exercise:  4-5 days/week    Duration of exercise:  30-45 minutes    Taking medications regularly:  Yes    Medication side effects:  Not applicable    Additional concerns today:  No  Patient has a history of pulmonary embolism and is on chronic anticoagulation therapy.    Has a family history of prostate cancer.    Also would like another referral to sleep medicine for his history of snoring and fatigue.        Social History     Tobacco Use    Smoking status: Former     Types: Cigars    Smokeless tobacco: Never    Tobacco comments:     Occasional Cigar Once in a couple years   Substance Use Topics    Alcohol use: Yes     Comment: social             11/1/2023     9:04 AM   Alcohol Use   Prescreen: >3 drinks/day or >7 drinks/week? No          No data to display                Last PSA:   PSA   Date Value Ref Range Status   01/13/2021 0.53 0 - 4 ug/L Final     Comment:     Assay Method:  Chemiluminescence using Siemens Vista analyzer     Prostate Specific Antigen Screen   Date Value Ref Range Status   05/06/2022 0.67 0.00 - 4.00 ug/L Final       Reviewed orders with patient. Reviewed health maintenance and updated orders accordingly - Yes  Lab work is in process  Labs reviewed in EPIC  BP Readings from Last 3 Encounters:   11/01/23 124/78   10/14/23 126/81   10/12/23 118/75    Wt Readings from Last 3 Encounters:   11/01/23 102.1 kg (225 lb)   10/14/23 103.9 kg (229 lb)   10/12/23 102.1 kg (225 lb)                  Patient Active Problem List   Diagnosis     CARDIOVASCULAR SCREENING; LDL GOAL LESS THAN 160    Family history of diabetes mellitus    Cold sore    Common wart    Crohn's disease with complication, unspecified gastrointestinal tract location (H)    FH: prostate cancer    BMI 28.0-28.9,adult    History of pulmonary embolism    Left knee pain    Chronic anticoagulation     Past Surgical History:   Procedure Laterality Date    APPENDECTOMY  1986    ARTHROSCOPY KNEE WITH MEDIAL MENISCECTOMY Left 01/14/2019    Procedure: LEFT KNEE ARTHROSCOPY WITH PARTIAL MEDIAL MENISCECTOMY;  Surgeon: Jose Luis Jama MD;  Location:  OR    COLONOSCOPY  05/14/2020    ENT SURGERY  06/28/2013    Left Tonsil biopsy    EXCISE MASS WRIST Right 11/07/2018    Procedure: EXCISION OF RIGHT DORSAL WRIST MASS;  Surgeon: Zohaib Edmond MD;  Location:  OR    LAPAROSCOPY PROCEDURE UNLISTED  08/16/2010    Exploratory laparotomy, lysis of adhesions, takedown enterocolonic fistula and colocononic fistula, with creation of loop ileostomy    OPEN REDUCTION INTERNAL FIXATION HAND  07/25/2011    right IV MCP hand 7/11    REMOVE HARDWARE LOWER EXTREMITY  04/06/2012    Procedure:REMOVE HARDWARE LOWER EXTREMITY; REMOVAL OF RIGHT TIBIAL SCREW; Surgeon:JOSE LUIS JAMA; Location: [facility]    SMALL BOWEL RESECTION  1986    terminal ileum resection at age 16    TAKEDOWN COLOSTOMY  12/27/2011       Social History     Tobacco Use    Smoking status: Former     Types: Cigars    Smokeless tobacco: Never    Tobacco comments:     Occasional Cigar Once in a couple years   Substance Use Topics    Alcohol use: Yes     Comment: social     Family History   Problem Relation Age of Onset    Respiratory Mother         SMOKER -EMPYSEMA    Diabetes Father     Prostate Cancer Father 50    Prostate Cancer Paternal Grandfather     Diabetes Brother     Prostate Cancer Paternal Uncle 50    Hypertension No family hx of          Current Outpatient Medications   Medication Sig Dispense Refill    rivaroxaban ANTICOAGULANT  (XARELTO ANTICOAGULANT) 20 MG TABS tablet Take 1 tablet (20 mg) by mouth daily with food . Take at the same time each day. 90 tablet 4    tacrolimus (PROTOPIC) 0.1 % external ointment Apply topically 2 times daily 100 g 3    diphenoxylate-atropine (LOMOTIL) 2.5-0.025 MG per tablet Take 2 tablets by mouth As Needed      mercaptopurine (PURINETHOL) 50 MG tablet Take 50 mg by mouth daily 1 and half tabs daily      sildenafil (VIAGRA) 100 MG tablet Take 0.5-1 tablets ( mg) by mouth daily as needed (take 1 hour before intercourse.) (Patient not taking: Reported on 11/1/2023) 10 tablet 11    tacrolimus (PROTOPIC) 0.1 % external ointment Apply twice daily as needed (Patient not taking: Reported on 11/1/2023) 60 g 3    ustekinumab (STELARA) 90 MG/ML 90 mg Every 8 weeks       No Known Allergies    Reviewed and updated as needed this visit by clinical staff   Tobacco  Allergies  Meds  Problems  Med Hx  Surg Hx  Fam Hx          Reviewed and updated as needed this visit by Provider   Tobacco  Allergies  Meds  Problems  Med Hx  Surg Hx  Fam Hx           Past Medical History:   Diagnosis Date    ACL (anterior cruciate ligament) tear--recurrent 01/03/2013    Arthritis August 2020    Crohn's disease (H)     Delayed union of fracture 12/01/2011    Fracture, metacarpal 08/03/2011    History of blood transfusion August 1987    Large bowel perforation (H) 08/16/2010    Nondisplaced fracture of navicular (scaphoid) of left foot, subsequent encounter for fracture with delayed healing 07/03/2019    Puncture wound     L hand DOI 4/29/21    Sepsis (H) 01/19/2018    He had an incomplete (due to stricture) colonoscopy day of presentation and after that developed acute abdominal pain and fever. He was septic and suspected to have a micro perforation. Imaging negative for larger perforation      Past Surgical History:   Procedure Laterality Date    APPENDECTOMY  1986    ARTHROSCOPY KNEE WITH MEDIAL MENISCECTOMY Left  "01/14/2019    Procedure: LEFT KNEE ARTHROSCOPY WITH PARTIAL MEDIAL MENISCECTOMY;  Surgeon: Jose Luis Jama MD;  Location:  OR    COLONOSCOPY  05/14/2020    ENT SURGERY  06/28/2013    Left Tonsil biopsy    EXCISE MASS WRIST Right 11/07/2018    Procedure: EXCISION OF RIGHT DORSAL WRIST MASS;  Surgeon: Zohaib Edmond MD;  Location:  OR    LAPAROSCOPY PROCEDURE UNLISTED  08/16/2010    Exploratory laparotomy, lysis of adhesions, takedown enterocolonic fistula and colocononic fistula, with creation of loop ileostomy    OPEN REDUCTION INTERNAL FIXATION HAND  07/25/2011    right IV MCP hand 7/11    REMOVE HARDWARE LOWER EXTREMITY  04/06/2012    Procedure:REMOVE HARDWARE LOWER EXTREMITY; REMOVAL OF RIGHT TIBIAL SCREW; Surgeon:JOSE LUIS JAMA; Location: [facility]    SMALL BOWEL RESECTION  1986    terminal ileum resection at age 16    TAKEDOWN COLOSTOMY  12/27/2011       Review of Systems   Constitutional:  Negative for chills and fever.   HENT:  Negative for congestion, ear pain, hearing loss and sore throat.    Eyes:  Negative for pain and visual disturbance.   Respiratory:  Negative for cough and shortness of breath.    Cardiovascular:  Negative for chest pain, palpitations and peripheral edema.   Gastrointestinal:  Negative for abdominal pain, constipation, diarrhea, heartburn, hematochezia and nausea.   Genitourinary:  Positive for impotence. Negative for dysuria, frequency, genital sores, hematuria, penile discharge and urgency.   Musculoskeletal:  Positive for arthralgias and myalgias. Negative for joint swelling.   Skin:  Negative for rash.   Neurological:  Positive for headaches. Negative for dizziness, weakness and paresthesias.   Psychiatric/Behavioral:  Negative for mood changes. The patient is not nervous/anxious.          OBJECTIVE:   /78   Pulse 87   Temp 98.8  F (37.1  C) (Tympanic)   Resp 16   Ht 1.88 m (6' 2\")   Wt 102.1 kg (225 lb)   SpO2 97%   BMI 28.89 kg/m      Physical " Exam  GENERAL: healthy, alert and no distress  EYES: Eyes grossly normal to inspection, PERRL and conjunctivae and sclerae normal  HENT: ear canals and TM's normal, nose and mouth without ulcers or lesions  NECK: no adenopathy, no asymmetry, masses, or scars and thyroid normal to palpation  RESP: lungs clear to auscultation - no rales, rhonchi or wheezes  CV: regular rate and rhythm, normal S1 S2, no S3 or S4, no murmur, click or rub, no peripheral edema and peripheral pulses strong  ABDOMEN: soft, nontender, no hepatosplenomegaly, no masses and bowel sounds normal   (male): normal male genitalia without lesions or urethral discharge, no hernia  MS: no gross musculoskeletal defects noted, no edema  SKIN: no suspicious lesions or rashes  NEURO: Normal strength and tone, mentation intact and speech normal  PSYCH: mentation appears normal, affect normal/bright    Diagnostic Test Results:  Labs reviewed in Epic    ASSESSMENT/PLAN:       ICD-10-CM    1. Routine general medical examination at a health care facility  Z00.00 Hepatitis C Screen Reflex to HCV RNA Quant and Genotype     Lipid panel reflex to direct LDL Fasting     Hepatitis C Screen Reflex to HCV RNA Quant and Genotype     Lipid panel reflex to direct LDL Fasting      2. Snores  R06.83 Adult Sleep Eval & Management  Referral      3. FH: prostate cancer  Z80.42 PSA, screen     PSA, screen      4. Screening PSA (prostate specific antigen)  Z12.5 PSA, screen     PSA, screen      5. History of pulmonary embolism  Z86.711 Comprehensive metabolic panel      6. Chronic anticoagulation  Z79.01 Comprehensive metabolic panel      Work on Healthy diet and exercise. Getting heart rate elevated for 30 mins most days of week.  Labs pending   We will have him follow-up with sleep clinic for second opinion.      COUNSELING:   Reviewed preventive health counseling, as reflected in patient instructions       Regular exercise       Healthy diet/nutrition       Vision  "screening      BMI:   Estimated body mass index is 28.89 kg/m  as calculated from the following:    Height as of this encounter: 1.88 m (6' 2\").    Weight as of this encounter: 102.1 kg (225 lb).   Weight management plan: Discussed healthy diet and exercise guidelines      He reports that he has quit smoking. His smoking use included cigars. He has never used smokeless tobacco.            Vince Swain PA-C  [facility]  "

## 2023-11-06 NOTE — PROGRESS NOTES
Jorge Amaral  :  1970  DOS: 2023  MRN: [id number]    Sports Medicine Clinic Visit    PCP: Vince Swain    Interim History - March 3, 2023  Since last visit on 10/13/2021 patient has noticed pain increasing within the past month or so.  Left hip injection completed on 10/13/2021 provided good relief for 1+ years.  No new injury in the interim.    Social History: currently employed as     Interim History - 2023  Since last visit on 23 patient states he felt relief from previous injection for a few months. R>L. No significant changes or concerns, but did mention that his hips have been bothering him. No interim injury.         Review of Systems  Musculoskeletal: as above  Remainder of review of systems is negative including constitutional, CV, pulmonary, GI, Skin and Neurologic except as noted in HPI or medical history.    Past Medical History:   Diagnosis Date    ACL (anterior cruciate ligament) tear--recurrent 2013    Arthritis 2020    Crohn's disease (H)     Delayed union of fracture 2011    Fracture, metacarpal 2011    History of blood transfusion 1987    Large bowel perforation (H) 2010    Nondisplaced fracture of navicular (scaphoid) of left foot, subsequent encounter for fracture with delayed healing 2019    Puncture wound     L hand DOI 21    Sepsis (H) 2018    He had an incomplete (due to stricture) colonoscopy day of presentation and after that developed acute abdominal pain and fever. He was septic and suspected to have a micro perforation. Imaging negative for larger perforation     Past Surgical History:   Procedure Laterality Date    APPENDECTOMY      ARTHROSCOPY KNEE WITH MEDIAL MENISCECTOMY Left 2019    Procedure: LEFT KNEE ARTHROSCOPY WITH PARTIAL MEDIAL MENISCECTOMY;  Surgeon: Joleen Jama MD;  Location: [facility]    COLONOSCOPY  2020    ENT SURGERY  2013     Left Tonsil biopsy    EXCISE MASS WRIST Right 11/07/2018    Procedure: EXCISION OF RIGHT DORSAL WRIST MASS;  Surgeon: Zohaib Edmond MD;  Location: [facility]    LAPAROSCOPY PROCEDURE UNLISTED  08/16/2010    Exploratory laparotomy, lysis of adhesions, takedown enterocolonic fistula and colocononic fistula, with creation of loop ileostomy    OPEN REDUCTION INTERNAL FIXATION HAND  07/25/2011    right IV MCP hand 7/11    REMOVE HARDWARE LOWER EXTREMITY  04/06/2012    Procedure:REMOVE HARDWARE LOWER EXTREMITY; REMOVAL OF RIGHT TIBIAL SCREW; Surgeon:JOSE LUIS CARMICHAEL; Location: [facility]    SMALL BOWEL RESECTION  1986    terminal ileum resection at age 16    TAKEDOWN COLOSTOMY  12/27/2011     Family History   Problem Relation Age of Onset    Respiratory Mother         SMOKER -EMPYSEMA    Diabetes Father     Prostate Cancer Father 50    Prostate Cancer Paternal Grandfather     Diabetes Brother     Prostate Cancer Paternal Uncle 50    Hypertension No family hx of        Objective  Pulse 82   Wt 102.1 kg (225 lb)   SpO2 96%   BMI 28.89 kg/m      General: healthy, alert and in no distress    HEENT: no scleral icterus or conjunctival erythema   Skin: no suspicious lesions or rash. No jaundice.   CV: regular rhythm by palpation, 2+ distal pulses, no pedal edema    Resp: normal respiratory effort without conversational dyspnea   Psych: normal mood and affect    Gait: nonantalgic, appropriate coordination and balance   Neuro: normal light touch sensory exam of the extremities. Motor strength as noted below     Bilateral Knee exam    ROM:        Flexion 130 degrees       Extension 0 degrees       Range of motion limited by mild pain in terminal flexion R>L    Inspection:       no visible ecchymosis        effusion noted small b/l by palpation    Skin:       no visible deformities       well perfused       capillary refill brisk    Patellar Motion:        Lateral tilt noted in patella       Crepitus noted in the patellofemoral  joint    Tender:        medial joint line most focal, mild lateral patellar borders    Non Tender:         remainder of knee area    Special Tests:        neg (-) Jose       neg (-) varus at 0 deg and 30 deg       neg (-) valgus at 0 deg and 30 deg      Radiology  Recent Results (from the past 744 hour(s))   XR Knee Bilateral 3 Views    Narrative    KNEE BILATERAL 3 VIEWS   11/8/2023 10:54 AM     HISTORY: Bilateral knee pain.    COMPARISON: Radiographs from 12/13/2012.      Impression    IMPRESSION:    Right: ACL reconstruction revision. Moderate to severe osteoarthrosis  of the medial compartment, significantly progressed. Mild  osteoarthrosis of the lateral compartment, progressed. Ossification in  the region of the patellar tendon likely related to prior  surgery/trauma. Small effusion, unchanged. There is probably an  ossified intra-articular body in the anterior aspect of the joint,  new. Mild patellofemoral osteoarthrosis, progressed. There is no  evidence of an acute or subacute fracture.    Left: Moderate to severe osteoarthrosis of the medial compartment.  Mild patellofemoral osteoarthrosis. Probable calcified intra-articular  bodies and a popliteal cyst.    BELINDA DOYLE MD         SYSTEM ID:  [id number]       MR RIGHT KNEE WITHOUT CONTRAST 12/13/2012 7:50 PM       HISTORY:Medial knee pain for 3 weeks after recurrent injury. ACL   graft repair March 2011.       TECHNIQUE: Sagittal proton density and T2 weighted images, coronal T1   and coronal and axial T2 fat suppressed images were obtained.       COMPARISON: 3/2/2011.       FINDINGS:   Menisci:   Medial meniscus: Interval partial meniscectomy involving the midbody   and posterior horn. The remaining peripheral mid body is   unremarkable. The remaining peripheral posterior horn shows an   irregular free edge which could indicate tear or degeneration. There   is a small meniscal flap or fragment adjacent to the far anterior   horn (image 18 of  series 4 and image 12 of series   5. This may have originated from a tear in the far anterior horn or   may be displaced from the residual posterior horn.       Lateral meniscus: Normal.       Cruciate ligaments:   Anterior cruciate ligament: Interval placement of ACL allograft.   There is no definite graft continuity, especially in the proximal   graft. This is suspicious for a complete tear of the graft although   there are no bone injuries to indicate that this is acute. Clinical   correlation for ACL insufficiency is recommended.       Posterior cruciate ligament: Normal.       Collateral supporting structures:   Medial collateral ligament: Normal.       Lateral supporting structures: Previously seen mild edema around the   fibular collateral ligament has resolved. The biceps tendon, fibular   collateral ligament, distal iliotibial tract and popliteus tendon are   intact.       Osseous structures and cartilaginous surfaces: The previously seen   bone marrow contusions have resolved. No acute appearing bony   abnormalities are seen. Metallic artifacts obscure some regions.   There is focal articular cartilage irregularity in the posterolateral   weightbearing medial femoral condyle without change. Remainder of the   articular cartilages in the medial compartment show mild diffuse   thinning, also unchanged. Articular cartilages in the weightbearing   lateral compartment are intact. There is again question of very early   chondromalacia at the patellar apex. Remainder of patellofemoral   cartilages are normal.       Additional findings: Minimal joint space fluid without change. Small   popliteal cyst, probably unchanged in size. Previously seen   nonspecific soft tissue edema about the knee has resolved.       Impression   IMPRESSION:   1. Interval placement of ACL allograft. Continuity of the fascicles   is not demonstrated, especially proximally. Disruption of the graft   is not excluded and clinical correlation  is recommended.   2. Resolution of previously seen bone marrow injuries.   3. Interval partial medial meniscectomy involving the midbody and   posterior horn. There is a new small meniscal flap or fragment   projecting adjacent to the anterior horn which may have originated   from a flap tear of the far anterior horn or could be a fragment   originating from the posterior horn.   4. Mild chondromalacia medial compartment and question of minimal   chondromalacia patellar apex, both without significant change.        Assessment:  1. Chronic pain of both knees    2. Osteoarthritis of both knees, unspecified osteoarthritis type           Large Joint Injection/Arthocentesis: bilateral knee    Date/Time: 11/8/2023 11:17 AM    Performed by: Sanjay Hatch DO  Authorized by: Sanjay Hatch DO    Indications:  Osteoarthritis  Needle Size:  21 G  Guidance: ultrasound    Approach:  Superolateral  Location:  Knee  Laterality:  Bilateral      Medications (Right):  40 mg triamcinolone 40 MG/ML; 3 mL ROPivacaine 5 MG/ML  Aspirate amount (mL):  10  Aspirate:  Yellow  Medications (Left):  40 mg triamcinolone 40 MG/ML; 3 mL ROPivacaine 5 MG/ML  Aspirate amount (mL):  18  Aspirate:  Yellow  Outcome:  Tolerated well, no immediate complications  Procedure discussed: discussed risks, benefits, and alternatives    Consent Given by:  Patient  Timeout: timeout called immediately prior to procedure    Prep: patient was prepped and draped in usual sterile fashion     Ultrasound images of procedure were permanently stored.      Plan:  Discussed the assessment with the patient.  Follow up: prn based on clinical progress  Repeat US guided corticosteroid injection to the bilateral knees today  Advanced medial compartment arthritis in the bilateral knee, with tricompartmental changes  Likely some posttraumatic arthritis component in the right given prior ACL reconstruction, more primary in the left  Can offer further referral  to orthopedic surgery in the future for TKA discussion, defer for now  Activity strategies reviewed, continues to be very active with martial arts  PT options reviewed for this as well  Bracing options and strategies reviewed  Oral Tylenol and topical Voltaren gel reviewed as safe OTC options, reviewed safe dosing strategies  XR images independently visualized and reviewed with patient today in clinic  Expectations and goals of CSI reviewed  Often 2-3 days for steroid effect, and can take up to two weeks for maximum effect  We discussed modified progressive pain-free activity as tolerated  Do not overuse in first two weeks if feeling better due to concern for vulnerability while steroid is working  Supportive care reviewed  All questions were answered today  Contact us with additional questions or concerns  Signs and sx of concern reviewed      Sanjay Hatch DO, JAYY  Sports Medicine Physician  [facility]        Disclaimer: This note consists of symbols derived from keyboarding, dictation and/or voice recognition software. As a result, there may be errors in the script that have gone undetected. Please consider this when interpreting information found in this chart.

## 2023-11-08 ENCOUNTER — MYC MEDICAL ADVICE (OUTPATIENT)
Dept: FAMILY MEDICINE | Facility: CLINIC | Age: 53
End: 2023-11-08

## 2023-11-08 ENCOUNTER — OFFICE VISIT (OUTPATIENT)
Dept: ORTHOPEDICS | Facility: CLINIC | Age: 53
End: 2023-11-08
Payer: COMMERCIAL

## 2023-11-08 ENCOUNTER — ANCILLARY PROCEDURE (OUTPATIENT)
Dept: GENERAL RADIOLOGY | Facility: CLINIC | Age: 53
End: 2023-11-08
Attending: FAMILY MEDICINE
Payer: COMMERCIAL

## 2023-11-08 VITALS — OXYGEN SATURATION: 96 % | BODY MASS INDEX: 28.89 KG/M2 | WEIGHT: 225 LBS | HEART RATE: 82 BPM

## 2023-11-08 DIAGNOSIS — M25.561 BILATERAL KNEE PAIN: ICD-10-CM

## 2023-11-08 DIAGNOSIS — M25.561 CHRONIC PAIN OF BOTH KNEES: Primary | ICD-10-CM

## 2023-11-08 DIAGNOSIS — M25.562 CHRONIC PAIN OF BOTH KNEES: Primary | ICD-10-CM

## 2023-11-08 DIAGNOSIS — M25.562 BILATERAL KNEE PAIN: ICD-10-CM

## 2023-11-08 DIAGNOSIS — M17.0 OSTEOARTHRITIS OF BOTH KNEES, UNSPECIFIED OSTEOARTHRITIS TYPE: ICD-10-CM

## 2023-11-08 DIAGNOSIS — G89.29 CHRONIC PAIN OF BOTH KNEES: Primary | ICD-10-CM

## 2023-11-08 PROCEDURE — 99214 OFFICE O/P EST MOD 30 MIN: CPT | Mod: 25 | Performed by: FAMILY MEDICINE

## 2023-11-08 PROCEDURE — 20611 DRAIN/INJ JOINT/BURSA W/US: CPT | Mod: 50 | Performed by: FAMILY MEDICINE

## 2023-11-08 PROCEDURE — 73562 X-RAY EXAM OF KNEE 3: CPT | Mod: TC | Performed by: RADIOLOGY

## 2023-11-08 RX ORDER — ROPIVACAINE HYDROCHLORIDE 5 MG/ML
3 INJECTION, SOLUTION EPIDURAL; INFILTRATION; PERINEURAL
Status: DISCONTINUED | OUTPATIENT
Start: 2023-11-08 | End: 2024-02-09

## 2023-11-08 RX ORDER — TRIAMCINOLONE ACETONIDE 40 MG/ML
40 INJECTION, SUSPENSION INTRA-ARTICULAR; INTRAMUSCULAR
Status: DISCONTINUED | OUTPATIENT
Start: 2023-11-08 | End: 2024-02-09

## 2023-11-08 RX ADMIN — TRIAMCINOLONE ACETONIDE 40 MG: 40 INJECTION, SUSPENSION INTRA-ARTICULAR; INTRAMUSCULAR at 11:17

## 2023-11-08 RX ADMIN — ROPIVACAINE HYDROCHLORIDE 3 ML: 5 INJECTION, SOLUTION EPIDURAL; INFILTRATION; PERINEURAL at 11:17

## 2023-11-08 NOTE — LETTER
2023         RE: Jorge Amaral  [address] [facility] [address]        Dear Colleague,    Thank you for referring your patient, Jorge Amaral, to the [facility]. Please see a copy of my visit note below.    Jorge Amaral  :  1970  DOS: 2023  MRN: [id number]    Sports Medicine Clinic Visit    PCP: Vince Swain    Interim History - March 3, 2023  Since last visit on 10/13/2021 patient has noticed pain increasing within the past month or so.  Left hip injection completed on 10/13/2021 provided good relief for 1+ years.  No new injury in the interim.    Social History: currently employed as     Interim History - 2023  Since last visit on 23 patient states he felt relief from previous injection for a few months. R>L. No significant changes or concerns, but did mention that his hips have been bothering him. No interim injury.         Review of Systems  Musculoskeletal: as above  Remainder of review of systems is negative including constitutional, CV, pulmonary, GI, Skin and Neurologic except as noted in HPI or medical history.    Past Medical History:   Diagnosis Date     ACL (anterior cruciate ligament) tear--recurrent 2013     Arthritis 2020     Crohn's disease (H)      Delayed union of fracture 2011     Fracture, metacarpal 2011     History of blood transfusion 1987     Large bowel perforation (H) 2010     Nondisplaced fracture of navicular (scaphoid) of left foot, subsequent encounter for fracture with delayed healing 2019     Puncture wound     L hand DOI 21     Sepsis (H) 2018    He had an incomplete (due to stricture) colonoscopy day of presentation and after that developed acute abdominal pain and fever. He was septic and suspected to have a micro perforation. Imaging negative for larger perforation     Past Surgical History:   Procedure  Laterality Date     APPENDECTOMY  1986     ARTHROSCOPY KNEE WITH MEDIAL MENISCECTOMY Left 01/14/2019    Procedure: LEFT KNEE ARTHROSCOPY WITH PARTIAL MEDIAL MENISCECTOMY;  Surgeon: Jose Luis Jama MD;  Location:  OR     COLONOSCOPY  05/14/2020     ENT SURGERY  06/28/2013    Left Tonsil biopsy     EXCISE MASS WRIST Right 11/07/2018    Procedure: EXCISION OF RIGHT DORSAL WRIST MASS;  Surgeon: Zohaib Edmond MD;  Location:  OR     LAPAROSCOPY PROCEDURE UNLISTED  08/16/2010    Exploratory laparotomy, lysis of adhesions, takedown enterocolonic fistula and colocononic fistula, with creation of loop ileostomy     OPEN REDUCTION INTERNAL FIXATION HAND  07/25/2011    right IV MCP hand 7/11     REMOVE HARDWARE LOWER EXTREMITY  04/06/2012    Procedure:REMOVE HARDWARE LOWER EXTREMITY; REMOVAL OF RIGHT TIBIAL SCREW; Surgeon:JOSE LUIS JAMA; Location: [facility]     SMALL BOWEL RESECTION  1986    terminal ileum resection at age 16     TAKEDOWN COLOSTOMY  12/27/2011     Family History   Problem Relation Age of Onset     Respiratory Mother         SMOKER -EMPYSEMA     Diabetes Father      Prostate Cancer Father 50     Prostate Cancer Paternal Grandfather      Diabetes Brother      Prostate Cancer Paternal Uncle 50     Hypertension No family hx of        Objective  Pulse 82   Wt 102.1 kg (225 lb)   SpO2 96%   BMI 28.89 kg/m      General: healthy, alert and in no distress    HEENT: no scleral icterus or conjunctival erythema   Skin: no suspicious lesions or rash. No jaundice.   CV: regular rhythm by palpation, 2+ distal pulses, no pedal edema    Resp: normal respiratory effort without conversational dyspnea   Psych: normal mood and affect    Gait: nonantalgic, appropriate coordination and balance   Neuro: normal light touch sensory exam of the extremities. Motor strength as noted below     Bilateral Knee exam    ROM:        Flexion 130 degrees       Extension 0 degrees       Range of motion limited by mild pain in  terminal flexion R>L    Inspection:       no visible ecchymosis        effusion noted small b/l by palpation    Skin:       no visible deformities       well perfused       capillary refill brisk    Patellar Motion:        Lateral tilt noted in patella       Crepitus noted in the patellofemoral joint    Tender:        medial joint line most focal, mild lateral patellar borders    Non Tender:         remainder of knee area    Special Tests:        neg (-) Jose       neg (-) varus at 0 deg and 30 deg       neg (-) valgus at 0 deg and 30 deg      Radiology  Recent Results (from the past 744 hour(s))   XR Knee Bilateral 3 Views    Narrative    KNEE BILATERAL 3 VIEWS   11/8/2023 10:54 AM     HISTORY: Bilateral knee pain.    COMPARISON: Radiographs from 12/13/2012.      Impression    IMPRESSION:    Right: ACL reconstruction revision. Moderate to severe osteoarthrosis  of the medial compartment, significantly progressed. Mild  osteoarthrosis of the lateral compartment, progressed. Ossification in  the region of the patellar tendon likely related to prior  surgery/trauma. Small effusion, unchanged. There is probably an  ossified intra-articular body in the anterior aspect of the joint,  new. Mild patellofemoral osteoarthrosis, progressed. There is no  evidence of an acute or subacute fracture.    Left: Moderate to severe osteoarthrosis of the medial compartment.  Mild patellofemoral osteoarthrosis. Probable calcified intra-articular  bodies and a popliteal cyst.    BELINDA DOYLE MD         SYSTEM ID:  [id number]       MR RIGHT KNEE WITHOUT CONTRAST 12/13/2012 7:50 PM       HISTORY:Medial knee pain for 3 weeks after recurrent injury. ACL   graft repair March 2011.       TECHNIQUE: Sagittal proton density and T2 weighted images, coronal T1   and coronal and axial T2 fat suppressed images were obtained.       COMPARISON: 3/2/2011.       FINDINGS:   Menisci:   Medial meniscus: Interval partial meniscectomy involving the  midbody   and posterior horn. The remaining peripheral mid body is   unremarkable. The remaining peripheral posterior horn shows an   irregular free edge which could indicate tear or degeneration. There   is a small meniscal flap or fragment adjacent to the far anterior   horn (image 18 of series 4 and image 12 of series   5. This may have originated from a tear in the far anterior horn or   may be displaced from the residual posterior horn.       Lateral meniscus: Normal.       Cruciate ligaments:   Anterior cruciate ligament: Interval placement of ACL allograft.   There is no definite graft continuity, especially in the proximal   graft. This is suspicious for a complete tear of the graft although   there are no bone injuries to indicate that this is acute. Clinical   correlation for ACL insufficiency is recommended.       Posterior cruciate ligament: Normal.       Collateral supporting structures:   Medial collateral ligament: Normal.       Lateral supporting structures: Previously seen mild edema around the   fibular collateral ligament has resolved. The biceps tendon, fibular   collateral ligament, distal iliotibial tract and popliteus tendon are   intact.       Osseous structures and cartilaginous surfaces: The previously seen   bone marrow contusions have resolved. No acute appearing bony   abnormalities are seen. Metallic artifacts obscure some regions.   There is focal articular cartilage irregularity in the posterolateral   weightbearing medial femoral condyle without change. Remainder of the   articular cartilages in the medial compartment show mild diffuse   thinning, also unchanged. Articular cartilages in the weightbearing   lateral compartment are intact. There is again question of very early   chondromalacia at the patellar apex. Remainder of patellofemoral   cartilages are normal.       Additional findings: Minimal joint space fluid without change. Small   popliteal cyst, probably unchanged in size.  Previously seen   nonspecific soft tissue edema about the knee has resolved.       Impression   IMPRESSION:   1. Interval placement of ACL allograft. Continuity of the fascicles   is not demonstrated, especially proximally. Disruption of the graft   is not excluded and clinical correlation is recommended.   2. Resolution of previously seen bone marrow injuries.   3. Interval partial medial meniscectomy involving the midbody and   posterior horn. There is a new small meniscal flap or fragment   projecting adjacent to the anterior horn which may have originated   from a flap tear of the far anterior horn or could be a fragment   originating from the posterior horn.   4. Mild chondromalacia medial compartment and question of minimal   chondromalacia patellar apex, both without significant change.        Assessment:  1. Chronic pain of both knees    2. Osteoarthritis of both knees, unspecified osteoarthritis type           Large Joint Injection/Arthocentesis: bilateral knee    Date/Time: 11/8/2023 11:17 AM    Performed by: Sanjay Hatch DO  Authorized by: Sanjay Hatch DO    Indications:  Osteoarthritis  Needle Size:  21 G  Guidance: ultrasound    Approach:  Superolateral  Location:  Knee  Laterality:  Bilateral      Medications (Right):  40 mg triamcinolone 40 MG/ML; 3 mL ROPivacaine 5 MG/ML  Aspirate amount (mL):  10  Aspirate:  Yellow  Medications (Left):  40 mg triamcinolone 40 MG/ML; 3 mL ROPivacaine 5 MG/ML  Aspirate amount (mL):  18  Aspirate:  Yellow  Outcome:  Tolerated well, no immediate complications  Procedure discussed: discussed risks, benefits, and alternatives    Consent Given by:  Patient  Timeout: timeout called immediately prior to procedure    Prep: patient was prepped and draped in usual sterile fashion     Ultrasound images of procedure were permanently stored.      Plan:  Discussed the assessment with the patient.  Follow up: prn based on clinical progress  Repeat US guided  corticosteroid injection to the bilateral knees today  Advanced medial compartment arthritis in the bilateral knee, with tricompartmental changes  Likely some posttraumatic arthritis component in the right given prior ACL reconstruction, more primary in the left  Can offer further referral to orthopedic surgery in the future for TKA discussion, defer for now  Activity strategies reviewed, continues to be very active with martial arts  PT options reviewed for this as well  Bracing options and strategies reviewed  Oral Tylenol and topical Voltaren gel reviewed as safe OTC options, reviewed safe dosing strategies  XR images independently visualized and reviewed with patient today in clinic  Expectations and goals of CSI reviewed  Often 2-3 days for steroid effect, and can take up to two weeks for maximum effect  We discussed modified progressive pain-free activity as tolerated  Do not overuse in first two weeks if feeling better due to concern for vulnerability while steroid is working  Supportive care reviewed  All questions were answered today  Contact us with additional questions or concerns  Signs and sx of concern reviewed      Sanjay Hatch DO, CAQ  Sports Medicine Physician  [facility]        Disclaimer: This note consists of symbols derived from keyboarding, dictation and/or voice recognition software. As a result, there may be errors in the script that have gone undetected. Please consider this when interpreting information found in this chart.          Again, thank you for allowing me to participate in the care of your patient.        Sincerely,        Sanjay Hatch DO

## 2023-11-08 NOTE — TELEPHONE ENCOUNTER
Routing to provider to review and advise regarding lab interpretations.      Kristina Kjellberg, MSN, RN  [facility] Primary Care Triage

## 2023-11-22 ENCOUNTER — OFFICE VISIT (OUTPATIENT)
Dept: URGENT CARE | Facility: URGENT CARE | Age: 53
End: 2023-11-22
Payer: COMMERCIAL

## 2023-11-22 VITALS
RESPIRATION RATE: 24 BRPM | SYSTOLIC BLOOD PRESSURE: 128 MMHG | DIASTOLIC BLOOD PRESSURE: 81 MMHG | TEMPERATURE: 97.1 F | HEART RATE: 80 BPM | WEIGHT: 224 LBS | OXYGEN SATURATION: 96 % | BODY MASS INDEX: 28.76 KG/M2

## 2023-11-22 DIAGNOSIS — J40 BRONCHITIS: Primary | ICD-10-CM

## 2023-11-22 PROCEDURE — 99213 OFFICE O/P EST LOW 20 MIN: CPT | Performed by: PHYSICIAN ASSISTANT

## 2023-11-22 NOTE — PROGRESS NOTES
Chief Complaint   Patient presents with    Cold Symptoms    Cough     Dry cough sometime productive for 3 weeks, pt has taken Dayquil and Emergency        ASSESSMENT/PLAN:  Jorge was seen today for cold symptoms and cough.    Diagnoses and all orders for this visit:    Bronchitis    Reassuring exam and vitals.  Improving overall.  Unlikely to be contagious.  Symptomatic cares and expected length of symptoms discussed at length and outlined in AVS  Return precautions also discussed    Chris Newton PA-C      SUBJECTIVE:  Jorge is a 53 year old male who presents to urgent care with 3 weeks of cough.  Overall he has improved but seems to have plateaued.  His daughter got sick about the same time as him and diagnosed with bronchitis.  He is leaving for a family gathering today and wants to make sure he is not going to get anyone else sick    ROS: Pertinent ROS neg other than the symptoms noted above in the HPI.     OBJECTIVE:  /81   Pulse 80   Temp 97.1  F (36.2  C) (Tympanic)   Resp 24   Wt 101.6 kg (224 lb)   SpO2 96%   BMI 28.76 kg/m     GENERAL: healthy, alert and no distress  EYES: Eyes grossly normal to inspection, PERRL and conjunctivae and sclerae normal  HENT: ear canals and TM's normal, nose and mouth without ulcers or lesions  RESP: lungs clear to auscultation - no rales, rhonchi or wheezes  CV: regular rate and rhythm, normal S1 S2, no S3 or S4, no murmur, click or rub, no peripheral edema and peripheral pulses strong    DIAGNOSTICS    No results found for any visits on 11/22/23.     Current Outpatient Medications   Medication    diphenoxylate-atropine (LOMOTIL) 2.5-0.025 MG per tablet    mercaptopurine (PURINETHOL) 50 MG tablet    rivaroxaban ANTICOAGULANT (XARELTO ANTICOAGULANT) 20 MG TABS tablet    tacrolimus (PROTOPIC) 0.1 % external ointment    ustekinumab (STELARA) 90 MG/ML    sildenafil (VIAGRA) 100 MG tablet    tacrolimus (PROTOPIC) 0.1 % external ointment     Current  Facility-Administered Medications   Medication    3 mL ropivacaine (NAROPIN) injection 5 mg/mL    3 mL ropivacaine (NAROPIN) injection 5 mg/mL    3 mL ropivacaine (NAROPIN) injection 5 mg/mL    3 mL ropivacaine (NAROPIN) injection 5 mg/mL    lidocaine 1 % injection 4 mL    ropivacaine (NAROPIN) injection 3 mL    ropivacaine (NAROPIN) injection 3 mL    triamcinolone (KENALOG-40) injection 40 mg    triamcinolone (KENALOG-40) injection 40 mg    triamcinolone (KENALOG-40) injection 40 mg    triamcinolone (KENALOG-40) injection 40 mg    triamcinolone (KENALOG-40) injection 40 mg    triamcinolone (KENALOG-40) injection 40 mg      Patient Active Problem List   Diagnosis    CARDIOVASCULAR SCREENING; LDL GOAL LESS THAN 160    Family history of diabetes mellitus    Cold sore    Common wart    Crohn's disease with complication, unspecified gastrointestinal tract location (H)    FH: prostate cancer    BMI 28.0-28.9,adult    History of pulmonary embolism    Left knee pain    Chronic anticoagulation      Past Medical History:   Diagnosis Date    ACL (anterior cruciate ligament) tear--recurrent 01/03/2013    Arthritis August 2020    Crohn's disease (H)     Delayed union of fracture 12/01/2011    Fracture, metacarpal 08/03/2011    History of blood transfusion August 1987    Large bowel perforation (H) 08/16/2010    Nondisplaced fracture of navicular (scaphoid) of left foot, subsequent encounter for fracture with delayed healing 07/03/2019    Puncture wound     L hand DOI 4/29/21    Sepsis (H) 01/19/2018    He had an incomplete (due to stricture) colonoscopy day of presentation and after that developed acute abdominal pain and fever. He was septic and suspected to have a micro perforation. Imaging negative for larger perforation     Past Surgical History:   Procedure Laterality Date    APPENDECTOMY  1986    ARTHROSCOPY KNEE WITH MEDIAL MENISCECTOMY Left 01/14/2019    Procedure: LEFT KNEE ARTHROSCOPY WITH PARTIAL MEDIAL MENISCECTOMY;   Surgeon: Jose Luis Jama MD;  Location:  OR    COLONOSCOPY  05/14/2020    ENT SURGERY  06/28/2013    Left Tonsil biopsy    EXCISE MASS WRIST Right 11/07/2018    Procedure: EXCISION OF RIGHT DORSAL WRIST MASS;  Surgeon: Zohaib Edmond MD;  Location:  OR    LAPAROSCOPY PROCEDURE UNLISTED  08/16/2010    Exploratory laparotomy, lysis of adhesions, takedown enterocolonic fistula and colocononic fistula, with creation of loop ileostomy    OPEN REDUCTION INTERNAL FIXATION HAND  07/25/2011    right IV MCP hand 7/11    REMOVE HARDWARE LOWER EXTREMITY  04/06/2012    Procedure:REMOVE HARDWARE LOWER EXTREMITY; REMOVAL OF RIGHT TIBIAL SCREW; Surgeon:JOSE LUIS JAMA; Location: [facility]    SMALL BOWEL RESECTION  1986    terminal ileum resection at age 16    TAKEDOWN COLOSTOMY  12/27/2011     Family History   Problem Relation Age of Onset    Respiratory Mother         SMOKER -EMPYSEMA    Diabetes Father     Prostate Cancer Father 50    Prostate Cancer Paternal Grandfather     Diabetes Brother     Prostate Cancer Paternal Uncle 50    Hypertension No family hx of      Social History     Tobacco Use    Smoking status: Former     Types: Cigars    Smokeless tobacco: Never    Tobacco comments:     Occasional Cigar Once in a couple years   Substance Use Topics    Alcohol use: Yes     Comment: social              The plan of care was discussed with the patient. They understand and agree with the course of treatment prescribed. A printed summary was given including instructions and medications.  The use of Dragon/[facility] dictation services may have been used to construct the content in this note; any grammatical or spelling errors are non-intentional. Please contact the author of this note directly if you are in need of any clarification.

## 2023-12-13 ENCOUNTER — OFFICE VISIT (OUTPATIENT)
Dept: URGENT CARE | Facility: URGENT CARE | Age: 53
End: 2023-12-13
Payer: COMMERCIAL

## 2023-12-13 ENCOUNTER — ANCILLARY PROCEDURE (OUTPATIENT)
Dept: GENERAL RADIOLOGY | Facility: CLINIC | Age: 53
End: 2023-12-13
Attending: PHYSICIAN ASSISTANT
Payer: COMMERCIAL

## 2023-12-13 VITALS
RESPIRATION RATE: 20 BRPM | HEART RATE: 81 BPM | BODY MASS INDEX: 28.79 KG/M2 | SYSTOLIC BLOOD PRESSURE: 126 MMHG | OXYGEN SATURATION: 94 % | TEMPERATURE: 97.7 F | WEIGHT: 224.2 LBS | DIASTOLIC BLOOD PRESSURE: 81 MMHG

## 2023-12-13 DIAGNOSIS — R05.3 PERSISTENT COUGH FOR 3 WEEKS OR LONGER: Primary | ICD-10-CM

## 2023-12-13 DIAGNOSIS — R05.3 PERSISTENT COUGH FOR 3 WEEKS OR LONGER: ICD-10-CM

## 2023-12-13 PROCEDURE — 99214 OFFICE O/P EST MOD 30 MIN: CPT | Performed by: PHYSICIAN ASSISTANT

## 2023-12-13 PROCEDURE — 71046 X-RAY EXAM CHEST 2 VIEWS: CPT | Mod: TC | Performed by: RADIOLOGY

## 2023-12-13 RX ORDER — AZITHROMYCIN 250 MG/1
TABLET, FILM COATED ORAL
Qty: 6 TABLET | Refills: 0 | Status: SHIPPED | OUTPATIENT
Start: 2023-12-13 | End: 2023-12-18

## 2023-12-13 RX ORDER — BENZONATATE 200 MG/1
200 CAPSULE ORAL 3 TIMES DAILY PRN
Qty: 30 CAPSULE | Refills: 0 | Status: SHIPPED | OUTPATIENT
Start: 2023-12-13 | End: 2024-02-21

## 2023-12-13 ASSESSMENT — ENCOUNTER SYMPTOMS
SORE THROAT: 0
DYSURIA: 0
NEUROLOGICAL NEGATIVE: 1
CONSTITUTIONAL NEGATIVE: 1
SINUS PAIN: 0
CHILLS: 0
COUGH: 1
HEMATURIA: 0
CARDIOVASCULAR NEGATIVE: 1
FEVER: 0
DIARRHEA: 0
PALPITATIONS: 0
VOMITING: 0
ALLERGIC/IMMUNOLOGIC NEGATIVE: 1
ABDOMINAL PAIN: 0
MUSCULOSKELETAL NEGATIVE: 1
WHEEZING: 0
RHINORRHEA: 0
CHEST TIGHTNESS: 0
SHORTNESS OF BREATH: 0
FREQUENCY: 0
MYALGIAS: 0
GASTROINTESTINAL NEGATIVE: 1
SINUS PRESSURE: 0
NAUSEA: 0
HEADACHES: 0

## 2023-12-13 NOTE — PROGRESS NOTES
Chief Complaint:     Chief Complaint   Patient presents with    Cough     Cough, congestion for the last 6 weeks       Results for orders placed or performed in visit on 12/13/23   XR Chest 2 Views     Status: None    Narrative    CHEST TWO VIEWS  12/13/2023 12:39 PM     HISTORY:  Persistent cough for 3 weeks or longer.    COMPARISON: None.      Impression    IMPRESSION: Negative chest. Lungs clear.    LAUREL KHAN MD         SYSTEM ID:  [id number]       Medical Decision Making:    Vital signs reviewed by Yomi Vogt PA-C  /81   Pulse 81   Temp 97.7  F (36.5  C) (Tympanic)   Resp 20   Wt 101.7 kg (224 lb 3.2 oz)   SpO2 94%   BMI 28.79 kg/m      Differential Diagnosis:  URI Adult/Peds:  Bronchitis-viral, Pneumonia, and Viral upper respiratory illness        ASSESSMENT    1. Persistent cough for 3 weeks or longer        PLAN    Patient is in no acute distress.    Temp is 97.7 in clinic today, lung sounds were clear, and O2 sats at 94% on RA.    CXR was negative for any acute infiltrates or consolidations per my read.  With length of symptoms, Rx for Zithromax sent in.  Rest, Push fluids, vaporizer, elevation of head of bed.  Ibuprofen and or Tylenol for any fever or body aches.  Rx for Tessalon cough suppressant- PRN- as discussed.   If symptoms worsen, recheck immediately otherwise follow up with your PCP in 1 week if symptoms are not improving.  Worrisome symptoms discussed with instructions to go to the ED.  Patient verbalized understanding and agreed with this plan.    33 minutes was spent in the care of this patient including chart review, HPI, ROS, PE, review of plan, and placing of orders.      Labs:    Results for orders placed or performed in visit on 12/13/23   XR Chest 2 Views     Status: None    Narrative    CHEST TWO VIEWS  12/13/2023 12:39 PM     HISTORY:  Persistent cough for 3 weeks or longer.    COMPARISON: None.      Impression    IMPRESSION: Negative chest. Lungs clear.    LAUREL  MD ERIN         SYSTEM ID:  [id number]        Vital signs reviewed by Yomi Vogt PA-C  /81   Pulse 81   Temp 97.7  F (36.5  C) (Tympanic)   Resp 20   Wt 101.7 kg (224 lb 3.2 oz)   SpO2 94%   BMI 28.79 kg/m      Current Meds      Current Outpatient Medications:     azithromycin (ZITHROMAX) 250 MG tablet, Take 2 tablets (500 mg) by mouth daily for 1 day, THEN 1 tablet (250 mg) daily for 4 days., Disp: 6 tablet, Rfl: 0    benzonatate (TESSALON) 200 MG capsule, Take 1 capsule (200 mg) by mouth 3 times daily as needed for cough, Disp: 30 capsule, Rfl: 0    diphenoxylate-atropine (LOMOTIL) 2.5-0.025 MG per tablet, Take 2 tablets by mouth As Needed, Disp: , Rfl:     mercaptopurine (PURINETHOL) 50 MG tablet, Take 50 mg by mouth daily 1 and half tabs daily, Disp: , Rfl:     rivaroxaban ANTICOAGULANT (XARELTO ANTICOAGULANT) 20 MG TABS tablet, Take 1 tablet (20 mg) by mouth daily with food . Take at the same time each day., Disp: 90 tablet, Rfl: 4    tacrolimus (PROTOPIC) 0.1 % external ointment, Apply topically 2 times daily, Disp: 100 g, Rfl: 3    tacrolimus (PROTOPIC) 0.1 % external ointment, Apply twice daily as needed, Disp: 60 g, Rfl: 3    ustekinumab (STELARA) 90 MG/ML, 90 mg Every 8 weeks, Disp: , Rfl:     sildenafil (VIAGRA) 100 MG tablet, Take 0.5-1 tablets ( mg) by mouth daily as needed (take 1 hour before intercourse.) (Patient not taking: Reported on 11/1/2023), Disp: 10 tablet, Rfl: 11    Current Facility-Administered Medications:     3 mL ropivacaine (NAROPIN) injection 5 mg/mL, 3 mL, , , Sanjay Hatch DO, 3 mL at 11/08/23 1117    3 mL ropivacaine (NAROPIN) injection 5 mg/mL, 3 mL, , , Sanjay Hatch DO, 3 mL at 11/08/23 1117    3 mL ropivacaine (NAROPIN) injection 5 mg/mL, 3 mL, , , Sanjay Hatch DO, 3 mL at 04/14/23 1207    3 mL ropivacaine (NAROPIN) injection 5 mg/mL, 3 mL, , , Sanjay Hatch DO, 3 mL at 04/14/23 1207    lidocaine 1 %  injection 4 mL, 4 mL, , , Repa, Sanjay Christopher, DO, 4 mL at 03/03/23 1104    ropivacaine (NAROPIN) injection 3 mL, 3 mL, , , Repa, Sanjay Christopher, DO, 3 mL at 03/03/23 1104    ropivacaine (NAROPIN) injection 3 mL, 3 mL, , , Repa, Sanjay Christopher, DO, 3 mL at 10/13/21 1540    triamcinolone (KENALOG-40) injection 40 mg, 40 mg, , , Repa, Sanjay Christopher, DO, 40 mg at 11/08/23 1117    triamcinolone (KENALOG-40) injection 40 mg, 40 mg, , , Repa, Sanjay Christopher, DO, 40 mg at 11/08/23 1117    triamcinolone (KENALOG-40) injection 40 mg, 40 mg, , , Repa, Sanjay Christopher, DO, 40 mg at 04/14/23 1207    triamcinolone (KENALOG-40) injection 40 mg, 40 mg, , , Repa, Sanjay Christopher, DO, 40 mg at 04/14/23 1207    triamcinolone (KENALOG-40) injection 40 mg, 40 mg, , , Repa, Sanjay Christopher, DO, 40 mg at 03/03/23 1104    triamcinolone (KENALOG-40) injection 40 mg, 40 mg, , , Repa, Sanjay Christopher, DO, 40 mg at 10/13/21 1540      Respiratory History    occasional episodes of bronchitis      SUBJECTIVE    HPI: Jorge Amaral is an 53 year old male who presents with chest congestion and cough nonproductive, occasional.  Symptoms began 6  weeks ago and has unchanged.  There is no shortness of breath, wheezing, and chest pain.  Patient is eating and drinking well.  No fever, nausea, vomiting, or diarrhea.    Patient denies any recent travel or exposure to known COVID positive tested individual.      ROS:     Review of Systems   Constitutional: Negative.  Negative for chills and fever.   HENT:  Positive for congestion. Negative for ear discharge, ear pain, rhinorrhea, sinus pressure, sinus pain and sore throat.    Respiratory:  Positive for cough. Negative for chest tightness, shortness of breath and wheezing.    Cardiovascular: Negative.  Negative for chest pain and palpitations.   Gastrointestinal: Negative.  Negative for abdominal pain, diarrhea, nausea and vomiting.   Genitourinary:  Negative for dysuria,  frequency, hematuria and urgency.   Musculoskeletal: Negative.  Negative for myalgias.   Skin:  Negative for rash.   Allergic/Immunologic: Negative.  Negative for immunocompromised state.   Neurological: Negative.  Negative for headaches.         Family History   Family History   Problem Relation Age of Onset    Respiratory Mother         SMOKER -EMPYSEMA    Diabetes Father     Prostate Cancer Father 50    Prostate Cancer Paternal Grandfather     Diabetes Brother     Prostate Cancer Paternal Uncle 50    Hypertension No family hx of         Problem history  Patient Active Problem List   Diagnosis    CARDIOVASCULAR SCREENING; LDL GOAL LESS THAN 160    Family history of diabetes mellitus    Cold sore    Common wart    Crohn's disease with complication, unspecified gastrointestinal tract location (H)    FH: prostate cancer    BMI 28.0-28.9,adult    History of pulmonary embolism    Left knee pain    Chronic anticoagulation        Allergies  No Known Allergies     Social History  Social History     Socioeconomic History    Marital status: Patient Declined     Spouse name: Not on file    Number of children: Not on file    Years of education: Not on file    Highest education level: Not on file   Occupational History    Occupation:      Employer: SELF   Tobacco Use    Smoking status: Former     Types: Cigars    Smokeless tobacco: Never    Tobacco comments:     Occasional Cigar Once in a couple years   Vaping Use    Vaping Use: Never used   Substance and Sexual Activity    Alcohol use: Yes     Comment: social    Drug use: No    Sexual activity: Yes     Partners: Female     Birth control/protection: Male Surgical   Other Topics Concern    Parent/sibling w/ CABG, MI or angioplasty before 65F 55M? No   Social History Narrative    Not on file     Social Determinants of Health     Financial Resource Strain: Low Risk  (11/1/2023)    Financial Resource Strain     Within the past 12 months, have you or your  family members you live with been unable to get utilities (heat, electricity) when it was really needed?: No   Food Insecurity: Low Risk  (11/1/2023)    Food Insecurity     Within the past 12 months, did you worry that your food would run out before you got money to buy more?: No     Within the past 12 months, did the food you bought just not last and you didn t have money to get more?: No   Transportation Needs: Low Risk  (11/1/2023)    Transportation Needs     Within the past 12 months, has lack of transportation kept you from medical appointments, getting your medicines, non-medical meetings or appointments, work, or from getting things that you need?: No   Physical Activity: Not on file   Stress: Not on file   Social Connections: Not on file   Interpersonal Safety: Low Risk  (11/1/2023)    Interpersonal Safety     Do you feel physically and emotionally safe where you currently live?: Yes     Within the past 12 months, have you been hit, slapped, kicked or otherwise physically hurt by someone?: No     Within the past 12 months, have you been humiliated or emotionally abused in other ways by your partner or ex-partner?: No   Housing Stability: Low Risk  (11/1/2023)    Housing Stability     Do you have housing? : Yes     Are you worried about losing your housing?: No        OBJECTIVE     Vital signs reviewed by Yomi Vogt PA-C  /81   Pulse 81   Temp 97.7  F (36.5  C) (Tympanic)   Resp 20   Wt 101.7 kg (224 lb 3.2 oz)   SpO2 94%   BMI 28.79 kg/m       Physical Exam  Vitals reviewed.   Constitutional:       General: He is not in acute distress.     Appearance: He is well-developed. He is not ill-appearing, toxic-appearing or diaphoretic.   HENT:      Head: Normocephalic and atraumatic.      Right Ear: Hearing, tympanic membrane, ear canal and external ear normal. No drainage, swelling or tenderness. Tympanic membrane is not perforated, erythematous, retracted or bulging.      Left Ear: Hearing,  tympanic membrane, ear canal and external ear normal. No drainage, swelling or tenderness. Tympanic membrane is not perforated, erythematous, retracted or bulging.      Nose: Congestion and rhinorrhea present. No nasal tenderness or mucosal edema.      Right Turbinates: Not enlarged or swollen.      Left Turbinates: Not enlarged or swollen.      Right Sinus: No maxillary sinus tenderness or frontal sinus tenderness.      Left Sinus: No maxillary sinus tenderness or frontal sinus tenderness.      Mouth/Throat:      Pharynx: No pharyngeal swelling, oropharyngeal exudate, posterior oropharyngeal erythema or uvula swelling.      Tonsils: No tonsillar exudate. 0 on the right. 0 on the left.   Eyes:      General: Lids are normal.         Right eye: No discharge.         Left eye: No discharge.      Conjunctiva/sclera: Conjunctivae normal.      Right eye: Right conjunctiva is not injected. No exudate.     Left eye: Left conjunctiva is not injected. No exudate.     Pupils: Pupils are equal, round, and reactive to light.   Cardiovascular:      Rate and Rhythm: Normal rate and regular rhythm.      Heart sounds: Normal heart sounds. No murmur heard.     No friction rub. No gallop.   Pulmonary:      Effort: Pulmonary effort is normal. No accessory muscle usage, respiratory distress or retractions.      Breath sounds: Normal breath sounds and air entry. No stridor, decreased air movement or transmitted upper airway sounds. No decreased breath sounds, wheezing, rhonchi or rales.   Chest:      Chest wall: No tenderness.   Abdominal:      General: Bowel sounds are normal. There is no distension.      Palpations: Abdomen is soft. Abdomen is not rigid. There is no mass.      Tenderness: There is no abdominal tenderness. There is no guarding or rebound.   Musculoskeletal:         General: Normal range of motion.      Cervical back: Normal range of motion and neck supple.   Lymphadenopathy:      Head:      Right side of head: No  submental, submandibular, tonsillar, preauricular or posterior auricular adenopathy.      Left side of head: No submental, submandibular, tonsillar, preauricular or posterior auricular adenopathy.      Cervical:      Right cervical: No superficial or posterior cervical adenopathy.     Left cervical: No superficial or posterior cervical adenopathy.   Skin:     General: Skin is warm.      Capillary Refill: Capillary refill takes less than 2 seconds.   Neurological:      Mental Status: He is alert and oriented to person, place, and time.      Cranial Nerves: No cranial nerve deficit.      Sensory: No sensory deficit.      Motor: No abnormal muscle tone.      Coordination: Coordination normal.      Deep Tendon Reflexes: Reflexes normal.   Psychiatric:         Behavior: Behavior normal. Behavior is cooperative.         Thought Content: Thought content normal.         Judgment: Judgment normal.           Yomi Vogt PA-C  12/13/2023, 12:08 PM

## 2023-12-19 ENCOUNTER — TRANSFERRED RECORDS (OUTPATIENT)
Dept: HEALTH INFORMATION MANAGEMENT | Facility: CLINIC | Age: 53
End: 2023-12-19
Payer: COMMERCIAL

## 2024-01-26 ENCOUNTER — OFFICE VISIT (OUTPATIENT)
Dept: ORTHOPEDICS | Facility: CLINIC | Age: 54
End: 2024-01-26
Payer: COMMERCIAL

## 2024-01-26 VITALS
DIASTOLIC BLOOD PRESSURE: 86 MMHG | HEART RATE: 69 BPM | SYSTOLIC BLOOD PRESSURE: 130 MMHG | WEIGHT: 224 LBS | BODY MASS INDEX: 28.76 KG/M2

## 2024-01-26 DIAGNOSIS — G89.29 CHRONIC PAIN OF BOTH KNEES: ICD-10-CM

## 2024-01-26 DIAGNOSIS — M16.12 ARTHRITIS OF LEFT HIP: Primary | ICD-10-CM

## 2024-01-26 DIAGNOSIS — M25.562 CHRONIC PAIN OF BOTH KNEES: ICD-10-CM

## 2024-01-26 DIAGNOSIS — M25.561 CHRONIC PAIN OF BOTH KNEES: ICD-10-CM

## 2024-01-26 DIAGNOSIS — M17.0 OSTEOARTHRITIS OF BOTH KNEES, UNSPECIFIED OSTEOARTHRITIS TYPE: ICD-10-CM

## 2024-01-26 DIAGNOSIS — M25.552 LEFT HIP PAIN: ICD-10-CM

## 2024-01-26 PROCEDURE — 99213 OFFICE O/P EST LOW 20 MIN: CPT | Mod: 25 | Performed by: FAMILY MEDICINE

## 2024-01-26 PROCEDURE — 20611 DRAIN/INJ JOINT/BURSA W/US: CPT | Mod: LT | Performed by: FAMILY MEDICINE

## 2024-01-26 RX ORDER — TRIAMCINOLONE ACETONIDE 40 MG/ML
40 INJECTION, SUSPENSION INTRA-ARTICULAR; INTRAMUSCULAR
Status: DISCONTINUED | OUTPATIENT
Start: 2024-01-26 | End: 2024-09-10

## 2024-01-26 RX ORDER — ROPIVACAINE HYDROCHLORIDE 5 MG/ML
3 INJECTION, SOLUTION EPIDURAL; INFILTRATION; PERINEURAL
Status: DISCONTINUED | OUTPATIENT
Start: 2024-01-26 | End: 2024-09-10

## 2024-01-26 RX ADMIN — TRIAMCINOLONE ACETONIDE 40 MG: 40 INJECTION, SUSPENSION INTRA-ARTICULAR; INTRAMUSCULAR at 09:54

## 2024-01-26 RX ADMIN — ROPIVACAINE HYDROCHLORIDE 3 ML: 5 INJECTION, SOLUTION EPIDURAL; INFILTRATION; PERINEURAL at 09:54

## 2024-01-26 NOTE — PROGRESS NOTES
Jorge Amaral  :  1970  DOS: 22  MRN: [id number]    Sports Medicine Clinic Visit    PCP: Vince Swain    Interim History - March 3, 2023  Since last visit on 10/13/2021 patient has noticed pain increasing within the past month or so.  Left hip injection completed on 10/13/2021 provided good relief for 1+ years.  No new injury in the interim.    Social History: currently employed as     Interim History - 2024  Since last visit on 2023 patient complains of soreness around the left hip. Would like another hip injection. States he felt relief from previous injection for about 10 months. Hip pain started to flare up when he scheduled this follow up appointment. Treatments tried: rest, activity avoidance, aspirin and tylenol. No interim injury.       Review of Systems  Musculoskeletal: as above  Remainder of review of systems is negative including constitutional, CV, pulmonary, GI, Skin and Neurologic except as noted in HPI or medical history.    Past Medical History:   Diagnosis Date    ACL (anterior cruciate ligament) tear--recurrent 2013    Arthritis 2020    Crohn's disease (H)     Delayed union of fracture 2011    Fracture, metacarpal 2011    History of blood transfusion 1987    Large bowel perforation (H) 2010    Nondisplaced fracture of navicular (scaphoid) of left foot, subsequent encounter for fracture with delayed healing 2019    Puncture wound     L hand DOI 21    Sepsis (H) 2018    He had an incomplete (due to stricture) colonoscopy day of presentation and after that developed acute abdominal pain and fever. He was septic and suspected to have a micro perforation. Imaging negative for larger perforation     Past Surgical History:   Procedure Laterality Date    APPENDECTOMY      ARTHROSCOPY KNEE WITH MEDIAL MENISCECTOMY Left 2019    Procedure: LEFT KNEE ARTHROSCOPY WITH PARTIAL MEDIAL  MENISCECTOMY;  Surgeon: Jose Luis Jama MD;  Location:  OR    COLONOSCOPY  05/14/2020    ENT SURGERY  06/28/2013    Left Tonsil biopsy    EXCISE MASS WRIST Right 11/07/2018    Procedure: EXCISION OF RIGHT DORSAL WRIST MASS;  Surgeon: Zohaib Edmond MD;  Location:  OR    LAPAROSCOPY PROCEDURE UNLISTED  08/16/2010    Exploratory laparotomy, lysis of adhesions, takedown enterocolonic fistula and colocononic fistula, with creation of loop ileostomy    OPEN REDUCTION INTERNAL FIXATION HAND  07/25/2011    right IV MCP hand 7/11    REMOVE HARDWARE LOWER EXTREMITY  04/06/2012    Procedure:REMOVE HARDWARE LOWER EXTREMITY; REMOVAL OF RIGHT TIBIAL SCREW; Surgeon:JOSE LUIS JAMA; Location: [facility]    SMALL BOWEL RESECTION  1986    terminal ileum resection at age 16    TAKEDOWN COLOSTOMY  12/27/2011     Family History   Problem Relation Age of Onset    Respiratory Mother         SMOKER -EMPYSEMA    Diabetes Father     Prostate Cancer Father 50    Prostate Cancer Paternal Grandfather     Diabetes Brother     Prostate Cancer Paternal Uncle 50    Hypertension No family hx of        Objective  /86   Pulse 69   Wt 101.6 kg (224 lb)   BMI 28.76 kg/m      General: healthy, alert and in no distress    HEENT: no scleral icterus or conjunctival erythema   Skin: no suspicious lesions or rash. No jaundice.   CV: regular rhythm by palpation, 2+ distal pulses, no pedal edema    Resp: normal respiratory effort without conversational dyspnea   Psych: normal mood and affect    Gait: nonantalgic, appropriate coordination and balance   Neuro: normal light touch sensory exam of the extremities. Motor strength as noted below     Left hip exam    Inspection:        no edema or ecchymosis in hip area    ROM:       Mildly decreased terminal active and passive ROM     Strength:        flexion 5/5       extension 5/5       abduction 5/5       adduction 5/5    Tender:        greater trochanter mild       Mild anterior hip  joint    Non Tender:        remainder of hip area       illiac crest       ASIS       SI joint    Sensation:        grossly intact in hip and thigh    Skin:       well perfused       capillary refill brisk    Special Tests:        neg (-) ELLIOT       positive (+) FADIR       positive (+) scour       neg (-) Mayuri    Bilateral Knee exam    ROM:        Flexion 140 degrees       Extension 0 degrees       Range of motion limited by mild pain in terminal flexion    Inspection:       no visible ecchymosis        effusion noted small right    Skin:       no visible deformities       well perfused       capillary refill brisk    Patellar Motion:        Lateral tilt noted in patella       Crepitus noted in the patellofemoral joint    Tender:        Medial popliteal space       medial joint line    Non Tender:         remainder of knee area    Special Tests:        neg (-) varus at 0 deg and 30 deg       neg (-) valgus at 0 deg and 30 deg      Radiology  PELVIS AND HIP LEFT ONE VIEW   10/7/2021 1:33 PM      HISTORY:  Left hip pain.     FINDINGS: Lower lumbar spine degenerative change.                                                                      IMPRESSION: Mild-moderate left hip osteoarthritis. No fracture  identified.     MR RIGHT KNEE WITHOUT CONTRAST 12/13/2012 7:50 PM       HISTORY:Medial knee pain for 3 weeks after recurrent injury. ACL   graft repair March 2011.       TECHNIQUE: Sagittal proton density and T2 weighted images, coronal T1   and coronal and axial T2 fat suppressed images were obtained.       COMPARISON: 3/2/2011.       FINDINGS:   Menisci:   Medial meniscus: Interval partial meniscectomy involving the midbody   and posterior horn. The remaining peripheral mid body is   unremarkable. The remaining peripheral posterior horn shows an   irregular free edge which could indicate tear or degeneration. There   is a small meniscal flap or fragment adjacent to the far anterior   horn (image 18 of series 4 and  image 12 of series   5. This may have originated from a tear in the far anterior horn or   may be displaced from the residual posterior horn.       Lateral meniscus: Normal.       Cruciate ligaments:   Anterior cruciate ligament: Interval placement of ACL allograft.   There is no definite graft continuity, especially in the proximal   graft. This is suspicious for a complete tear of the graft although   there are no bone injuries to indicate that this is acute. Clinical   correlation for ACL insufficiency is recommended.       Posterior cruciate ligament: Normal.       Collateral supporting structures:   Medial collateral ligament: Normal.       Lateral supporting structures: Previously seen mild edema around the   fibular collateral ligament has resolved. The biceps tendon, fibular   collateral ligament, distal iliotibial tract and popliteus tendon are   intact.       Osseous structures and cartilaginous surfaces: The previously seen   bone marrow contusions have resolved. No acute appearing bony   abnormalities are seen. Metallic artifacts obscure some regions.   There is focal articular cartilage irregularity in the posterolateral   weightbearing medial femoral condyle without change. Remainder of the   articular cartilages in the medial compartment show mild diffuse   thinning, also unchanged. Articular cartilages in the weightbearing   lateral compartment are intact. There is again question of very early   chondromalacia at the patellar apex. Remainder of patellofemoral   cartilages are normal.       Additional findings: Minimal joint space fluid without change. Small   popliteal cyst, probably unchanged in size. Previously seen   nonspecific soft tissue edema about the knee has resolved.       Impression   IMPRESSION:   1. Interval placement of ACL allograft. Continuity of the fascicles   is not demonstrated, especially proximally. Disruption of the graft   is not excluded and clinical correlation is  recommended.   2. Resolution of previously seen bone marrow injuries.   3. Interval partial medial meniscectomy involving the midbody and   posterior horn. There is a new small meniscal flap or fragment   projecting adjacent to the anterior horn which may have originated   from a flap tear of the far anterior horn or could be a fragment   originating from the posterior horn.   4. Mild chondromalacia medial compartment and question of minimal   chondromalacia patellar apex, both without significant change.      KNEE BILATERAL 3 VIEWS   11/8/2023 10:54 AM      HISTORY: Bilateral knee pain.     COMPARISON: Radiographs from 12/13/2012.                                                                      IMPRESSION:     Right: ACL reconstruction revision. Moderate to severe osteoarthrosis  of the medial compartment, significantly progressed. Mild  osteoarthrosis of the lateral compartment, progressed. Ossification in  the region of the patellar tendon likely related to prior  surgery/trauma. Small effusion, unchanged. There is probably an  ossified intra-articular body in the anterior aspect of the joint,  new. Mild patellofemoral osteoarthrosis, progressed. There is no  evidence of an acute or subacute fracture.     Left: Moderate to severe osteoarthrosis of the medial compartment.  Mild patellofemoral osteoarthrosis. Probable calcified intra-articular  bodies and a popliteal cyst.     Assessment:  1. Arthritis of left hip    2. Left hip pain    3. Osteoarthritis of both knees, unspecified osteoarthritis type    4. Chronic pain of both knees         Large Joint Injection/Arthocentesis: L hip joint    Date/Time: 1/26/2024 9:54 AM    Performed by: Sanjay Hatch DO  Authorized by: Sanjay Hatch DO    Indications:  Pain and diagnostic evaluation  Needle Size:  22 G  Guidance: ultrasound    Approach:  Anterior  Location:  Hip      Site:  L hip joint  Medications:  40 mg triamcinolone 40 MG/ML; 3 mL  ROPivacaine 5 MG/ML  Outcome:  Tolerated well, no immediate complications  Procedure discussed: discussed risks, benefits, and alternatives    Consent Given by:  Patient  Timeout: timeout called immediately prior to procedure    Prep: patient was prepped and draped in usual sterile fashion     4 ml's of 1% lidocaine was used as local anesthetic prior to injection    Ultrasound images of procedure were permanently stored.         Plan:  Discussed the assessment with the patient.  Follow up: prn based on clinical progress  Acute flare in the left hip, known DJD and signs of JACQUI  Reviewed PT options and low impact and core building strategies  Repeat US guided CSI to left hip joint today, reviewed option to trial PRP in the future if desired  Also with recurrent bilateral knee pain, hx of right knee ACL reconstruction though MRI from 2012 suggested possible graft failure  US guided CSI was helpful previously, pre-authorization sent for viscosupplementation trial, plan to try at appt in 2 weeks  PT options reviewed for this as well  Oral Tylenol and topical Voltaren gel reviewed as safe OTC options, reviewed safe dosing strategies  XR images independently visualized and reviewed with patient today in clinic  Expectations and goals of CSI reviewed  Often 2-3 days for steroid effect, and can take up to two weeks for maximum effect  We discussed modified progressive pain-free activity as tolerated  Do not overuse in first two weeks if feeling better due to concern for vulnerability while steroid is working  Supportive care reviewed  All questions were answered today  Contact us with additional questions or concerns  Signs and sx of concern reviewed      Sanjay Hatch DO, JAYY  Sports Medicine Physician  [facility]        Disclaimer: This note consists of symbols derived from keyboarding, dictation and/or voice recognition software. As a result, there may be errors in the script that have  gone undetected. Please consider this when interpreting information found in this chart.

## 2024-01-26 NOTE — LETTER
2024         RE: Jorge Amaral  [address] [place] [facility] [address]        Dear Colleague,    Thank you for referring your patient, Jorge Amaral, to the [facility]. Please see a copy of my visit note below.    Jorge Amaral  :  1970  DOS: 24  MRN: [id number]    Sports Medicine Clinic Visit    PCP: Vince Swain    Interim History - March 3, 2023  Since last visit on 10/13/2021 patient has noticed pain increasing within the past month or so.  Left hip injection completed on 10/13/2021 provided good relief for 1+ years.  No new injury in the interim.    Social History: currently employed as     Interim History - 2024  Since last visit on 2023 patient complains of soreness around the left hip. Would like another hip injection. States he felt relief from previous injection for about 10 months. Hip pain started to flare up when he scheduled this follow up appointment. Treatments tried: rest, activity avoidance, aspirin and tylenol. No interim injury.       Review of Systems  Musculoskeletal: as above  Remainder of review of systems is negative including constitutional, CV, pulmonary, GI, Skin and Neurologic except as noted in HPI or medical history.    Past Medical History:   Diagnosis Date     ACL (anterior cruciate ligament) tear--recurrent 2013     Arthritis 2020     Crohn's disease (H)      Delayed union of fracture 2011     Fracture, metacarpal 2011     History of blood transfusion 1987     Large bowel perforation (H) 2010     Nondisplaced fracture of navicular (scaphoid) of left foot, subsequent encounter for fracture with delayed healing 2019     Puncture wound     L hand DOI 21     Sepsis (H) 2018    He had an incomplete (due to stricture) colonoscopy day of presentation and after that developed acute abdominal pain and fever. He was septic and  suspected to have a micro perforation. Imaging negative for larger perforation     Past Surgical History:   Procedure Laterality Date     APPENDECTOMY  1986     ARTHROSCOPY KNEE WITH MEDIAL MENISCECTOMY Left 01/14/2019    Procedure: LEFT KNEE ARTHROSCOPY WITH PARTIAL MEDIAL MENISCECTOMY;  Surgeon: Jose Luis Jama MD;  Location:  OR     COLONOSCOPY  05/14/2020     ENT SURGERY  06/28/2013    Left Tonsil biopsy     EXCISE MASS WRIST Right 11/07/2018    Procedure: EXCISION OF RIGHT DORSAL WRIST MASS;  Surgeon: Zohaib Edmond MD;  Location:  OR     LAPAROSCOPY PROCEDURE UNLISTED  08/16/2010    Exploratory laparotomy, lysis of adhesions, takedown enterocolonic fistula and colocononic fistula, with creation of loop ileostomy     OPEN REDUCTION INTERNAL FIXATION HAND  07/25/2011    right IV MCP hand 7/11     REMOVE HARDWARE LOWER EXTREMITY  04/06/2012    Procedure:REMOVE HARDWARE LOWER EXTREMITY; REMOVAL OF RIGHT TIBIAL SCREW; Surgeon:JOSE LUIS JAMA; Location: [facility]     SMALL BOWEL RESECTION  1986    terminal ileum resection at age 16     TAKEDOWN COLOSTOMY  12/27/2011     Family History   Problem Relation Age of Onset     Respiratory Mother         SMOKER -EMPYSEMA     Diabetes Father      Prostate Cancer Father 50     Prostate Cancer Paternal Grandfather      Diabetes Brother      Prostate Cancer Paternal Uncle 50     Hypertension No family hx of        Objective  /86   Pulse 69   Wt 101.6 kg (224 lb)   BMI 28.76 kg/m      General: healthy, alert and in no distress    HEENT: no scleral icterus or conjunctival erythema   Skin: no suspicious lesions or rash. No jaundice.   CV: regular rhythm by palpation, 2+ distal pulses, no pedal edema    Resp: normal respiratory effort without conversational dyspnea   Psych: normal mood and affect    Gait: nonantalgic, appropriate coordination and balance   Neuro: normal light touch sensory exam of the extremities. Motor strength as noted below     Left hip  exam    Inspection:        no edema or ecchymosis in hip area    ROM:       Mildly decreased terminal active and passive ROM     Strength:        flexion 5/5       extension 5/5       abduction 5/5       adduction 5/5    Tender:        greater trochanter mild       Mild anterior hip joint    Non Tender:        remainder of hip area       illiac crest       ASIS       SI joint    Sensation:        grossly intact in hip and thigh    Skin:       well perfused       capillary refill brisk    Special Tests:        neg (-) ELLIOT       positive (+) FADIR       positive (+) scour       neg (-) Mayuri    Bilateral Knee exam    ROM:        Flexion 140 degrees       Extension 0 degrees       Range of motion limited by mild pain in terminal flexion    Inspection:       no visible ecchymosis        effusion noted small right    Skin:       no visible deformities       well perfused       capillary refill brisk    Patellar Motion:        Lateral tilt noted in patella       Crepitus noted in the patellofemoral joint    Tender:        Medial popliteal space       medial joint line    Non Tender:         remainder of knee area    Special Tests:        neg (-) varus at 0 deg and 30 deg       neg (-) valgus at 0 deg and 30 deg      Radiology  PELVIS AND HIP LEFT ONE VIEW   10/7/2021 1:33 PM      HISTORY:  Left hip pain.     FINDINGS: Lower lumbar spine degenerative change.                                                                      IMPRESSION: Mild-moderate left hip osteoarthritis. No fracture  identified.     MR RIGHT KNEE WITHOUT CONTRAST 12/13/2012 7:50 PM       HISTORY:Medial knee pain for 3 weeks after recurrent injury. ACL   graft repair March 2011.       TECHNIQUE: Sagittal proton density and T2 weighted images, coronal T1   and coronal and axial T2 fat suppressed images were obtained.       COMPARISON: 3/2/2011.       FINDINGS:   Menisci:   Medial meniscus: Interval partial meniscectomy involving the midbody   and  posterior horn. The remaining peripheral mid body is   unremarkable. The remaining peripheral posterior horn shows an   irregular free edge which could indicate tear or degeneration. There   is a small meniscal flap or fragment adjacent to the far anterior   horn (image 18 of series 4 and image 12 of series   5. This may have originated from a tear in the far anterior horn or   may be displaced from the residual posterior horn.       Lateral meniscus: Normal.       Cruciate ligaments:   Anterior cruciate ligament: Interval placement of ACL allograft.   There is no definite graft continuity, especially in the proximal   graft. This is suspicious for a complete tear of the graft although   there are no bone injuries to indicate that this is acute. Clinical   correlation for ACL insufficiency is recommended.       Posterior cruciate ligament: Normal.       Collateral supporting structures:   Medial collateral ligament: Normal.       Lateral supporting structures: Previously seen mild edema around the   fibular collateral ligament has resolved. The biceps tendon, fibular   collateral ligament, distal iliotibial tract and popliteus tendon are   intact.       Osseous structures and cartilaginous surfaces: The previously seen   bone marrow contusions have resolved. No acute appearing bony   abnormalities are seen. Metallic artifacts obscure some regions.   There is focal articular cartilage irregularity in the posterolateral   weightbearing medial femoral condyle without change. Remainder of the   articular cartilages in the medial compartment show mild diffuse   thinning, also unchanged. Articular cartilages in the weightbearing   lateral compartment are intact. There is again question of very early   chondromalacia at the patellar apex. Remainder of patellofemoral   cartilages are normal.       Additional findings: Minimal joint space fluid without change. Small   popliteal cyst, probably unchanged in size. Previously  seen   nonspecific soft tissue edema about the knee has resolved.       Impression   IMPRESSION:   1. Interval placement of ACL allograft. Continuity of the fascicles   is not demonstrated, especially proximally. Disruption of the graft   is not excluded and clinical correlation is recommended.   2. Resolution of previously seen bone marrow injuries.   3. Interval partial medial meniscectomy involving the midbody and   posterior horn. There is a new small meniscal flap or fragment   projecting adjacent to the anterior horn which may have originated   from a flap tear of the far anterior horn or could be a fragment   originating from the posterior horn.   4. Mild chondromalacia medial compartment and question of minimal   chondromalacia patellar apex, both without significant change.      KNEE BILATERAL 3 VIEWS   11/8/2023 10:54 AM      HISTORY: Bilateral knee pain.     COMPARISON: Radiographs from 12/13/2012.                                                                      IMPRESSION:     Right: ACL reconstruction revision. Moderate to severe osteoarthrosis  of the medial compartment, significantly progressed. Mild  osteoarthrosis of the lateral compartment, progressed. Ossification in  the region of the patellar tendon likely related to prior  surgery/trauma. Small effusion, unchanged. There is probably an  ossified intra-articular body in the anterior aspect of the joint,  new. Mild patellofemoral osteoarthrosis, progressed. There is no  evidence of an acute or subacute fracture.     Left: Moderate to severe osteoarthrosis of the medial compartment.  Mild patellofemoral osteoarthrosis. Probable calcified intra-articular  bodies and a popliteal cyst.     Assessment:  1. Arthritis of left hip    2. Left hip pain    3. Osteoarthritis of both knees, unspecified osteoarthritis type    4. Chronic pain of both knees         Large Joint Injection/Arthocentesis: L hip joint    Date/Time: 1/26/2024 9:54 AM    Performed  by: Sanjay Hatch DO  Authorized by: Sanjay Hatch DO    Indications:  Pain and diagnostic evaluation  Needle Size:  22 G  Guidance: ultrasound    Approach:  Anterior  Location:  Hip      Site:  L hip joint  Medications:  40 mg triamcinolone 40 MG/ML; 3 mL ROPivacaine 5 MG/ML  Outcome:  Tolerated well, no immediate complications  Procedure discussed: discussed risks, benefits, and alternatives    Consent Given by:  Patient  Timeout: timeout called immediately prior to procedure    Prep: patient was prepped and draped in usual sterile fashion     4 ml's of 1% lidocaine was used as local anesthetic prior to injection    Ultrasound images of procedure were permanently stored.         Plan:  Discussed the assessment with the patient.  Follow up: prn based on clinical progress  Acute flare in the left hip, known DJD and signs of JACQUI  Reviewed PT options and low impact and core building strategies  Repeat US guided CSI to left hip joint today, reviewed option to trial PRP in the future if desired  Also with recurrent bilateral knee pain, hx of right knee ACL reconstruction though MRI from 2012 suggested possible graft failure  US guided CSI was helpful previously, pre-authorization sent for viscosupplementation trial, plan to try at appt in 2 weeks  PT options reviewed for this as well  Oral Tylenol and topical Voltaren gel reviewed as safe OTC options, reviewed safe dosing strategies  XR images independently visualized and reviewed with patient today in clinic  Expectations and goals of CSI reviewed  Often 2-3 days for steroid effect, and can take up to two weeks for maximum effect  We discussed modified progressive pain-free activity as tolerated  Do not overuse in first two weeks if feeling better due to concern for vulnerability while steroid is working  Supportive care reviewed  All questions were answered today  Contact us with additional questions or concerns  Signs and sx of concern  reviewed      Sanjay Hatch DO, CAQ  Sports Medicine Physician  [facility]        Disclaimer: This note consists of symbols derived from keyboarding, dictation and/or voice recognition software. As a result, there may be errors in the script that have gone undetected. Please consider this when interpreting information found in this chart.          Again, thank you for allowing me to participate in the care of your patient.        Sincerely,        Sanjay Hatch DO

## 2024-02-09 ENCOUNTER — OFFICE VISIT (OUTPATIENT)
Dept: ORTHOPEDICS | Facility: CLINIC | Age: 54
End: 2024-02-09
Payer: COMMERCIAL

## 2024-02-09 VITALS — BODY MASS INDEX: 28.75 KG/M2 | WEIGHT: 224 LBS | HEIGHT: 74 IN

## 2024-02-09 DIAGNOSIS — M17.0 OSTEOARTHRITIS OF BOTH KNEES, UNSPECIFIED OSTEOARTHRITIS TYPE: Primary | ICD-10-CM

## 2024-02-09 DIAGNOSIS — G89.29 CHRONIC PAIN OF BOTH KNEES: ICD-10-CM

## 2024-02-09 DIAGNOSIS — M25.561 CHRONIC PAIN OF BOTH KNEES: ICD-10-CM

## 2024-02-09 DIAGNOSIS — M25.562 CHRONIC PAIN OF BOTH KNEES: ICD-10-CM

## 2024-02-09 PROCEDURE — 20611 DRAIN/INJ JOINT/BURSA W/US: CPT | Mod: 50 | Performed by: FAMILY MEDICINE

## 2024-02-09 NOTE — PROGRESS NOTES
Jorge Amaral  :  1970  DOS: 2024  MRN: [id number]    Sports Medicine Clinic Procedure    Ultrasound Guided Bilateral Intra-Articular Knee SynviscOne Injection, +/- Aspiration    Clinical History:  Here for repeat bilateral knee injections as previously discussed.  Last  CSI helped for about 1-2 mo, now worsening again.  Here for visco trial today.    Diagnosis:   1. Osteoarthritis of both knees, unspecified osteoarthritis type    2. Chronic pain of both knees      Large Joint Injection/Arthocentesis: bilateral knee    Date/Time: 2024 2:21 PM    Performed by: Sanjay Hatch DO  Authorized by: Sanjay Hatch DO    Indications:  Osteoarthritis  Needle Size:  21 G  Guidance: ultrasound    Approach:  Superolateral  Location:  Knee  Laterality:  Bilateral      Medications (Right):  48 mg hylan 48 MG/6ML  Aspirate amount (mL):  6  Aspirate:  Serous and yellow  Medications (Left):  48 mg hylan 48 MG/6ML  Aspirate amount (mL):  25  Aspirate:  Serous and yellow  Outcome:  Tolerated well, no immediate complications  Procedure discussed: discussed risks, benefits, and alternatives    Consent Given by:  Patient  Timeout: timeout called immediately prior to procedure    Prep: patient was prepped and draped in usual sterile fashion     Ultrasound images of procedure were permanently stored.      Impression:  Successful Bilateral intra-articular knee SynviscOne injection and aspiration.    Plan:  Follow up prn based on clinical progress  Expectations and limitations of SynviscOne were reviewed in detail  Often 4-6 weeks before full effect may be noticed  Usually covered up to every 6 months by insurance, but does not need to be repeated unless pain returns, at which point we would re-evaluate  Potential use of CSI in future for flares of pain reviewed in detail  Encouraged modified progressive pain-free activity as tolerated  HEP and Supportive care reviewed  All questions were answered  today  Contact us with additional questions or concerns  Signs and sx of concern reviewed      Sanjay Hatch DO, JAYY  Primary Care Sports Medicine  [facility]

## 2024-02-09 NOTE — LETTER
2024         RE: Jorge Amaral  [address] [facility] [address]        Dear Colleague,    Thank you for referring your patient, Jorge Amaral, to the [facility]. Please see a copy of my visit note below.    Jorge Amaral  :  1970  DOS: 2024  MRN: [id number]    Sports Medicine Clinic Procedure    Ultrasound Guided Bilateral Intra-Articular Knee SynviscOne Injection, +/- Aspiration    Clinical History:  Here for repeat bilateral knee injections as previously discussed.  Last  CSI helped for about 1-2 mo, now worsening again.  Here for visco trial today.    Diagnosis:   1. Osteoarthritis of both knees, unspecified osteoarthritis type    2. Chronic pain of both knees      Large Joint Injection/Arthocentesis: bilateral knee    Date/Time: 2024 2:21 PM    Performed by: Sanjay Hatch DO  Authorized by: Sanjay Hatch DO    Indications:  Osteoarthritis  Needle Size:  21 G  Guidance: ultrasound    Approach:  Superolateral  Location:  Knee  Laterality:  Bilateral      Medications (Right):  48 mg hylan 48 MG/6ML  Aspirate amount (mL):  6  Aspirate:  Serous and yellow  Medications (Left):  48 mg hylan 48 MG/6ML  Aspirate amount (mL):  25  Aspirate:  Serous and yellow  Outcome:  Tolerated well, no immediate complications  Procedure discussed: discussed risks, benefits, and alternatives    Consent Given by:  Patient  Timeout: timeout called immediately prior to procedure    Prep: patient was prepped and draped in usual sterile fashion     Ultrasound images of procedure were permanently stored.      Impression:  Successful Bilateral intra-articular knee SynviscOne injection and aspiration.    Plan:  Follow up prn based on clinical progress  Expectations and limitations of SynviscOne were reviewed in detail  Often 4-6 weeks before full effect may be noticed  Usually covered up to every 6 months by insurance, but does not need to be repeated  unless pain returns, at which point we would re-evaluate  Potential use of CSI in future for flares of pain reviewed in detail  Encouraged modified progressive pain-free activity as tolerated  HEP and Supportive care reviewed  All questions were answered today  Contact us with additional questions or concerns  Signs and sx of concern reviewed      Sanjay Hatch DO, CAQ  Primary Care Sports Medicine  [facility]       Again, thank you for allowing me to participate in the care of your patient.        Sincerely,        Sanjay Hatch DO

## 2024-02-14 ENCOUNTER — OFFICE VISIT (OUTPATIENT)
Dept: FAMILY MEDICINE | Facility: CLINIC | Age: 54
End: 2024-02-14
Payer: COMMERCIAL

## 2024-02-14 ENCOUNTER — NURSE TRIAGE (OUTPATIENT)
Dept: NURSING | Facility: CLINIC | Age: 54
End: 2024-02-14

## 2024-02-14 VITALS
RESPIRATION RATE: 16 BRPM | OXYGEN SATURATION: 98 % | DIASTOLIC BLOOD PRESSURE: 68 MMHG | WEIGHT: 223 LBS | TEMPERATURE: 96 F | HEIGHT: 74 IN | SYSTOLIC BLOOD PRESSURE: 123 MMHG | HEART RATE: 80 BPM | BODY MASS INDEX: 28.62 KG/M2

## 2024-02-14 DIAGNOSIS — G44.89 OTHER HEADACHE SYNDROME: Primary | ICD-10-CM

## 2024-02-14 PROCEDURE — 99214 OFFICE O/P EST MOD 30 MIN: CPT | Performed by: PHYSICIAN ASSISTANT

## 2024-02-14 RX ORDER — SUMATRIPTAN 50 MG/1
50-100 TABLET, FILM COATED ORAL
Qty: 30 TABLET | Refills: 0 | Status: SHIPPED | OUTPATIENT
Start: 2024-02-14 | End: 2024-08-26

## 2024-02-14 ASSESSMENT — PAIN SCALES - GENERAL: PAINLEVEL: MILD PAIN (3)

## 2024-02-14 NOTE — TELEPHONE ENCOUNTER
You can offer it to me based on how he is feeling. Possible quicker for him to be seen.    Thanks  Vince Swain PA-C

## 2024-02-14 NOTE — PROGRESS NOTES
"  Assessment & Plan       ICD-10-CM    1. Other headache syndrome  G44.89 CT Head w/o Contrast     SUMAtriptan (IMITREX) 50 MG tablet      Talk to patient about his concerns and order CT scan of his head follow-up depend on the results.  He can continue Tylenol as needed working to add sumatriptan hand.  Warning signs side effects were discussed.  Recheck in 4 weeks.  He does have a follow-up with sleep clinic in a couple weeks is possible that he has uncontrolled sleep apnea that is triggering his headaches also.    Emily Patel is a 53 year old, presenting for the following health issues:  Headache        2/14/2024     1:53 PM   Additional Questions   Roomed by burak   Accompanied by self         2/14/2024     1:53 PM   Patient Reported Additional Medications   Patient reports taking the following new medications no     History of Present Illness       Headaches:   Since the patient's last clinic visit, headaches are: improved  The patient is getting headaches:  2 week  He is not able to do normal daily activities when he has a migraine.  The patient is taking the following rescue/relief medications:  Tylenol   Patient states \"I get some relief\" from the rescue/relief medications.   The patient is taking the following medications to prevent migraines:  No medications to prevent migraines  In the past 4 weeks, the patient has gone to an Urgent Care or Emergency Room 0 times times due to headaches.    He eats 0-1 servings of fruits and vegetables daily.He consumes 3 sweetened beverage(s) daily.He exercises with enough effort to increase his heart rate 30 to 60 minutes per day.  He exercises with enough effort to increase his heart rate 3 or less days per week. He is missing 2 dose(s) of medications per week.  He is not taking prescribed medications regularly due to remembering to take.   Woke up with headache this a.m. frontal lobe.   Will get them a couple times a week in the past for the last couple years. " "  Well go away for couple weeks then comes.   No known cause or pattern.  8-9/10. Tx: tylenol helps. 4/10 now.   Last eye exam: yearly  Caffeine: 2 pops daily. Coffee and energy drinks weekly  Sleep: not sleep well. Snore.   Diet/Exercise: not good at drinking water. Not skipping meals.   Stress: nothing different.       Review of Systems  Constitutional, HEENT, cardiovascular, pulmonary, gi and gu systems are negative, except as otherwise noted.      Objective    /68   Pulse 80   Temp (!) 96  F (35.6  C) (Tympanic)   Resp 16   Ht 1.88 m (6' 2\")   Wt 101.2 kg (223 lb)   SpO2 98%   BMI 28.63 kg/m    Body mass index is 28.63 kg/m .  Physical Exam   GENERAL: alert and no distress  EYES: Eyes grossly normal to inspection, PERRL and conjunctivae and sclerae normal  HENT: ear canals and TM's normal, nose and mouth without ulcers or lesions  NECK: no adenopathy, no asymmetry, masses, or scars  RESP: lungs clear to auscultation - no rales, rhonchi or wheezes  CV: regular rate and rhythm, normal S1 S2, no S3 or S4, no murmur, click or rub, no peripheral edema  ABDOMEN: soft, nontender, no hepatosplenomegaly, no masses and bowel sounds normal  MS: no gross musculoskeletal defects noted, no edema  SKIN: no suspicious lesions or rashes  NEURO: Normal strength and tone, sensory exam grossly normal, mentation intact, and cranial nerves 2-12 intact  PSYCH: mentation appears normal, affect normal/bright    Labs reviewed            Signed Electronically by: Vince Swain PA-C    "

## 2024-02-14 NOTE — TELEPHONE ENCOUNTER
Provider:  Patient has an appointment with you at 2:00 pm today.  Would you like him seen in urgent care at 10:00 am today (sooner)? Thank you. Casandra Moreland R.N.    Next 5 appointments (look out 90 days)      Feb 14, 2024  2:00 PM  (Arrive by 1:40 PM)  Provider Visit with Vince Swain PA-C  [facility] ([facility] ) [address] [facility] [address] [phone]

## 2024-02-14 NOTE — TELEPHONE ENCOUNTER
When trying to call the patient I get a VM that states [place] [employer] and does not identify the patient. We will try at a later time. Thank you. Casandra Moreland R.N.

## 2024-02-14 NOTE — TELEPHONE ENCOUNTER
Triage call  Patient calling to report he woke up with a headache this morning he states he feels clammy nauseous he rates the pain a 7-8/10.  He has not tried to take anything for it yet.  He denied any weakness or numbness his speech is clear. Denies fever    Per protocol  call back from PCP with in 1 hour.  Care advice given.  Verbalizes understanding and agrees with plan. Transferred to scheduling for a appointment.  Routing to PCP he did get a appointment  for 2 pm today    Lenora Johnson RN   [facility] Nurse Advisor  6:49 AM 2/14/2024    Reason for Disposition   SEVERE headache (e.g., excruciating) and has had severe headaches before    Additional Information   Negative: Difficult to awaken or acting confused (e.g., disoriented, slurred speech)   Negative: Weakness of the face, arm or leg on one side of the body and new-onset   Negative: Numbness of the face, arm or leg on one side of the body and new-onset   Negative: Loss of speech or garbled speech and new-onset   Negative: Passed out (i.e., fainted, collapsed and was not responding)   Negative: Sounds like a life-threatening emergency to the triager   Negative: Followed a head injury within last 3 days   Negative: Traumatic Brain Injury (TBI) is suspected   Negative: Sinus pain of forehead and yellow or green nasal discharge   Negative: Pregnant   Negative: Unable to walk without falling   Negative: Stiff neck (can't touch chin to chest)   Negative: Possibility of carbon monoxide exposure   Negative: SEVERE headache, states 'worst headache' of life   Negative: SEVERE headache, sudden-onset (i.e., reaching maximum intensity within seconds to 1 hour)   Negative: Severe pain in one eye   Negative: Loss of vision or double vision  (Exception: Same as prior migraines.)   Negative: Patient sounds very sick or weak to the triager   Negative: Fever > 103 F (39.4 C)   Negative: Fever > 100.0 F (37.8 C) and has diabetes mellitus or a weak immune system  (e.g., HIV positive, cancer chemotherapy, organ transplant, splenectomy, chronic steroids)    Protocols used: Headache-A-OH

## 2024-02-20 ENCOUNTER — TRANSFERRED RECORDS (OUTPATIENT)
Dept: HEALTH INFORMATION MANAGEMENT | Facility: CLINIC | Age: 54
End: 2024-02-20
Payer: COMMERCIAL

## 2024-02-20 LAB
ALT SERPL-CCNC: 35 IU/L (ref 0–44)
AST SERPL-CCNC: 25 IU/L (ref 0–40)

## 2024-02-21 ENCOUNTER — OFFICE VISIT (OUTPATIENT)
Dept: SLEEP MEDICINE | Facility: CLINIC | Age: 54
End: 2024-02-21
Attending: PHYSICIAN ASSISTANT
Payer: COMMERCIAL

## 2024-02-21 VITALS
HEART RATE: 108 BPM | SYSTOLIC BLOOD PRESSURE: 123 MMHG | WEIGHT: 223 LBS | HEIGHT: 74 IN | DIASTOLIC BLOOD PRESSURE: 82 MMHG | OXYGEN SATURATION: 95 % | BODY MASS INDEX: 28.62 KG/M2

## 2024-02-21 DIAGNOSIS — R06.83 SNORES: ICD-10-CM

## 2024-02-21 DIAGNOSIS — R79.89 HIGH SERUM BICARBONATE: ICD-10-CM

## 2024-02-21 DIAGNOSIS — R53.81 MALAISE AND FATIGUE: ICD-10-CM

## 2024-02-21 DIAGNOSIS — G47.30 SLEEP APNEA, UNSPECIFIED TYPE: ICD-10-CM

## 2024-02-21 DIAGNOSIS — R06.89 DYSPNEA AND RESPIRATORY ABNORMALITY: Primary | ICD-10-CM

## 2024-02-21 DIAGNOSIS — R40.0 DAYTIME SLEEPINESS: ICD-10-CM

## 2024-02-21 DIAGNOSIS — R53.83 MALAISE AND FATIGUE: ICD-10-CM

## 2024-02-21 DIAGNOSIS — R51.9 MORNING HEADACHE: ICD-10-CM

## 2024-02-21 DIAGNOSIS — Z72.820 LACK OF ADEQUATE SLEEP: ICD-10-CM

## 2024-02-21 DIAGNOSIS — R06.00 DYSPNEA AND RESPIRATORY ABNORMALITY: Primary | ICD-10-CM

## 2024-02-21 PROCEDURE — 99204 OFFICE O/P NEW MOD 45 MIN: CPT | Performed by: PHYSICIAN ASSISTANT

## 2024-02-21 ASSESSMENT — SLEEP AND FATIGUE QUESTIONNAIRES
HOW LIKELY ARE YOU TO NOD OFF OR FALL ASLEEP WHILE SITTING INACTIVE IN A PUBLIC PLACE: SLIGHT CHANCE OF DOZING
HOW LIKELY ARE YOU TO NOD OFF OR FALL ASLEEP WHILE SITTING AND READING: SLIGHT CHANCE OF DOZING
HOW LIKELY ARE YOU TO NOD OFF OR FALL ASLEEP WHILE SITTING QUIETLY AFTER LUNCH WITHOUT ALCOHOL: MODERATE CHANCE OF DOZING
HOW LIKELY ARE YOU TO NOD OFF OR FALL ASLEEP WHEN YOU ARE A PASSENGER IN A CAR FOR AN HOUR WITHOUT A BREAK: HIGH CHANCE OF DOZING
HOW LIKELY ARE YOU TO NOD OFF OR FALL ASLEEP WHILE LYING DOWN TO REST IN THE AFTERNOON WHEN CIRCUMSTANCES PERMIT: SLIGHT CHANCE OF DOZING
HOW LIKELY ARE YOU TO NOD OFF OR FALL ASLEEP WHILE WATCHING TV: MODERATE CHANCE OF DOZING
HOW LIKELY ARE YOU TO NOD OFF OR FALL ASLEEP IN A CAR, WHILE STOPPED FOR A FEW MINUTES IN TRAFFIC: SLIGHT CHANCE OF DOZING
HOW LIKELY ARE YOU TO NOD OFF OR FALL ASLEEP WHILE SITTING AND TALKING TO SOMEONE: WOULD NEVER DOZE

## 2024-02-21 NOTE — NURSING NOTE
Writer scheduled patient for psg & 3 month follow up with provider after sleep study. Handed patient the sleep study information.

## 2024-02-21 NOTE — PATIENT INSTRUCTIONS
"          MY TREATMENT INFORMATION FOR SLEEP APNEA-  Jorge Amaral    DOCTOR : SEUN Dutta    Am I having a sleep study at a sleep center?  --->Due to normal delays, you will be contacted within 2-4 weeks to schedule    Am I having a home sleep study?  --->Watch the video for the device you are using:    -/drop off device-   https://www.Locally.com/watch?v=yGGFBdELGhk    -Disposable device sent out require phone/computer application-   https://www.Locally.com/watch?v=BCce_vbiwxE      Frequently asked questions:  1. What is Obstructive Sleep Apnea (KIRA)? KIRA is the most common type of sleep apnea. Apnea means, \"without breath.\"  Apnea is most often caused by narrowing or collapse of the upper airway as muscles relax during sleep.   Almost everyone has occasional apneas. Most people with sleep apnea have had brief interruptions at night frequently for many years.  The severity of sleep apnea is related to how frequent and severe the events are.   2. What are the consequences of KIRA? Symptoms include: feeling sleepy during the day, snoring loudly, gasping or stopping of breathing, trouble sleeping, and occasionally morning headaches or heartburn at night.  Sleepiness can be serious and even increase the risk of falling asleep while driving. Other health consequences may include development of high blood pressure and other cardiovascular disease in persons who are susceptible. Untreated KIRA  can contribute to heart disease, stroke and diabetes.   3. What are the treatment options? In most situations, sleep apnea is a lifelong disease that must be managed with daily therapy. Medications are not effective for sleep apnea and surgery is generally not considered until other therapies have been tried. Your treatment is your choice . Continuous Positive Airway (CPAP) works right away and is the therapy that is effective in nearly everyone. An oral device to hold your jaw forward is usually the next most " reliable option. Other options include postioning devices (to keep you off your back), weight loss, and surgery including a tongue pacing device. There is more detail about some of these options below.  4. Are my sleep studies covered by insurance? Although we will request verification of coverage, we advise you also check in advance of the study to ensure there is coverage.    Important tips for those choosing CPAP and similar devices  For new devices, sign up for device XI to monitor your device for your followup visits  We encourage you to utilize the [employer] xi or website (myAir web (resmed.com) ) to monitor your therapy progress and share the data with your healthcare team when you discuss your sleep apnea.                                                    Know your equipment:  CPAP is continuous positive airway pressure that prevents obstructive sleep apnea by keeping the throat from collapsing while you are sleeping. In most cases, the device is  smart  and can slowly self-adjusts if your throat collapses and keeps a record every day of how well you are treated-this information is available to you and your care team.  BPAP is bilevel positive airway pressure that keeps your throat open and also assists each breath with a pressure boost to maintain adequate breathing.  Special kinds of BPAP are used in patients who have inadequate breathing from lung or heart disease. In most cases, the device is  smart  and can slowly self-adjusts to assist breathing. Like CPAP, the device keeps a record of how well you are treated.  Your mask is your connection to the device. You get to choose what feels most comfortable and the staff will help to make sure if fits. Here: are some examples of the different masks that are available: Magnetic mask aids may assist with use but there are safety issues that should be addressed when considering with magnets* ( see end of discussion).       Key points to remember on your  journey with sleep apnea:  Sleep study.  PAP devices often need to be adjusted during a sleep study to show that they are effective and adjusted right.  Good tips to remember: Try wearing just the mask during a quiet time during the day so your body adapts to wearing it. A humidifier is recommended for comfort in most cases to prevent drying of your nose and throat. Allergy medication from your provider may help you if you are having nasal congestion.  Getting settled-in. It takes more than one night for most of us to get used to wearing a mask. Try wearing just the mask during a quiet time during the day so your body adapts to wearing it. A humidifier is recommended for comfort in most cases. Our team will work with you carefully on the first day and will be in contact within 4 days and again at 2 and 4 weeks for advice and remote device adjustments. Your therapy is evaluated by the device each day.   Use it every night. The more you are able to sleep naturally for 7-8 hours, the more likely you will have good sleep and to prevent health risks or symptoms from sleep apnea. Even if you use it 4 hours it helps. Occasionally all of us are unable to use a medical therapy, in sleep apnea, it is not dangerous to miss one night.   Communicate. Call our skilled team on the number provided on the first day if your visit for problems that make it difficult to wear the device. Over 2 out of 3 patients can learn to wear the device long-term with help from our team. Remember to call our team or your sleep providers if you are unable to wear the device as we may have other solutions for those who cannot adapt to mask CPAP therapy. It is recommended that you sleep your sleep provider within the first 3 months and yearly after that if you are not having problems.   Use it for your health. We encourage use of CPAP masks during daytime quiet periods to allow your face and brain to adapt to the sensation of CPAP so that it will be a  more natural sensation to awaken to at night or during naps. This can be very useful during the first few weeks or months of adapting to CPAP though it does not help medically to wear CPAP during wakefulness and  should not be used as a strategy just to meet guidelines.  Take care of your equipment. Make sure you clean your mask and tubing using directions every day and that your filter and mask are replaced as recommended or if they are not working.     *Masks with magnets:  Updated Contraindications  Masks with magnetic components are contraindicated for use by patients where they, or anyone in close physical contact while using the mask, have the following:   Active medical implants that interact with magnets (i.e., pacemakers, implantable cardioverter defibrillators (ICD), neurostimulators, cerebrospinal fluid (CSF) shunts, insulin/infusion pumps)   Metallic implants/objects containing ferromagnetic material (i.e., aneurysm clips/flow disruption devices, embolic coils, stents, valves, electrodes, implants to restore hearing or balance with implanted magnets, ocular implants, metallic splinters in the eye)  Updated Warning  Keep the mask magnets at a safe distance of at least 6 inches (150 mm) away from implants or medical devices that may be adversely affected by magnetic interference. This warning applies to you or anyone in close physical contact with your mask. The magnets are in the frame and lower headgear clips, with a magnetic field strength of up to 400mT. When worn, they connect to secure the mask but may inadvertently detach while asleep.  Implants/medical devices, including those listed within contraindications, may be adversely affected if they change function under external magnetic fields or contain ferromagnetic materials that attract/repel to magnetic fields (some metallic implants, e.g., contact lenses with metal, dental implants, metallic cranial plates, screws, cesar hole covers, and bone  substitute devices). Consult your physician and  of your implant / other medical device for information on the potential adverse effects of magnetic fields.    BESIDES CPAP, WHAT OTHER THERAPIES ARE THERE?    Positioning Device  Positioning devices are generally used when sleep apnea is mild and only occurs on your back.This example shows a pillow that straps around the waist. It may be appropriate for those whose sleep study shows milder sleep apnea that occurs primarily when lying flat on one's back. Preliminary studies have shown benefit but effectiveness at home may need to be verified by a home sleep test. These devices are generally not covered by medical insurance.  Examples of devices that maintain sleeping on the back to prevent snoring and mild sleep apnea.    Belt type body positioner  http://IPexpert/    Electronic reminder  http://nightshifttherapy.com/            Oral Appliance  What is oral appliance therapy?  An oral appliance device fits on your teeth at night like a retainer used after having braces. The device is made by a specialized dentist and requires several visits over 1-2 months before a manufactured device is made to fit your teeth and is adjusted to prevent your sleep apnea. Once an oral device is working properly, snoring should be improved. A home sleep test may be recommended at that time if to determine whether the sleep apnea is adequately treated.       Some things to remember:  -Oral devices are often, but not always, covered by your medical insurance. Be sure to check with your insurance provider.   -If you are referred for oral therapy, you will be given a list of specialized dentists to consider or you may choose to visit the Web site of the American Academy of Dental Sleep Medicine  -Oral devices are less likely to work if you have severe sleep apnea or are extremely overweight.     More detailed information  An oral appliance is a small acrylic device that fits  over the upper and lower teeth  (similar to a retainer or a mouth guard). This device slightly moves jaw forward, which moves the base of the tongue forward, opens the airway, improves breathing for effective treat snoring and obstructive sleep apnea in perhaps 7 out of 10 people .  The best working devices are custom-made by a dental device  after a mold is made of the teeth 1, 2, 3.  When is an oral appliance indicated?  Oral appliance therapy is recommended as a first-line treatment for patients with primary snoring, mild sleep apnea, and for patients with moderate sleep apnea who prefer appliance therapy to use of CPAP4, 5. Severity of sleep apnea is determined by sleep testing and is based on the number of respiratory events per hour of sleep.   How successful is oral appliance therapy?  The success rate of oral appliance therapy in patients with mild sleep apnea is 75-80% while in patients with moderate sleep apnea it is 50-70%. The chance of success in patients with severe sleep apnea is 40-50%. The research also shows that oral appliances have a beneficial effect on the cardiovascular health of KIRA patients at the same magnitude as CPAP therapy7.  Oral appliances should be a second-line treatment in cases of severe sleep apnea, but if not completely successful then a combination therapy utilizing CPAP plus oral appliance therapy may be effective. Oral appliances tend to be effective in a broad range of patients although studies show that the patients who have the highest success are females, younger patients, those with milder disease, and less severe obesity. 3, 6.   Finding a dentist that practices dental sleep medicine  Specific training is available through the American Academy of Dental Sleep Medicine for dentists interested in working in the field of sleep. To find a dentist who is educated in the field of sleep and the use of oral appliances, near you, visit the Web site of the American  Academy of Dental Sleep Medicine.    References  1. Ольга et al. Objectively measured vs self-reported compliance during oral appliance therapy for sleep-disordered breathing. Chest 2013; 144(5): 2113-8328.  2. Jun et al. Objective measurement of compliance during oral appliance therapy for sleep-disordered breathing. Thorax 2013; 68(1): 91-96.  3. Codie, et al. Mandibular advancement devices in 620 men and women with KIRA and snoring: tolerability and predictors of treatment success. Chest 2004; 125: 5089-5082.  4. Jennifer et al. Oral appliances for snoring and KIRA: a review. Sleep 2006; 29: 244-262.  5. Naif et al. Oral appliance treatment for KIRA: an update. J Clin Sleep Med 2014; 10(2): 215-227.  6. Zayra et al. Predictors of OSAH treatment outcome. J Dent Res 2007; 86: 5865-6775.      Weight Loss:   Your Body mass index is 28.62 kg/m .    Being overweight does not necessarily mean you will have health consequences.  Those who have BMI over 35 or over 27 with existing medical conditions carries greater risk.   Weight loss decreases severity of sleep apnea in most people with obesity. For those with mild obesity who have developed snoring with weight gain, even 15-30 pound weight loss can improve and occasionally milder eliminate sleep apnea.  Structured and life-long dietary and health habits are necessary to lose weight and keep healthier weight levels.     The Comprehensive Weight loss program offers all aspects of weight loss strategies including two Non-Surgical Weight Loss Programs: Medical Weight Management and our 24 Week Healthy Lifestyle Program:    Medical Weight Management: You will meet with a Medical Weight Management Provider, as well as a Registered Dietician. The program may include medication therapy, dietary education, recommended exercise and physical therapy programs, monthly support group meetings, and possible psychological counseling. Follow up visits with the  provider or dietician are scheduled based on your progress and needs.    24 Week Healthy Lifestyle Program: This unique program is designed to give you the support of weekly appointments and activities thru a 24-week period. It may include all of the components of the basic program (above), with the addition of 11 individual Health  Visits, 24-week access to the [employer] website for over 700 online classes, and monthly support group meetings. This program has an out-of-pocket expense of $499 to cover the items that can not be billed to insurance (health coaches and [employer] access), and is non-refundable/non-transferable (you may be able to use a Health Savings Account; ask your HSA provider). There may be an optional meal replacement plan prescribed as well.   Surgical management achieves meaningful long-term weight loss and improvement in health risks in most patients with more severe obesity.      Sleep Apnea Surgery:    Surgery for obstructive sleep apnea is considered generally only when other therapies fail to work. Surgery may be discussed with you if you are having a difficult time tolerating CPAP and or when there is an abnormal structure that requires surgical correction.  Nose and throat surgeries often enlarge the airway to prevent collapse.  Most of these surgeries create pain for 1-2 weeks and up to half of the most common surgeries are not effective throughout life.  You should carefully discuss the benefits and drawbacks to surgery with your sleep provider and surgeon to determine if it is the best solution for you.   More information  Surgery for KIRA is directed at areas that are responsible for narrowing or complete obstruction of the airway during sleep.  There are a wide range of procedures available to enlarge and/or stabilize the airway to prevent blockage of breathing in the three major areas where it can occur: the palate, tongue, and nasal regions.  Successful surgical treatment  depends on the accurate identification of the factors responsible for obstructive sleep apnea in each person.  A personalized approach is required because there is no single treatment that works well for everyone.  Because of anatomic variation, consultation with an examination by a sleep surgeon is a critical first step in determining what surgical options are best for each patient.  In some cases, examination during sedation may be recommended in order to guide the selection of procedures.  Patients will be counseled about risks and benefits as well as the typical recovery course after surgery. Surgery is typically not a cure for a person s KIRA.  However, surgery will often significantly improve one s KIRA severity (termed  success rate ).  Even in the absence of a cure, surgery will decrease the cardiovascular risk associated with OSA7; improve overall quality of life8 (sleepiness, functionality, sleep quality, etc).      Palate Procedures:  Patients with KIRA often have narrowing of their airway in the region of their tonsils and uvula.  The goals of palate procedures are to widen the airway in this region as well as to help the tissues resist collapse.  Modern palate procedure techniques focus on tissue conservation and soft tissue rearrangement, rather than tissue removal.  Often the uvula is preserved in this procedure. Residual sleep apnea is common in patient after pharyngoplasty with an average reduction in sleep apnea events of 33%2.      Tongue Procedures:  ExamWhile patients are awake, the muscles that surround the throat are active and keep this region open for breathing. These muscles relax during sleep, allowing the tongue and other structures to collapse and block breathing.  There are several different tongue procedures available.  Selection of a tongue base procedure depends on characteristics seen on physical exam.  Generally, procedures are aimed at removing bulky tissues in this area or  preventing the back of the tongue from falling back during sleep.  Success rates for tongue surgery range from 50-62%3.    Hypoglossal Nerve Stimulation:  Hypoglossal nerve stimulation has recently received approval from the [place] Food and Drug Administration for the treatment of obstructive sleep apnea.  This is based on research showing that the system was safe and effective in treating sleep apnea6.  Results showed that the median AHI score decreased 68%, from 29.3 to 9.0. This therapy uses an implant system that senses breathing patterns and delivers mild stimulation to airway muscles, which keeps the airway open during sleep.  The system consists of three fully implanted components: a small generator (similar in size to a pacemaker), a breathing sensor, and a stimulation lead.  Using a small handheld remote, a patient turns the therapy on before bed and off upon awakening.    Candidates for this device must be greater than 18 years of age, have moderate to severe obstructive sleep apnea with less than 25% central events  (AHI between 15-65), BMI less than 35, have tried CPAP/oral appliance for at least 8 weeks without success, and have appropriate upper airway anatomy (determined by a sleep endoscopy performed by Dr. Thor Neves or Dr. Velasquez Dick).    Nasal Procedures:  Nasal obstruction can interfere with nasal breathing during the day and night.  Studies have shown that relief of nasal obstruction can improve the ability of some patients to tolerate positive airway pressure therapy for obstructive sleep apnea1.  Treatment options include medications such as nasal saline, topical corticosteroid and antihistamine sprays, and oral medications such as antihistamines or decongestants. Non-surgical treatments can include external nasal dilators for selected patients. If these are not successful by themselves, surgery can improve the nasal airway either alone or in combination with these other  options.        Combination Procedures:  Combination of surgical procedures and other treatments may be recommended, particularly if patients have more than one area of narrowing or persistent positional disease.  The success rate of combination surgery ranges from 66-80%2,3.    References  Nadir BRAGA. The Role of the Nose in Snoring and Obstructive Sleep Apnoea: An Update.  Eur Arch Otorhinolaryngol. 2011; 268: 1365-73.   Susu SM; Jermaine JA; Connor JR; Pallanch JF; Jasper MB; Helen SG; Andie SHORT. Surgical modifications of the upper airway for obstructive sleep apnea in adults: a systematic review and meta-analysis. SLEEP 2010;33(10):5979-1604. Camille DE LEON. Hypopharyngeal surgery in obstructive sleep apnea: an evidence-based medicine review.  Arch Otolaryngol Head Neck Surg. 2006 Feb;132(2):206-13.  Earnest YH1, John Y, Alex JOSIAH. The efficacy of anatomically based multilevel surgery for obstructive sleep apnea. Otolaryngol Head Neck Surg. 2003 Oct;129(4):327-35.  Kezirian E, Goldberg A. Hypopharyngeal Surgery in Obstructive Sleep Apnea: An Evidence-Based Medicine Review. Arch Otolaryngol Head Neck Surg. 2006 Feb;132(2):206-13.  Carine JACOBS et al. Upper-Airway Stimulation for Obstructive Sleep Apnea.  N Engl J Med. 2014 Jan 9;370(2):139-49.  Irvin Y et al. Increased Incidence of Cardiovascular Disease in Middle-aged Men with Obstructive Sleep Apnea. Am J Respir Crit Care Med; 2002 166: 159-165  George EM et al. Studying Life Effects and Effectiveness of Palatopharyngoplasty (SLEEP) study: Subjective Outcomes of Isolated Uvulopalatopharyngoplasty. Otolaryngol Head Neck Surg. 2011; 144: 623-631.        WHAT IF I ONLY HAVE SNORING?    Mandibular advancement devices, lateral sleep positioning, long-term weight loss and treatment of nasal allergies have been shown to improve snoring.  Exercising tongue muscles with a game (https://apps.[employer]/us/xi/soundly-reduce-snoring/px8083268512) or stimulating the tongue during  the day with a device (https://doi.org/10.3390/krn60824802) have improved snoring in some individuals.  https://www.Frugalo.Linguastat/  https://www.sleepfoundation.org/best-anti-snoring-mouthpieces-and-mouthguards    Remember to Drive Safe... Drive Alive     Sleep health profoundly affects your health, mood, and your safety.  Thirty three percent of the population (one in three of us) is not getting enough sleep and many have a sleep disorder. Not getting enough sleep or having an untreated / undertreated sleep condition may make us sleepy without even knowing it. In fact, our driving could be dramatically impaired due to our sleep health. As your provider, here are some things I would like you to know about driving:     Here are some warning signs for impairment and dangerous drowsy driving:              -Having been awake more than 16 hours               -Looking tired               -Eyelid drooping              -Head nodding (it could be too late at this point)              -Driving for more than 30 minutes     Some things you could do to make the driving safer if you are experiencing some drowsiness:              -Stop driving and rest              -Call for transportation              -Make sure your sleep disorder is adequately treated     Some things that have been shown NOT to work when experiencing drowsiness while driving:              -Turning on the radio              -Opening windows              -Eating any  distracting  /  entertaining  foods (e.g., sunflower seeds, candy, or any other)              -Talking on the phone      Your decision may not only impact your life, but also the life of others. Please, remember to drive safe for yourself and all of us.           Your Body mass index is 28.62 kg/m .  Weight management is a personal decision.  If you are interested in exploring weight loss strategies, the following discussion covers the approaches that may be successful. Body mass index (BMI) is one  way to tell whether you are at a healthy weight, overweight, or obese. It measures your weight in relation to your height.  A BMI of 18.5 to 24.9 is in the healthy range. A person with a BMI of 25 to 29.9 is considered overweight, and someone with a BMI of 30 or greater is considered obese. More than two-thirds of American adults are considered overweight or obese.  Being overweight or obese increases the risk for further weight gain. Excess weight may lead to heart disease and diabetes.  Creating and following plans for healthy eating and physical activity may help you improve your health.  Weight control is part of healthy lifestyle and includes exercise, emotional health, and healthy eating habits. Careful eating habits lifelong are the mainstay of weight control. Though there are significant health benefits from weight loss, long-term weight loss with diet alone may be very difficult to achieve- studies show long-term success with dietary management in less than 10% of people. Attaining a healthy weight may be especially difficult to achieve in those with severe obesity. In some cases, medications, devices and surgical management might be considered.  What can you do?  If you are overweight or obese and are interested in methods for weight loss, you should discuss this with your provider.   Consider reducing daily calorie intake by 500 calories.   Keep a food journal.   Avoiding skipping meals, consider cutting portions instead.    Diet combined with exercise helps maintain muscle while optimizing fat loss. Strength training is particularly important for building and maintaining muscle mass. Exercise helps reduce stress, increase energy, and improves fitness. Increasing exercise without diet control, however, may not burn enough calories to loose weight.     Start walking three days a week 10-20 minutes at a time  Work towards walking thirty minutes five days a week   Eventually, increase the speed of your  walking for 1-2 minutes at time    In addition, we recommend that you review healthy lifestyles and methods for weight loss available through the National Institutes of Health patient information sites:  http://win.niddk.nih.gov/publications/index.htm    And look into health and wellness programs that may be available through your health insurance provider, employer, local community center, or nnamdi club.

## 2024-02-21 NOTE — NURSING NOTE
"Chief Complaint   Patient presents with    Snoring    Sleep Problem     Daytime sleepiness.        Initial /82   Pulse 108   Ht 1.88 m (6' 2.02\")   Wt 101.2 kg (223 lb)   SpO2 95%   BMI 28.62 kg/m   Estimated body mass index is 28.62 kg/m  as calculated from the following:    Height as of this encounter: 1.88 m (6' 2.02\").    Weight as of this encounter: 101.2 kg (223 lb).    Medication Reconciliation: complete    Neck circumference: 15 inches / 39 centimeters.    Barbra Rivas CMA on 2/21/2024 at 1:21 PM   "

## 2024-02-21 NOTE — PROGRESS NOTES
Sleep Apnea - Follow-up Visit:    Impression/Plan:  Probable obstructive sleep apnea with coexisting hypoventilation based on elevated bicarbonate (31), morning headaches,  loud snoring, witnessed apnea, non-refreshing sleep, excessive daytime sleepiness (ESS 11) and crowded oropharynx. The STOP-BANG score is 5/8. Recommend Polysomnogram (using 4% desaturation/Medicare/2012 AASM 1B scoring rules) with transcutaneous C02 monitoring and pre-study VBG to evaluate for obstructive sleep apnea and hypoventilation  Given the patient's risk of hypoventilation, laboratory study would be preferable.     He is interested in a mandibular advancement device for mild sleep apnea and CPAP for moderate to severe sleep apnea.      Recommend weight management.        Jorge Amaral will follow up once testing is completed.    30 minutes spent on day of encounter doing chart review,  history and exam, counseling, coordinating plan of care, documentation and further activities as noted above.       Alfredo Ferguson PA-C  Sleep Medicine     History of Present Illness:  Chief Complaint   Patient presents with    Snoring    Sleep Problem     Daytime sleepiness.        Jorge Amaral  was previously evaluated by Dr. Estes and recommended to undergo a sleep study. That was not completed.   He presents with snoring witnessed apnea, non-refreshing sleep, frequent morning headaches and daytime sleepiness (ESS 11)    Do you use a CPAP Machine at home: No    What is your typical bedtime:  11 pm  How long does it take you to go to sleep:  5-20 minutes  What time do you typically get out of bed for the day:  6 am  How many hours are you sleeping per night:  6-7 hours   Do you feel well rested in the morning:  no    Last Comprehensive Metabolic Panel:  Lab Results   Component Value Date     11/01/2023    POTASSIUM 4.4 11/01/2023    CHLORIDE 105 11/01/2023    CO2 31 (H) 11/01/2023    ANIONGAP 6 (L) 11/01/2023    GLC 89 11/01/2023    BUN 11.0  11/01/2023    CR 0.97 11/01/2023    GFRESTIMATED >90 11/01/2023    JOSELITO 9.7 11/01/2023        SpO2 Readings from Last 4 Encounters:   02/21/24 95%   02/14/24 98%   12/13/23 94%   11/22/23 96%        EPWORTH SLEEPINESS SCALE         2/21/2024     1:10 PM    [place] Sleepiness Scale ( ARIEL Roque  [id number]<br>ESS - [place]/English - Final version - 21 Nov 07 - [facility] [place].)   Sitting and reading Slight chance of dozing   Watching TV Moderate chance of dozing   Sitting, inactive in a public place (e.g. a theatre or a meeting) Slight chance of dozing   As a passenger in a car for an hour without a break High chance of dozing   Lying down to rest in the afternoon when circumstances permit Slight chance of dozing   Sitting and talking to someone Would never doze   Sitting quietly after a lunch without alcohol Moderate chance of dozing   In a car, while stopped for a few minutes in traffic Slight chance of dozing   [place] Score (MC) 11   [place] Score (Sleep) 11       INSOMNIA SEVERITY INDEX (YONATHAN)          2/21/2024     1:09 PM   Insomnia Severity Index (YONATHAN)   Difficulty falling asleep 2   Difficulty staying asleep 2   Problems waking up too early 1   How SATISFIED/DISSATISFIED are you with your CURRENT sleep pattern? 3   How NOTICEABLE to others do you think your sleep problem is in terms of impairing the quality of your life? 2   How WORRIED/DISTRESSED are you about your current sleep problem? 3   To what extent do you consider your sleep problem to INTERFERE with your daily functioning (e.g. daytime fatigue, mood, ability to function at work/daily chores, concentration, memory, mood, etc.) CURRENTLY? 3   YONATHAN Total Score 16       Guidelines for Scoring/Interpretation:  Total score categories:  0-7 = No clinically significant insomnia   8-14 = Subthreshold insomnia   15-21 = Clinical insomnia (moderate severity)  22-28 = Clinical insomnia (severe)  Used via courtesy of www.[facility].va.gov with permission from  "Dennis Cota PhD., [facility]        Past medical/surgical history, family history, social history, medications and allergies were reviewed.        Problem List:  Patient Active Problem List    Diagnosis Date Noted    Chronic anticoagulation 11/01/2023     Priority: Medium    Left knee pain 05/10/2021     Priority: Medium    History of pulmonary embolism 08/21/2020     Priority: Medium     ED 8/15/20 CT - Acute pulmonary emboli involving 2 small segmental and subsegmental right middle lobe and lower lobe pulmonary arteries.         BMI 28.0-28.9,adult 12/04/2019     Priority: Medium    Crohn's disease with complication, unspecified gastrointestinal tract location (H) 08/11/2017     Priority: Medium    FH: prostate cancer 08/11/2017     Priority: Medium    Common wart 10/14/2014     Priority: Medium    Cold sore 08/14/2014     Priority: Medium    Family history of diabetes mellitus 12/16/2011     Priority: Medium    CARDIOVASCULAR SCREENING; LDL GOAL LESS THAN 160 10/31/2010     Priority: Medium        /82   Pulse 108   Ht 1.88 m (6' 2.02\")   Wt 101.2 kg (223 lb)   SpO2 95%   BMI 28.62 kg/m             "

## 2024-02-22 ENCOUNTER — ANCILLARY PROCEDURE (OUTPATIENT)
Dept: CT IMAGING | Facility: CLINIC | Age: 54
End: 2024-02-22
Attending: PHYSICIAN ASSISTANT
Payer: COMMERCIAL

## 2024-02-22 DIAGNOSIS — G44.89 OTHER HEADACHE SYNDROME: ICD-10-CM

## 2024-02-22 PROCEDURE — 70450 CT HEAD/BRAIN W/O DYE: CPT | Mod: TC | Performed by: RADIOLOGY

## 2024-02-22 NOTE — RESULT ENCOUNTER NOTE
Mr. Amaral,    Your CT scan was read as normal by the radiologist.     Please contact the clinic if you have additional questions.  Thank you.    Sincerely,    Vince Swain PA-C

## 2024-04-19 ENCOUNTER — OFFICE VISIT (OUTPATIENT)
Dept: ORTHOPEDICS | Facility: CLINIC | Age: 54
End: 2024-04-19
Payer: COMMERCIAL

## 2024-04-19 VITALS — BODY MASS INDEX: 28.62 KG/M2 | WEIGHT: 223 LBS

## 2024-04-19 DIAGNOSIS — M25.461 BILATERAL KNEE EFFUSIONS: ICD-10-CM

## 2024-04-19 DIAGNOSIS — G89.29 CHRONIC PAIN OF BOTH KNEES: ICD-10-CM

## 2024-04-19 DIAGNOSIS — M17.0 OSTEOARTHRITIS OF BOTH KNEES, UNSPECIFIED OSTEOARTHRITIS TYPE: Primary | ICD-10-CM

## 2024-04-19 DIAGNOSIS — M25.561 CHRONIC PAIN OF BOTH KNEES: ICD-10-CM

## 2024-04-19 DIAGNOSIS — M25.462 BILATERAL KNEE EFFUSIONS: ICD-10-CM

## 2024-04-19 DIAGNOSIS — M25.562 CHRONIC PAIN OF BOTH KNEES: ICD-10-CM

## 2024-04-19 PROCEDURE — 20611 DRAIN/INJ JOINT/BURSA W/US: CPT | Mod: 50 | Performed by: FAMILY MEDICINE

## 2024-04-19 RX ORDER — TRIAMCINOLONE ACETONIDE 40 MG/ML
40 INJECTION, SUSPENSION INTRA-ARTICULAR; INTRAMUSCULAR
Status: DISCONTINUED | OUTPATIENT
Start: 2024-04-19 | End: 2024-09-10

## 2024-04-19 RX ORDER — ROPIVACAINE HYDROCHLORIDE 5 MG/ML
3 INJECTION, SOLUTION EPIDURAL; INFILTRATION; PERINEURAL
Status: DISCONTINUED | OUTPATIENT
Start: 2024-04-19 | End: 2024-09-10

## 2024-04-19 RX ADMIN — TRIAMCINOLONE ACETONIDE 40 MG: 40 INJECTION, SUSPENSION INTRA-ARTICULAR; INTRAMUSCULAR at 12:00

## 2024-04-19 RX ADMIN — ROPIVACAINE HYDROCHLORIDE 3 ML: 5 INJECTION, SOLUTION EPIDURAL; INFILTRATION; PERINEURAL at 12:00

## 2024-04-19 NOTE — LETTER
2024         RE: Jorge Amaral  [address] [facility] [address]        Dear Colleague,    Thank you for referring your patient, Jorge Amaral, to the [facility]. Please see a copy of my visit note below.    Jorge Amaral  :  1970  DOS: 22  MRN: [id number]    Sports Medicine Clinic Visit    PCP: Vince Swain    Interim History - March 3, 2023  Since last visit on 10/13/2021 patient has noticed pain increasing within the past month or so.  Left hip injection completed on 10/13/2021 provided good relief for 1+ years.  No new injury in the interim.    Social History: currently employed as     Interim History - 2023  Since last visit on 23 patient states he felt relief from previous injection for a few months. R>L. No significant changes or concerns, but did mention that his hips have been bothering him. No interim injury.       Interim History - 2024  Since last visit on 2024 patient has moderate-severe bilateral knee pain.  Bilateral knee SynviscOne injection completed on 24 provided only moderate relief.  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  No new injury in the interim.    Review of Systems  Musculoskeletal: as above  Remainder of review of systems is negative including constitutional, CV, pulmonary, GI, Skin and Neurologic except as noted in HPI or medical history.    Past Medical History:   Diagnosis Date     ACL (anterior cruciate ligament) tear--recurrent 2013     Arthritis 2020     Crohn's disease (H)      Delayed union of fracture 2011     Fracture, metacarpal 2011     History of blood transfusion 1987     Large bowel perforation (H) 2010     Nondisplaced fracture of navicular (scaphoid) of left foot, subsequent encounter for fracture with delayed healing  07/03/2019     Puncture wound     L hand DOI 4/29/21     Sepsis (H) 01/19/2018    He had an incomplete (due to stricture) colonoscopy day of presentation and after that developed acute abdominal pain and fever. He was septic and suspected to have a micro perforation. Imaging negative for larger perforation     Past Surgical History:   Procedure Laterality Date     APPENDECTOMY  1986     ARTHROSCOPY KNEE WITH MEDIAL MENISCECTOMY Left 01/14/2019    Procedure: LEFT KNEE ARTHROSCOPY WITH PARTIAL MEDIAL MENISCECTOMY;  Surgeon: Jose Luis Jama MD;  Location:  OR     COLONOSCOPY  05/14/2020     ENT SURGERY  06/28/2013    Left Tonsil biopsy     EXCISE MASS WRIST Right 11/07/2018    Procedure: EXCISION OF RIGHT DORSAL WRIST MASS;  Surgeon: Zohaib Edmond MD;  Location:  OR     LAPAROSCOPY PROCEDURE UNLISTED  08/16/2010    Exploratory laparotomy, lysis of adhesions, takedown enterocolonic fistula and colocononic fistula, with creation of loop ileostomy     OPEN REDUCTION INTERNAL FIXATION HAND  07/25/2011    right IV MCP hand 7/11     REMOVE HARDWARE LOWER EXTREMITY  04/06/2012    Procedure:REMOVE HARDWARE LOWER EXTREMITY; REMOVAL OF RIGHT TIBIAL SCREW; Surgeon:JOSE LUIS JAMA; Location:[facility]     SMALL BOWEL RESECTION  1986    terminal ileum resection at age 16     TAKEDOWN COLOSTOMY  12/27/2011     Family History   Problem Relation Age of Onset     Respiratory Mother         SMOKER -EMPYSEMA     Diabetes Father      Prostate Cancer Father 50     Prostate Cancer Paternal Grandfather      Diabetes Brother      Prostate Cancer Paternal Uncle 50     Hypertension No family hx of        Objective  Wt 101.2 kg (223 lb)   BMI 28.62 kg/m      General: healthy, alert and in no distress    HEENT: no scleral icterus or conjunctival erythema   Skin: no suspicious lesions or rash. No jaundice.   CV: regular rhythm by palpation, 2+ distal pulses, no pedal edema    Resp: normal respiratory effort without conversational  dyspnea   Psych: normal mood and affect    Gait: nonantalgic, appropriate coordination and balance   Neuro: normal light touch sensory exam of the extremities. Motor strength as noted below     Bilateral Knee exam    ROM:        Flexion 130 degrees       Extension 0 degrees       Range of motion limited by mild pain in terminal flexion R>L    Inspection:       no visible ecchymosis        effusion noted small b/l by palpation right, moderate left    Skin:       no visible deformities       well perfused       capillary refill brisk    Patellar Motion:        Lateral tilt noted in patella       Crepitus noted in the patellofemoral joint    Tender:        medial joint line most focal, mild lateral patellar borders    Non Tender:         remainder of knee area    Special Tests:        neg (-) varus at 0 deg and 30 deg       neg (-) valgus at 0 deg and 30 deg      Radiology  Recent Results (from the past 744 hour(s))   XR Knee Bilateral 3 Views    Narrative    KNEE BILATERAL 3 VIEWS   11/8/2023 10:54 AM     HISTORY: Bilateral knee pain.    COMPARISON: Radiographs from 12/13/2012.      Impression    IMPRESSION:    Right: ACL reconstruction revision. Moderate to severe osteoarthrosis  of the medial compartment, significantly progressed. Mild  osteoarthrosis of the lateral compartment, progressed. Ossification in  the region of the patellar tendon likely related to prior  surgery/trauma. Small effusion, unchanged. There is probably an  ossified intra-articular body in the anterior aspect of the joint,  new. Mild patellofemoral osteoarthrosis, progressed. There is no  evidence of an acute or subacute fracture.    Left: Moderate to severe osteoarthrosis of the medial compartment.  Mild patellofemoral osteoarthrosis. Probable calcified intra-articular  bodies and a popliteal cyst.    BELINDA DOYLE MD         SYSTEM ID:  [id number]       MR RIGHT KNEE WITHOUT CONTRAST 12/13/2012 7:50 PM       HISTORY:Medial knee pain for 3  weeks after recurrent injury. ACL   graft repair March 2011.       TECHNIQUE: Sagittal proton density and T2 weighted images, coronal T1   and coronal and axial T2 fat suppressed images were obtained.       COMPARISON: 3/2/2011.       FINDINGS:   Menisci:   Medial meniscus: Interval partial meniscectomy involving the midbody   and posterior horn. The remaining peripheral mid body is   unremarkable. The remaining peripheral posterior horn shows an   irregular free edge which could indicate tear or degeneration. There   is a small meniscal flap or fragment adjacent to the far anterior   horn (image 18 of series 4 and image 12 of series   5. This may have originated from a tear in the far anterior horn or   may be displaced from the residual posterior horn.       Lateral meniscus: Normal.       Cruciate ligaments:   Anterior cruciate ligament: Interval placement of ACL allograft.   There is no definite graft continuity, especially in the proximal   graft. This is suspicious for a complete tear of the graft although   there are no bone injuries to indicate that this is acute. Clinical   correlation for ACL insufficiency is recommended.       Posterior cruciate ligament: Normal.       Collateral supporting structures:   Medial collateral ligament: Normal.       Lateral supporting structures: Previously seen mild edema around the   fibular collateral ligament has resolved. The biceps tendon, fibular   collateral ligament, distal iliotibial tract and popliteus tendon are   intact.       Osseous structures and cartilaginous surfaces: The previously seen   bone marrow contusions have resolved. No acute appearing bony   abnormalities are seen. Metallic artifacts obscure some regions.   There is focal articular cartilage irregularity in the posterolateral   weightbearing medial femoral condyle without change. Remainder of the   articular cartilages in the medial compartment show mild diffuse   thinning, also unchanged.  Articular cartilages in the weightbearing   lateral compartment are intact. There is again question of very early   chondromalacia at the patellar apex. Remainder of patellofemoral   cartilages are normal.       Additional findings: Minimal joint space fluid without change. Small   popliteal cyst, probably unchanged in size. Previously seen   nonspecific soft tissue edema about the knee has resolved.       Impression   IMPRESSION:   1. Interval placement of ACL allograft. Continuity of the fascicles   is not demonstrated, especially proximally. Disruption of the graft   is not excluded and clinical correlation is recommended.   2. Resolution of previously seen bone marrow injuries.   3. Interval partial medial meniscectomy involving the midbody and   posterior horn. There is a new small meniscal flap or fragment   projecting adjacent to the anterior horn which may have originated   from a flap tear of the far anterior horn or could be a fragment   originating from the posterior horn.   4. Mild chondromalacia medial compartment and question of minimal   chondromalacia patellar apex, both without significant change.        Large Joint Injection/Arthocentesis: bilateral knee    Date/Time: 4/19/2024 12:00 PM    Performed by: Sanjay Hatch DO  Authorized by: Sanjay Hatch DO    Indications:  Pain and osteoarthritis  Needle Size:  21 G  Guidance: ultrasound    Approach:  Superolateral  Location:  Knee  Laterality:  Bilateral      Medications (Right):  40 mg triamcinolone 40 MG/ML; 3 mL ROPivacaine 5 MG/ML  Aspirate amount (mL):  8  Aspirate:  Serous and yellow  Medications (Left):  40 mg triamcinolone 40 MG/ML; 3 mL ROPivacaine 5 MG/ML  Aspirate amount (mL):  22  Aspirate:  Serous and yellow  Outcome:  Tolerated well, no immediate complications  Procedure discussed: discussed risks, benefits, and alternatives    Consent Given by:  Patient  Timeout: timeout called immediately prior to procedure     Prep: patient was prepped and draped in usual sterile fashion     Ultrasound images of procedure were permanently stored.       Assessment:  1. Osteoarthritis of both knees, unspecified osteoarthritis type    2. Chronic pain of both knees    3. Bilateral knee effusions           Plan:  Discussed the assessment with the patient.  Follow up: prn based on clinical progress  Repeat US guided corticosteroid injection to the bilateral knees today  Advanced medial compartment arthritis in the bilateral knee, with tricompartmental changes  Likely some posttraumatic arthritis component in the right given prior ACL reconstruction, more primary in the left  Can offer further referral to orthopedic surgery in the future for TKA discussion, declined for now  Activity strategies reviewed, continues to be very active with martial arts, reviewed relative risks  PT options reviewed for this as well  Bracing options and strategies reviewed  Oral Tylenol and topical Voltaren gel reviewed as safe OTC options, reviewed safe dosing strategies  XR images independently visualized and reviewed with patient today in clinic  Expectations and goals of CSI reviewed  Often 2-3 days for steroid effect, and can take up to two weeks for maximum effect  We discussed modified progressive pain-free activity as tolerated  Do not overuse in first two weeks if feeling better due to concern for vulnerability while steroid is working  Supportive care reviewed  All questions were answered today  Contact us with additional questions or concerns  Signs and sx of concern reviewed      Sanjay Hatch DO, JAYY  Sports Medicine Physician  [facility]        Disclaimer: This note consists of symbols derived from keyboarding, dictation and/or voice recognition software. As a result, there may be errors in the script that have gone undetected. Please consider this when interpreting information found in this chart.          Again,  thank you for allowing me to participate in the care of your patient.        Sincerely,        Sanjay Hatch, DO

## 2024-04-19 NOTE — PROGRESS NOTES
Jorge Amaral  :  1970  DOS: 24  MRN: [id number]    Sports Medicine Clinic Visit    PCP: Vince Swain    Interim History - March 3, 2023  Since last visit on 10/13/2021 patient has noticed pain increasing within the past month or so.  Left hip injection completed on 10/13/2021 provided good relief for 1+ years.  No new injury in the interim.    Social History: currently employed as     Interim History - 2023  Since last visit on 23 patient states he felt relief from previous injection for a few months. R>L. No significant changes or concerns, but did mention that his hips have been bothering him. No interim injury.       Interim History - 2024  Since last visit on 2024 patient has moderate-severe bilateral knee pain.  Bilateral knee SynviscOne injection completed on 24 provided only moderate relief.  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  No new injury in the interim.    Review of Systems  Musculoskeletal: as above  Remainder of review of systems is negative including constitutional, CV, pulmonary, GI, Skin and Neurologic except as noted in HPI or medical history.    Past Medical History:   Diagnosis Date    ACL (anterior cruciate ligament) tear--recurrent 2013    Arthritis 2020    Crohn's disease (H)     Delayed union of fracture 2011    Fracture, metacarpal 2011    History of blood transfusion 1987    Large bowel perforation (H) 2010    Nondisplaced fracture of navicular (scaphoid) of left foot, subsequent encounter for fracture with delayed healing 2019    Puncture wound     L hand DOI 21    Sepsis (H) 2018    He had an incomplete (due to stricture) colonoscopy day of presentation and after that developed acute abdominal pain and fever. He was septic and suspected to have a micro perforation. Imaging  negative for larger perforation     Past Surgical History:   Procedure Laterality Date    APPENDECTOMY  1986    ARTHROSCOPY KNEE WITH MEDIAL MENISCECTOMY Left 01/14/2019    Procedure: LEFT KNEE ARTHROSCOPY WITH PARTIAL MEDIAL MENISCECTOMY;  Surgeon: Jose Luis Jama MD;  Location:  OR    COLONOSCOPY  05/14/2020    ENT SURGERY  06/28/2013    Left Tonsil biopsy    EXCISE MASS WRIST Right 11/07/2018    Procedure: EXCISION OF RIGHT DORSAL WRIST MASS;  Surgeon: Zohiab Edmond MD;  Location:  OR    LAPAROSCOPY PROCEDURE UNLISTED  08/16/2010    Exploratory laparotomy, lysis of adhesions, takedown enterocolonic fistula and colocononic fistula, with creation of loop ileostomy    OPEN REDUCTION INTERNAL FIXATION HAND  07/25/2011    right IV MCP hand 7/11    REMOVE HARDWARE LOWER EXTREMITY  04/06/2012    Procedure:REMOVE HARDWARE LOWER EXTREMITY; REMOVAL OF RIGHT TIBIAL SCREW; Surgeon:JOSE LUIS JAMA; Location: [facility]    SMALL BOWEL RESECTION  1986    terminal ileum resection at age 16    TAKEDOWN COLOSTOMY  12/27/2011     Family History   Problem Relation Age of Onset    Respiratory Mother         SMOKER -EMPYSEMA    Diabetes Father     Prostate Cancer Father 50    Prostate Cancer Paternal Grandfather     Diabetes Brother     Prostate Cancer Paternal Uncle 50    Hypertension No family hx of        Objective  Wt 101.2 kg (223 lb)   BMI 28.62 kg/m      General: healthy, alert and in no distress    HEENT: no scleral icterus or conjunctival erythema   Skin: no suspicious lesions or rash. No jaundice.   CV: regular rhythm by palpation, 2+ distal pulses, no pedal edema    Resp: normal respiratory effort without conversational dyspnea   Psych: normal mood and affect    Gait: nonantalgic, appropriate coordination and balance   Neuro: normal light touch sensory exam of the extremities. Motor strength as noted below     Bilateral Knee exam    ROM:        Flexion 130 degrees       Extension 0 degrees       Range of  motion limited by mild pain in terminal flexion R>L    Inspection:       no visible ecchymosis        effusion noted small b/l by palpation right, moderate left    Skin:       no visible deformities       well perfused       capillary refill brisk    Patellar Motion:        Lateral tilt noted in patella       Crepitus noted in the patellofemoral joint    Tender:        medial joint line most focal, mild lateral patellar borders    Non Tender:         remainder of knee area    Special Tests:        neg (-) varus at 0 deg and 30 deg       neg (-) valgus at 0 deg and 30 deg      Radiology  Recent Results (from the past 744 hour(s))   XR Knee Bilateral 3 Views    Narrative    KNEE BILATERAL 3 VIEWS   11/8/2023 10:54 AM     HISTORY: Bilateral knee pain.    COMPARISON: Radiographs from 12/13/2012.      Impression    IMPRESSION:    Right: ACL reconstruction revision. Moderate to severe osteoarthrosis  of the medial compartment, significantly progressed. Mild  osteoarthrosis of the lateral compartment, progressed. Ossification in  the region of the patellar tendon likely related to prior  surgery/trauma. Small effusion, unchanged. There is probably an  ossified intra-articular body in the anterior aspect of the joint,  new. Mild patellofemoral osteoarthrosis, progressed. There is no  evidence of an acute or subacute fracture.    Left: Moderate to severe osteoarthrosis of the medial compartment.  Mild patellofemoral osteoarthrosis. Probable calcified intra-articular  bodies and a popliteal cyst.    BELINDA DOYLE MD         SYSTEM ID:  [id number]       MR RIGHT KNEE WITHOUT CONTRAST 12/13/2012 7:50 PM       HISTORY:Medial knee pain for 3 weeks after recurrent injury. ACL   graft repair March 2011.       TECHNIQUE: Sagittal proton density and T2 weighted images, coronal T1   and coronal and axial T2 fat suppressed images were obtained.       COMPARISON: 3/2/2011.       FINDINGS:   Menisci:   Medial meniscus: Interval  partial meniscectomy involving the midbody   and posterior horn. The remaining peripheral mid body is   unremarkable. The remaining peripheral posterior horn shows an   irregular free edge which could indicate tear or degeneration. There   is a small meniscal flap or fragment adjacent to the far anterior   horn (image 18 of series 4 and image 12 of series   5. This may have originated from a tear in the far anterior horn or   may be displaced from the residual posterior horn.       Lateral meniscus: Normal.       Cruciate ligaments:   Anterior cruciate ligament: Interval placement of ACL allograft.   There is no definite graft continuity, especially in the proximal   graft. This is suspicious for a complete tear of the graft although   there are no bone injuries to indicate that this is acute. Clinical   correlation for ACL insufficiency is recommended.       Posterior cruciate ligament: Normal.       Collateral supporting structures:   Medial collateral ligament: Normal.       Lateral supporting structures: Previously seen mild edema around the   fibular collateral ligament has resolved. The biceps tendon, fibular   collateral ligament, distal iliotibial tract and popliteus tendon are   intact.       Osseous structures and cartilaginous surfaces: The previously seen   bone marrow contusions have resolved. No acute appearing bony   abnormalities are seen. Metallic artifacts obscure some regions.   There is focal articular cartilage irregularity in the posterolateral   weightbearing medial femoral condyle without change. Remainder of the   articular cartilages in the medial compartment show mild diffuse   thinning, also unchanged. Articular cartilages in the weightbearing   lateral compartment are intact. There is again question of very early   chondromalacia at the patellar apex. Remainder of patellofemoral   cartilages are normal.       Additional findings: Minimal joint space fluid without change. Small    popliteal cyst, probably unchanged in size. Previously seen   nonspecific soft tissue edema about the knee has resolved.       Impression   IMPRESSION:   1. Interval placement of ACL allograft. Continuity of the fascicles   is not demonstrated, especially proximally. Disruption of the graft   is not excluded and clinical correlation is recommended.   2. Resolution of previously seen bone marrow injuries.   3. Interval partial medial meniscectomy involving the midbody and   posterior horn. There is a new small meniscal flap or fragment   projecting adjacent to the anterior horn which may have originated   from a flap tear of the far anterior horn or could be a fragment   originating from the posterior horn.   4. Mild chondromalacia medial compartment and question of minimal   chondromalacia patellar apex, both without significant change.        Large Joint Injection/Arthocentesis: bilateral knee    Date/Time: 4/19/2024 12:00 PM    Performed by: Sanjay Hatch DO  Authorized by: Sanjay Hatch DO    Indications:  Pain and osteoarthritis  Needle Size:  21 G  Guidance: ultrasound    Approach:  Superolateral  Location:  Knee  Laterality:  Bilateral      Medications (Right):  40 mg triamcinolone 40 MG/ML; 3 mL ROPivacaine 5 MG/ML  Aspirate amount (mL):  8  Aspirate:  Serous and yellow  Medications (Left):  40 mg triamcinolone 40 MG/ML; 3 mL ROPivacaine 5 MG/ML  Aspirate amount (mL):  22  Aspirate:  Serous and yellow  Outcome:  Tolerated well, no immediate complications  Procedure discussed: discussed risks, benefits, and alternatives    Consent Given by:  Patient  Timeout: timeout called immediately prior to procedure    Prep: patient was prepped and draped in usual sterile fashion     Ultrasound images of procedure were permanently stored.       Assessment:  1. Osteoarthritis of both knees, unspecified osteoarthritis type    2. Chronic pain of both knees    3. Bilateral knee effusions            Plan:  Discussed the assessment with the patient.  Follow up: prn based on clinical progress  Repeat US guided corticosteroid injection to the bilateral knees today  Advanced medial compartment arthritis in the bilateral knee, with tricompartmental changes  Likely some posttraumatic arthritis component in the right given prior ACL reconstruction, more primary in the left  Can offer further referral to orthopedic surgery in the future for TKA discussion, declined for now  Activity strategies reviewed, continues to be very active with martial arts, reviewed relative risks  PT options reviewed for this as well  Bracing options and strategies reviewed  Oral Tylenol and topical Voltaren gel reviewed as safe OTC options, reviewed safe dosing strategies  XR images independently visualized and reviewed with patient today in clinic  Expectations and goals of CSI reviewed  Often 2-3 days for steroid effect, and can take up to two weeks for maximum effect  We discussed modified progressive pain-free activity as tolerated  Do not overuse in first two weeks if feeling better due to concern for vulnerability while steroid is working  Supportive care reviewed  All questions were answered today  Contact us with additional questions or concerns  Signs and sx of concern reviewed      Sanjay Hatch DO, CAQ  Sports Medicine Physician  [facility]        Disclaimer: This note consists of symbols derived from keyboarding, dictation and/or voice recognition software. As a result, there may be errors in the script that have gone undetected. Please consider this when interpreting information found in this chart.

## 2024-04-19 NOTE — PROGRESS NOTES
"Jorge Amaral  :  1970  DOS: 2024  MRN: [id number]    Sports Medicine Clinic Procedure    Ultrasound Guided Bilateral Intra-Articular Knee Injection, +/- Aspiration    Clinical History: ***    Diagnosis: No diagnosis found.  Referring Physician: ***  Procedures    Impression:  Successful {RIGHT/LEFT:805977::\"Bilateral\"} intra-articular knee {Injection&Aspiration:186790}.    Plan:  Follow up ***  Expectations and goals of CSI reviewed  Often 2-3 days for steroid effect, and can take up to two weeks for maximum effect  We discussed modified progressive pain-free activity as tolerated  Do not overuse in first two weeks if feeling better due to concern for vulnerability while steroid is working  Supportive care reviewed  All questions were answered today  Contact us with additional questions or concerns  Signs and sx of concern reviewed      Sanjay Hatch DO, CASALIMA  Primary Care Sports Medicine  [facility]      "

## 2024-05-24 ENCOUNTER — LAB (OUTPATIENT)
Dept: LAB | Facility: CLINIC | Age: 54
End: 2024-05-24
Payer: COMMERCIAL

## 2024-05-24 DIAGNOSIS — R06.00 DYSPNEA AND RESPIRATORY ABNORMALITY: ICD-10-CM

## 2024-05-24 DIAGNOSIS — R06.89 DYSPNEA AND RESPIRATORY ABNORMALITY: ICD-10-CM

## 2024-05-24 DIAGNOSIS — R79.89 HIGH SERUM BICARBONATE: ICD-10-CM

## 2024-05-24 LAB
BASE EXCESS BLDV CALC-SCNC: 2 MMOL/L (ref -3–3)
CPB POCT: NO
HCO3 BLDV-SCNC: 27 MMOL/L (ref 21–28)
HCT VFR BLD CALC: 42 % (ref 40–53)
HGB BLD-MCNC: 14.3 G/DL (ref 13.3–17.7)
PCO2 BLDV: 44 MM HG (ref 40–50)
PH BLDV: 7.39 [PH] (ref 7.32–7.43)
PO2 BLDV: 58 MM HG (ref 25–47)
POTASSIUM BLD-SCNC: 3.6 MMOL/L (ref 3.4–5.3)
SAO2 % BLDV: 89 % (ref 70–75)
SODIUM BLD-SCNC: 141 MMOL/L (ref 135–145)

## 2024-05-24 PROCEDURE — 84295 ASSAY OF SERUM SODIUM: CPT

## 2024-05-24 PROCEDURE — 82803 BLOOD GASES ANY COMBINATION: CPT

## 2024-05-24 PROCEDURE — 84132 ASSAY OF SERUM POTASSIUM: CPT

## 2024-05-28 ENCOUNTER — THERAPY VISIT (OUTPATIENT)
Dept: SLEEP MEDICINE | Facility: CLINIC | Age: 54
End: 2024-05-28
Payer: COMMERCIAL

## 2024-05-28 DIAGNOSIS — R06.89 DYSPNEA AND RESPIRATORY ABNORMALITY: ICD-10-CM

## 2024-05-28 DIAGNOSIS — R40.0 DAYTIME SLEEPINESS: ICD-10-CM

## 2024-05-28 DIAGNOSIS — R06.83 SNORES: ICD-10-CM

## 2024-05-28 DIAGNOSIS — G47.30 SLEEP APNEA, UNSPECIFIED TYPE: ICD-10-CM

## 2024-05-28 DIAGNOSIS — R53.83 MALAISE AND FATIGUE: ICD-10-CM

## 2024-05-28 DIAGNOSIS — R53.81 MALAISE AND FATIGUE: ICD-10-CM

## 2024-05-28 DIAGNOSIS — R06.00 DYSPNEA AND RESPIRATORY ABNORMALITY: ICD-10-CM

## 2024-05-28 DIAGNOSIS — R79.89 HIGH SERUM BICARBONATE: ICD-10-CM

## 2024-05-28 DIAGNOSIS — R51.9 MORNING HEADACHE: ICD-10-CM

## 2024-05-28 DIAGNOSIS — Z72.820 LACK OF ADEQUATE SLEEP: ICD-10-CM

## 2024-05-28 LAB — SLPCOMP: NORMAL

## 2024-05-28 PROCEDURE — 95810 POLYSOM 6/> YRS 4/> PARAM: CPT | Performed by: INTERNAL MEDICINE

## 2024-06-06 ENCOUNTER — MYC MEDICAL ADVICE (OUTPATIENT)
Dept: SLEEP MEDICINE | Facility: CLINIC | Age: 54
End: 2024-06-06
Payer: COMMERCIAL

## 2024-06-06 PROBLEM — G47.33 OSA (OBSTRUCTIVE SLEEP APNEA): Chronic | Status: ACTIVE | Noted: 2024-06-06

## 2024-06-06 PROBLEM — M25.562 LEFT KNEE PAIN: Status: RESOLVED | Noted: 2021-05-10 | Resolved: 2024-06-06

## 2024-06-06 PROBLEM — Z79.01 CHRONIC ANTICOAGULATION: Chronic | Status: ACTIVE | Noted: 2023-11-01

## 2024-06-06 NOTE — LETTER
June 9, 2024      Jorge Amaral  [address] [address] [address]        To Whom It May Concern:    Jorge Amaral  was seen on 5/28/2024.  Please excuse him  until 5/29/2024 due to an overnight sleep study.        Sincerely,        Alfredo Ferguson PA-C

## 2024-06-06 NOTE — PROCEDURES
" SLEEP STUDY INTERPRETATION  DIAGNOSTIC POLYSOMNOGRAPHY REPORT      Patient: SAUMYA VAIL  YOB: 1970  Study Date: 5/28/2024  MRN: [id number]  Referring Provider: CHERRI Lockhart  Ordering Provider: CHERRI Goode    Indications for Polysomnography: The patient is a 53 year old Male who is 6' 2\" and weighs 223.0 lbs. His BMI is 28.6, [place] sleepiness scale 11/24 and neck circumference is 39 cm. A diagnostic polysomnogram was performed to evaluate for probable obstructive sleep apnea with possible coexisting hypoventilation based on elevated bicarbonate (31), morning headaches,  loud snoring, witnessed apnea, non-refreshing sleep, excessive daytime sleepiness (ESS 11) and crowded oropharynx.      Polysomnogram Data: A full night polysomnogram recorded the standard physiologic parameters including EEG, EOG, EMG, ECG, nasal and oral airflow. Respiratory parameters of chest and abdominal movements were recorded with respiratory inductance plethysmography. Oxygen saturation was recorded by pulse oximetry. Hypopnea scoring rule used: 1B 4%.    Sleep Architecture:    The total recording time of the polysomnogram was 479.5 minutes. The total sleep time was 419.5 minutes. Sleep latency was decreased at 1.0 minutes with the use of a sleep aid (zolpidem). REM latency was 114.5 minutes. Arousal index was increased at 42.5 arousals per hour. Sleep efficiency was normal at 87.5%. Wake after sleep onset was 59.0 minutes. The patient spent 24.3% of total sleep time in Stage N1, 49.9% in Stage N2, 9.4% in Stage N3, and 16.3% in REM. Time in REM supine was 3.5 minutes.    Respiration:    Events ? The polysomnogram revealed a presence of 22 obstructive, 6 central, and 0 mixed apneas resulting in an apnea index of 4.0 events per hour. There were 133 obstructive hypopneas and 0 central hypopneas resulting in an obstructive hypopnea index of 19.0 and central hypopnea index of 0 events per hour. The " combined apnea/hypopnea index was 23.0 events per hour (central apnea/hypopnea index was 0.9 events per hour). The REM AHI was 20.1 events per hour. The supine AHI was 33.2 events per hour. The RERA index was 4.7 events per hour.  The RDI was 27.7 events per hour.  Snoring - was reported as moderate to loud.  Respiratory rate and pattern - was notable for normal respiratory rate and pattern.  Sustained Sleep Associated Hypoventilation - Transcutaneous carbon dioxide monitoring was used. Significant hypoventilation was not suggested by oximetry. TCM reading ranged from 40 at lights out, 41 at sleep onset to the highest 51.  Sleep Associated Hypoxemia - (Greater than 5 minutes O2 sat at or below 88%) was present. Baseline oxygen saturation was 92.2%. Lowest oxygen saturation was 70.0%. Time spent less than or equal to 88% was 16.7 minutes. Time spent less than or equal to 89% was 28.1 minutes.    Movement Activity:    Periodic Limb Activity - There were 82 PLMs during the entire study. The PLM index was 11.7 movements per hour. The PLM Arousal Index was 2.0 per hour.  REM EMG Activity - Excessive transient muscle activity was present in REM and non-REM sleep   Nocturnal Behavior - Abnormal sleep related behaviors were not noted   Bruxism - None apparent.    Cardiac Summary:   The average pulse rate was 57.8 bpm. The minimum pulse rate was 44.0 bpm while the maximum pulse rate was 88.0 bpm.  Arrhythmias were not noted.        Assessment:   Moderate obstructive sleep apnea with hypoxemia  Bradycardia     Recommendations:  Consider repeat polysomnography with full night titration of positive airway pressure therapy for the control of sleep disordered breathing.  Based on the presence of moderate obstructive sleep apnea and excessive daytime sleepiness, treatment could be empirically initiated with Auto?titrating PAP therapy with a range of 5 to 15 cmH2O. Recommend clinical follow up with sleep management team. Consider  follow-up SpO2 assessment on therapy  Patient may be a candidate for dental appliance through referral to Sleep Dentistry for the treatment of obstructive sleep apnea and/or socially disruptive snoring.  If devices are not acceptable or effective, patient may benefit from evaluation of possible surgical options. If he is interested, would recommend referral to specialized ENT-Sleep provider.  Advice regarding the risks of drowsy driving.  Suggest optimizing sleep schedule and avoiding sleep deprivation.  Pharmacologic therapy should be used for management of restless legs syndrome only if present and clinically indicated and not based on the presence of periodic limb movements alone.    Diagnostic Codes:   Obstructive Sleep Apnea G47.33  Sleep Hypoxemia/Hypoventilation G47.36         _____________________________________   Electronically Signed By: Nigel Estes MD 6/6/24           Range(%) Time in range (min)   0.0 - 89.0 28.1   0.0 - 88.0 16.7         Stage Min(mm Hg) Max(mm Hg)   Wake 39.0 51.1   NREM(1+2+3) 39.3 50.2   REM 46.4 50.1       Range(mmHg) Time in range (min)   55.0 - 100.0 -   Excluded data <20.0 & >65.0 1.5

## 2024-06-07 NOTE — TELEPHONE ENCOUNTER
Patient requesting letter for employer to be excused from work for study 5/28/24    Study resulted    Follow up scheduled 10/21/24    Nisha OLIVO RN  [facility]

## 2024-06-20 NOTE — PATIENT INSTRUCTIONS
Cryotherapy    What is it?  Use of a very cold liquid, such as liquid nitrogen, to freeze and destroy abnormal skin cells that need to be removed    What should I expect?  Tenderness and redness  A small blister that might grow and fill with dark purple blood. There may be crusting.  More than one treatment may be needed if the lesions do not go away.    How do I care for the treated area?  Gently wash the area with your hands when bathing.  Use a thin layer of Vaseline to help with healing. You may use a Band-Aid.   The area should heal within 7-10 days and may leave behind a pink or lighter color.   Do not use an antibiotic or Neosporin ointment.   You may take acetaminophen (Tylenol) for pain.     Call your doctor if you have:  Severe pain  Signs of infection (warmth, redness, cloudy yellow drainage, and or a bad smell)  Questions or concerns    Who should I call with questions?      [facility]: [phone]      [facility]: [phone]      For urgent needs outside of business hours call the [facility] at [phone] and ask for the dermatology resident on call       Checking for Skin Cancer  You can help find cancer early by checking your skin each month. There are 3 main kinds of skin cancer: melanoma, basal cell carcinoma, and squamous cell carcinoma. Doing monthly skin checks is the best way to find new marks, sores, or skin changes. Follow these instructions for checking your skin.   The ABCDEs of checking moles for melanoma   Check your moles or growths for signs of melanoma using ABCDE:   Asymmetry: The sides of the mole or growth don t match.  Border: The edges are ragged, notched, or blurred.  Color: The color within the mole or growth varies. It could be black, brown, tan, white, or shades of red, gray, or blue.  Diameter: The mole or growth is larger than   inch or 6 mm (size of a pencil eraser).  Evolving: The size, shape,  texture, or color of the mole or growth is changing.     ABCDE's of moles on light skin.        ABCDE's of moles on dark skin may be harder to identify.     Checking for other types of skin cancer  Basal cell carcinoma or squamous cell carcinoma cause symptoms like:     A spot or mole that looks different from all other marks on your skin  Changes in how an area feels, such as itching, tenderness, or pain  Changes in the skin's surface, such as oozing, bleeding, or scaliness  A sore that doesn't heal  New swelling, redness, or spread of color beyond the border of a mole    Who s at risk?  Anyone of any skin color can get skin cancer. But you're at greater risk if you have:   Fair skin that freckles easily and burns instead of tanning  Light-colored or red hair  Light-colored eyes  Many moles or abnormal moles on your skin  A long history of unprotected exposure to sunlight or tanning beds  A history of many blistering sunburns as a child or teen  A family history of skin cancer  Been exposed to radiation or chemicals  A weakened immune system  Been exposed to arsenic  If you've had skin cancer in the past, you're at high risk of having it again.   How to check your skin  Do your monthly skin checkups in front of a full-length mirror. Use a room with good lighting so it's easier to see. Use a hand mirror to look at hard-to-see places like your buttocks and back. You can also have a trusted friend or family member help you with these checks. Check every part of your body, including your:   Head (ears, face, neck, and scalp)  Torso (front, back, sides, and under breasts)  Arms (tops, undersides, and armpits)  Hands (palms, backs, and fingers, including under the nails)  Lower back, buttocks, and genitals  Legs (front, back, and sides)  Feet (tops, soles, toes, including under the nails, and between toes)  Watch for new spots on your skin or a spot that's changing in color, shape, size.   If you have a lot of moles,  take digital photos of them each month. Make sure to take photos both up close and from a distance. These can help you see if any moles change over time.   Know your skin  Most skin changes aren't cancer. But if you see any changes in your skin, call your healthcare provider right away. Only they can tell you if a change is a problem. If you have skin cancer, seeing your provider can be the first step to getting the treatment that could save your life.   [employer] last reviewed this educational content on 10/1/2021    6038-3326 [employer]. All rights reserved. This information is not intended as a substitute for professional medical care. Always follow your healthcare professional's instructions.

## 2024-06-20 NOTE — PROGRESS NOTES
[facility] Dermatology Note  Encounter Date: Jun 21, 2024  Office Visit     Dermatology Problem List:  Last skin check 6/21/24  1. History of immunosuppression, Chron's  2. Bowenoid papulosis, left abdomen  -s/p biopsy 4/18/15  -s/p Efudex x 6 weeks initiated 5/6/15, marked resolved 8/2015  3. Verruca plantaris  -Previous Tx: cantherone, trichloracetic acid, cryotherapy and PDL with Dr. Chris Stoddard, s/p candida 3X, cryotherapy  4. Tinea versicolor, back and chest  -Previous Tx:  ketoconazole 2% cream initiated 10/23/2015  5. 2 cm fissure on crease of R posterior ear - not healing  -current tx; hydrocortisone 2.5% cream  6. Lipoma, left bicep excision  7. Verrucoid keratosis vs indolent wart, right 2nd toe.  s/p scoop shave biopsy.   - cryotherapy 09/27/23  8. Dermatitis(psoriasiform on 12/2020 biopsy)R postauricular - possibly triggered by humira  -Mild involving the right ear simin.   -Will continue to tacrolimus 0.1% ointment.   9. Inflamed Seborrheic keratoses  - Cryotherapy 09/27/2023    ____________________________________________    Assessment & Plan:    # History of immunosuppression due to Chron's   - ABCDEs: Counseled ABCDEs of melanoma: Asymmetry, Border (irregularity), Color (not uniform, changes in color), Diameter (greater than 6 mm which is about the size of a pencil eraser), and Evolving (any changes in preexisting moles).  - Sun protection: Counseled SPF30+ sunscreen, UPF clothing, sun avoidance, tanning bed avoidance.  - Recommended regular skin exams.        # Verruca on left third toe and R 2nd toe  - Cryotherapy performed today. See procedure section.          # Psoriasis  - Mild involving the right ear canal. No change, tacrolimus is okay, wants clobetasol on hand for neck and scalp up to 2 weeks per month       #skin tag, left groin, wants removed    #Bowenoid papulosis  -resolved    Procedures Performed:   - Cryotherapy procedure note, location(s): L 3rd toe, R 2nd toe,  left groin. After verbal consent and discussion of risks and benefits including, but not limited to, dyspigmentation/scar, blister, and pain, 2 lesion(s) was(were) treated with 1-2 mm freeze border for 1-2 cycles with liquid nitrogen. Post cryotherapy instructions were provided.    Follow-up: 1 year(s) in-person, or earlier for new or changing lesions    Staff and Scribe:     Scribe Disclosure:   I, Marianne Oneil, am serving as a scribe to document services personally performed by Sasha Woods MD based on data collection and the provider's statements to me.     Provider Disclosure:   The documentation recorded by the scribe accurately reflects the services I personally performed and the decisions made by me.    Sasha Woods MD    Department of Dermatology  [facility]: Phone: [phone], Fax:[phone]  [facility]: Phone: [phone], Fax: [phone]   ____________________________________________    CC: Skin Check (FBSE.  No areas of concern.  Patient has a wart on left foot.  No HX of skin cancer.  Immunosuppressed. )    HPI:  Mr. Jorge Amaral is a(n) 53 year old male who presents today as a return patient for skin check.    Today, patient reports ears are fine    Has warts on toe        Patient is otherwise feeling well, without additional skin concerns.    Labs Reviewed:  NA    Physical Exam:  Vitals: There were no vitals taken for this visit.  SKIN: Full skin, which includes the head/face, both arms, chest, back, abdomen,both legs, genitalia and/or groin buttocks, digits and/or nails, was examined.  - Maureen Werner EMT   - There are verrucous papules with thrombosed capillaries interrupting dermatoglyphics on the L 3rd toe and R 2nd toe..    - no reoccurrence of skin cancer  Skin colored papule on the left groin  - No other lesions of concern on areas examined.      Medications:  Current Outpatient Medications   Medication Sig Dispense Refill    mercaptopurine (PURINETHOL) 50 MG tablet Take 50 mg by mouth daily 1 and half tabs daily      rivaroxaban ANTICOAGULANT (XARELTO ANTICOAGULANT) 20 MG TABS tablet Take 1 tablet (20 mg) by mouth daily with food . Take at the same time each day. 90 tablet 4    SUMAtriptan (IMITREX) 50 MG tablet Take 1-2 tablets ( mg) by mouth at onset of headache for migraine May repeat in 2 hours. Max 4 tablets/24 hours. 30 tablet 0    tacrolimus (PROTOPIC) 0.1 % external ointment Apply topically 2 times daily 100 g 3    ustekinumab (STELARA) 90 MG/ML 90 mg Every 8 weeks      diphenoxylate-atropine (LOMOTIL) 2.5-0.025 MG per tablet Take 2 tablets by mouth As Needed       Current Facility-Administered Medications   Medication Dose Route Frequency Provider Last Rate Last Admin    3 mL ropivacaine (NAROPIN) injection 5 mg/mL  3 mL      3 mL at 04/19/24 1200    3 mL ropivacaine (NAROPIN) injection 5 mg/mL  3 mL      3 mL at 04/19/24 1200    3 mL ropivacaine (NAROPIN) injection 5 mg/mL  3 mL   Repa, Sanjay Dumasopher, DO   3 mL at 01/26/24 0954    hylan (SYNVISC ONE) injection 48 mg  48 mg   RepSanjay castañedaopher, DO   48 mg at 02/09/24 1421    hylan (SYNVISC ONE) injection 48 mg  48 mg   Repa, Sanjay Dumasopher, DO   48 mg at 02/09/24 1421    triamcinolone (KENALOG-40) injection 40 mg  40 mg      40 mg at 04/19/24 1200    triamcinolone (KENALOG-40) injection 40 mg  40 mg      40 mg at 04/19/24 1200    triamcinolone (KENALOG-40) injection 40 mg  40 mg   Repmaddy, Sanjay Dumasopher, DO   40 mg at 01/26/24 0954      Past Medical History:   Patient Active Problem List   Diagnosis    CARDIOVASCULAR SCREENING; LDL GOAL LESS THAN 160    Family history of diabetes mellitus    Cold sore    Common wart    Crohn's disease with complication, unspecified gastrointestinal tract location (H)    FH: prostate cancer    BMI 28.0-28.9,adult    History of pulmonary  embolism    Chronic anticoagulation    KIRA (obstructive sleep apnea) - moderate (AHI 23)     Past Medical History:   Diagnosis Date    ACL (anterior cruciate ligament) tear--recurrent 01/03/2013    Crohn's disease (H)     Delayed union of fracture 12/01/2011    Fracture, metacarpal 08/03/2011    History of blood transfusion 1987 August 1987    Large bowel perforation (H) 08/16/2010    Left knee pain 05/10/2021    Nondisplaced fracture of navicular (scaphoid) of left foot, subsequent encounter for fracture with delayed healing 07/03/2019    Puncture wound 04/29/2021    L hand DOI 4/29/21    Sepsis (H) 01/19/2018    He had an incomplete (due to stricture) colonoscopy day of presentation and after that developed acute abdominal pain and fever. He was septic and suspected to have a micro perforation. Imaging negative for larger perforation        CC No referring provider defined for this encounter. on close of this encounter.

## 2024-06-21 ENCOUNTER — OFFICE VISIT (OUTPATIENT)
Dept: DERMATOLOGY | Facility: CLINIC | Age: 54
End: 2024-06-21
Payer: COMMERCIAL

## 2024-06-21 DIAGNOSIS — L40.9 PSORIASIS: Primary | ICD-10-CM

## 2024-06-21 DIAGNOSIS — L82.1 SEBORRHEIC KERATOSIS: ICD-10-CM

## 2024-06-21 DIAGNOSIS — L91.8 SKIN TAG: ICD-10-CM

## 2024-06-21 PROCEDURE — 99214 OFFICE O/P EST MOD 30 MIN: CPT | Mod: 25 | Performed by: DERMATOLOGY

## 2024-06-21 PROCEDURE — 17110 DESTRUCTION B9 LES UP TO 14: CPT | Performed by: DERMATOLOGY

## 2024-06-21 PROCEDURE — 11200 RMVL SKIN TAGS UP TO&INC 15: CPT | Mod: XS | Performed by: DERMATOLOGY

## 2024-06-21 RX ORDER — CLOBETASOL PROPIONATE 0.5 MG/ML
SOLUTION TOPICAL
Qty: 50 ML | Refills: 2 | Status: SHIPPED | OUTPATIENT
Start: 2024-06-21

## 2024-06-21 ASSESSMENT — PAIN SCALES - GENERAL: PAINLEVEL: MODERATE PAIN (4)

## 2024-06-21 NOTE — LETTER
6/21/2024      Jorge Amaral  [address] [address] [facility] [address]      Dear Colleague,    Thank you for referring your patient, Jorge Amaral, to the [facility]. Please see a copy of my visit note below.      [facility] Dermatology Note  Encounter Date: Jun 21, 2024  Office Visit     Dermatology Problem List:  Last skin check 6/21/24  1. History of immunosuppression, Chron's  2. Bowenoid papulosis, left abdomen  -s/p biopsy 4/18/15  -s/p Efudex x 6 weeks initiated 5/6/15, marked resolved 8/2015  3. Verruca plantaris  -Previous Tx: cantherone, trichloracetic acid, cryotherapy and PDL with Dr. Chris Stoddard, s/p candida 3X, cryotherapy  4. Tinea versicolor, back and chest  -Previous Tx:  ketoconazole 2% cream initiated 10/23/2015  5. 2 cm fissure on crease of R posterior ear - not healing  -current tx; hydrocortisone 2.5% cream  6. Lipoma, left bicep excision  7. Verrucoid keratosis vs indolent wart, right 2nd toe.  s/p scoop shave biopsy.   - cryotherapy 09/27/23  8. Dermatitis(psoriasiform on 12/2020 biopsy)R postauricular - possibly triggered by humira  -Mild involving the right ear simin.   -Will continue to tacrolimus 0.1% ointment.   9. Inflamed Seborrheic keratoses  - Cryotherapy 09/27/2023    ____________________________________________    Assessment & Plan:    # History of immunosuppression due to Chron's   - ABCDEs: Counseled ABCDEs of melanoma: Asymmetry, Border (irregularity), Color (not uniform, changes in color), Diameter (greater than 6 mm which is about the size of a pencil eraser), and Evolving (any changes in preexisting moles).  - Sun protection: Counseled SPF30+ sunscreen, UPF clothing, sun avoidance, tanning bed avoidance.  - Recommended regular skin exams.        # Verruca on left third toe and R 2nd toe  - Cryotherapy performed today. See procedure section.          # Psoriasis  - Mild involving the right ear canal. No change,  tacrolimus is okay, wants clobetasol on hand for neck and scalp up to 2 weeks per month       #skin tag, left groin, wants removed    #Bowenoid papulosis  -resolved    Procedures Performed:   - Cryotherapy procedure note, location(s): L 3rd toe, R 2nd toe, left groin. After verbal consent and discussion of risks and benefits including, but not limited to, dyspigmentation/scar, blister, and pain, 2 lesion(s) was(were) treated with 1-2 mm freeze border for 1-2 cycles with liquid nitrogen. Post cryotherapy instructions were provided.    Follow-up: 1 year(s) in-person, or earlier for new or changing lesions    Staff and Scribe:     Scribe Disclosure:   I, Marianne Oneil, am serving as a scribe to document services personally performed by Sasha Woods MD based on data collection and the provider's statements to me.     Provider Disclosure:   The documentation recorded by the scribe accurately reflects the services I personally performed and the decisions made by me.    Sasha Woods MD    Department of Dermatology  [facility]: Phone: [phone], Fax:[phone]  [facility]: Phone: [phone], Fax: [phone]   ____________________________________________    CC: Skin Check (FBSE.  No areas of concern.  Patient has a wart on left foot.  No HX of skin cancer.  Immunosuppressed. )    HPI:  Mr. Jorge Amaral is a(n) 53 year old male who presents today as a return patient for skin check.    Today, patient reports ears are fine    Has warts on toe        Patient is otherwise feeling well, without additional skin concerns.    Labs Reviewed:  NA    Physical Exam:  Vitals: There were no vitals taken for this visit.  SKIN: Full skin, which includes the head/face, both arms, chest, back, abdomen,both legs, genitalia and/or groin buttocks, digits and/or nails, was examined.  - Maureen Werner EMT   -  There are verrucous papules with thrombosed capillaries interrupting dermatoglyphics on the L 3rd toe and R 2nd toe..    - no reoccurrence of skin cancer  Skin colored papule on the left groin  - No other lesions of concern on areas examined.     Medications:  Current Outpatient Medications   Medication Sig Dispense Refill     mercaptopurine (PURINETHOL) 50 MG tablet Take 50 mg by mouth daily 1 and half tabs daily       rivaroxaban ANTICOAGULANT (XARELTO ANTICOAGULANT) 20 MG TABS tablet Take 1 tablet (20 mg) by mouth daily with food . Take at the same time each day. 90 tablet 4     SUMAtriptan (IMITREX) 50 MG tablet Take 1-2 tablets ( mg) by mouth at onset of headache for migraine May repeat in 2 hours. Max 4 tablets/24 hours. 30 tablet 0     tacrolimus (PROTOPIC) 0.1 % external ointment Apply topically 2 times daily 100 g 3     ustekinumab (STELARA) 90 MG/ML 90 mg Every 8 weeks       diphenoxylate-atropine (LOMOTIL) 2.5-0.025 MG per tablet Take 2 tablets by mouth As Needed       Current Facility-Administered Medications   Medication Dose Route Frequency Provider Last Rate Last Admin     3 mL ropivacaine (NAROPIN) injection 5 mg/mL  3 mL      3 mL at 04/19/24 1200     3 mL ropivacaine (NAROPIN) injection 5 mg/mL  3 mL      3 mL at 04/19/24 1200     3 mL ropivacaine (NAROPIN) injection 5 mg/mL  3 mL   Sanjay Hatch, DO   3 mL at 01/26/24 0954     hylan (SYNVISC ONE) injection 48 mg  48 mg   RepSanjay castañeda, DO   48 mg at 02/09/24 1421     hylan (SYNVISC ONE) injection 48 mg  48 mg   RepSanjay castañeda, DO   48 mg at 02/09/24 1421     triamcinolone (KENALOG-40) injection 40 mg  40 mg      40 mg at 04/19/24 1200     triamcinolone (KENALOG-40) injection 40 mg  40 mg      40 mg at 04/19/24 1200     triamcinolone (KENALOG-40) injection 40 mg  40 mg   Sanjay Hatch, DO   40 mg at 01/26/24 0954      Past Medical History:   Patient Active Problem List   Diagnosis     CARDIOVASCULAR  SCREENING; LDL GOAL LESS THAN 160     Family history of diabetes mellitus     Cold sore     Common wart     Crohn's disease with complication, unspecified gastrointestinal tract location (H)     FH: prostate cancer     BMI 28.0-28.9,adult     History of pulmonary embolism     Chronic anticoagulation     KIRA (obstructive sleep apnea) - moderate (AHI 23)     Past Medical History:   Diagnosis Date     ACL (anterior cruciate ligament) tear--recurrent 01/03/2013     Crohn's disease (H)      Delayed union of fracture 12/01/2011     Fracture, metacarpal 08/03/2011     History of blood transfusion 1987 August 1987     Large bowel perforation (H) 08/16/2010     Left knee pain 05/10/2021     Nondisplaced fracture of navicular (scaphoid) of left foot, subsequent encounter for fracture with delayed healing 07/03/2019     Puncture wound 04/29/2021    L hand DOI 4/29/21     Sepsis (H) 01/19/2018    He had an incomplete (due to stricture) colonoscopy day of presentation and after that developed acute abdominal pain and fever. He was septic and suspected to have a micro perforation. Imaging negative for larger perforation        CC No referring provider defined for this encounter. on close of this encounter.      Again, thank you for allowing me to participate in the care of your patient.        Sincerely,        Sasha Woods MD

## 2024-06-21 NOTE — NURSING NOTE
Jorge Amaral's goals for this visit include:   Chief Complaint   Patient presents with    Skin Check     FBSE.  No areas of concern.  Patient has a wart on left foot.  No HX of skin cancer.  Immunosuppressed.       He requests these members of his care team be copied on today's visit information:     PCP: Vince Swain    Referring Provider:  Referred Self, MD  No address on file    There were no vitals taken for this visit.    Do you need any medication refills at today's visit?     Maureen Werner on 6/21/2024 at 3:03 PM

## 2024-07-08 ENCOUNTER — DOCUMENTATION ONLY (OUTPATIENT)
Dept: SLEEP MEDICINE | Facility: CLINIC | Age: 54
End: 2024-07-08
Payer: COMMERCIAL

## 2024-07-08 DIAGNOSIS — G47.33 OSA (OBSTRUCTIVE SLEEP APNEA): Primary | Chronic | ICD-10-CM

## 2024-07-08 NOTE — PROGRESS NOTES
Patient was offered choice of vendor and chose [facility].  Patient Jorge Amaral was set up at [place] on July 8, 2024. Patient received a Resmed Airsense 10 Pressures were set at  6-16 cm H2O.   Patient s ramp is 5 cm H2O for Auto and FLEX/EPR is 2.  Patient received a Resmed Mask name: [id number]  Full Face mask size Medium, heated tubing and heated humidifier.  Patient has the following compliance requirements: usage only.    Deborah Razo

## 2024-07-11 ENCOUNTER — DOCUMENTATION ONLY (OUTPATIENT)
Dept: SLEEP MEDICINE | Facility: CLINIC | Age: 54
End: 2024-07-11
Payer: COMMERCIAL

## 2024-07-11 DIAGNOSIS — G47.33 OSA (OBSTRUCTIVE SLEEP APNEA): Primary | Chronic | ICD-10-CM

## 2024-07-11 NOTE — PROGRESS NOTES
3 day Sleep therapy management telephone visit    Diagnostic AHI:PS         Confirmed with patient at time of call- Yes Patient is still interested in STM service       Subjective measures:  Patient was able to fix his mask leak last night and stated he spelt really good.  Patient has no other questions or concerns.         Objective data     Order Settings for PAP  CPAP min     CPAP max              Device settings from machine CPAP min 6     CPAP max 18                      Assessment: Nighty usage most nights over four hours      Patient has the following upcoming sleep appts:  Future Sleep Appointments         Provider Department    10/21/2024 9:00 AM (Arrive by 8:45 AM) Alfredo Ferguson PA [facility] [place]            Replacement device: No  STM ordered by provider: Yes     Total time spent on accessing and  interpreting remote patient PAP therapy data  10 minutes    Total time spent counseling, coaching  and reviewing PAP therapy data with patient  3 minutes    [id number] no

## 2024-07-23 ENCOUNTER — DOCUMENTATION ONLY (OUTPATIENT)
Dept: SLEEP MEDICINE | Facility: CLINIC | Age: 54
End: 2024-07-23
Payer: COMMERCIAL

## 2024-07-23 DIAGNOSIS — G47.33 OSA (OBSTRUCTIVE SLEEP APNEA): Primary | Chronic | ICD-10-CM

## 2024-07-23 NOTE — PROGRESS NOTES
14  DAY STM VISIT    Diagnostic AHI: PS         MESSAGE LEFT FOR PT TO RETURN CALL    Assessment: Pt not meeting objective benchmarks for leak     Action plan: waiting for patient to return call.         PAP settings:  CPAP MIN CPAP MAX 95TH % PRESSURE EPR RESMED SOFT RESPONSE SETTING   6.0 cm  H20 16.0 cm  H20 12.2 cm  H20  TWO OFF     Objective measures: 14 day rolling measures   COMPLIANCE LEAK AHI AVERAGE USE IN MINUTES   71 % 34.48 3.73 288   GOAL >70% GOAL < 24 LPM GOAL <5 GOAL >240      Patient has the following upcoming sleep appts:  Future Sleep Appointments         Provider Department    10/21/2024 9:00 AM (Arrive by 8:45 AM) Alfredo Ferguson PA [facility] [place]            Total time spent on accessing and interpreting remote patient PAP therapy data  10 minutes    Total time spent counseling, coaching  and reviewing PAP therapy data with patient  1 minute    [id number]  [id number]  no (3 day STM)

## 2024-08-16 ENCOUNTER — TRANSFERRED RECORDS (OUTPATIENT)
Dept: HEALTH INFORMATION MANAGEMENT | Facility: CLINIC | Age: 54
End: 2024-08-16
Payer: COMMERCIAL

## 2024-08-16 LAB
ALT SERPL-CCNC: 38 IU/L (ref 0–44)
AST SERPL-CCNC: 24 IU/L (ref 0–40)

## 2024-08-20 ENCOUNTER — TELEPHONE (OUTPATIENT)
Dept: ORTHOPEDICS | Facility: CLINIC | Age: 54
End: 2024-08-20
Payer: COMMERCIAL

## 2024-08-20 DIAGNOSIS — M17.0 OSTEOARTHRITIS OF BOTH KNEES, UNSPECIFIED OSTEOARTHRITIS TYPE: Primary | ICD-10-CM

## 2024-08-20 NOTE — TELEPHONE ENCOUNTER
Patient scheduled for appointment on 9/10/24 @ [facility] for discussion of viscosupplementation injection vs steroid injection of bilateral knee.        SynviscOne injection last completed 2/9/24.       Patient has failed trial of OTC NSAIDs/Pain Medication (ibuprofen, tylenol, naproxen,...):  Yes       Patient has completed trial of physical therapy: No         Prior authorization referral for SynviscOne injection placed.    Leilani Devries, ATC

## 2024-08-26 DIAGNOSIS — G44.89 OTHER HEADACHE SYNDROME: ICD-10-CM

## 2024-08-26 RX ORDER — SUMATRIPTAN 50 MG/1
TABLET, FILM COATED ORAL
Qty: 30 TABLET | Refills: 0 | Status: SHIPPED | OUTPATIENT
Start: 2024-08-26

## 2024-08-29 ENCOUNTER — TELEPHONE (OUTPATIENT)
Dept: HEMATOLOGY | Facility: CLINIC | Age: 54
End: 2024-08-29
Payer: COMMERCIAL

## 2024-09-10 ENCOUNTER — OFFICE VISIT (OUTPATIENT)
Dept: ORTHOPEDICS | Facility: CLINIC | Age: 54
End: 2024-09-10
Payer: COMMERCIAL

## 2024-09-10 VITALS — BODY MASS INDEX: 28.62 KG/M2 | HEIGHT: 74 IN

## 2024-09-10 DIAGNOSIS — M17.0 PRIMARY OSTEOARTHRITIS OF BOTH KNEES: Primary | ICD-10-CM

## 2024-09-10 PROCEDURE — 20611 DRAIN/INJ JOINT/BURSA W/US: CPT | Mod: 50 | Performed by: FAMILY MEDICINE

## 2024-09-10 NOTE — PATIENT INSTRUCTIONS
[facility] Injection Discharge Instructions    Procedure: Bilateral knee aspiration/hyaluronic acid injection with aspiration    You may shower, however avoid swimming, tub baths or hot tubs for 24 hours following your procedure  You may have a mild to moderate increase in pain for several days following the injection.  It may take up to 30 days for the medication to start working although you may feel the effect as early as a few days after the procedure.  You may use ice packs for 10-15 minutes, 3 to 4 times a day at the injection site for comfort  You may use anti-inflammatory medications (such as Ibuprofen or Aleve or Advil) or Tylenol for pain control if necessary    If you experience any of the following, call [facility] @ [phone] or [phone]  -Fever over 100 degree F  -Swelling, bleeding, redness, drainage, warmth at the injection site  - New or worsening pain    It was great seeing you today!    Brenton Munguia

## 2024-09-10 NOTE — LETTER
"9/10/2024      Jorge Amaral  [address] [facility] [address]      Dear Colleague,    Thank you for referring your patient, Jorge Amaral, to the [facility] [place]. Please see a copy of my visit note below.    ASSESSMENT & PLAN    There are no diagnoses linked to this encounter.    # Bilateral Knee Arthritis: Patient presenting for bilateral knee Synvisc injections. Aspirations/injections completed, no complications, follow-up as needed.      -----    SUBJECTIVE:  Jorge Amaral is a 54 year old male who is seen in follow-up for bilateral knee pain. They were last seen 4/190/2024 and bilateral knee steroid injection was performed.  The patient is seen by themselves.    Since their last visit reports returned knee pain.  They have tried bilateral knee steroid injection 4/19/24, 11/8/23, 4/14/23 bilateral knee Synvisc injection 2/9/24.        Patient's past medical, surgical, social, and family histories were reviewed today and no changes are noted.    REVIEW OF SYSTEMS:  Constitutional: NEGATIVE for fever, chills, change in weight  Skin: NEGATIVE for worrisome rashes, moles or lesions  GI/: NEGATIVE for bowel or bladder changes  Neuro: NEGATIVE for weakness, dizziness or paresthesias    OBJECTIVE:  Ht 1.88 m (6' 2.02\")   BMI 28.62 kg/m     General: healthy, alert and in no distress  HEENT: no scleral icterus or conjunctival erythema  Skin: no suspicious lesions or rash. No jaundice.  CV: regular rhythm by palpation, no pedal edema  Resp: normal respiratory effort without conversational dyspnea   Psych: normal mood and affect  Gait: normal steady gait with appropriate coordination and balance  Neuro: normal light touch sensory exam of the extremities.    MSK:    BILATERAL KNEE  Inspection:    Normal alignment; no edema, erythema, or ecchymosis present       Independent visualization of the below image:  KNEE BILATERAL 3 VIEWS   11/8/2023 10:54 AM      HISTORY: Bilateral " knee pain.     COMPARISON: Radiographs from 12/13/2012.                                                                      IMPRESSION:     Right: ACL reconstruction revision. Moderate to severe osteoarthrosis  of the medial compartment, significantly progressed. Mild  osteoarthrosis of the lateral compartment, progressed. Ossification in  the region of the patellar tendon likely related to prior  surgery/trauma. Small effusion, unchanged. There is probably an  ossified intra-articular body in the anterior aspect of the joint,  new. Mild patellofemoral osteoarthrosis, progressed. There is no  evidence of an acute or subacute fracture.     Left: Moderate to severe osteoarthrosis of the medial compartment.  Mild patellofemoral osteoarthrosis. Probable calcified intra-articular  bodies and a popliteal cyst.     MD Brenton TANNER MD, [facility] [place] [facility]    Disclaimer: This note consists of symbols derived from keyboarding, dictation and/or voice recognition software. As a result, there may be errors in the script that have gone undetected. Please consider this when interpreting information found in this chart.    Large Joint Injection/Arthocentesis: bilateral knee    Date/Time: 9/10/2024 8:31 AM    Performed by: Brenton Munguia MD  Authorized by: Brenton Munguia MD    Indications:  Pain  Needle Size:  21 G  Guidance: ultrasound    Approach:  Superolateral  Location:  Knee  Laterality:  Bilateral      Medications (Right):  48 mg hylan 48 MG/6ML  Aspirate amount (mL):  7  Aspirate:  Serous and yellow  Medications (Left):  48 mg hylan 48 MG/6ML  Aspirate amount (mL):  21  Aspirate:  Serous and yellow  Outcome:  Tolerated well, no immediate complications  Procedure discussed: discussed risks, benefits, and alternatives    Consent Given by:  Patient  Timeout: timeout called immediately prior to procedure    Prep: patient was prepped and draped in usual sterile fashion      Ultrasound images of procedure were permanently stored.           Again, thank you for allowing me to participate in the care of your patient.        Sincerely,        Brenton Munguia MD

## 2024-09-10 NOTE — PROGRESS NOTES
"ASSESSMENT & PLAN    There are no diagnoses linked to this encounter.    # Bilateral Knee Arthritis: Patient presenting for bilateral knee Synvisc injections. Aspirations/injections completed, no complications, follow-up as needed.      -----    SUBJECTIVE:  Jorge Amaral is a 54 year old male who is seen in follow-up for bilateral knee pain. They were last seen 4/190/2024 and bilateral knee steroid injection was performed.  The patient is seen by themselves.    Since their last visit reports returned knee pain.  They have tried bilateral knee steroid injection 4/19/24, 11/8/23, 4/14/23 bilateral knee Synvisc injection 2/9/24.        Patient's past medical, surgical, social, and family histories were reviewed today and no changes are noted.    REVIEW OF SYSTEMS:  Constitutional: NEGATIVE for fever, chills, change in weight  Skin: NEGATIVE for worrisome rashes, moles or lesions  GI/: NEGATIVE for bowel or bladder changes  Neuro: NEGATIVE for weakness, dizziness or paresthesias    OBJECTIVE:  Ht 1.88 m (6' 2.02\")   BMI 28.62 kg/m     General: healthy, alert and in no distress  HEENT: no scleral icterus or conjunctival erythema  Skin: no suspicious lesions or rash. No jaundice.  CV: regular rhythm by palpation, no pedal edema  Resp: normal respiratory effort without conversational dyspnea   Psych: normal mood and affect  Gait: normal steady gait with appropriate coordination and balance  Neuro: normal light touch sensory exam of the extremities.    MSK:    BILATERAL KNEE  Inspection:    Normal alignment; no edema, erythema, or ecchymosis present       Independent visualization of the below image:  KNEE BILATERAL 3 VIEWS   11/8/2023 10:54 AM      HISTORY: Bilateral knee pain.     COMPARISON: Radiographs from 12/13/2012.                                                                      IMPRESSION:     Right: ACL reconstruction revision. Moderate to severe osteoarthrosis  of the medial compartment, significantly " progressed. Mild  osteoarthrosis of the lateral compartment, progressed. Ossification in  the region of the patellar tendon likely related to prior  surgery/trauma. Small effusion, unchanged. There is probably an  ossified intra-articular body in the anterior aspect of the joint,  new. Mild patellofemoral osteoarthrosis, progressed. There is no  evidence of an acute or subacute fracture.     Left: Moderate to severe osteoarthrosis of the medial compartment.  Mild patellofemoral osteoarthrosis. Probable calcified intra-articular  bodies and a popliteal cyst.     MD Brenton TANNER MD, [facility]    Disclaimer: This note consists of symbols derived from keyboarding, dictation and/or voice recognition software. As a result, there may be errors in the script that have gone undetected. Please consider this when interpreting information found in this chart.    Large Joint Injection/Arthocentesis: bilateral knee    Date/Time: 9/10/2024 8:31 AM    Performed by: Brenton Mugnuia MD  Authorized by: Brenton Munguia MD    Indications:  Pain  Needle Size:  21 G  Guidance: ultrasound    Approach:  Superolateral  Location:  Knee  Laterality:  Bilateral      Medications (Right):  48 mg hylan 48 MG/6ML  Aspirate amount (mL):  7  Aspirate:  Serous and yellow  Medications (Left):  48 mg hylan 48 MG/6ML  Aspirate amount (mL):  21  Aspirate:  Serous and yellow  Outcome:  Tolerated well, no immediate complications  Procedure discussed: discussed risks, benefits, and alternatives    Consent Given by:  Patient  Timeout: timeout called immediately prior to procedure    Prep: patient was prepped and draped in usual sterile fashion     Ultrasound images of procedure were permanently stored.

## 2024-10-02 ENCOUNTER — PATIENT OUTREACH (OUTPATIENT)
Dept: CARE COORDINATION | Facility: CLINIC | Age: 54
End: 2024-10-02
Payer: COMMERCIAL

## 2024-10-16 ENCOUNTER — PATIENT OUTREACH (OUTPATIENT)
Dept: CARE COORDINATION | Facility: CLINIC | Age: 54
End: 2024-10-16
Payer: COMMERCIAL

## 2024-10-21 ENCOUNTER — VIRTUAL VISIT (OUTPATIENT)
Dept: SLEEP MEDICINE | Facility: CLINIC | Age: 54
End: 2024-10-21
Payer: COMMERCIAL

## 2024-10-21 VITALS — HEIGHT: 75 IN | WEIGHT: 225 LBS | BODY MASS INDEX: 27.98 KG/M2

## 2024-10-21 DIAGNOSIS — G47.33 OSA (OBSTRUCTIVE SLEEP APNEA): Primary | ICD-10-CM

## 2024-10-21 PROCEDURE — 99213 OFFICE O/P EST LOW 20 MIN: CPT | Mod: 95 | Performed by: PHYSICIAN ASSISTANT

## 2024-10-21 ASSESSMENT — PAIN SCALES - GENERAL: PAINLEVEL: EXTREME PAIN (8)

## 2024-10-21 ASSESSMENT — SLEEP AND FATIGUE QUESTIONNAIRES
HOW LIKELY ARE YOU TO NOD OFF OR FALL ASLEEP WHILE SITTING AND READING: SLIGHT CHANCE OF DOZING
HOW LIKELY ARE YOU TO NOD OFF OR FALL ASLEEP WHILE WATCHING TV: SLIGHT CHANCE OF DOZING
HOW LIKELY ARE YOU TO NOD OFF OR FALL ASLEEP WHILE SITTING QUIETLY AFTER LUNCH WITHOUT ALCOHOL: SLIGHT CHANCE OF DOZING
HOW LIKELY ARE YOU TO NOD OFF OR FALL ASLEEP IN A CAR, WHILE STOPPED FOR A FEW MINUTES IN TRAFFIC: SLIGHT CHANCE OF DOZING
HOW LIKELY ARE YOU TO NOD OFF OR FALL ASLEEP WHILE SITTING INACTIVE IN A PUBLIC PLACE: WOULD NEVER DOZE
HOW LIKELY ARE YOU TO NOD OFF OR FALL ASLEEP WHILE SITTING AND TALKING TO SOMEONE: SLIGHT CHANCE OF DOZING
HOW LIKELY ARE YOU TO NOD OFF OR FALL ASLEEP WHILE LYING DOWN TO REST IN THE AFTERNOON WHEN CIRCUMSTANCES PERMIT: MODERATE CHANCE OF DOZING
HOW LIKELY ARE YOU TO NOD OFF OR FALL ASLEEP WHEN YOU ARE A PASSENGER IN A CAR FOR AN HOUR WITHOUT A BREAK: MODERATE CHANCE OF DOZING

## 2024-10-21 NOTE — PATIENT INSTRUCTIONS
Your Body mass index is 28.5 kg/m .  Weight management is a personal decision.  If you are interested in exploring weight loss strategies, the following discussion covers the approaches that may be successful. Body mass index (BMI) is one way to tell whether you are at a healthy weight, overweight, or obese. It measures your weight in relation to your height.  A BMI of 18.5 to 24.9 is in the healthy range. A person with a BMI of 25 to 29.9 is considered overweight, and someone with a BMI of 30 or greater is considered obese. More than two-thirds of American adults are considered overweight or obese.  Being overweight or obese increases the risk for further weight gain. Excess weight may lead to heart disease and diabetes.  Creating and following plans for healthy eating and physical activity may help you improve your health.  Weight control is part of healthy lifestyle and includes exercise, emotional health, and healthy eating habits. Careful eating habits lifelong are the mainstay of weight control. Though there are significant health benefits from weight loss, long-term weight loss with diet alone may be very difficult to achieve- studies show long-term success with dietary management in less than 10% of people. Attaining a healthy weight may be especially difficult to achieve in those with severe obesity. In some cases, medications, devices and surgical management might be considered.  What can you do?  If you are overweight or obese and are interested in methods for weight loss, you should discuss this with your provider.   Consider reducing daily calorie intake by 500 calories.   Keep a food journal.   Avoiding skipping meals, consider cutting portions instead.    Diet combined with exercise helps maintain muscle while optimizing fat loss. Strength training is particularly important for building and maintaining muscle mass. Exercise helps reduce stress, increase energy, and improves fitness. Increasing  exercise without diet control, however, may not burn enough calories to loose weight.     Start walking three days a week 10-20 minutes at a time  Work towards walking thirty minutes five days a week   Eventually, increase the speed of your walking for 1-2 minutes at time    In addition, we recommend that you review healthy lifestyles and methods for weight loss available through the National Institutes of Health patient information sites:  http://win.niddk.nih.gov/publications/index.htm    And look into health and wellness programs that may be available through your health insurance provider, employer, local community center, or nnamdi club.

## 2024-10-21 NOTE — NURSING NOTE
Current patient location: [facility] [address] [address]    Is the patient currently in the state of [place]? YES    Visit mode:VIDEO    If the visit is dropped, the patient can be reconnected by: VIDEO VISIT: Text to cell phone:   Telephone Information:   Mobile [phone]       Will anyone else be joining the visit? NO  (If patient encounters technical issues they should call [phone]    Are changes needed to the allergy or medication list? No    Are refills needed on medications prescribed by this physician? NO    Rooming Documentation:  Questionnaire(s) completed    Reason for visit: RECHECK    Prema JACKSONF

## 2024-10-21 NOTE — PROGRESS NOTES
Virtual Visit Details    Type of service:  Video Visit   Video Start Time: 8:53 AM  Video End Time:9:05 AM    Originating Location (pt. Location): Home    Distant Location (provider location):  On-site  Platform used for Video Visit: Luis    Sleep Apnea - Follow-up Visit:    Impression/Plan:  Moderate obstructive sleep apnea-  Sub-optimal CPAP compliance, but AHI is well controlled on CPAP 6-16 cm/H20. Daytime symptoms are improved.   Continue current treatment.   Patient counseled to use CPAP with all sleep. Compliance requirements discussed.   Sleep apnea reviewed. Also reviewed potential consequences of untreated obstructive sleep apnea.  Comprehensive DME order placed.      Jorge Amaral will follow up in about 1 year or sooner if any concerns.     21 minutes spent on day of encounter doing chart review,  history and exam, counseling, coordinating plan of care, documentation and further activities as noted above.    Alfredo Ferguson PA-C  Sleep Medicine          History of Present Illness:  Chief Complaint   Patient presents with    RECHECK       Jorge Amaral presents for follow-up of their moderate sleep apnea, managed with CPAP.     He initially presented with loud snoring, witnessed apnea, non-refreshing sleep, excessive daytime sleepiness (ESS 11) and crowded oropharynx.    5/28/2024 [facility] Sleep Study (223.0 lbs) - AHI 23.0, RDI 27.7, Supine AHI 33.2, REM AHI 20.1, Low O2 70.0%, Time Spent less than 88% 16.7 minutes / Time Spent less than 89% 28.1 minutes. TCM reading ranged from 40 at lights out, 41 at sleep onset to the highest 51.    Do you use a CPAP Machine at home:  yes  Overall, on a scale of 0-10 how would you rate your CPAP (0 poor, 10 great):  7    What type of mask do you use:  FFM  Is your mask comfortable:  yes  If not, why:      Is your mask leaking:  no  If yes, where do you feel it:    How many night per week does the mask leak (0-7):      Do you notice snoring with mask  on:  no  Do you notice gasping arousals with mask on:  no  Are you having significant oral or nasal dryness:  no  Is the pressure setting comfortable:  yes  If not, why:      What is your typical bedtime:  11 pm  How long does it take you to go to sleep on PAP therapy:  5 minutes  What time do you typically get out of bed for the day:  6 am  How many hours on average per night are you using PAP therapy:  -  How many hours are you sleeping per night:  6  Do you feel well rested in the morning:  yes      ResMed   Auto-PAP 6.0 - 16.0 cmH2O 30 day usage data:    30% of days with > 4 hours of use. 14/30 days with no use.   Average use 115 minutes per day.   95%ile Leak 24.08 L/min.   CPAP 95% pressure 12.6 cm.   AHI 2.17 events per hour.     Current problems with PAP use include:  1. Congested  2. Gone and did not take     EPWORTH SLEEPINESS SCALE         10/21/2024     8:39 AM    [place] Sleepiness Scale ( ARIEL Roque  2252-0354<br>ESS - [place]/English - Final version - 21 Nov 07 - [facility] [place].)   Sitting and reading Slight chance of dozing   Watching TV Slight chance of dozing   Sitting, inactive in a public place (e.g. a theatre or a meeting) Would never doze   As a passenger in a car for an hour without a break Moderate chance of dozing   Lying down to rest in the afternoon when circumstances permit Moderate chance of dozing   Sitting and talking to someone Slight chance of dozing   Sitting quietly after a lunch without alcohol Slight chance of dozing   In a car, while stopped for a few minutes in traffic Slight chance of dozing   [place] Score (MC) 9   [place] Score (Sleep) 9       INSOMNIA SEVERITY INDEX (YONATHAN)          2/21/2024     1:09 PM   Insomnia Severity Index (YONATHAN)   Difficulty falling asleep 2   Difficulty staying asleep 2   Problems waking up too early 1   How SATISFIED/DISSATISFIED are you with your CURRENT sleep pattern? 3   How NOTICEABLE to others do you think your sleep problem is in terms of  impairing the quality of your life? 2   How WORRIED/DISTRESSED are you about your current sleep problem? 3   To what extent do you consider your sleep problem to INTERFERE with your daily functioning (e.g. daytime fatigue, mood, ability to function at work/daily chores, concentration, memory, mood, etc.) CURRENTLY? 3   YONATHAN Total Score 16       Guidelines for Scoring/Interpretation:  Total score categories:  0-7 = No clinically significant insomnia   8-14 = Subthreshold insomnia   15-21 = Clinical insomnia (moderate severity)  22-28 = Clinical insomnia (severe)  Used via courtesy of www.TrueSpanealth.va.gov with permission from Dennis Cota PhD., [facility]        Past medical/surgical history, family history, social history, medications and allergies were reviewed.        Problem List:  Patient Active Problem List    Diagnosis Date Noted    KIRA (obstructive sleep apnea) - moderate (AHI 23) 06/06/2024     Priority: Medium     5/28/2024 [facility] Sleep Study (223.0 lbs) - AHI 23.0, RDI 27.7, Supine AHI 33.2, REM AHI 20.1, Low O2 70.0%, Time Spent ?88% 16.7 minutes / Time Spent ?89% 28.1 minutes. TCM reading ranged from 40 at lights out, 41 at sleep onset to the highest 51.      Chronic anticoagulation 11/01/2023     Priority: Medium    History of pulmonary embolism 08/21/2020     Priority: Medium     ED 8/15/20 CT - Acute pulmonary emboli involving 2 small segmental and subsegmental right middle lobe and lower lobe pulmonary arteries.         BMI 28.0-28.9,adult 12/04/2019     Priority: Medium    Crohn's disease with complication, unspecified gastrointestinal tract location (H) 08/11/2017     Priority: Medium    FH: prostate cancer 08/11/2017     Priority: Medium    Common wart 10/14/2014     Priority: Medium    Cold sore 08/14/2014     Priority: Medium    Family history of diabetes mellitus 12/16/2011     Priority: Medium    CARDIOVASCULAR SCREENING; LDL GOAL LESS THAN 160 10/31/2010     Priority:  "Medium        Ht 1.892 m (6' 2.5\")   Wt 102.1 kg (225 lb)   BMI 28.50 kg/m     "

## 2024-10-28 NOTE — PROGRESS NOTES
[facility]  [facility]  [facility], [address], [place] [address]  Main: [phone], Fax: [phone]      Outpatient Visit Note:    Patient: Jorge Amaral  MRN: [id number]  : 1970  CHONG: Oct 29, 2024    History of Present Illness:  Jorge Amaral is a 54 year old male with a history of Crohn's disease (controlled on Humira) and unprovoked/minimally pulmonary embolism in 2020. Please refer to previous notes by myself and my colleague Dr. Plaza for additional details regarding his history and initial presentation. In summary, he presented to the ED on August 15, 2020 with complaints of chest pain and was found to have a pulmonary embolism. At the time he denied any recent periods of immobility, hospital stays, long distance travel, infection, or trauma/injury. He IBD was well controlled--not flaring. He did report a recent knee aspiration, but no other surgeries/procedures. He is maintained on indefinite anticoagulation (Xarelto 20mg daily) and presents today for routine follow-up.     Interval History:  Last seen by me on 10/19/23. In the interim, he has continued on Xarelto 20mg daily. No recurrent venous thromboembolism. No bleeding concerns. Still doing Snip.ly. Holds his Xarelto surrounding competitions. Was recently found to have a slightly elevated MCV (99) at his GI follow-up appt. His CBC was otherwise normal. No other major health changes.    Exam via Televideo:  Constitutional: Appears well. Not in distress.   Respiratory: Breathing comfortably on room air.  Neuro: Aox3.    Labs:  Component      Latest Ref Rng 2023  9:36 AM   Sodium      135 - 145 mmol/L 142    Potassium      3.4 - 5.3 mmol/L 4.4    Carbon Dioxide (CO2)      22 - 29 mmol/L 31 (H)    Anion Gap      7 - 15 mmol/L 6 (L)    Urea Nitrogen      6.0 - 20.0 mg/dL 11.0    Creatinine      0.67 - 1.17 mg/dL 0.97    GFR Estimate      >60 mL/min/1.73m2 >90     Calcium      8.6 - 10.0 mg/dL 9.7    Chloride      98 - 107 mmol/L 105    Glucose      70 - 99 mg/dL 89    Alkaline Phosphatase      40 - 129 U/L 90    AST      0 - 45 U/L 35    ALT      0 - 70 U/L 52    Protein Total      6.4 - 8.3 g/dL 7.3    Albumin      3.5 - 5.2 g/dL 4.2    Bilirubin Total      <=1.2 mg/dL 0.3      Assessment:  In summary, Jorge Amaral is a 54 year old man that had an unprovoked/minimally provoked pulmonary embolism in August 2020 and is therefore presently maintained on indefinite anticoagulation with Xarelto (rivaroxaban) 20mg daily. He is tolerating this well and remains an appropriate candidate at this time.     Plan:  1) Continue Xarelto (rivaroxaban) at 20mg daily. [facility]ills [facility] to [facility].  2) OK to hold Xarelto for 24-48 hours surrounding [employer] events.  3) Labs: CMP, repeat CBC with B12/folate due to recently elevated MCV and history of B12 deficiency 2/2 Crohn's disease.  4) Contact the clinic if any upcoming surgeries/procedures and/or if any new bleeding concerns.     Video visit  Joined the call at 10/29/2024, 10:32:57 am.  Left the call at 10/29/2024, 10:45:43 am.  You were on the call for 12 minutes 45 seconds .    21 minutes spent on the date of the encounter doing chart review, history and exam, documentation and further activities per the note.           Wendi Wheeler PA-C, [facility]  [facility]  [address] [place] [address], [address], [place] [address]  Main: [phone], Fax: [phone]

## 2024-10-29 ENCOUNTER — VIRTUAL VISIT (OUTPATIENT)
Dept: HEMATOLOGY | Facility: CLINIC | Age: 54
End: 2024-10-29
Attending: PHYSICIAN ASSISTANT
Payer: COMMERCIAL

## 2024-10-29 DIAGNOSIS — Z86.711 HISTORY OF PULMONARY EMBOLISM: ICD-10-CM

## 2024-10-29 PROCEDURE — 99213 OFFICE O/P EST LOW 20 MIN: CPT | Mod: 95 | Performed by: PHYSICIAN ASSISTANT

## 2024-10-31 ENCOUNTER — OFFICE VISIT (OUTPATIENT)
Dept: FAMILY MEDICINE | Facility: CLINIC | Age: 54
End: 2024-10-31
Payer: COMMERCIAL

## 2024-10-31 VITALS
TEMPERATURE: 96 F | WEIGHT: 230 LBS | SYSTOLIC BLOOD PRESSURE: 128 MMHG | BODY MASS INDEX: 28.6 KG/M2 | HEART RATE: 80 BPM | DIASTOLIC BLOOD PRESSURE: 72 MMHG | HEIGHT: 75 IN | RESPIRATION RATE: 16 BRPM | OXYGEN SATURATION: 96 %

## 2024-10-31 DIAGNOSIS — Z86.711 HISTORY OF PULMONARY EMBOLISM: ICD-10-CM

## 2024-10-31 DIAGNOSIS — Z13.220 LIPID SCREENING: ICD-10-CM

## 2024-10-31 DIAGNOSIS — Z80.42 FH: PROSTATE CANCER: ICD-10-CM

## 2024-10-31 DIAGNOSIS — Z00.00 ROUTINE GENERAL MEDICAL EXAMINATION AT A HEALTH CARE FACILITY: Primary | ICD-10-CM

## 2024-10-31 DIAGNOSIS — R53.83 OTHER FATIGUE: ICD-10-CM

## 2024-10-31 DIAGNOSIS — K50.919 CROHN'S DISEASE WITH COMPLICATION, UNSPECIFIED GASTROINTESTINAL TRACT LOCATION (H): ICD-10-CM

## 2024-10-31 DIAGNOSIS — Z12.5 SCREENING PSA (PROSTATE SPECIFIC ANTIGEN): ICD-10-CM

## 2024-10-31 LAB
ALBUMIN SERPL BCG-MCNC: 4.1 G/DL (ref 3.5–5.2)
ALP SERPL-CCNC: 87 U/L (ref 40–150)
ALT SERPL W P-5'-P-CCNC: 59 U/L (ref 0–70)
ANION GAP SERPL CALCULATED.3IONS-SCNC: 7 MMOL/L (ref 7–15)
AST SERPL W P-5'-P-CCNC: 67 U/L (ref 0–45)
BILIRUB SERPL-MCNC: 0.8 MG/DL
BUN SERPL-MCNC: 11.1 MG/DL (ref 6–20)
CALCIUM SERPL-MCNC: 9 MG/DL (ref 8.8–10.4)
CHLORIDE SERPL-SCNC: 104 MMOL/L (ref 98–107)
CHOLEST SERPL-MCNC: 193 MG/DL
CREAT SERPL-MCNC: 1 MG/DL (ref 0.67–1.17)
EGFRCR SERPLBLD CKD-EPI 2021: 88 ML/MIN/1.73M2
ERYTHROCYTE [DISTWIDTH] IN BLOOD BY AUTOMATED COUNT: 13.7 % (ref 10–15)
FASTING STATUS PATIENT QL REPORTED: NO
FASTING STATUS PATIENT QL REPORTED: NO
FOLATE SERPL-MCNC: 15.1 NG/ML (ref 4.6–34.8)
GLUCOSE SERPL-MCNC: 101 MG/DL (ref 70–99)
HCO3 SERPL-SCNC: 31 MMOL/L (ref 22–29)
HCT VFR BLD AUTO: 44.2 % (ref 40–53)
HDLC SERPL-MCNC: 36 MG/DL
HGB BLD-MCNC: 14.8 G/DL (ref 13.3–17.7)
LDLC SERPL CALC-MCNC: 120 MG/DL
MCH RBC QN AUTO: 31.8 PG (ref 26.5–33)
MCHC RBC AUTO-ENTMCNC: 33.5 G/DL (ref 31.5–36.5)
MCV RBC AUTO: 95 FL (ref 78–100)
NONHDLC SERPL-MCNC: 157 MG/DL
PLATELET # BLD AUTO: 221 10E3/UL (ref 150–450)
POTASSIUM SERPL-SCNC: 3.6 MMOL/L (ref 3.4–5.3)
PROT SERPL-MCNC: 7 G/DL (ref 6.4–8.3)
PSA SERPL DL<=0.01 NG/ML-MCNC: 0.49 NG/ML (ref 0–3.5)
RBC # BLD AUTO: 4.65 10E6/UL (ref 4.4–5.9)
SHBG SERPL-SCNC: 34 NMOL/L (ref 11–80)
SODIUM SERPL-SCNC: 142 MMOL/L (ref 135–145)
TRIGL SERPL-MCNC: 187 MG/DL
VIT B12 SERPL-MCNC: 357 PG/ML (ref 232–1245)
VIT D+METAB SERPL-MCNC: 41 NG/ML (ref 20–50)
WBC # BLD AUTO: 3.9 10E3/UL (ref 4–11)

## 2024-10-31 PROCEDURE — 90471 IMMUNIZATION ADMIN: CPT | Performed by: PHYSICIAN ASSISTANT

## 2024-10-31 PROCEDURE — G0103 PSA SCREENING: HCPCS | Performed by: PHYSICIAN ASSISTANT

## 2024-10-31 PROCEDURE — 80053 COMPREHEN METABOLIC PANEL: CPT | Performed by: PHYSICIAN ASSISTANT

## 2024-10-31 PROCEDURE — 90673 RIV3 VACCINE NO PRESERV IM: CPT | Performed by: PHYSICIAN ASSISTANT

## 2024-10-31 PROCEDURE — 84270 ASSAY OF SEX HORMONE GLOBUL: CPT | Performed by: PHYSICIAN ASSISTANT

## 2024-10-31 PROCEDURE — 82607 VITAMIN B-12: CPT | Performed by: PHYSICIAN ASSISTANT

## 2024-10-31 PROCEDURE — 36415 COLL VENOUS BLD VENIPUNCTURE: CPT | Performed by: PHYSICIAN ASSISTANT

## 2024-10-31 PROCEDURE — 82306 VITAMIN D 25 HYDROXY: CPT | Performed by: PHYSICIAN ASSISTANT

## 2024-10-31 PROCEDURE — 90480 ADMN SARSCOV2 VAC 1/ONLY CMP: CPT | Performed by: PHYSICIAN ASSISTANT

## 2024-10-31 PROCEDURE — 84403 ASSAY OF TOTAL TESTOSTERONE: CPT | Performed by: PHYSICIAN ASSISTANT

## 2024-10-31 PROCEDURE — 80061 LIPID PANEL: CPT | Performed by: PHYSICIAN ASSISTANT

## 2024-10-31 PROCEDURE — 82746 ASSAY OF FOLIC ACID SERUM: CPT | Performed by: PHYSICIAN ASSISTANT

## 2024-10-31 PROCEDURE — 85027 COMPLETE CBC AUTOMATED: CPT | Performed by: PHYSICIAN ASSISTANT

## 2024-10-31 PROCEDURE — 99396 PREV VISIT EST AGE 40-64: CPT | Mod: 25 | Performed by: PHYSICIAN ASSISTANT

## 2024-10-31 PROCEDURE — 91320 SARSCV2 VAC 30MCG TRS-SUC IM: CPT | Performed by: PHYSICIAN ASSISTANT

## 2024-10-31 SDOH — HEALTH STABILITY: PHYSICAL HEALTH: ON AVERAGE, HOW MANY DAYS PER WEEK DO YOU ENGAGE IN MODERATE TO STRENUOUS EXERCISE (LIKE A BRISK WALK)?: 3 DAYS

## 2024-10-31 ASSESSMENT — PAIN SCALES - GENERAL: PAINLEVEL_OUTOF10: MODERATE PAIN (4)

## 2024-10-31 ASSESSMENT — SOCIAL DETERMINANTS OF HEALTH (SDOH): HOW OFTEN DO YOU GET TOGETHER WITH FRIENDS OR RELATIVES?: ONCE A WEEK

## 2024-10-31 NOTE — PATIENT INSTRUCTIONS
Patient Education   Preventive Care Advice   This is general advice given by our system to help you stay healthy. However, your care team may have specific advice just for you. Please talk to your care team about your preventive care needs.  Nutrition  Eat 5 or more servings of fruits and vegetables each day.  Try wheat bread, brown rice and whole grain pasta (instead of white bread, rice, and pasta).  Get enough calcium and vitamin D. Check the label on foods and aim for 100% of the RDA (recommended daily allowance).  Lifestyle  Exercise at least 150 minutes each week  (30 minutes a day, 5 days a week).  Do muscle strengthening activities 2 days a week. These help control your weight and prevent disease.  No smoking.  Wear sunscreen to prevent skin cancer.  Have a dental exam and cleaning every 6 months.  Yearly exams  See your health care team every year to talk about:  Any changes in your health.  Any medicines your care team has prescribed.  Preventive care, family planning, and ways to prevent chronic diseases.  Shots (vaccines)   HPV shots (up to age 26), if you've never had them before.  Hepatitis B shots (up to age 59), if you've never had them before.  COVID-19 shot: Get this shot when it's due.  Flu shot: Get a flu shot every year.  Tetanus shot: Get a tetanus shot every 10 years.  Pneumococcal, hepatitis A, and RSV shots: Ask your care team if you need these based on your risk.  Shingles shot (for age 50 and up)  General health tests  Diabetes screening:  Starting at age 35, Get screened for diabetes at least every 3 years.  If you are younger than age 35, ask your care team if you should be screened for diabetes.  Cholesterol test: At age 39, start having a cholesterol test every 5 years, or more often if advised.  Bone density scan (DEXA): At age 50, ask your care team if you should have this scan for osteoporosis (brittle bones).  Hepatitis C: Get tested at least once in your life.  STIs (sexually  transmitted infections)  Before age 24: Ask your care team if you should be screened for STIs.  After age 24: Get screened for STIs if you're at risk. You are at risk for STIs (including HIV) if:  You are sexually active with more than one person.  You don't use condoms every time.  You or a partner was diagnosed with a sexually transmitted infection.  If you are at risk for HIV, ask about PrEP medicine to prevent HIV.  Get tested for HIV at least once in your life, whether you are at risk for HIV or not.  Cancer screening tests  Cervical cancer screening: If you have a cervix, begin getting regular cervical cancer screening tests starting at age 21.  Breast cancer scan (mammogram): If you've ever had breasts, begin having regular mammograms starting at age 40. This is a scan to check for breast cancer.  Colon cancer screening: It is important to start screening for colon cancer at age 45.  Have a colonoscopy test every 10 years (or more often if you're at risk) Or, ask your provider about stool tests like a FIT test every year or Cologuard test every 3 years.  To learn more about your testing options, visit:   .  For help making a decision, visit:   https://bit.ly/bm72694.  Prostate cancer screening test: If you have a prostate, ask your care team if a prostate cancer screening test (PSA) at age 55 is right for you.  Lung cancer screening: If you are a current or former smoker ages 50 to 80, ask your care team if ongoing lung cancer screenings are right for you.  For informational purposes only. Not to replace the advice of your health care provider. Copyright   2023 [employer]. All rights reserved. Clinically reviewed by the [facility] Transitions Program. [employer] [id number] - REV 01/24.

## 2024-10-31 NOTE — PROGRESS NOTES
"Preventive Care Visit  [facility]  Vince Swain PA-C, Family Medicine  Oct 31, 2024      Assessment & Plan       ICD-10-CM    1. Routine general medical examination at a health care facility  Z00.00       2. Other fatigue  R53.83 Testosterone Free and Total     Vitamin D Deficiency     Testosterone Free and Total     Vitamin D Deficiency      3. Crohn's disease with complication, unspecified gastrointestinal tract location (H)  K50.919       4. History of pulmonary embolism  Z86.711 CBC with platelets     Comprehensive metabolic panel     Vitamin B12     Folate      5. FH: prostate cancer  Z80.42       6. Lipid screening  Z13.220 Lipid panel reflex to direct LDL Fasting     Lipid panel reflex to direct LDL Fasting      7. Screening PSA (prostate specific antigen)  Z12.5 PSA, screen     PSA, screen        Talk to patient about his concerns.  We talked about diet and exercise.  Labs are pending.  Follow-up depend on lab results.  He will continue care with the specialist.  Recheck yearly as needed.      BMI  Estimated body mass index is 29.14 kg/m  as calculated from the following:    Height as of this encounter: 1.892 m (6' 2.5\").    Weight as of this encounter: 104.3 kg (230 lb).   Weight management plan: Discussed healthy diet and exercise guidelines    Counseling  Appropriate preventive services were addressed with this patient via screening, questionnaire, or discussion as appropriate for fall prevention, nutrition, physical activity, Tobacco-use cessation, social engagement, weight loss and cognition.  Checklist reviewing preventive services available has been given to the patient.  Reviewed patient's diet, addressing concerns and/or questions.   He is at risk for lack of exercise and has been provided with information to increase physical activity for the benefit of his well-being.       Emily Patel is a 54 year old, presenting for the following:  Physical        10/31/2024 "     9:16 AM   Additional Questions   Roomed by burak   Accompanied by self         10/31/2024     9:16 AM   Patient Reported Additional Medications   Patient reports taking the following new medications no          HPI    Patient is here today for physical.  He sees a specialist for his history of Crohn's and history of PE.    He has noticed a gradual vague symptoms of fatigue.  He does do martial arts and does finds his stamina is not as well.  He is requesting testosterone screening.  No history of chest pain or shortness of breath.     Health Care Directive  Patient does not have a Health Care Directive: Discussed advance care planning with patient; information given to patient to review.      10/31/2024   General Health   How would you rate your overall physical health? Good   Feel stress (tense, anxious, or unable to sleep) To some extent      (!) STRESS CONCERN      10/31/2024   Nutrition   Three or more servings of calcium each day? (!) NO   Diet: Regular (no restrictions)   How many servings of fruit and vegetables per day? (!) 0-1   How many sweetened beverages each day? (!) 2            10/31/2024   Exercise   Days per week of moderate/strenous exercise 3 days            10/31/2024   Social Factors   Frequency of gathering with friends or relatives Once a week   Worry food won't last until get money to buy more No   Food not last or not have enough money for food? No   Do you have housing? (Housing is defined as stable permanent housing and does not include staying ouside in a car, in a tent, in an abandoned building, in an overnight shelter, or couch-surfing.) Yes   Are you worried about losing your housing? No   Lack of transportation? No   Unable to get utilities (heat,electricity)? No            10/31/2024   Fall Risk   Fallen 2 or more times in the past year? No    Trouble with walking or balance? No        Patient-reported          10/31/2024   Dental   Dentist two times every year? Yes             10/31/2024   TB Screening   Were you born outside of the [place]? No              Today's PHQ-2 Score:       10/21/2024     8:37 AM   PHQ-2 ( 1999 [employer])   Q1: Little interest or pleasure in doing things 0   Q2: Feeling down, depressed or hopeless 0   PHQ-2 Score 0         10/31/2024   Substance Use   Alcohol more than 3/day or more than 7/wk No   Do you use any other substances recreationally? No        Social History     Tobacco Use    Smoking status: Former     Types: Cigars    Smokeless tobacco: Never    Tobacco comments:     Occasional Cigar Once in a couple years   Vaping Use    Vaping status: Never Used   Substance Use Topics    Alcohol use: Yes     Comment: social    Drug use: No             10/31/2024   One time HIV Screening   Previous HIV test? Yes          10/31/2024   STI Screening   New sexual partner(s) since last STI/HIV test? No      Last PSA:   PSA   Date Value Ref Range Status   01/13/2021 0.53 0 - 4 ug/L Final     Comment:     Assay Method:  Chemiluminescence using Siemens Vista analyzer     Prostate Specific Antigen Screen   Date Value Ref Range Status   11/01/2023 0.57 0.00 - 3.50 ng/mL Final   05/06/2022 0.67 0.00 - 4.00 ug/L Final     ASCVD Risk   The 10-year ASCVD risk score (Didier TOLEDO, et al., 2019) is: 10%    Values used to calculate the score:      Age: 54 years      Sex: Male      Is Non- : No      Diabetic: No      Tobacco smoker: No      Systolic Blood Pressure: 128 mmHg      Is BP treated: No      HDL Cholesterol: 36 mg/dL      Total Cholesterol: 244 mg/dL         Reviewed and updated as needed this visit by Provider   Tobacco  Allergies  Meds  Problems  Med Hx  Surg Hx  Fam Hx            Past Medical History:   Diagnosis Date    ACL (anterior cruciate ligament) tear--recurrent 01/03/2013    Crohn's disease (H)     Delayed union of fracture 12/01/2011    Fracture, metacarpal 08/03/2011    History of blood transfusion 1987 August 1987    Large  bowel perforation (H) 08/16/2010    Left knee pain 05/10/2021    Nondisplaced fracture of navicular (scaphoid) of left foot, subsequent encounter for fracture with delayed healing 07/03/2019    Puncture wound 04/29/2021    L hand DOI 4/29/21    Sepsis (H) 01/19/2018    He had an incomplete (due to stricture) colonoscopy day of presentation and after that developed acute abdominal pain and fever. He was septic and suspected to have a micro perforation. Imaging negative for larger perforation     Past Surgical History:   Procedure Laterality Date    APPENDECTOMY  1986    ARTHROSCOPY KNEE WITH MEDIAL MENISCECTOMY Left 01/14/2019    Procedure: LEFT KNEE ARTHROSCOPY WITH PARTIAL MEDIAL MENISCECTOMY;  Surgeon: Jose Luis Jama MD;  Location:  OR    COLONOSCOPY  05/14/2020    ENT SURGERY  06/28/2013    Left Tonsil biopsy    EXCISE MASS WRIST Right 11/07/2018    Procedure: EXCISION OF RIGHT DORSAL WRIST MASS;  Surgeon: Zohaib Edmond MD;  Location:  OR    LAPAROSCOPY PROCEDURE UNLISTED  08/16/2010    Exploratory laparotomy, lysis of adhesions, takedown enterocolonic fistula and colocononic fistula, with creation of loop ileostomy    OPEN REDUCTION INTERNAL FIXATION HAND  07/25/2011    right IV MCP hand 7/11    REMOVE HARDWARE LOWER EXTREMITY  04/06/2012    Procedure:REMOVE HARDWARE LOWER EXTREMITY; REMOVAL OF RIGHT TIBIAL SCREW; Surgeon:JOSE LUIS JAMA; Location:[facility]    SMALL BOWEL RESECTION  1986    terminal ileum resection at age 16    TAKEDOWN COLOSTOMY  12/27/2011     Lab work is in process  Labs reviewed in EPIC  BP Readings from Last 3 Encounters:   10/31/24 128/72   02/21/24 123/82   02/14/24 123/68    Wt Readings from Last 3 Encounters:   10/31/24 104.3 kg (230 lb)   10/21/24 102.1 kg (225 lb)   04/19/24 101.2 kg (223 lb)                  Patient Active Problem List   Diagnosis    CARDIOVASCULAR SCREENING; LDL GOAL LESS THAN 160    Family history of diabetes mellitus    Cold sore    Common wart     Crohn's disease with complication, unspecified gastrointestinal tract location (H)    FH: prostate cancer    BMI 28.0-28.9,adult    History of pulmonary embolism    Chronic anticoagulation    KIRA (obstructive sleep apnea) - moderate (AHI 23)     Past Surgical History:   Procedure Laterality Date    APPENDECTOMY  1986    ARTHROSCOPY KNEE WITH MEDIAL MENISCECTOMY Left 01/14/2019    Procedure: LEFT KNEE ARTHROSCOPY WITH PARTIAL MEDIAL MENISCECTOMY;  Surgeon: Jose Luis Jama MD;  Location:  OR    COLONOSCOPY  05/14/2020    ENT SURGERY  06/28/2013    Left Tonsil biopsy    EXCISE MASS WRIST Right 11/07/2018    Procedure: EXCISION OF RIGHT DORSAL WRIST MASS;  Surgeon: Zohaib Edmond MD;  Location:  OR    LAPAROSCOPY PROCEDURE UNLISTED  08/16/2010    Exploratory laparotomy, lysis of adhesions, takedown enterocolonic fistula and colocononic fistula, with creation of loop ileostomy    OPEN REDUCTION INTERNAL FIXATION HAND  07/25/2011    right IV MCP hand 7/11    REMOVE HARDWARE LOWER EXTREMITY  04/06/2012    Procedure:REMOVE HARDWARE LOWER EXTREMITY; REMOVAL OF RIGHT TIBIAL SCREW; Surgeon:JOSE LUIS JAMA; Location: [facility]    SMALL BOWEL RESECTION  1986    terminal ileum resection at age 16    TAKEDOWN COLOSTOMY  12/27/2011       Social History     Tobacco Use    Smoking status: Former     Types: Cigars    Smokeless tobacco: Never    Tobacco comments:     Occasional Cigar Once in a couple years   Substance Use Topics    Alcohol use: Yes     Comment: social     Family History   Problem Relation Age of Onset    Respiratory Mother         SMOKER -EMPYSEMA    Diabetes Father     Prostate Cancer Father 50    Prostate Cancer Paternal Grandfather     Diabetes Brother     Prostate Cancer Paternal Uncle 50    Hypertension No family hx of          Current Outpatient Medications   Medication Sig Dispense Refill    clobetasol (TEMOVATE) 0.05 % external solution Use nightly for up to 2 weeks per month on psoriasis on scalp  "50 mL 2    diphenoxylate-atropine (LOMOTIL) 2.5-0.025 MG per tablet Take 2 tablets by mouth As Needed      mercaptopurine (PURINETHOL) 50 MG tablet Take 50 mg by mouth daily 1 and half tabs daily      rivaroxaban ANTICOAGULANT (XARELTO ANTICOAGULANT) 20 MG TABS tablet Take 1 tablet (20 mg) by mouth daily with food. . Take at the same time each day. 90 tablet 4    SUMAtriptan (IMITREX) 50 MG tablet TAKE 1-2 TABS BY MOUTH AT ONSET OF HEADACHE FOR MIGRAINE MAY REPEAT IN 2 HOURS. MAX 4 TABLETS/24 HOURS. 30 tablet 0    tacrolimus (PROTOPIC) 0.1 % external ointment Apply topically 2 times daily 100 g 3    ustekinumab (STELARA) 90 MG/ML 90 mg Every 8 weeks       No Known Allergies      Review of Systems  Constitutional, HEENT, cardiovascular, pulmonary, gi and gu systems are negative, except as otherwise noted.     Objective    Exam  /72   Pulse 80   Temp (!) 96  F (35.6  C) (Tympanic)   Resp 16   Ht 1.892 m (6' 2.5\")   Wt 104.3 kg (230 lb)   SpO2 96%   BMI 29.14 kg/m     Estimated body mass index is 29.14 kg/m  as calculated from the following:    Height as of this encounter: 1.892 m (6' 2.5\").    Weight as of this encounter: 104.3 kg (230 lb).    Physical Exam  GENERAL: alert and no distress  EYES: Eyes grossly normal to inspection, PERRL and conjunctivae and sclerae normal  HENT: ear canals and TM's normal, nose and mouth without ulcers or lesions  NECK: no adenopathy, no asymmetry, masses, or scars  RESP: lungs clear to auscultation - no rales, rhonchi or wheezes  CV: regular rate and rhythm, normal S1 S2, no S3 or S4, no murmur, click or rub, no peripheral edema  ABDOMEN: soft, nontender, no hepatosplenomegaly, no masses and bowel sounds normal  MS: no gross musculoskeletal defects noted, no edema  SKIN: no suspicious lesions or rashes  NEURO: Normal strength and tone, mentation intact and speech normal  PSYCH: mentation appears normal, affect normal/bright    The 10-year ASCVD risk score (Clearfield DK, et " al., 2019) is: 10%    Values used to calculate the score:      Age: 54 years      Sex: Male      Is Non- : No      Diabetic: No      Tobacco smoker: No      Systolic Blood Pressure: 128 mmHg      Is BP treated: No      HDL Cholesterol: 36 mg/dL      Total Cholesterol: 244 mg/dL     Signed Electronically by: Vince Swain PA-C

## 2024-10-31 NOTE — PROGRESS NOTES
Preventive Care Visit  [facility]  Vince Swain PA-C, Family Medicine  Oct 31, 2024  {Provider  Link to SmartSet :979216}    {PROVIDER CHARTING PREFERENCE:156016}    Emily Patel is a 54 year old, presenting for the following:  Physical    {(!) Visit Details have not yet been documented.  Please enter Visit Details and then use this list to pull in documentation. (Optional):295327}       HPI  ***  {MA/LPN/RN Pre-Provider Visit Orders- hCG/UA/Strep (Optional):322965}  {SUPERLIST (Optional):730636}  {additonal problems for provider to add (Optional):410191}  Health Care Directive  Patient does not have a Health Care Directive: {ADVANCE_DIRECTIVE_STATUS:109558}       No data to display                  11/1/2023   Nutrition   Three or more servings of calcium each day? Yes   Diet: regular (no restrictions)            11/1/2023   Exercise   Frequency of exercise: 4-5 days/week            11/1/2023   Social Factors   Worry food won't last until get money to buy more No   Food not last or not have enough money for food? No   Do you have housing? (Housing is defined as stable permanent housing and does not include staying ouside in a car, in a tent, in an abandoned building, in an overnight shelter, or couch-surfing.) Yes   Are you worried about losing your housing? No   Lack of transportation? No   Unable to get utilities (heat,electricity)? No            11/1/2023   Dental   Dentist two times every year? Yes             {Rooming Staff Patient needs a PHQ as part of the AWV.  Use this link to complete and then refresh the note to pull results Link to PHQ2 Assessment :096963}  {USE TO PULL IN PHQ RESULTS FOR TODAY:378997}      11/1/2023   Substance Use   Alcohol more than 3/day or more than 7/wk No        Social History     Tobacco Use    Smoking status: Former     Types: Cigars    Smokeless tobacco: Never    Tobacco comments:     Occasional Cigar Once in a couple years   Vaping Use  "   Vaping status: Never Used   Substance Use Topics    Alcohol use: Yes     Comment: social    Drug use: No     {Provider  If there are gaps in the social history shown above, please follow the link to update and then refresh the note Link to Social and Substance History :647001}  Last PSA:   PSA   Date Value Ref Range Status   01/13/2021 0.53 0 - 4 ug/L Final     Comment:     Assay Method:  Chemiluminescence using Siemens Vista analyzer     Prostate Specific Antigen Screen   Date Value Ref Range Status   11/01/2023 0.57 0.00 - 3.50 ng/mL Final   05/06/2022 0.67 0.00 - 4.00 ug/L Final     ASCVD Risk   The ASCVD Risk score (Didier TOLEDO, et al., 2019) failed to calculate for the following reasons:    The systolic blood pressure is missing    {Link to Fracture Risk Assessment Tool (Optional):817291}    {Provider  REQUIRED FOR AWV Use the storyboard to review patient history, after sections have been marked as reviewed, refresh note to capture documentation:181900}   Reviewed and updated as needed this visit by Provider                    {HISTORY OPTIONS (Optional):895680}    {ROS Picklists (Optional):650987}     Objective    Exam  There were no vitals taken for this visit.   Estimated body mass index is 28.5 kg/m  as calculated from the following:    Height as of 10/21/24: 1.892 m (6' 2.5\").    Weight as of 10/21/24: 102.1 kg (225 lb).    Physical Exam  {Exam Choices (Optional):180972}        Signed Electronically by: Vince Swain PA-C  {Email feedback regarding this note to [email or web address]   :365967}  "

## 2024-11-02 LAB
TESTOST FREE SERPL-MCNC: 5.65 NG/DL
TESTOST SERPL-MCNC: 293 NG/DL (ref 240–950)

## 2024-11-18 ENCOUNTER — TRANSFERRED RECORDS (OUTPATIENT)
Dept: HEALTH INFORMATION MANAGEMENT | Facility: CLINIC | Age: 54
End: 2024-11-18
Payer: COMMERCIAL

## 2025-01-14 ENCOUNTER — DOCUMENTATION ONLY (OUTPATIENT)
Dept: SLEEP MEDICINE | Facility: CLINIC | Age: 55
End: 2025-01-14
Payer: COMMERCIAL

## 2025-01-14 DIAGNOSIS — G47.33 OSA (OBSTRUCTIVE SLEEP APNEA): Primary | Chronic | ICD-10-CM

## 2025-01-14 NOTE — PROGRESS NOTES
DATE: 1/14/2025  LOCATION OF MASK FITTING: [facility] [facility]  REASON FOR MASK FITTING: WANTING TO TRY NASAL STYLE  DISCUSSED/VIEWED THE FOLLOWING MASKS: AIRFIT N20, DRAMWISP NASAL, DREAMWEAR NASAL, AIRFIT N30I, AIRFIT P30I, AIRFIT P10    MASK AND SIZE SELECTED: AIRFIT P30I MEDIUM PILLOWS  ----------SECOND CHOICE: AIRFIT P10  MASK CLARIFICATION NEEDED: N    DOES PATIENT WEAR A MEDICAL DEVICE THAT WILL BE AFFECTED BY THE RESPIRONICS OR RESMED MAGNETIC MASK CONTRAINDICATION (IF YES, SELECT A MASK THAT DOES NOT HAVE MAGNETS)? N    PROVIDED PATIENT WITH UPDATED INSURANCE SUPPLY SCHEDULE    ELMA ALCOCER

## 2025-01-20 DIAGNOSIS — N48.6 PEYRONIE'S DISEASE: ICD-10-CM

## 2025-01-28 NOTE — TELEPHONE ENCOUNTER
SILDENAFIL 100 MG TABLET       Last Written Prescription Date:     sildenafil (VIAGRA) 100 MG tablet (Discontinued) 10 tablet 11 10/12/2023 2/21/2024      Last Office Visit : 10/12/23  Future Office visit:  None    Routing refill request to provider for review/approval because:  Drug not active on patient's medication list, Discontinued 2/21/24. Appt past due

## 2025-01-29 RX ORDER — SILDENAFIL 100 MG/1
50-100 TABLET, FILM COATED ORAL DAILY PRN
Qty: 10 TABLET | OUTPATIENT
Start: 2025-01-29

## 2025-02-27 ENCOUNTER — OFFICE VISIT (OUTPATIENT)
Dept: UROLOGY | Facility: CLINIC | Age: 55
End: 2025-02-27
Payer: COMMERCIAL

## 2025-02-27 VITALS — DIASTOLIC BLOOD PRESSURE: 81 MMHG | OXYGEN SATURATION: 95 % | SYSTOLIC BLOOD PRESSURE: 128 MMHG | HEART RATE: 80 BPM

## 2025-02-27 DIAGNOSIS — N52.9 ERECTILE DYSFUNCTION, UNSPECIFIED ERECTILE DYSFUNCTION TYPE: Primary | ICD-10-CM

## 2025-02-27 DIAGNOSIS — N48.6 PEYRONIE'S DISEASE: ICD-10-CM

## 2025-02-27 RX ORDER — SILDENAFIL 100 MG/1
50-100 TABLET, FILM COATED ORAL DAILY PRN
Qty: 30 TABLET | Refills: 11 | Status: SHIPPED | OUTPATIENT
Start: 2025-02-27

## 2025-02-27 ASSESSMENT — PAIN SCALES - GENERAL: PAINLEVEL_OUTOF10: NO PAIN (0)

## 2025-02-27 NOTE — PROGRESS NOTES
Jorge Amaral is a 54 year old male here for Peyronie's disease follow-up.  He has history of a Peyronie's scar at base of the penis, associated with some overall loss of length, hinge effect at the base, and some upward curve.   Last seen October 2023.  Due for medication refill.    /81 (BP Location: Right arm, Patient Position: Sitting, Cuff Size: Adult Regular)   Pulse 80   SpO2 95%     General: Alert, oriented, nad  Eyes: anicteric, EOMI.  Pulse: regular  Resps: normal, non-labored.  Abdomen:  nondistended.   exam deferred.       Assessment/plan:  Peyronie's disease.  - discussed there is no easy intervention given location and morphology of plaque.  Hinge effect cannot effectively dealt with except for plaque excision and grafting. I think this would be overly invasive and probably do more harm than good.   - discussed mechanical therapies of Xiaflex injections and hand stretching.   Curve is less than 30 degrees, so likely collagenase injections would not be covered.    RestoreX penile traction device is not going to work well due to very proximal location of the curve.  - discussed surgical therapies including plication, patch graft, penile prosthesis as a last resort.  Plication may cause more shortening and may accentuate hinge effect.  Plaque excision and grafting may cause denovo ED, but a grafting would be the only thing to regain girth.    Erectile dysfunction  Refilled sildenafil, he uses this for firmness of erections.      I'm happy to see him back in the future as needed.     --------------------------------------------------------------------------------------------------------------------   Additional Coding Information:    Problems:  4 -- two or more stable chronic illnesses    Data Reviewed  N/A     Level of risk:  4 -- prescription drug management    Time spent:  12 minutes spent on the date of the encounter doing chart review, history and exam, documentation and further  activities per the note

## 2025-02-27 NOTE — LETTER
2/27/2025       RE: Jorge Amaral  [address] [place] [facility] [address]     Dear Colleague,    Thank you for referring your patient, Jorge Amaral, to the [facility] [place] at [facility]. Please see a copy of my visit note below.        Jorge Amaral is a 54 year old male here for Peyronie's disease follow-up.  He has history of a Peyronie's scar at base of the penis, associated with some overall loss of length, hinge effect at the base, and some upward curve.   Last seen October 2023.  Due for medication refill.    /81 (BP Location: Right arm, Patient Position: Sitting, Cuff Size: Adult Regular)   Pulse 80   SpO2 95%     General: Alert, oriented, nad  Eyes: anicteric, EOMI.  Pulse: regular  Resps: normal, non-labored.  Abdomen:  nondistended.   exam deferred.       Assessment/plan:  Peyronie's disease.  - discussed there is no easy intervention given location and morphology of plaque.  Hinge effect cannot effectively dealt with except for plaque excision and grafting. I think this would be overly invasive and probably do more harm than good.   - discussed mechanical therapies of Xiaflex injections and hand stretching.   Curve is less than 30 degrees, so likely collagenase injections would not be covered.    RestoreX penile traction device is not going to work well due to very proximal location of the curve.  - discussed surgical therapies including plication, patch graft, penile prosthesis as a last resort.  Plication may cause more shortening and may accentuate hinge effect.  Plaque excision and grafting may cause denovo ED, but a grafting would be the only thing to regain girth.    Erectile dysfunction  Refilled sildenafil, he uses this for firmness of erections.      I'm happy to see him back in the future as needed.      --------------------------------------------------------------------------------------------------------------------   Additional Coding Information:    Problems:  4 -- two or more stable chronic illnesses    Data Reviewed  N/A     Level of risk:  4 -- prescription drug management    Time spent:  12 minutes spent on the date of the encounter doing chart review, history and exam, documentation and further activities per the note          Again, thank you for allowing me to participate in the care of your patient.      Sincerely,    Cliff Langston MD

## 2025-02-27 NOTE — NURSING NOTE
Jorge Amaral is a 54 year old male patient that presents today in clinic for the following:    Chief Complaint   Patient presents with    Follow Up     Peyronie's disease        The patient's allergies and medications were reviewed as noted. A set of vitals were recorded as noted without incident. The patient does not have any other questions for the provider.    Blood pressure 128/81, pulse 80, SpO2 95%. There is no height or weight on file to calculate BMI.    Patient Active Problem List   Diagnosis    CARDIOVASCULAR SCREENING; LDL GOAL LESS THAN 160    Family history of diabetes mellitus    Cold sore    Common wart    Crohn's disease with complication, unspecified gastrointestinal tract location (H)    FH: prostate cancer    BMI 28.0-28.9,adult    History of pulmonary embolism    Chronic anticoagulation    KIRA (obstructive sleep apnea) - moderate (AHI 23)       No Known Allergies    Current Outpatient Medications   Medication Sig Dispense Refill    clobetasol (TEMOVATE) 0.05 % external solution Use nightly for up to 2 weeks per month on psoriasis on scalp 50 mL 2    diphenoxylate-atropine (LOMOTIL) 2.5-0.025 MG per tablet Take 2 tablets by mouth As Needed      mercaptopurine (PURINETHOL) 50 MG tablet Take 50 mg by mouth daily 1 and half tabs daily      rivaroxaban ANTICOAGULANT (XARELTO ANTICOAGULANT) 20 MG TABS tablet Take 1 tablet (20 mg) by mouth daily with food. . Take at the same time each day. 90 tablet 4    SUMAtriptan (IMITREX) 50 MG tablet TAKE 1-2 TABS BY MOUTH AT ONSET OF HEADACHE FOR MIGRAINE MAY REPEAT IN 2 HOURS. MAX 4 TABLETS/24 HOURS. 30 tablet 0    tacrolimus (PROTOPIC) 0.1 % external ointment Apply topically 2 times daily 100 g 3    ustekinumab (STELARA) 90 MG/ML 90 mg Every 8 weeks         Social History     Tobacco Use    Smoking status: Former     Types: Cigars     Passive exposure: Past    Smokeless tobacco: Never    Tobacco comments:     Occasional Cigar Once in a couple years   Vaping  Use    Vaping status: Never Used   Substance Use Topics    Alcohol use: Yes     Comment: social    Drug use: No       Kennedi Ibarra LPN  2/27/2025  1:25 PM

## 2025-03-25 ENCOUNTER — TRANSFERRED RECORDS (OUTPATIENT)
Dept: MULTI SPECIALTY CLINIC | Facility: CLINIC | Age: 55
End: 2025-03-25
Payer: COMMERCIAL

## 2025-03-25 LAB
ALT SERPL-CCNC: 33 IU/L (ref 0–44)
AST SERPL-CCNC: 24 IU/L (ref 0–40)

## 2025-03-31 ENCOUNTER — TRANSFERRED RECORDS (OUTPATIENT)
Dept: LAB | Facility: CLINIC | Age: 55
End: 2025-03-31
Payer: COMMERCIAL

## 2025-04-01 NOTE — PROCEDURES
2025        Jorge Amaral   [address] [place] [address]      Jorge Amaral,  :  1970    Your Quantiferon TB test is negative.  You do not have tuberculosis.  Hepatic Function Panel (7) 2025 09:04   Description Result Units Flags Range   Bilirubin, Total 0.4 mg/dL  0.0-1.2   Bilirubin, Direct 0.13 mg/dL  0.00-0.40   AST (SGOT) 24 IU/L  0-40   ALT (SGPT) 33 IU/L  0-44   Albumin 4.3 g/dL  3.8-4.9   Alkaline Phosphatase 90 IU/L     Protein, Total 6.7 g/dL  6.0-8.5   Comments   Performed At: [facility], [facility]  [address] [facility], [place], [place], [id number]  Joshua Rolle MD, Phone: [phone]   CBC With Differential/Platelet 2025 09:04   Description Result Units Flags Range   Hemoglobin 14.9 g/dL  13.0-17.7   Hematocrit 44.9 %  37.5-51.0   MCV 98 fL H 79-97   MCHC 33.2 g/dL  31.5-35.7   MCH 32.5 pg  26.6-33.0   RDW 15.0 %  11.6-15.4   Platelets 238 x10E3/uL  150-450   Neutrophils 48 %  Not Estab.   Lymphs 40 %  Not Estab.   Monocytes 10 %  Not Estab.   Eos 1 %  Not Estab.   Basos 1 %  Not Estab.   Neutrophils (Absolute) 1.8 x10E3/uL  1.4-7.0   Lymphs (Absolute) 1.5 x10E3/uL  0.7-3.1   Monocytes(Absolute) 0.4 x10E3/uL  0.1-0.9   Eos (Absolute) 0.1 x10E3/uL  0.0-0.4   Baso (Absolute) 0.0 x10E3/uL  0.0-0.2   Immature Grans (Abs) 0.0 x10E3/uL  0.0-0.1   Immature Granulocytes 0 %  Not Estab.   RBC 4.58 x10E6/uL  4.14-5.80   WBC 3.7 x10E3/uL  3.4-10.8   Comments   First Available              Performed At: , [facility]  [address] [facility], [place], [place], [id number]  Joshua Rolle MD, Phone: [phone]     QuantiFERON-TB Gold Plus 2025 09:04   Description Result Units Flags Range   QuantiFERON-TB Gold Plus Negative   Negative   Comments   Performed At: [facility], [facility]  [address], [place], [place], [id number]  Joshua Rolle MD, Phone: [phone]   QuantiFERON-TB Gold Plus:  No response to M tuberculosis antigens detected.  Infection with M tuberculosis  is unlikely, but high risk  individuals should be considered for additional testing  (ATS/IDSA/CDC Clinical Practice Guidelines, 2017). The  reference range is an Antigen minus Nil result of <0.35 IU/mL.  Chemiluminescence immunoassay methodology   QuantiFERON-TB Gold Plus 03/25/2025 09:04   Description Result Units Flags Range   QuantiFERON Criteria Comment      QuantiFERON Incubation Incubation performed.      QuantiFERON Mitogen Value >10.00 IU/mL     QuantiFERON Nil Value 0.15 IU/mL     QuantiFERON TB1 Ag Value 0.11 IU/mL     QuantiFERON TB2 Ag Value 0.08 IU/mL     Comments   First Available              Performed At: [facility], [facility] [place] [address], [place], [place], [id number]  Richard Aaron, PhD, Phone: [phone]  Performed At: [facility], [facility] [place]  [address], [place], [place], [id number]  Joshua Rolle MD, Phone: [phone]   QuantiFERON Criteria:  QuantiFERON-TB Gold Plus is a qualitative indirect test for  M tuberculosis infection (including disease) and is intended for use  in conjunction with risk assessment, radiography, and other medical  and diagnostic evaluations. The QuantiFERON-TB Gold Plus result is  determined by subtracting the Nil value from either TB antigen (Ag)  value. The Mitogen tube serves as a control for the test.           Thank you.    Electronically signed by:  Aminata LOPEZ 03/31/2025 11:04 AM  Document generated by:  Aminata LOPEZ  03/31/2025  If your provider ordered multiple tests; the results may not become available at the same time.  If multiple test results are received within 14 days of one another, you may receive a duplicate.  cc:  Vince KOHLI

## 2025-06-05 ENCOUNTER — TRANSFERRED RECORDS (OUTPATIENT)
Dept: MULTI SPECIALTY CLINIC | Facility: CLINIC | Age: 55
End: 2025-06-05
Payer: COMMERCIAL

## 2025-06-05 LAB
ALT SERPL-CCNC: 32 IU/L (ref 0–44)
AST SERPL-CCNC: 40 IU/L (ref 0–40)

## 2025-06-24 ENCOUNTER — TELEPHONE (OUTPATIENT)
Dept: ORTHOPEDICS | Facility: CLINIC | Age: 55
End: 2025-06-24
Payer: COMMERCIAL

## 2025-06-24 DIAGNOSIS — M17.0 PRIMARY OSTEOARTHRITIS OF BOTH KNEES: Primary | ICD-10-CM

## 2025-07-16 ENCOUNTER — OFFICE VISIT (OUTPATIENT)
Dept: ORTHOPEDICS | Facility: CLINIC | Age: 55
End: 2025-07-16
Payer: COMMERCIAL

## 2025-07-16 DIAGNOSIS — M17.0 PRIMARY OSTEOARTHRITIS OF BOTH KNEES: Primary | ICD-10-CM

## 2025-07-16 SDOH — HEALTH STABILITY: PHYSICAL HEALTH: ON AVERAGE, HOW MANY MINUTES DO YOU ENGAGE IN EXERCISE AT THIS LEVEL?: 90 MIN

## 2025-07-16 SDOH — HEALTH STABILITY: PHYSICAL HEALTH: ON AVERAGE, HOW MANY DAYS PER WEEK DO YOU ENGAGE IN MODERATE TO STRENUOUS EXERCISE (LIKE A BRISK WALK)?: 5 DAYS

## 2025-07-16 NOTE — PROGRESS NOTES
Jorge Amaral  :  1970  DOS: 2025  MRN: [id number]      Sports Medicine Clinic Procedure    Ultrasound Guided Bilateral Intra-Articular Knee SynviscOne Injection, +/- Aspiration    Clinical History: Interim History - 2025  Since last visit on 9/10/2024 with Dr Munguia patient has moderate bilateral knee pain over the past 1 - 2 months.  Bilateral knee SynviscOne injection completed on 9/10/2024 provided relief for ~ 6+ months.  No new injury in the interim.    Diagnosis:   1. Primary osteoarthritis of both knees      Large Joint Injection/Arthocentesis: bilateral knee    Date/Time: 2025 9:53 AM    Performed by: Sanjay Hatch DO  Authorized by: Sanjay Hatch DO    Indications:  Osteoarthritis  Needle Size:  21 G  Guidance: ultrasound    Approach:  Superolateral  Location:  Knee  Laterality:  Bilateral      Medications (Right):  48 mg hylan 48 MG/6ML  Aspirate amount (mL):  7  Aspirate:  Serous and yellow  Medications (Left):  48 mg hylan 48 MG/6ML  Aspirate amount (mL):  22  Aspirate:  Serous and yellow  Outcome:  Tolerated well, no immediate complications  Procedure discussed: discussed risks, benefits, and alternatives    Consent Given by:  Patient  Timeout: timeout called immediately prior to procedure    Prep: patient was prepped and draped in usual sterile fashion     Ultrasound images of procedure were permanently stored.      Impression:  Successful Bilateral intra-articular knee SynviscOne injection and aspiration.    Plan:  Follow up one month if not improved  Expectations and limitations of SynviscOne were reviewed in detail  Often 4-6 weeks before full effect may be noticed  Usually covered up to every 6 months by insurance, but does not need to be repeated unless pain returns, at which point we would re-evaluate  Potential use of CSI in future for flares of pain reviewed in detail  Encouraged modified progressive pain-free activity as tolerated  HEP  and Supportive care reviewed  All questions were answered today  Contact us with additional questions or concerns  Signs and sx of concern reviewed      Sanjay Hatch DO, CASALIMA  Primary Care Sports Medicine  [facility]

## 2025-07-16 NOTE — LETTER
2025      Jorge Amaral  [address] [place] [facility] [address]      Dear Colleague,    Thank you for referring your patient, Jorge Amaral, to the [facility]. Please see a copy of my visit note below.    Jorge Amaral  :  1970  DOS: 2025  MRN: [id number]      Sports Medicine Clinic Procedure    Ultrasound Guided Bilateral Intra-Articular Knee SynviscOne Injection, +/- Aspiration    Clinical History: Interim History - 2025  Since last visit on 9/10/2024 with Dr Munguia patient has moderate bilateral knee pain over the past 1 - 2 months.  Bilateral knee SynviscOne injection completed on 9/10/2024 provided relief for ~ 6+ months.  No new injury in the interim.    Diagnosis:   1. Primary osteoarthritis of both knees      Large Joint Injection/Arthocentesis: bilateral knee    Date/Time: 2025 9:53 AM    Performed by: Sanjay Hatch DO  Authorized by: Sanjay Hatch DO    Indications:  Osteoarthritis  Needle Size:  21 G  Guidance: ultrasound    Approach:  Superolateral  Location:  Knee  Laterality:  Bilateral      Medications (Right):  48 mg hylan 48 MG/6ML  Aspirate amount (mL):  7  Aspirate:  Serous and yellow  Medications (Left):  48 mg hylan 48 MG/6ML  Aspirate amount (mL):  22  Aspirate:  Serous and yellow  Outcome:  Tolerated well, no immediate complications  Procedure discussed: discussed risks, benefits, and alternatives    Consent Given by:  Patient  Timeout: timeout called immediately prior to procedure    Prep: patient was prepped and draped in usual sterile fashion     Ultrasound images of procedure were permanently stored.      Impression:  Successful Bilateral intra-articular knee SynviscOne injection and aspiration.    Plan:  Follow up one month if not improved  Expectations and limitations of SynviscOne were reviewed in detail  Often 4-6 weeks before full effect may be noticed  Usually covered up to every 6  months by insurance, but does not need to be repeated unless pain returns, at which point we would re-evaluate  Potential use of CSI in future for flares of pain reviewed in detail  Encouraged modified progressive pain-free activity as tolerated  HEP and Supportive care reviewed  All questions were answered today  Contact us with additional questions or concerns  Signs and sx of concern reviewed      Sanjay Hatch DO, CAQ  Primary Care Sports Medicine  [facility]       Again, thank you for allowing me to participate in the care of your patient.        Sincerely,        Sanjay Hatch DO    Electronically signed

## (undated) DEVICE — SU MONOCRYL 3-0 PS-2 27" Y427H

## (undated) DEVICE — PREP CHLORAPREP 26ML TINTED ORANGE  260815

## (undated) DEVICE — BNDG KLING 2" 2231

## (undated) DEVICE — DRSG STERI STRIP 1/2X4" R1547

## (undated) DEVICE — DRSG ADAPTIC 3X8" 6113

## (undated) DEVICE — NDL 19GA 1.5"

## (undated) DEVICE — SOL WATER IRRIG 1000ML BOTTLE 07139-09

## (undated) DEVICE — WRAP EZY KNEE

## (undated) DEVICE — BNDG ESMARK 6" STERILE 836-3612

## (undated) DEVICE — TUBING ARTHRO CONMED/LINVATEC PUMP BLUE INFLOW 10K100

## (undated) DEVICE — GLOVE PROTEXIS W/NEU-THERA 7.5  2D73TE75

## (undated) DEVICE — NDL 25GA 1.5" 305127

## (undated) DEVICE — ADHESIVE SWIFTSET 0.8ML OCTYL SS6

## (undated) DEVICE — DRAPE STERI TOWEL SM 1000

## (undated) DEVICE — SOL NACL 0.9% IRRIG 3000ML BAG 2B7477

## (undated) DEVICE — BNDG ELASTIC 2"X5YDS UNSTERILE 6611-20

## (undated) DEVICE — ESU ELEC NDL 1" COATED/INSULATED E1465

## (undated) DEVICE — SU ETHILON 3-0 FS-1 18" 669H

## (undated) DEVICE — PACK HAND WRIST SOP15HWFSP

## (undated) DEVICE — GLOVE PROTEXIS MICRO 6.5  2D73PM65

## (undated) DEVICE — SOL WATER IRRIG 1000ML BOTTLE 2F7114

## (undated) DEVICE — BLADE SHAVER ARTHRO 4.2MM CUDA C9254

## (undated) DEVICE — GLOVE PROTEXIS POWDER FREE 8.5 ORTHOPEDIC 2D73ET85

## (undated) DEVICE — PACK ARTHROSCOPY KNEE SOP15AKFSM

## (undated) DEVICE — GLOVE PROTEXIS POWDER FREE 8.0 ORTHOPEDIC 2D73ET80

## (undated) DEVICE — GLOVE PROTEXIS W/NEU-THERA 8.0  2D73TE80

## (undated) DEVICE — GLOVE PROTEXIS W/NEU-THERA 8.5  2D73TE85

## (undated) DEVICE — CAST PADDING 6" STERILE 9046S

## (undated) RX ORDER — KETOROLAC TROMETHAMINE 30 MG/ML
INJECTION, SOLUTION INTRAMUSCULAR; INTRAVENOUS
Status: DISPENSED
Start: 2019-01-14

## (undated) RX ORDER — CEFAZOLIN SODIUM 2 G/100ML
INJECTION, SOLUTION INTRAVENOUS
Status: DISPENSED
Start: 2019-01-14

## (undated) RX ORDER — ACETAMINOPHEN 325 MG/1
TABLET ORAL
Status: DISPENSED
Start: 2018-11-07

## (undated) RX ORDER — PROPOFOL 10 MG/ML
INJECTION, EMULSION INTRAVENOUS
Status: DISPENSED
Start: 2019-01-14

## (undated) RX ORDER — LIDOCAINE HYDROCHLORIDE 10 MG/ML
INJECTION, SOLUTION EPIDURAL; INFILTRATION; INTRACAUDAL; PERINEURAL
Status: DISPENSED
Start: 2018-11-07

## (undated) RX ORDER — BUPIVACAINE HYDROCHLORIDE 5 MG/ML
INJECTION, SOLUTION EPIDURAL; INTRACAUDAL
Status: DISPENSED
Start: 2019-01-14

## (undated) RX ORDER — FENTANYL CITRATE 50 UG/ML
INJECTION, SOLUTION INTRAMUSCULAR; INTRAVENOUS
Status: DISPENSED
Start: 2019-01-14

## (undated) RX ORDER — DEXAMETHASONE SODIUM PHOSPHATE 4 MG/ML
INJECTION, SOLUTION INTRA-ARTICULAR; INTRALESIONAL; INTRAMUSCULAR; INTRAVENOUS; SOFT TISSUE
Status: DISPENSED
Start: 2019-01-14

## (undated) RX ORDER — BUPIVACAINE HYDROCHLORIDE 2.5 MG/ML
INJECTION, SOLUTION EPIDURAL; INFILTRATION; INTRACAUDAL
Status: DISPENSED
Start: 2018-11-07

## (undated) RX ORDER — LIDOCAINE HYDROCHLORIDE 20 MG/ML
INJECTION, SOLUTION EPIDURAL; INFILTRATION; INTRACAUDAL; PERINEURAL
Status: DISPENSED
Start: 2019-01-14

## (undated) RX ORDER — HYDROCODONE BITARTRATE AND ACETAMINOPHEN 5; 325 MG/1; MG/1
TABLET ORAL
Status: DISPENSED
Start: 2019-01-14

## (undated) RX ORDER — ONDANSETRON 2 MG/ML
INJECTION INTRAMUSCULAR; INTRAVENOUS
Status: DISPENSED
Start: 2019-01-14

## (undated) RX ORDER — GABAPENTIN 300 MG/1
CAPSULE ORAL
Status: DISPENSED
Start: 2018-11-07

## (undated) RX ORDER — OXYCODONE HYDROCHLORIDE 5 MG/1
TABLET ORAL
Status: DISPENSED
Start: 2018-11-07

## (undated) RX ORDER — FENTANYL CITRATE 50 UG/ML
INJECTION, SOLUTION INTRAMUSCULAR; INTRAVENOUS
Status: DISPENSED
Start: 2018-11-07